# Patient Record
Sex: MALE | Race: WHITE | Employment: OTHER | URBAN - METROPOLITAN AREA
[De-identification: names, ages, dates, MRNs, and addresses within clinical notes are randomized per-mention and may not be internally consistent; named-entity substitution may affect disease eponyms.]

---

## 2017-09-24 ENCOUNTER — HOSPITAL ENCOUNTER (INPATIENT)
Facility: CLINIC | Age: 42
LOS: 15 days | Discharge: HOME | DRG: 885 | End: 2017-10-10
Attending: FAMILY MEDICINE | Admitting: PSYCHIATRY & NEUROLOGY
Payer: MEDICARE

## 2017-09-24 DIAGNOSIS — Z72.0 TOBACCO USE: Primary | ICD-10-CM

## 2017-09-24 DIAGNOSIS — F41.1 GAD (GENERALIZED ANXIETY DISORDER): ICD-10-CM

## 2017-09-24 DIAGNOSIS — F31.2 BIPOLAR AFFECTIVE DISORDER, MANIC, SEVERE, WITH PSYCHOTIC BEHAVIOR (H): ICD-10-CM

## 2017-09-24 DIAGNOSIS — F19.10 POLYSUBSTANCE ABUSE (H): ICD-10-CM

## 2017-09-24 LAB
ALBUMIN SERPL-MCNC: 3.8 G/DL (ref 3.4–5)
ALP SERPL-CCNC: 78 U/L (ref 40–150)
ALT SERPL W P-5'-P-CCNC: 23 U/L (ref 0–70)
AMPHETAMINES UR QL SCN: NEGATIVE
ANION GAP SERPL CALCULATED.3IONS-SCNC: 9 MMOL/L (ref 3–14)
AST SERPL W P-5'-P-CCNC: 17 U/L (ref 0–45)
BARBITURATES UR QL: NEGATIVE
BASOPHILS # BLD AUTO: 0 10E9/L (ref 0–0.2)
BASOPHILS NFR BLD AUTO: 0.4 %
BENZODIAZ UR QL: POSITIVE
BILIRUB SERPL-MCNC: 0.6 MG/DL (ref 0.2–1.3)
BUN SERPL-MCNC: 8 MG/DL (ref 7–30)
CALCIUM SERPL-MCNC: 8.7 MG/DL (ref 8.5–10.1)
CANNABINOIDS UR QL SCN: POSITIVE
CHLORIDE SERPL-SCNC: 110 MMOL/L (ref 94–109)
CO2 SERPL-SCNC: 25 MMOL/L (ref 20–32)
COCAINE UR QL: POSITIVE
CREAT SERPL-MCNC: 0.95 MG/DL (ref 0.66–1.25)
DIFFERENTIAL METHOD BLD: NORMAL
EOSINOPHIL # BLD AUTO: 0.2 10E9/L (ref 0–0.7)
EOSINOPHIL NFR BLD AUTO: 2 %
ERYTHROCYTE [DISTWIDTH] IN BLOOD BY AUTOMATED COUNT: 13.1 % (ref 10–15)
ETHANOL UR QL SCN: NEGATIVE
GFR SERPL CREATININE-BSD FRML MDRD: 88 ML/MIN/1.7M2
GLUCOSE SERPL-MCNC: 136 MG/DL (ref 70–99)
HCT VFR BLD AUTO: 42.3 % (ref 40–53)
HGB BLD-MCNC: 14.2 G/DL (ref 13.3–17.7)
IMM GRANULOCYTES # BLD: 0 10E9/L (ref 0–0.4)
IMM GRANULOCYTES NFR BLD: 0.2 %
LITHIUM SERPL-SCNC: 0.5 MMOL/L (ref 0.6–1.2)
LYMPHOCYTES # BLD AUTO: 2.5 10E9/L (ref 0.8–5.3)
LYMPHOCYTES NFR BLD AUTO: 25.6 %
MCH RBC QN AUTO: 29.3 PG (ref 26.5–33)
MCHC RBC AUTO-ENTMCNC: 33.6 G/DL (ref 31.5–36.5)
MCV RBC AUTO: 87 FL (ref 78–100)
MONOCYTES # BLD AUTO: 0.8 10E9/L (ref 0–1.3)
MONOCYTES NFR BLD AUTO: 8.2 %
NEUTROPHILS # BLD AUTO: 6.2 10E9/L (ref 1.6–8.3)
NEUTROPHILS NFR BLD AUTO: 63.6 %
NRBC # BLD AUTO: 0 10*3/UL
NRBC BLD AUTO-RTO: 0 /100
OPIATES UR QL SCN: NEGATIVE
PLATELET # BLD AUTO: 195 10E9/L (ref 150–450)
POTASSIUM SERPL-SCNC: 3.6 MMOL/L (ref 3.4–5.3)
PROT SERPL-MCNC: 7.2 G/DL (ref 6.8–8.8)
RBC # BLD AUTO: 4.85 10E12/L (ref 4.4–5.9)
SODIUM SERPL-SCNC: 144 MMOL/L (ref 133–144)
WBC # BLD AUTO: 9.8 10E9/L (ref 4–11)

## 2017-09-24 PROCEDURE — 99284 EMERGENCY DEPT VISIT MOD MDM: CPT | Mod: Z6 | Performed by: FAMILY MEDICINE

## 2017-09-24 PROCEDURE — A9270 NON-COVERED ITEM OR SERVICE: HCPCS | Mod: GY | Performed by: FAMILY MEDICINE

## 2017-09-24 PROCEDURE — 99285 EMERGENCY DEPT VISIT HI MDM: CPT | Mod: 25 | Performed by: FAMILY MEDICINE

## 2017-09-24 PROCEDURE — 25000132 ZZH RX MED GY IP 250 OP 250 PS 637: Mod: GY | Performed by: EMERGENCY MEDICINE

## 2017-09-24 PROCEDURE — 85025 COMPLETE CBC W/AUTO DIFF WBC: CPT | Performed by: FAMILY MEDICINE

## 2017-09-24 PROCEDURE — 90791 PSYCH DIAGNOSTIC EVALUATION: CPT

## 2017-09-24 PROCEDURE — 80307 DRUG TEST PRSMV CHEM ANLYZR: CPT | Performed by: NURSE PRACTITIONER

## 2017-09-24 PROCEDURE — 25000132 ZZH RX MED GY IP 250 OP 250 PS 637: Mod: GY | Performed by: FAMILY MEDICINE

## 2017-09-24 PROCEDURE — 80320 DRUG SCREEN QUANTALCOHOLS: CPT | Performed by: NURSE PRACTITIONER

## 2017-09-24 PROCEDURE — 80053 COMPREHEN METABOLIC PANEL: CPT | Performed by: FAMILY MEDICINE

## 2017-09-24 PROCEDURE — A9270 NON-COVERED ITEM OR SERVICE: HCPCS | Mod: GY | Performed by: EMERGENCY MEDICINE

## 2017-09-24 PROCEDURE — 80178 ASSAY OF LITHIUM: CPT | Performed by: FAMILY MEDICINE

## 2017-09-24 RX ORDER — LITHIUM CARBONATE 300 MG/1
1200 TABLET, FILM COATED, EXTENDED RELEASE ORAL AT BEDTIME
Status: DISCONTINUED | OUTPATIENT
Start: 2017-09-24 | End: 2017-09-25

## 2017-09-24 RX ORDER — LORAZEPAM 0.5 MG/1
0.5 TABLET ORAL EVERY 4 HOURS PRN
Status: ON HOLD | COMMUNITY
Start: 2017-06-15 | End: 2017-10-10

## 2017-09-24 RX ORDER — LITHIUM CARBONATE 300 MG/1
1200 TABLET, FILM COATED, EXTENDED RELEASE ORAL AT BEDTIME
Status: ON HOLD | COMMUNITY
Start: 2017-06-15 | End: 2017-10-10

## 2017-09-24 RX ORDER — DIPHENHYDRAMINE HCL 50 MG
50 CAPSULE ORAL ONCE
Status: COMPLETED | OUTPATIENT
Start: 2017-09-24 | End: 2017-09-24

## 2017-09-24 RX ORDER — OLANZAPINE 5 MG/1
10 TABLET, ORALLY DISINTEGRATING ORAL ONCE
Status: COMPLETED | OUTPATIENT
Start: 2017-09-24 | End: 2017-09-24

## 2017-09-24 RX ADMIN — OLANZAPINE 10 MG: 5 TABLET, ORALLY DISINTEGRATING ORAL at 16:05

## 2017-09-24 RX ADMIN — LITHIUM CARBONATE 1200 MG: 300 TABLET, EXTENDED RELEASE ORAL at 22:26

## 2017-09-24 RX ADMIN — DIPHENHYDRAMINE HYDROCHLORIDE 50 MG: 50 CAPSULE ORAL at 16:05

## 2017-09-24 ASSESSMENT — ENCOUNTER SYMPTOMS
ABDOMINAL PAIN: 0
FEVER: 0
DYSPHORIC MOOD: 1
AGITATION: 1
NERVOUS/ANXIOUS: 1
SHORTNESS OF BREATH: 0

## 2017-09-24 NOTE — ED NOTES
Son here to  keys, Keys not in pa=tient's belonging bag. Called [place] [facility] [phone] and was referred to Property room [phone]. Unable to reach any one, on hold for 15 minutes. Will pass on to call in am and follow up with patients keys.

## 2017-09-24 NOTE — ED PROVIDER NOTES
History     Chief Complaint   Patient presents with     Delusional     manic, off meds     Homicidal     threatened to harm hospital staff     HPI  Tyler Case is a 41 year old male who is brought to the emergency room after having made threats to family members and in general against many of his health care providers including the activity.  Patient has a history of bipolar disorder with psychotic symptoms he has also had intermittent drug abuse history and admits to having recently used some cocaine along with being noncompliant with his current medication regimen.  Currently patient is supposed to be taking lithium but has not done so and has had an escalation of his behaviors over the last 2-3 weeks.  Patient had previously been under commitment however currently is no longer under commitment.  Patient on arrival here to the emergency room was verbally aggressive towards staff and stating that his family and all health care providers for part of a group managed by [facility] out to get him.  Patient has a lot of Yazidism preoccupation is delusional paranoid and has made threats of harm against his family.    I have reviewed the Medications, Allergies, Past Medical and Surgical History, and Social History in the Epic system.    PERSONAL MEDICAL HISTORY  Past Medical History:   Diagnosis Date     Bipolar 1 disorder (H) 8/5/2013     Uses marijuana 10/17/2013     PAST SURGICAL HISTORY  Past Surgical History:   Procedure Laterality Date     leg fractures  2002     FAMILY HISTORY  Family History   Problem Relation Age of Onset     DIABETES Maternal Grandmother      Hypertension Paternal Grandfather      CEREBROVASCULAR DISEASE Paternal Grandmother      Alcohol/Drug Mother      Alcohol/Drug Father      Alcohol/Drug Maternal Grandmother      Alcohol/Drug Maternal Grandfather      Alcohol/Drug Paternal Grandmother      Alcohol/Drug Paternal Grandfather      Alcohol/Drug Sister      Alcohol/Drug Brother      Alzheimer  Disease Maternal Grandmother      Arthritis Maternal Grandmother      Arthritis Maternal Grandfather      Depression Mother      Depression Father      Depression Maternal Grandmother      Depression Maternal Grandfather      Eye Disorder Mother      Eye Disorder Father      Eye Disorder Maternal Grandmother      Eye Disorder Maternal Grandfather      Eye Disorder Paternal Grandmother      Eye Disorder Paternal Grandfather      Eye Disorder Sister      Eye Disorder Brother      GASTROINTESTINAL DISEASE Mother      GASTROINTESTINAL DISEASE Father      GASTROINTESTINAL DISEASE Maternal Grandmother      GASTROINTESTINAL DISEASE Maternal Grandfather      GASTROINTESTINAL DISEASE Paternal Grandmother      GASTROINTESTINAL DISEASE Paternal Grandfather      GASTROINTESTINAL DISEASE Sister      GASTROINTESTINAL DISEASE Brother      Neurologic Disorder Mother      Neurologic Disorder Father      Neurologic Disorder Maternal Grandmother      Neurologic Disorder Maternal Grandfather      Neurologic Disorder Paternal Grandmother      Neurologic Disorder Paternal Grandfather      Neurologic Disorder Sister      Neurologic Disorder Brother      Lipids Maternal Grandfather      Lipids Paternal Grandmother      OSTEOPOROSIS Maternal Grandmother      OSTEOPOROSIS Paternal Grandmother      Psychotic Disorder Mother      Psychotic Disorder Father      Psychotic Disorder Maternal Grandmother      Psychotic Disorder Maternal Grandfather      Psychotic Disorder Paternal Grandmother      Psychotic Disorder Paternal Grandfather      Psychotic Disorder Sister      Psychotic Disorder Brother      SOCIAL HISTORY  Social History   Substance Use Topics     Smoking status: Current Every Day Smoker     Types: Cigarettes, Pipe     Smokeless tobacco: Current User     Types: Snuff, Chew     Alcohol use No     MEDICATIONS  Current Facility-Administered Medications   Medication     OLANZapine zydis (zyPREXA) ODT tab 10 mg     diphenhydrAMINE  (BENADRYL) capsule 50 mg     Current Outpatient Prescriptions   Medication     OLANZapine (ZYPREXA) 5 MG tablet     risperiDONE microspheres (RISPERDAL CONSTA) 25 MG injection     risperiDONE (RISPERDAL) 2 MG tablet     ALLERGIES  Allergies   Allergen Reactions     Abilify Discmelt      Suicidal thoughts       Compazine Other (See Comments)     Seizure          Review of Systems   Constitutional: Negative for fever.   Respiratory: Negative for shortness of breath.    Cardiovascular: Negative for chest pain.   Gastrointestinal: Negative for abdominal pain.   Psychiatric/Behavioral: Positive for agitation, behavioral problems and dysphoric mood. The patient is nervous/anxious.    All other systems reviewed and are negative.      Physical Exam   BP: 137/82  Pulse: 86  Temp: 98.8  F (37.1  C)  Resp: 16  Weight: 78.5 kg (173 lb)  SpO2: 96 %  Physical Exam   Constitutional: No distress.   HENT:   Head: Atraumatic.   Mouth/Throat: Oropharynx is clear and moist. No oropharyngeal exudate.   Eyes: Pupils are equal, round, and reactive to light. No scleral icterus.   Cardiovascular: Normal heart sounds and intact distal pulses.    Pulmonary/Chest: Breath sounds normal. No respiratory distress.   Abdominal: Soft. Bowel sounds are normal. There is no tenderness.   Musculoskeletal: He exhibits no edema or tenderness.   Neurological: He is alert. No cranial nerve deficit. He exhibits normal muscle tone. Coordination normal.   Skin: Skin is warm. No rash noted. He is not diaphoretic.   Psychiatric: His mood appears anxious. His affect is angry and labile. His speech is rapid and/or pressured and tangential. He is agitated and aggressive. Thought content is paranoid and delusional. He expresses impulsivity. He expresses homicidal ideation. He expresses no suicidal ideation.       ED Course     ED Course     Procedures     Patient was seen by the  please refer to their report in the notes section of the Epic chart dated  9/24/17      Critical Care time:  none       Assessments & Plan (with Medical Decision Making)           I have reviewed the nursing notes.    I have reviewed the findings, diagnosis, plan and need for follow up with the patient.  Patient with bipolar affective disorder history question of possible PTSD as well as drug-induced psychosis in combination with possible polar affective disorder with psychotic features.  Patient presents today with Mormon preoccupation threatening aggressive behaviors it sounds as though the patient has been medication noncompliant and possibly using illicit drugs lab evaluation was pending at time of this dictation patient was discussed with both family members as well as the on-call psychiatrist for his act team at [phone] patient at this time will be placed on a 72 hour hold for admission to the hospital for further evaluation and treatment.  The patient has at times been verbally aggressive he has not acted out physically against staff we will be watching him very closely if in fact he is threatening towards staff he will require restraints occlusion but until that time will remain in the  psychiatric rooms in the emergency room until a bed becomes available upstairs.    New Prescriptions    No medications on file       Final diagnoses:   Bipolar affective disorder, manic, severe, with psychotic behavior (H)   Polysubstance abuse       9/24/2017   [facility], [place], EMERGENCY DEPARTMENT     Barry Oneill MD  09/24/17 8410

## 2017-09-24 NOTE — ED NOTES
Pt came in and was placed in hallway.  Psych Associate noticed that patient was recording.  Informed pt that recording was a hippa violation and that hospital staff was going to take his phone.  Pt became agitated.  Phone was taken.  Pt swore at staff.  Moved to room 17.

## 2017-09-24 NOTE — IP AVS SNAPSHOT
[address] [address]    Phone:  [phone]                                       After Visit Summary   9/24/2017    Tyler Case    MRN: [id number]           After Visit Summary Signature Page     I have received my discharge instructions, and my questions have been answered. I have discussed any challenges I see with this plan with the nurse or doctor.    ..........................................................................................................................................  Patient/Patient Representative Signature      ..........................................................................................................................................  Patient Representative Print Name and Relationship to Patient    ..................................................               ................................................  Date                                            Time    ..........................................................................................................................................  Reviewed by Signature/Title    ...................................................              ..............................................  Date                                                            Time

## 2017-09-24 NOTE — ED NOTES
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  You think I trust you  That I'm just gonna do what you tell me. You're not gonna drain me.  You're not gonna infect me with your satanic ways.  I'm not gonna let you hypnotize me.\"    \"How's it feel to be a double agent?  How's it feel to sell your berothers and sisters off to satan.  Your pedophilic ways.  Hey you know profiting off the death of your people is call slavery and sacrilidge.  And all you do is try to sit there and hypnotize us.\"    \"Every minute you keep me waiting is another minute of your millions of dollars that I'm going to sew you for profiting off my people.\"    \"Filthy, sick-a*s, pieces of s*it.\"    Pt is making no sense, being verbally aggressive.  "

## 2017-09-24 NOTE — PHARMACY-ADMISSION MEDICATION HISTORY
Admission medication history interview status for the September 24, 2017 admission is complete. See Epic admission navigator for allergy information, pharmacy, prior to admission medications and immunization status.     Medication history interview sources:  Patient, [facility] discharge pharmacy ([phone])    Medication compliance: poor - per pharmacy records patient has not refilled his medications since June 2017    Changes made to PTA medication list (reason)  Added: lithium, lorazepam, lidocaine  Deleted:   - olanzapine 5 mg at bedtime (discontinued)  - risperidone 2 mg twice daily (discontinued)  - Risperdal Consta 25 mg IM every 14 days (discontinued)  Changed: none    Additional medication history information (including reliability of information, actions taken by pharmacist):  - Attempted to interview patient, but he quickly became agitated after his son came in room the room and made a comment about the his keys. Unable to gather further information from the patient.   - [facility] was listed as the patient's pharmacy, however they have not filled anything since 2013.   - [facility] discharge pharmacy filled the medications listed below for 30 day supply on 6/15/17. They have not refilled anything since.     Prior to Admission medications    Medication Sig Last Dose Taking? Auth Provider   lidocaine (XYLOCAINE) 2 % topical gel Apply topically to left knee three times per day Unknown  Unknown, Entered By History   lithium (ESKALITH/LITHOBID) 300 MG CR tablet Take 1,200 mg by mouth At Bedtime Unknown  Unknown, Entered By History   LORazepam (ATIVAN) 0.5 MG tablet Take 0.5 mg by mouth every 4 hours as needed for anxiety Unknown  Unknown, Entered By History       Time spent: 15 minutes    Medication history completed by:   Cindy Sepulveda, AnselmoD

## 2017-09-24 NOTE — IP AVS SNAPSHOT
MRN:[id number]                      After Visit Summary   9/24/2017    Tyler Case    MRN: [id number]           Thank you!     Thank you for choosing [facility] for your care. Our goal is always to provide you with excellent care.        Patient Information     Date Of Birth          1975        Designated Caregiver       Most Recent Value    Caregiver    Will someone help with your care after discharge? no      About your hospital stay     You were admitted on:  September 25, 2017 You last received care in the:  [facility] 10[facility]    You were discharged on:  October 10, 2017       Who to Call     For medical emergencies, please call 911.  For non-urgent questions about your medical care, please call your primary care provider or clinic, [phone]          Attending Provider     Provider Specialty    Milan Toro MD Emergency Medicine    Cara, Ismael Gipson MD Emergency Medicine    Nino, Barry Bush MD Emergency Medicine    Joseph, Pablo Mayers MD Psychiatry    Bridget, Abdelrahman Hanson MD Psychiatry    Ashok, Lambert Ortega MD Psychiatry       Primary Care Provider Office Phone # Fax #    Radha MurraySCARLETT [phone]      Further instructions from your care team        Behavioral Discharge Planning and Instructions      Summary:  You were admitted on 9/24/2017  due to Disorganized Thinking/Behaviors, Delusional Thoughts and Psychotic Symptomology.  You were treated by Dr. Abdelrahman Gill MD and Dr. Lambert Pulido and discharged on 10/10/17 from Station 10 to Home  You were dually committed to the [facility] and the Commissioner of Human Services on 05/22/2017.  You are being discharged on a Provisional Discharge Agreement which shall remain in effect for the duration of the Commitment.  Your Commitment expires on 04/3/2018.    You were also court ordered to take the medications the doctor ordered for you.     Principal  Diagnosis:  Bipolar affective disorder, manic, severe, with psychotic behavior (H)  Polysubstance abuse      Health Care Follow-up Appointments:   - ACT Team - Psychiatrist comes to your home w/ the ACT team  Outpatient Psychiatrist:  Dr. Lydia Vasquez  Phone: [phone]  Fax: [phone] HUC TO FAX DISCHARGE INSTRUCTIONS      - [facility] :   Dayna Palafox   Phone: [phone]    Attend all scheduled appointments with your outpatient providers. Call at least 24 hours in advance if you need to reschedule an appointment to ensure continued access to your outpatient providers.   Major Treatments, Procedures and Findings:  You were provided with: a psychiatric assessment, assessed for medical stability, medication evaluation and/or management and milieu management    Symptoms to Report: feeling more aggressive, increased confusion, losing more sleep, mood getting worse or thoughts of suicide    Early warning signs can include: increased depression or anxiety sleep disturbances increased thoughts or behaviors of suicide or self-harm  increased unusual thinking, such as paranoia or hearing voices    Safety and Wellness:  Take all medicines as directed.  Make no changes unless your doctor suggests them.      Follow treatment recommendations.  Refrain from alcohol and non-prescribed drugs.  If there is a concern for safety, call 911.    Resources:   Crisis Intervention: 663.256.6530 or 254-341-3029 (TTY: 467.300.5461).  Call anytime for help.  [place] on Mental Illness (www.mn.luis.org): 747.738.1952 or 774-162-0131.  [place] Association for Children's Mental Health (www.macmh.org): 120.793.8585.  Alcoholics Anonymous (www.alcoholics-anonymous.org): Check your phone book for your local chapter.  Suicide Awareness Voices of Education (SAVE) (www.save.org): 739-132-AUXA (3526)  National Suicide Prevention Line (www.mentalhealthmn.org): 294-074-GPWQ (6101)  Mental Health Consumer/Survivor Network  "of [place] (www.mhcsn.net): 665-265-2438 or 748-586-0781  Mental Health Association of [place] (www.mentalhealth.org): 586.359.8967 or 494-889-1559  Self- Management and Recovery Training., SMART-- Toll free: 534.279.5703  www.JayCut.[employer] First  [facility] Crisis (COPE) Response - Adult [phone]  [facility] Crisis Response - Adult [phone]    The treatment team has appreciated the opportunity to work with you. If you have any questions or concerns our unit number is [phone].          Pending Results     Date and Time Order Name Status Description    10/10/2017 1141 Prolactin In process             Admission Information     Date & Time Provider Department Dept. Phone    2017 Lambert Pulido MD [facility] [phone]      Your Vitals Were     Blood Pressure Pulse Temperature Respirations Height Weight    131/91 87 98.6  F (37  C) (Oral) 16 1.676 m (5' 6\") 79.9 kg (176 lb 1.6 oz)    Pulse Oximetry BMI (Body Mass Index)                98% 28.42 kg/m2          MyChart Information     [facility] lets you send messages to your doctor, view your test results, renew your prescriptions, schedule appointments and more. To sign up, go to [email or web address] . Click on \"Log in\" on the left side of the screen, which will take you to the Welcome page. Then click on \"Sign up Now\" on the right side of the page.     You will be asked to enter the access code listed below, as well as some personal information. Please follow the directions to create your username and password.     Your access code is: [email or web address]  Expires: 2018 10:09 AM     Your access code will  in 90 days. If you need help or a new code, please call your [facility] clinic or [phone].        Care EveryWhere ID     This is your Care EveryWhere ID. This could be used by other organizations to access your [facility] medical records  [id number]        Equal Access to Services     CM GREEN AH: Hadfelicita Padilla " kelly eileen joelolivia vargas, tuan broderickaan ah. So [facility] [phone].    ATENCIÓN: Si avis hernandez, tiene a sarabia disposición servicios gratuitos de asistencia lingüística. Aime al [phone].    We comply with applicable federal civil rights laws and [place] laws. We do not discriminate on the basis of race, color, national origin, age, disability, sex, sexual orientation, or gender identity.               Review of your medicines      START taking        Dose / Directions    clonazePAM 1 MG tablet   Commonly known as:  klonoPIN        Dose:  1 mg   Take 1 tablet (1 mg) by mouth daily as needed for anxiety   Quantity:  3 tablet   Refills:  0       hydrOXYzine 25 MG tablet   Commonly known as:  ATARAX   Used for:  Bipolar affective disorder, manic, severe, with psychotic behavior (H)        Dose:  50 mg   Take 2 tablets (50 mg) by mouth 3 times daily as needed for anxiety   Quantity:  9 tablet   Refills:  0       nicotine 21 MG/24HR 24 hr patch   Commonly known as:  NICODERM CQ        Dose:  1 patch   Place 1 patch onto the skin daily   Quantity:  3 patch   Refills:  0       nicotine polacrilex 4 MG gum   Commonly known as:  NICORETTE        Dose:  8 mg   Place 2 each (8 mg) inside cheek every hour as needed for other (nicotine withdrawal symptoms)   Quantity:  6 tablet   Refills:  0       risperiDONE 1 MG tablet   Commonly known as:  risperDAL   Used for:  Bipolar affective disorder, manic, severe, with psychotic behavior (H)        Dose:  1 mg   Take 1 tablet (1 mg) by mouth daily   Quantity:  3 tablet   Refills:  0         CONTINUE these medicines which may have CHANGED, or have new prescriptions. If we are uncertain of the size of tablets/capsules you have at home, strength may be listed as something that might have changed.        Dose / Directions    lithium 300 MG CR tablet   Commonly known as:  ESKALITH/LITHOBID   This may have changed:  how much to take   Used for:   Bipolar affective disorder, manic, severe, with psychotic behavior (H)        Dose:  1500 mg   Take 5 tablets (1,500 mg) by mouth At Bedtime   Quantity:  15 tablet   Refills:  0         CONTINUE these medicines which have NOT CHANGED        Dose / Directions    lidocaine 2 % topical gel   Commonly known as:  XYLOCAINE        Apply topically to left knee three times per day   Refills:  0         STOP taking     LORazepam 0.5 MG tablet   Commonly known as:  ATIVAN                Where to get your medicines      These medications were sent to [facility]/pharmacy #[id number] - [place], [place] - [address], [place] [address]     Phone:  [phone]     hydrOXYzine 25 MG tablet    lithium 300 MG CR tablet    nicotine 21 MG/24HR 24 hr patch    nicotine polacrilex 4 MG gum    risperiDONE 1 MG tablet         Some of these will need a paper prescription and others can be bought over the counter. Ask your nurse if you have questions.     Bring a paper prescription for each of these medications     clonazePAM 1 MG tablet                Protect others around you: Learn how to safely use, store and throw away your medicines at www.disposemymeds.org.             Medication List: This is a list of all your medications and when to take them. Check marks below indicate your daily home schedule. Keep this list as a reference.      Medications           Morning Afternoon Evening Bedtime As Needed    clonazePAM 1 MG tablet   Commonly known as:  klonoPIN   Take 1 tablet (1 mg) by mouth daily as needed for anxiety                                hydrOXYzine 25 MG tablet   Commonly known as:  ATARAX   Take 2 tablets (50 mg) by mouth 3 times daily as needed for anxiety   Last time this was given:  50 mg on 10/10/2017  5:17 AM                                lidocaine 2 % topical gel   Commonly known as:  XYLOCAINE   Apply topically to left knee three times per day                                lithium 300 MG CR tablet    Commonly known as:  ESKALITH/LITHOBID   Take 5 tablets (1,500 mg) by mouth At Bedtime   Last time this was given:  1,500 mg on 10/9/2017  8:35 PM                                nicotine 21 MG/24HR 24 hr patch   Commonly known as:  NICODERM CQ   Place 1 patch onto the skin daily   Last time this was given:  1 patch on 10/10/2017  9:09 AM                                nicotine polacrilex 4 MG gum   Commonly known as:  NICORETTE   Place 2 each (8 mg) inside cheek every hour as needed for other (nicotine withdrawal symptoms)   Last time this was given:  8 mg on 10/4/2017  7:50 PM                                risperiDONE 1 MG tablet   Commonly known as:  risperDAL   Take 1 tablet (1 mg) by mouth daily   Last time this was given:  1 mg on 10/10/2017  9:09 AM

## 2017-09-24 NOTE — LETTER
Date: 10/9/2017    Agreement of Discharge Plan:    1.) ACT team is allowed to meet with patient daily, to be observed to take his medications (Lithium and Risperdal)    2.) ACT team is allowed to meet with him 1-2 times a month for any future long acting injectable medications administration.    3.) If patient refuses medications or is not able to been seen, then he has a period of 5 days to be seen. If he is not seen within those 5 days for medications, then he will be brought back to the hospital.           Doctor:  Lambert Pulido MD _______________________      Patient:  Tyler Case _________________________

## 2017-09-24 NOTE — ED NOTES
Pt was asleep in room 17 and sitting in chair.  Able to wake pt up after repeatedly tapping pt on the arm.

## 2017-09-24 NOTE — ED NOTES
Son, Khadar, came to  keys.    Son left key-less.  No keys were noted on security hold sheet.  Pt states that keys were on a red lanyard.  Psych Associate does not recall pt coming in with red lanyard.

## 2017-09-25 PROBLEM — F29 PSYCHOSIS (H): Status: ACTIVE | Noted: 2017-09-25

## 2017-09-25 PROCEDURE — 12400003 ZZH R&B MH CRITICAL UMMC

## 2017-09-25 RX ORDER — HYDROXYZINE HYDROCHLORIDE 25 MG/1
25-50 TABLET, FILM COATED ORAL EVERY 4 HOURS PRN
Status: DISCONTINUED | OUTPATIENT
Start: 2017-09-25 | End: 2017-10-10 | Stop reason: HOSPADM

## 2017-09-25 RX ORDER — ALUMINA, MAGNESIA, AND SIMETHICONE 2400; 2400; 240 MG/30ML; MG/30ML; MG/30ML
30 SUSPENSION ORAL EVERY 4 HOURS PRN
Status: DISCONTINUED | OUTPATIENT
Start: 2017-09-25 | End: 2017-10-10 | Stop reason: HOSPADM

## 2017-09-25 RX ORDER — OLANZAPINE 10 MG/1
10 TABLET, ORALLY DISINTEGRATING ORAL ONCE
Status: DISCONTINUED | OUTPATIENT
Start: 2017-09-25 | End: 2017-09-28 | Stop reason: CLARIF

## 2017-09-25 RX ORDER — OLANZAPINE 10 MG/2ML
10 INJECTION, POWDER, FOR SOLUTION INTRAMUSCULAR
Status: DISCONTINUED | OUTPATIENT
Start: 2017-09-25 | End: 2017-10-10 | Stop reason: HOSPADM

## 2017-09-25 RX ORDER — LORAZEPAM 0.5 MG/1
0.5 TABLET ORAL 3 TIMES DAILY PRN
Status: DISCONTINUED | OUTPATIENT
Start: 2017-09-25 | End: 2017-10-02

## 2017-09-25 RX ORDER — NICOTINE 21 MG/24HR
1 PATCH, TRANSDERMAL 24 HOURS TRANSDERMAL DAILY
Status: DISCONTINUED | OUTPATIENT
Start: 2017-09-25 | End: 2017-10-10 | Stop reason: HOSPADM

## 2017-09-25 RX ORDER — OLANZAPINE 10 MG/1
10 TABLET ORAL
Status: DISCONTINUED | OUTPATIENT
Start: 2017-09-25 | End: 2017-10-10 | Stop reason: HOSPADM

## 2017-09-25 RX ORDER — ACETAMINOPHEN 325 MG/1
650 TABLET ORAL EVERY 4 HOURS PRN
Status: DISCONTINUED | OUTPATIENT
Start: 2017-09-25 | End: 2017-10-10 | Stop reason: HOSPADM

## 2017-09-25 RX ORDER — TRAZODONE HYDROCHLORIDE 50 MG/1
50 TABLET, FILM COATED ORAL
Status: DISCONTINUED | OUTPATIENT
Start: 2017-09-25 | End: 2017-10-10 | Stop reason: HOSPADM

## 2017-09-25 RX ORDER — LITHIUM CARBONATE 300 MG/1
1200 TABLET, FILM COATED, EXTENDED RELEASE ORAL AT BEDTIME
Status: DISCONTINUED | OUTPATIENT
Start: 2017-09-25 | End: 2017-10-09 | Stop reason: DRUGHIGH

## 2017-09-25 ASSESSMENT — ACTIVITIES OF DAILY LIVING (ADL)
ORAL_HYGIENE: INDEPENDENT
GROOMING: INDEPENDENT
DRESS: INDEPENDENT

## 2017-09-25 NOTE — ED NOTES
41-year-old male with bipolar disorder being admitted to psychiatry after making threatening statements to others.    Tyler is on a 72 hour hold awaiting admission to psychiatry.     Osbaldo Sam MD  09/25/17 0753

## 2017-09-25 NOTE — ED NOTES
"Pt woke, asked for juice. He stated, \"I haven't slept since this whole thing started. This is what I do for you.\" as he was drinking his juice.  "

## 2017-09-25 NOTE — ED NOTES
"Pt woke, called out for nurse. He asked for fluids and they were provided to him. When asked what he wanted for breakfast, patient stated \"This place is fucked, this hospital is fucked, the whole system is fucked! They're dumping all their toxic pharmaceuticals into [place] killing off the animals.\" Pt confirmed that he does not want breakfast.  "

## 2017-09-25 NOTE — PROGRESS NOTES
"   09/25/17 1727   Patient Belongings   Patient Belongings cell phone/electronics;clothing;jewelry;necklace;money (see comment);ring;shoes   Disposition of Belongings Pt locker, security    Belongings Search Yes   Clothing Search Yes   Second Staff Jermaine    Locker Items:   Sandals  Belt  Marijuana shirt  [place]   Cell phone  Necklace x3  [place] DL  Ring  $1.00    Security Items envelope #[id number]  Paypal mastercard  [facility] visa  Passport    Fred Mccall  9/25/2017    See addendum of 9/30/17 that is entered below.  ADMISSION:  I am responsible for any personal items that are not sent to the safe or pharmacy. [facility] is not responsible for loss, theft or damage of any property in my possession.    Patient Signature _____________________ Date/Time _____________________    Staff Signature _______________________ Date/Time _____________________    2nd Staff person, if patient is unable/unwilling to sign  ___________________________________ Date/Time _____________________  DISCHARGE:  All personal items have been returned to me.    Patient Signature _____________________ Date/Time _____________________    Staff Signature _______________________ Date/Time _____________________    Patient requested that he be allowed to wear his \"pinky\" ring.  He was already wearing another ring so he was given the \"pinky\" ring that was in his locker.  "

## 2017-09-26 LAB
CHOLEST SERPL-MCNC: 164 MG/DL
HDLC SERPL-MCNC: 38 MG/DL
LDLC SERPL CALC-MCNC: 69 MG/DL
NONHDLC SERPL-MCNC: 126 MG/DL
T4 FREE SERPL-MCNC: 0.87 NG/DL (ref 0.76–1.46)
TRIGL SERPL-MCNC: 284 MG/DL
TSH SERPL DL<=0.005 MIU/L-ACNC: 4.62 MU/L (ref 0.4–4)

## 2017-09-26 PROCEDURE — 25000132 ZZH RX MED GY IP 250 OP 250 PS 637: Mod: GY | Performed by: PSYCHIATRY & NEUROLOGY

## 2017-09-26 PROCEDURE — 80061 LIPID PANEL: CPT | Performed by: PSYCHIATRY & NEUROLOGY

## 2017-09-26 PROCEDURE — 84443 ASSAY THYROID STIM HORMONE: CPT | Performed by: PSYCHIATRY & NEUROLOGY

## 2017-09-26 PROCEDURE — 84439 ASSAY OF FREE THYROXINE: CPT | Performed by: PSYCHIATRY & NEUROLOGY

## 2017-09-26 PROCEDURE — A9270 NON-COVERED ITEM OR SERVICE: HCPCS | Mod: GY | Performed by: PSYCHIATRY & NEUROLOGY

## 2017-09-26 PROCEDURE — 97150 GROUP THERAPEUTIC PROCEDURES: CPT | Mod: GO

## 2017-09-26 PROCEDURE — 12400003 ZZH R&B MH CRITICAL UMMC

## 2017-09-26 PROCEDURE — 99207 ZZC DOWN CODE DUE TO SUBSEQUENT EXAM: CPT | Performed by: PSYCHIATRY & NEUROLOGY

## 2017-09-26 PROCEDURE — 36415 COLL VENOUS BLD VENIPUNCTURE: CPT | Performed by: PSYCHIATRY & NEUROLOGY

## 2017-09-26 PROCEDURE — 99207 ZZC CDG-HISTORY COMP: MEETS EXP. PROBLEM FOCUSED-DOWN CODED LACK OF ROS: CPT | Performed by: PSYCHIATRY & NEUROLOGY

## 2017-09-26 PROCEDURE — 99221 1ST HOSP IP/OBS SF/LOW 40: CPT | Mod: AI | Performed by: PSYCHIATRY & NEUROLOGY

## 2017-09-26 RX ADMIN — ACETAMINOPHEN 650 MG: 325 TABLET, FILM COATED ORAL at 13:03

## 2017-09-26 RX ADMIN — HYDROXYZINE HYDROCHLORIDE 50 MG: 25 TABLET ORAL at 13:03

## 2017-09-26 RX ADMIN — NICOTINE 1 PATCH: 21 PATCH, EXTENDED RELEASE TRANSDERMAL at 09:18

## 2017-09-26 RX ADMIN — NICOTINE 1 PATCH: 21 PATCH, EXTENDED RELEASE TRANSDERMAL at 00:02

## 2017-09-26 RX ADMIN — LORAZEPAM 0.5 MG: 0.5 TABLET ORAL at 13:03

## 2017-09-26 ASSESSMENT — ACTIVITIES OF DAILY LIVING (ADL)
LAUNDRY: UNABLE TO COMPLETE
DRESS: SCRUBS (BEHAVIORAL HEALTH)
ORAL_HYGIENE: INDEPENDENT
LAUNDRY: UNABLE TO COMPLETE
GROOMING: INDEPENDENT
DRESS: SCRUBS (BEHAVIORAL HEALTH)
GROOMING: INDEPENDENT
ORAL_HYGIENE: INDEPENDENT

## 2017-09-26 NOTE — PROGRESS NOTES
Talked about questions being repeated and that he would be asked to take medications and this just had to be done, not to get upset because of it.  Did state he did not want mssa done but would allow labs to be drawn this am.  Thinks that his thyroid may be off due to taking the lithium

## 2017-09-26 NOTE — PROGRESS NOTES
[place] court order received and placed in pt's chart.    He is court ordered to take the following meds:  Risperdal  Zyprexa  Abilify  Seroquel

## 2017-09-26 NOTE — PROGRESS NOTES
SPIRITUAL HEALTH SERVICES    [facility] ([facility]) [address]      REFERRAL SOURCE: request at admission for chaplaincy    Triage with pt's nurse who agreed that pt is still quite delusional and a visit is not appropriate today. Pt also has Shinto delusions as a part of his symptomology.    PLAN: I will be out tomorrow, but will follow up on Thursday 9/28 with staff regarding pt readiness for a visit.                                                                                                                            Gina Garcias  Staff   Pager [phone]

## 2017-09-26 NOTE — PROGRESS NOTES
"Pt transferred to station 12 without incident.  Pt was elated to be on 12, giving staff high fives upon arrival.  Pt was searched and shown to his room.  When RN offered pt his schedule medications he became extremely tense and said \"It's in my constitutional rights to not be offered sickness and death.\"  Writer explained that he had schedule Lithium and that it was my role to offer those medications.  Pt became even more agitated saying  \"you know damn well that I won't take medications and you're assaulting me by offering me medications.\"  He made other Zoroastrian comments about his rights and how all the psychiatrists are evil.  Writer encouraged the pt to talk to his doctor in the morning about what he is willing to do, and pt responded, \"you will tell him.\"    "
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
## 2017-09-26 NOTE — PROGRESS NOTES
"Patient states he \"is here on behalf of the [employer]\". He goes on to further state he \"is the owner and  of [employer]\", and that he \"knows where the in2nite are\". Furthermore, he states he \"owns this hospital, he owns the [facility], as well as he owns the entire [place]\". Patient is offered PRN medication, which he agrees to take from this writer. He thanks this writer \"for making him not a slave, and not a patient anymore\".   "
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
## 2017-09-26 NOTE — PLAN OF CARE
"Problem: Psychotic Symptoms  Intervention: Social and Therapeutic Interv (Psychotic Symptoms)     Pt attended morning OT groups.  Very excited to work on a beaded necklace, took his time to carefully create a pattern.  Social, friendly, though grandiose and delusional throughout the session.     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  -Spoke of his connections in politics, \"Trumps my man, but it's Isis really running the show in this country!\"     Appreciative of group, compliments writer on craft samples, offers to buy them for several hundred dollars, believing he can market and sell them for thousands.        "

## 2017-09-26 NOTE — PROGRESS NOTES
This writer called and spoke to [facility] Atty Mara Booker @ [facility].  She sent over court paperwork showing that the patient is under commitment w/ [facility]arte order currently.  It as issued on 5/22/17 and expires on 11/22/17.  He is committed to [facility]. HOWEVER; the pt's CCM Dayna David Palafox @ [phone] told me they forgot to file the 60/90 day report and the committment was dropped.     Waiting to hear back from [facility] atty about how to proceed. If he isn't under commitment- we will likely pursue one in the morning.

## 2017-09-26 NOTE — PLAN OF CARE
"Problem: Psychotic Symptoms  Goal: Psychotic Symptoms  Signs and symptoms of listed problems will be absent or manageable.   Outcome: No Change  Pt was admitted at approx 1645. Pt presents as tense, staring intently and angry, Pt reports feeling frustrated. Pt reports that he feels he has been treated un compassionately at other hospitals and that he believes the \"pharmacutical industry\" is a scam to \"poision the earth.\" Pt is making many grandiose statements about how medical industry is destroying earth and killing people, Pt talks about an upcoming apocalypse and that he is a Messiah and no one is listening to him. Pt reports his [employer] is Orthodoxy and Roman Catholic. When asked if he has allergies Pt stated that he is allergic to all medications and forcing him to take medications is a violation of his personal rights and Muslim freedom. Pt stated \"if anyone forcible injects me with medication it will be the last thing they ever do.\" Pt agrees to be calm and seems relatively peaceful but becomes agitated when talking about medications. When asked if Pt uses drugs he states he uses drugs \"as I see fit,\" Pt stated using chewing tobacco and smoking tobacco \"in prayer\" he does deny drinking alcohol. Pt denies any suicidal thoughts. Pt reported stepping on glass about 1 week ago, writer observed a small sliver on bottom of R 1st toe, Pt reports this is painful to walk on, writer ordered medical consult.      "

## 2017-09-26 NOTE — PLAN OF CARE
Problem: Patient Care Overview  Goal: Team Discussion  Team Plan:   BEHAVIORAL TEAM DISCUSSION     Participants: Clarice Johnson MD (Resident), Alvin Eastman RN, Bettie DIAZ, [facility]  Progress: continue to assess, pt just admitted today   Continued Stay Criteria/Rationale: pt exhibiting delusional and threatening behavior  Medical/Physical: see medical chart  Precautions:   Behavioral Orders   Procedures     Assault precautions     Code 1 - Restrict to Unit     Routine Programming       As clinically indicated     Single Room     Status 15       Every 15 minutes.     Plan: conduct initial psychosocial assessment, meet with Psychiatrist and treatment team  Rationale for change in precautions or plan: no change        Problem: General Plan of Care (Inpatient Behavioral)  Goal: Team Discussion  Team Plan:   BEHAVIORAL TEAM DISCUSSION     Participants: Clarice Johnson MD (Resident), Alvin Eastman RN, Bettie DIAZ, [facility]  Progress: continue to assess, pt just admitted today   Continued Stay Criteria/Rationale: pt exhibiting delusional and threatening behavior  Medical/Physical: see medical chart  Precautions:   Behavioral Orders   Procedures     Assault precautions     Code 1 - Restrict to Unit     Routine Programming       As clinically indicated     Single Room     Status 15       Every 15 minutes.     Plan: conduct initial psychosocial assessment, meet with Psychiatrist and treatment team  Rationale for change in precautions or plan: no change

## 2017-09-26 NOTE — PROGRESS NOTES
Patient manic and intrusive, responds only to direction from selected staff members. Patient has had to be redirected to back off and allow space for peers. Patient became somewhat argumentative during a code 21 and needed redirection to go back to pod 2.     09/26/17 1323   Behavioral Health   Hallucinations denies / not responding to hallucinations   Thinking delusional;paranoid   Orientation person: oriented;place: oriented;date: oriented   Memory baseline memory   Insight poor   Judgement impaired   Eye Contact at examiner   Affect tense;angry;other (see comments)  (Heightened Intensity)   Mood grandiose;elated;labile;irritable   Hygiene well groomed   Suicidality other (see comments)  (Denies)   Self Injury other (see comment)  (Denies)   Elopement (No behaviors noted)   Activity restless   Speech clear;pressured   Medication Sensitivity no stated side effects;no observed side effects   Psychomotor / Gait balanced;steady   Overt Aggression Scale   Verbal Aggression 1   Aggression against Property 0   Auto-Aggression 0   Physical Aggression 0   Overt Aggression Total Score 1   Sleep/Rest/Relaxation   Day/Evening Time Hours up all shift   Psycho Education   Type of Intervention 1:1 intervention   Response unavailable   Hours 0.5   Treatment Detail (Disorganized)   Daily Care   Activity up ad iesha   Activities of Daily Living   Hygiene/Grooming independent   Oral Hygiene independent   Dress scrubs (behavioral health)   Laundry unable to complete   Room Organization independent   Activity   Activity Assistance Provided independent   Behavioral Health Interventions   Psychotic Symptoms maintain safety precautions;decrease environmental stimulation;simple, clear language   Social and Therapeutic Interventions (Psychotic Symptoms) encourage effective boundaries with peers

## 2017-09-26 NOTE — PLAN OF CARE
Problem: General Plan of Care (Inpatient Behavioral)  Goal: Individualization/Patient Specific Goal (IP Behavioral)  The patient and/or their representative will achieve their patient-specific goals related to the plan of care.    The patient-specific goals include:   Pt refusing to answer any questions at this time

## 2017-09-27 PROCEDURE — A9270 NON-COVERED ITEM OR SERVICE: HCPCS | Mod: GY | Performed by: PSYCHIATRY & NEUROLOGY

## 2017-09-27 PROCEDURE — 90853 GROUP PSYCHOTHERAPY: CPT

## 2017-09-27 PROCEDURE — 25000128 H RX IP 250 OP 636

## 2017-09-27 PROCEDURE — 99233 SBSQ HOSP IP/OBS HIGH 50: CPT | Mod: GC | Performed by: PSYCHIATRY & NEUROLOGY

## 2017-09-27 PROCEDURE — 25000132 ZZH RX MED GY IP 250 OP 250 PS 637: Mod: GY | Performed by: PSYCHIATRY & NEUROLOGY

## 2017-09-27 PROCEDURE — 12400003 ZZH R&B MH CRITICAL UMMC

## 2017-09-27 PROCEDURE — S0166 INJ OLANZAPINE 2.5MG: HCPCS | Performed by: PSYCHIATRY & NEUROLOGY

## 2017-09-27 PROCEDURE — 25000125 ZZHC RX 250: Performed by: PSYCHIATRY & NEUROLOGY

## 2017-09-27 RX ORDER — HALOPERIDOL 5 MG/ML
5-10 INJECTION INTRAMUSCULAR ONCE
Status: COMPLETED | OUTPATIENT
Start: 2017-09-27 | End: 2017-09-27

## 2017-09-27 RX ORDER — DIPHENHYDRAMINE HYDROCHLORIDE 50 MG/ML
INJECTION INTRAMUSCULAR; INTRAVENOUS
Status: COMPLETED
Start: 2017-09-27 | End: 2017-09-27

## 2017-09-27 RX ORDER — LORAZEPAM 2 MG/ML
1-2 INJECTION INTRAMUSCULAR ONCE
Status: COMPLETED | OUTPATIENT
Start: 2017-09-27 | End: 2017-09-27

## 2017-09-27 RX ORDER — LORAZEPAM 2 MG/ML
INJECTION INTRAMUSCULAR
Status: COMPLETED
Start: 2017-09-27 | End: 2017-09-27

## 2017-09-27 RX ORDER — DIPHENHYDRAMINE HYDROCHLORIDE 50 MG/ML
50 INJECTION INTRAMUSCULAR; INTRAVENOUS ONCE
Status: COMPLETED | OUTPATIENT
Start: 2017-09-27 | End: 2017-09-27

## 2017-09-27 RX ORDER — HALOPERIDOL 5 MG/ML
INJECTION INTRAMUSCULAR
Status: COMPLETED
Start: 2017-09-27 | End: 2017-09-27

## 2017-09-27 RX ADMIN — DIPHENHYDRAMINE HYDROCHLORIDE 50 MG: 50 INJECTION, SOLUTION INTRAMUSCULAR; INTRAVENOUS at 23:24

## 2017-09-27 RX ADMIN — HYDROXYZINE HYDROCHLORIDE 50 MG: 25 TABLET ORAL at 08:46

## 2017-09-27 RX ADMIN — ACETAMINOPHEN 650 MG: 325 TABLET, FILM COATED ORAL at 08:46

## 2017-09-27 RX ADMIN — LORAZEPAM 0.5 MG: 0.5 TABLET ORAL at 08:46

## 2017-09-27 RX ADMIN — OLANZAPINE 10 MG: 10 INJECTION, POWDER, LYOPHILIZED, FOR SOLUTION INTRAMUSCULAR at 17:03

## 2017-09-27 RX ADMIN — LORAZEPAM 2 MG: 2 INJECTION INTRAMUSCULAR at 23:23

## 2017-09-27 RX ADMIN — HALOPERIDOL LACTATE 5 MG: 5 INJECTION, SOLUTION INTRAMUSCULAR at 23:24

## 2017-09-27 RX ADMIN — DIPHENHYDRAMINE HYDROCHLORIDE 50 MG: 50 INJECTION INTRAMUSCULAR; INTRAVENOUS at 23:24

## 2017-09-27 RX ADMIN — OLANZAPINE 10 MG: 10 INJECTION, POWDER, LYOPHILIZED, FOR SOLUTION INTRAMUSCULAR at 19:30

## 2017-09-27 RX ADMIN — NICOTINE 1 PATCH: 21 PATCH, EXTENDED RELEASE TRANSDERMAL at 01:21

## 2017-09-27 RX ADMIN — LORAZEPAM 2 MG: 2 INJECTION INTRAMUSCULAR; INTRAVENOUS at 23:23

## 2017-09-27 RX ADMIN — LITHIUM CARBONATE 1200 MG: 300 TABLET, EXTENDED RELEASE ORAL at 22:36

## 2017-09-27 RX ADMIN — HALOPERIDOL 5 MG: 5 INJECTION INTRAMUSCULAR at 23:24

## 2017-09-27 RX ADMIN — LORAZEPAM 0.5 MG: 0.5 TABLET ORAL at 15:57

## 2017-09-27 ASSESSMENT — ACTIVITIES OF DAILY LIVING (ADL)
GROOMING: HANDWASHING;INDEPENDENT
DRESS: SCRUBS (BEHAVIORAL HEALTH)
GROOMING: INDEPENDENT;HANDWASHING
LAUNDRY: WITH SUPERVISION
DRESS: SCRUBS (BEHAVIORAL HEALTH)
ORAL_HYGIENE: INDEPENDENT
ORAL_HYGIENE: INDEPENDENT

## 2017-09-27 NOTE — PROGRESS NOTES
"[facility], [place]   Psychiatric Progress Note  Hospital Day: 2        Interim History:   The patient's care was discussed with the treatment team during the daily team meeting and/or staff's chart notes were reviewed.  Staff report patient continues to display grandiose delusions, refused Lithium.    Upon interview, the patient reports that he is doing well. Asked if he would be able to wear his Gnosticist head covering. Explained that this is a bandana with a rock and a quarter in it. He likes to tight it tightly around his head and explained this is part of  heritage to help calm him down. 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 Reports that family will be bringing his [place].     Psychiatric Symptoms: Denies    Medication side effects reported: None    Other issues reported by patient: None         Medications:       OLANZapine zydis  10 mg Oral Once     lithium  1,200 mg Oral At Bedtime     nicotine   Transdermal Q8H     nicotine   Transdermal Daily     nicotine  1 patch Transdermal Daily          Allergies:     Allergies   Allergen Reactions     Abilify Discmelt      Suicidal thoughts       Compazine Other (See Comments)     Seizure           Labs:   No results found for this or any previous visit (from the past 24 hour(s)).       Psychiatric Examination:     BP (!) 149/96  Pulse 91  Temp 98.3  F (36.8  C) (Tympanic)  Resp 16  Ht 1.676 m (5' 6\")  Wt 78.6 kg (173 lb 4.8 oz)  SpO2 98%  BMI 27.97 kg/m2  Weight is 173 lbs 4.8 oz  Body mass index is 27.97 kg/(m^2).                Sitting Orthostatic BP: 151/85      Sitting Orthostatic Pulse: 103 bpm      Standing Orthostatic BP:  (delcined)      Standing Orthostatic Pulse: 100 bpm       Appearance: awake, alert, adequately groomed and dressed in hospital scrubs, open sore in the middle of his forehead  Attitude:  cooperative  Eye Contact:  intense  Mood:  \"I'm good\"  Affect:  intensity " is heightened  Speech:  clear, coherent  Language: fluent and intact in English  Psychomotor, Gait, Musculoskeletal:  no evidence of tardive dyskinesia, dystonia, or tics and intact station, gait and muscle tone  Throught Process:  goal oriented  Associations:  no loose associations  Thought Content:  no evidence of suicidal ideation or homicidal ideation and grandiose  Insight:  limited  Judgement:  limited  Oriented to:  time, person, and place  Attention Span and Concentration:  fair  Recent and Remote Memory:  fair  Fund of Knowledge:  appropriate         Precautions:     Behavioral Orders   Procedures     Assault precautions     Code 1 - Restrict to Unit     Routine Programming     As clinically indicated     Single Room     Status 15     Every 15 minutes.          Diagnoses:      Bipolar affective disorder, manic, severe, with psychotic behavior (H)  Polysubstance abuse         Assessment & Plan:     Assessment and hospital summary: 41 year old man with a h/o Bipolar 1 Disorder and multiple substance use disorders presented with gwen with psychosis and aggression in the context of medication non adherence. Currently refusing Lithium as pt prefers more natural form that he has apparently asked family to bring in.     Target psychiatric symptoms and interventions:  Gwen:  - Lithium 1200 mg QHS  - PRN ativan, olanzapine     Medical Problems and Treatments: None    Behavioral/Psychological/Social: Encourage group attendance    Legal: Came in on a 72 hour hold, recent commitment had lapsed so re-petitioned for commitment with [facility].     Disposition: Pending improvement in symptoms and court process

## 2017-09-27 NOTE — PROGRESS NOTES
This writer spoke to Dr. Wilde the pt's ACT Team Psychiatrist - She wanted to make sure that our team was pursuing commitment.  I explained that the screener from [employer] was here today and screened him.  We will learn today/tomorrow if they are support the petition.     Dr. Wilde provided her cell number to me if myself or our dr has any questions.

## 2017-09-27 NOTE — PLAN OF CARE
Problem: Psychotic Symptoms  Intervention: Social and Therapeutic Interv (Psychotic Symptoms)     Pt attended 2 hours of morning groups where he has worked on a beading project.  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  Has remained calm in group, though had several complains and allegations about psychiatric medications and psychiatrists.

## 2017-09-27 NOTE — PROGRESS NOTES
"Pt has spent most of the evening pacing the halls. Pt made delusional statements through out the evening, \"I am the  of a very large essential oils company, I should do business with this hospital.\" Pt also stated, \"I have been medicating all of [place] for the last 10 years.\" Pt is pleasant to talk to but affect is tense, speech is pressured and tangential. Pt did not endorse SI, SIB, or HI and has been cooperative with staff when pt needed redirection for poor boundaries with a peer. No behavioral concerns to report this evening.      09/26/17 2154   Behavioral Health   Hallucinations denies / not responding to hallucinations   Thinking delusional;paranoid   Orientation person: oriented;place: oriented;date: oriented   Memory baseline memory   Insight poor   Judgement impaired   Eye Contact at examiner   Affect tense;angry   Mood grandiose;elated;labile;irritable   Physical Appearance/Attire attire appropriate to age and situation;appears stated age   Hygiene well groomed   Suicidality other (see comments)  (none noted)   Self Injury other (see comment)  (none noted)   Elopement Loitering near exit doors   Activity restless;hyperactive (agitated, impulsive)   Speech pressured;tangential   Medication Sensitivity no observed side effects;no stated side effects   Psychomotor / Gait balanced;steady   Activities of Daily Living   Hygiene/Grooming independent   Oral Hygiene independent   Dress scrubs (behavioral health)   Laundry unable to complete   Room Organization independent   Behavioral Health Interventions   Psychotic Symptoms maintain safety precautions;monitor need to revise level of observation;maintain safe secure environment;reality orientation;simple, clear language;decrease environmental stimulation;redirection of intrusive behaviors;provide emotional support;establish therapeutic relationship;build upon strengths;monitor need for prn medication;assist with developing & utilizing healthy coping " strategies   Social and Therapeutic Interventions (Psychotic Symptoms) encourage socialization with peers;encourage participation in therapeutic groups and milieu activities;encourage effective boundaries with peers

## 2017-09-27 NOTE — PROGRESS NOTES
"Writer assessed pt's foot.  Pt stated that he stepped on a splinter.   Writer observed a small brown dot.  Writer applied pressure to the dot, and pt denied pain.  CRT < 3 seconds.  Pedal pulse +2.  CMS intact.  Pt stated he did not want a physician at [facility] to assess his foot.  \"I am going to get a full scan at [facility] next month\"    Pt refused scheduled medications \"I buy my own lithium, that other stuff can give you kidney disease.\"  Pt refused education about the benefits of lithium.  Pt stated \"I heal by calling my [place] together, making a fire, and eating the sacred food of my people (pt described it as a cactus).  My creator will heal me from the inside out\".  "
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
## 2017-09-27 NOTE — PROGRESS NOTES
At 1745 writer tried to process pt out of seclusion.  Pt was sleeping on the map, snoring loudly.  Pt appeared in NAD.   Respirations even and regular.   Per report pt had poor sleep during the night shift.  Pt did not wake up when staff tapped on seclusion room window.  Pt behavior on the unit had been threatening towards others.  Because pt was sleeping, exhibiting symptoms of denisha, and has had poor sleep on the unit, pt was allowed to continue sleeping.  Pt unable to process out of seclusion.  Psych associate notified to call writer when pt wakes up.    At 1820, writer assessed pt.  Pt sleeping in seclusion room.  Pt snoring loudly.  Pt appeared to be in NAD.  Pt did not respond to writer tapping on seclusion room window.  Unable to complete debriefing with pt.   Seclusion continued.    Will continue to monitor and assess.

## 2017-09-27 NOTE — PLAN OF CARE
"Problem: Psychotic Symptoms  Goal: Psychotic Symptoms  Signs and symptoms of listed problems will be absent or manageable.   Outcome: No Change  RN ASSESSMENT   Patient presents visible in the milieu, social with select peers. Mood grandiose and mostly irritable. Complained of anxiety and requested PRN, however insisted for a while that current dose of ativan is not therapeutic and he needs four milligrams instead. Requested his bandana out of the locker stating that it is his Mandaeism head covering and helps him feel grounded. He stated that he had a small rock attached to it.  Upon examination bandana had a large sharp rock and what and appeared to be covered in fragments of marijuana leaf.  Patient was informed that he will not be able to have bandana with a rock in it. Before this writer was ana to finish sentence patient instantly got agitated, started yelling at this writer, accusing of violating his human rights. He yelled, \" You are fired, you are fired. You hear me?\"  I removed myself from patients room as he was staring and was posturing towards me yelling. After this writer left the room patient came out of his room and continued to yell. He then returned to his room and made shooting gun gestures out of his hand pointing at this writer. Later requested PRN, however stated that he will not be taking his currently ordered dose and needs at least 2 mg to calm down. Provider was contacted.  Patient signed an RAPHAEL for his parents as JFK, MLK, Christy, and Allrenae. He stated that he was sent here by the royal family.  Denied suicidal and homicidal thoughts.  Insight into situation noted. Sleep poor. Per chart review patient slept 2.5 hours last night. Patient was seen by pre petition today.   Current legal status 72 hour hold. Refer to case manger notes for discharge planing and recommendations. Continue with current treatment plan and recommendations. Continue to monitor and reassess symptoms. Monitor response " to medications. Monitor progress towards treatment goals. Encourage groups and participation.

## 2017-09-27 NOTE — PROGRESS NOTES
Has been going to the br a lot and often after each visit will drink 2 glasses of water.  When asked about doing mssa he stated no and that he was feeling ok so he did not need it.

## 2017-09-27 NOTE — PROGRESS NOTES
"   09/27/17 1715   Seclusion or Restraint Order   Length of Order 4   Order Obtained Yes   Leadership   Seclus/Restraint >12H or 2x/24H (NA)   Assessment   Less Restrictive Alternative Decreased stimulation;Verbal de-escalation;Medication administration;Reality orientation;Reassurance / Support   Risk Factors N   Justification   Clinical Justification Others   Education   Discontinuation Criteria Cessation of behavior that precipitated seclusion or restraint   Criteria Explained Yes   Patient's Response VU   Family Notification D     At approx 1700, pt was extremely agitated about not having his \"Anabaptist headpiece\".  Writer explained to pt that it was not an approved item for the unit.  Pt was tense, had his hands in a fist, and was yelling at writer.  Pt stated he was going to slit the throat of his doctor (Dr. Gill notified of threat).  PT stated that he was going to have \"either peace, or war\".  From [id number], staff had multiple deescalation conversations with pt, offered him PRN ativan (accepted), offered pt distraction with hard candies/ activities, modified programming by having the pt relax in his room.  All nursing interventions failed.  Writer assessed pt as a threat to others.      Pt was stating that he would refuse all medications.  Pt threatening behavior necessitated seclusion and medication.  Code 21 was paged.  Pt walked to seclusion with staff holding onto patient's arms' with BCS technique.  Writer administered 10 mg PRN zyprexa (SEE MAR).  Discontinuation criteria was explained to pt, but pt just yelled at second nurse - no evidence of learning.     Will continue to monitor and assess.  "

## 2017-09-27 NOTE — PROGRESS NOTES
"   09/27/17 1720   Seclusion or Restraint Order   In Person Face to Face Assessment Conducted Yes-Eval of pt's immediate situation, reaction to intervention, complete review of systems assessment, behavioral assessment & review/assessment of hx, drugs & meds, recent labs, etc, behavioral condition, need to continue/terminate restraint/seclusion   Patient Experienced No adverse physical outcome from seclusion/restraint initiation   Continuation of Seclus/Restraint indicated at this time Yes   Patient states that he has no injuries. He then yells, \"This hospital is killing me\". Dr Christensen was paged.   "

## 2017-09-27 NOTE — PROGRESS NOTES
This writer called [facility] Pre-petition screening to do an MI/CD w/ [facility].  Had to leave a vm.

## 2017-09-28 PROCEDURE — A9270 NON-COVERED ITEM OR SERVICE: HCPCS | Mod: GY | Performed by: PSYCHIATRY & NEUROLOGY

## 2017-09-28 PROCEDURE — 12400003 ZZH R&B MH CRITICAL UMMC

## 2017-09-28 PROCEDURE — 25000132 ZZH RX MED GY IP 250 OP 250 PS 637: Mod: GY | Performed by: PSYCHIATRY & NEUROLOGY

## 2017-09-28 PROCEDURE — 90853 GROUP PSYCHOTHERAPY: CPT

## 2017-09-28 RX ADMIN — LITHIUM CARBONATE 1200 MG: 300 TABLET, EXTENDED RELEASE ORAL at 19:23

## 2017-09-28 RX ADMIN — OLANZAPINE 10 MG: 10 TABLET, FILM COATED ORAL at 06:05

## 2017-09-28 RX ADMIN — NICOTINE POLACRILEX 8 MG: 4 GUM, CHEWING ORAL at 16:49

## 2017-09-28 RX ADMIN — LORAZEPAM 0.5 MG: 0.5 TABLET ORAL at 19:23

## 2017-09-28 RX ADMIN — OLANZAPINE 10 MG: 10 TABLET, FILM COATED ORAL at 03:03

## 2017-09-28 RX ADMIN — HYDROXYZINE HYDROCHLORIDE 50 MG: 25 TABLET ORAL at 19:23

## 2017-09-28 RX ADMIN — NICOTINE 1 PATCH: 21 PATCH, EXTENDED RELEASE TRANSDERMAL at 12:26

## 2017-09-28 RX ADMIN — NICOTINE POLACRILEX 8 MG: 4 GUM, CHEWING ORAL at 22:15

## 2017-09-28 RX ADMIN — LORAZEPAM 0.5 MG: 0.5 TABLET ORAL at 12:26

## 2017-09-28 RX ADMIN — LORAZEPAM 0.5 MG: 0.5 TABLET ORAL at 06:07

## 2017-09-28 RX ADMIN — HYDROXYZINE HYDROCHLORIDE 50 MG: 25 TABLET ORAL at 12:26

## 2017-09-28 RX ADMIN — ACETAMINOPHEN 650 MG: 325 TABLET, FILM COATED ORAL at 12:26

## 2017-09-28 ASSESSMENT — ACTIVITIES OF DAILY LIVING (ADL)
HYGIENE/GROOMING: INDEPENDENT
DRESS: SCRUBS (BEHAVIORAL HEALTH);INDEPENDENT
LAUNDRY: UNABLE TO COMPLETE
ORAL_HYGIENE: INDEPENDENT

## 2017-09-28 NOTE — PROGRESS NOTES
"   09/27/17 1930   Seclusion or Restraint Order   Length of Order 4   Order Obtained Yes   Attending Physician Notified Yes   Leadership   Seclus/Restraint >12H or 2x/24H Notified  (nursing supervisor)   Assessment   Less Restrictive Alternative Decreased stimulation;Verbal de-escalation;Reality orientation;Modified programming;Reassurance / Support   Risk Factors N   Justification   Clinical Justification Others   Education   Discontinuation Criteria Cessation of behavior that precipitated seclusion or restraint   Criteria Explained Yes   Patient's Response NL  (Giving middle finger to nurse, not answering)   Family Notification D   Restraint Monitoring Q15 Minutes   Psychological Status O  (laying on mat, staring at ceiling)   Physical Comfort D   Circulation NS   Continuous Observation Yes   Restraint Type   Seclusion (BH) Start       Staff notified writer that pt was threatening staff with vague threats \"there will be no peace here tonight\" \"and \"I can start trouble here tonight\".  Writer assessed pt.  Writer offered pt PRN medication, candy, to talk in the lounge, and headphones.  Pt refused all interventions.   Pt told writer that he would hurt people if he did not get his \"Adventist head gear.\"  Writer asked pt if he could be safe on the unit.  Pt stated no.  Pt's hands were clenched in a fist.  Pt had an intense stare at the writer.  Writer assessed pt as a threat to others.  Code 21 staff were present on the unit from a previous code - no additional code was paged.  Pt walked to seclusion with staff holding his arms.  Pt received 10 mg zyprexa IM (SEE MAR) at approx 1930.  Pt showed no evidence of learning concerning discharge criteria, and only gave staff the middle finger.  Pt appeared in NAD.  Physician notified.  No apparent injury to staff or patient.    Will continue to monitor and assess.  "

## 2017-09-28 NOTE — PROGRESS NOTES
Late entry note:  Spoke to [employer] yesterday late morning to screen patient and paperwork sent over.  They will send screener to see pt.

## 2017-09-28 NOTE — PROGRESS NOTES
09/27/17 7997   Seclusion or Restraint Order   Length of Order 4   Order Obtained Yes   Attending Physician Notified Yes   Attending Physician's Name Fermin    Leadership   Seclus/Restraint >12H or 2x/24H Notified   Assessment   Less Restrictive Alternative Decreased stimulation;Verbal de-escalation;Reassurance / Support;Modified programming;Immediate action required, no least restrictive measures could be attempted   Risk Factors N   Justification   Clinical Justification Others   Education   Discontinuation Criteria Cessation of behavior that precipitated seclusion or restraint   Criteria Explained Yes   Patient's Response NL   Restraint Type   Seclusion () Start     Patient has been continuing to escalate since coming out of seclusions. He is continuing to make verbal threats and posture at staff. He was placed on a SIO when last seclusion was discontinued. He was redirected to his room several times. He was given headphones and journaling items. All interventions have failed and patient was again taken to seclusion. Once in seclusion he banged his head. He was directed to stop or he would have to go into 5 point restraints for safety. He stopped. He was given a IM and haldol, benadryl, and ativan.

## 2017-09-28 NOTE — PROGRESS NOTES
"   09/27/17 2235   Debriefing   Debriefing DO   Does patient understand why the event happened? Yes   Does patient agree to safe behaviors? Yes   What can we do differently so this doesn't happen again? Other (comment)  (\"talk to me, give me ativan\")   Plan of care reviewed and modified Yes       Pt woke up in seclusion room approx 2215.  Pt stated he could be safe on the unit.  Pt stated that he was brought to seclusion because he was \"swearing\".  Writer told pt he was threatening staff.  Pt was receptive to information.  Writer assessed pt as safe to be in the milieu.  Pt agreed to take scheduled lithium.  Pt took scheduled lithium approx 2230.  Pt ambulated to his room.  Pt was receptive to a snack.      Will continue to monitor and assess.  "

## 2017-09-28 NOTE — PROGRESS NOTES
"Attempted to process pt out of seclusion at approx 2000.  Pt was sleeping in seclusion room, snoring loudly.  Pt has had poor sleep on the unit and symptoms of denisha.  Writer assessed that sleep was beneficial for the pt.  Writer notified psych associate to call writer if pt wakes up.    At 2030, pt woke up to use a urinal.  Writer assessed pt.  Pt was unable to state why he was in seclusion.  When pt was told he made threatening statements to staff, the pt became argumentative.  Pt stated he was no longer a threat to others; however, thirty seconds later, pt was threatening to call the police for \"killing me with medications\", to \"mess the unit up\", and other threatening statements.  Pt still appeared disorganized.  Writer assessed pt as still a threat to others. As writer turned to leave, pt shouted obscenities at writer.  Another nurse attempted to assess pt and pt shouted obscenities at that nurse.    At 2100, writer went to assess pt.  Pt was sleeping.  Staff told writer, that 15 minutes earlier pt had been yelling and threatening at staff through the seclusion room window.  Pt is still displaying threatening behaviors.  Writer assessed pt as still a danger to others.  Staff notified to call writer if pt wakes up again.    At 2200 writer attempted to process pt out of seclusion.  Pt was sleeping, snoring loudly on the seclusion floor mat.  Pt did not wake when writer tapped on seclusion room window.  Pt has had poor sleep and can benefit from continued sleeping.  Pt has consistently demonstrated that when he is awake he is threatening, impulsive, not receptive to nursing education, and easily agitated.  Unable to process pt out of seclusion at this time.    Will continue to monitor and assess.  "

## 2017-09-28 NOTE — PROGRESS NOTES
This writer spoke to Jacquie from [employer] office.  They are supporting the MI portion of the petition but not the CD.  We decided not to appeal this.  They will send over court hold paperwork when it is complete.

## 2017-09-28 NOTE — PLAN OF CARE
Problem: Psychotic Symptoms  Intervention: Social and Therapeutic Interv (Psychotic Symptoms)     Pt attended the afternoon OT clinic group.  Friendly, though conversation remains entirely grandiose and delusional. 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     Yesterday was angry with writer about the safety policies- not being able to keep a bracelet with 3 wire strands, started to yell that he built this hospital... Though today made no complaints.

## 2017-09-28 NOTE — PROGRESS NOTES
"[facility], [place]   Psychiatric Progress Note  Hospital Day: 3        Interim History:   The patient's care was discussed with the treatment team during the daily team meeting and/or staff's chart notes were reviewed.  Staff report Tyler became agitated and made threats towards doctors. Ended up in seclusion three times last evening. Did eventually take scheduled Lithium last evening.     Upon interview, Tyler expresses frustration about getting medications because he now reports this has impacted his thinking. Cannot believe he is treated this way since he is a \"Sovereign Diplomat\" and works for the \"[employer].\" Is annoyed that he can't have his Anabaptism head wrap and explained that staff examined it but deemed it unsafe since it has a rock in it and pictures of marijuana on it. He talked about how Psychiatric drugs kill people.     Psychiatric Symptoms: Denies    Medication side effects reported: as above    Other issues reported by patient: None         Medications:       lithium  1,200 mg Oral At Bedtime     nicotine   Transdermal Q8H     nicotine   Transdermal Daily     nicotine  1 patch Transdermal Daily          Allergies:     Allergies   Allergen Reactions     Abilify Discmelt      Suicidal thoughts       Compazine Other (See Comments)     Seizure           Labs:   No results found for this or any previous visit (from the past 24 hour(s)).       Psychiatric Examination:     BP (!) 131/91  Pulse 87  Temp 98  F (36.7  C) (Tympanic)  Resp 18  Ht 1.676 m (5' 6\")  Wt 78.6 kg (173 lb 4.8 oz)  SpO2 98%  BMI 27.97 kg/m2  Weight is 173 lbs 4.8 oz  Body mass index is 27.97 kg/(m^2).    Appearance: awake, alert, adequately groomed and dressed in hospital scrubs, open sore in the middle of his forehead is scabbing over   Attitude:  cooperative  Eye Contact:  intense  Mood:  \"Not great\"  Affect:  intensity is heightened  Speech:  clear, coherent  Language: fluent and intact in " English  Psychomotor, Gait, Musculoskeletal:  no evidence of tardive dyskinesia, dystonia, or tics and intact station, gait and muscle tone  Throught Process:  goal oriented  Associations:  no loose associations  Thought Content:  no evidence of suicidal ideation or homicidal ideation and grandiose  Insight:  limited  Judgement:  limited  Oriented to:  time, person, and place  Attention Span and Concentration:  fair  Recent and Remote Memory:  fair  Fund of Knowledge:  appropriate         Precautions:     Behavioral Orders   Procedures     Assault precautions     Code 1 - Restrict to Unit     Routine Programming     As clinically indicated     Single Room     Status 15     Every 15 minutes.     Status Individual Observation     Order Specific Question:   CONTINUOUS 24 hours / day     Answer:   Distance and Exceptions     Order Specific Question:   Distance     Answer:   5 feet     Order Specific Question:   Exceptions     Answer:   Other     Order Specific Question:   Other exception     Answer:   outside of room          Diagnoses:      Bipolar affective disorder, manic, severe, with psychotic behavior (H)  Polysubstance abuse         Assessment & Plan:     Assessment and hospital summary: 41 year old man with a h/o Bipolar 1 Disorder and multiple substance use disorders presented with gwen with psychosis and aggression in the context of medication non adherence. Initially refused Lithium as pt prefers more natural form that he has apparently asked family to bring in. Took on 9/27.    Target psychiatric symptoms and interventions:  Gwen:  - Lithium 1200 mg QHS  - PRN ativan, olanzapine     Medical Problems and Treatments: None    Behavioral/Psychological/Social: Encourage group attendance    Legal: Came in on a 72 hour hold, recent commitment had lapsed so re-petitioned for commitment with Frausto.     Disposition: Pending improvement in symptoms and court process

## 2017-09-28 NOTE — PROGRESS NOTES
When giving medications to pt he talked about being violent and in the room he has been yelling that his rights have been violated and that he needs to be given his bandana that has the rock in it due to it being a Gnosticist item.  He would not contract for his/others safety on the unit at this time.  Md notified and continuation of the seclusion has been approved.

## 2017-09-28 NOTE — PROGRESS NOTES
"   09/27/17 1904   Restraint Type   Seclusion (BH) Discontinued   Debriefing   Debriefing DO   Does patient understand why the event happened? Yes   Does patient agree to safe behaviors? Yes   What can we do differently so this doesn't happen again? Other (comment)  (\"I will ask for medications.  I will come talk to staff\")   Plan of care reviewed and modified Yes       At approx 1900, psych associate notified writer that pt woke up in seclusion.  Writer assessed pt.  Pt stated that he was in seclusion because he was threatening.  Pt stated that he would have safe behaviors on the unit.  Pt stated that if he became agitated he would talk to staff, or ask for PRN medications.  Pt was calm and cooperative when talking with writer.  Pt stated he felt \"great\".  Seclusion was discontinued.  Pt ambulated to his room.    Will continue to monitor and assess.  "

## 2017-09-28 NOTE — PROVIDER NOTIFICATION
"   09/28/17 0640   Debriefing   Debriefing DO   Does patient understand why the event happened? Other (comment)  (Did not want to talk.  Fixated on his rights)   Does patient agree to safe behaviors? Yes   What can we do differently so this doesn't happen again? Other (comment)  (Give him what he wants)   Plan of care reviewed and modified No  (He refused and only wanted to go back to his room)   He was taken out of seclusion when he stated that he could stay calm now.  Did talk about the zyprexa \"fucking him up \" and that is why he does not want it.  It will also kill him he stated.Walked back to his room and given some snacks to eat and he is relaxing and not calling out at this time.  "

## 2017-09-28 NOTE — PROGRESS NOTES
09/27/17 1935   Seclusion or Restraint Order   Attending Physician's Name Femrin   In Person Face to Face Assessment Conducted Yes-Eval of pt's immediate situation, reaction to intervention, complete review of systems assessment, behavioral assessment & review/assessment of hx, drugs & meds, recent labs, etc, behavioral condition, need to continue/terminate restraint/seclusion   Patient Experienced No adverse physical outcome from seclusion/restraint initiation   Continuation of Seclus/Restraint indicated at this time Yes     Patient was walked with no issues to seclusion. When asked if he was injured he responded with two middle fingers. MD was notified.

## 2017-09-28 NOTE — PROGRESS NOTES
"Behavioral Health  Note    Behavioral Health  Spirituality Group Note    [address]    Name: Tyler Case YOB: 1975   MRN: [id number] Age: 41 year old      Patient attended -led group, which included discussion of spirituality, coping with illness and building resilience.    Patient attended group for 1.0 hrs.    The patient actively participated in group discussion and patient demonstrated an appreciation of topic's application for their personal circumstances. Tyler was very interested in spiritual topics and was very verbal during this group. He made quite a few grandiose comments such as \"I am the [place] leader of the [facility]. 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     His main concern is \"I am being denied the right to wear my Mandaen head-covering.\" I agreed with him that I would need to talk to his staff to get their reasoning, but would follow up with him about this concern -- I did not say anything more would be possible. He also expressed interest in both some readings from the native tradition (unclear about his actual connection, but addressed his concern about \"being denied his Mandaen rights\"), and Qi Gong as a soothing/connection mechanism.     Tyler's sense of connection is to his values is strongly felt at this time (\"showing love and peace to others, and discovering what that is for myself.\"). He completed an activity on " connection and reflected on his values in conversation with the group.    After group, I followed up with unit staff (OT and nurse) regarding his requested head-covering.    Gina Garcias MDiv, [facility]  Staff   Pager [phone]

## 2017-09-28 NOTE — PROGRESS NOTES
09/27/17 0942   Seclusion or Restraint Order   In Person Face to Face Assessment Conducted Yes-Eval of pt's immediate situation, reaction to intervention, complete review of systems assessment, behavioral assessment & review/assessment of hx, drugs & meds, recent labs, etc, behavioral condition, need to continue/terminate restraint/seclusion   Patient Experienced No adverse physical outcome from seclusion/restraint initiation   Continuation of Seclus/Restraint indicated at this time Yes   Describe actions taken medication, de-escalation, redirection, decrease of stimuli   Pt experienced no adverse physical outcome as a result of the seclusion.  Pt walked to seclusion without incident.  He refuses to process what happened and shows no insight into why he needed to go to seclusion and what behaviors are necessary to get out of seclusion.

## 2017-09-29 PROCEDURE — A9270 NON-COVERED ITEM OR SERVICE: HCPCS | Mod: GY | Performed by: PSYCHIATRY & NEUROLOGY

## 2017-09-29 PROCEDURE — 25000132 ZZH RX MED GY IP 250 OP 250 PS 637: Mod: GY | Performed by: PSYCHIATRY & NEUROLOGY

## 2017-09-29 PROCEDURE — 12400003 ZZH R&B MH CRITICAL UMMC

## 2017-09-29 PROCEDURE — 97150 GROUP THERAPEUTIC PROCEDURES: CPT | Mod: GO

## 2017-09-29 PROCEDURE — 99233 SBSQ HOSP IP/OBS HIGH 50: CPT | Mod: GC | Performed by: PSYCHIATRY & NEUROLOGY

## 2017-09-29 RX ADMIN — HYDROXYZINE HYDROCHLORIDE 50 MG: 25 TABLET ORAL at 10:40

## 2017-09-29 RX ADMIN — LITHIUM CARBONATE 1200 MG: 300 TABLET, EXTENDED RELEASE ORAL at 20:03

## 2017-09-29 RX ADMIN — HYDROXYZINE HYDROCHLORIDE 50 MG: 25 TABLET ORAL at 06:00

## 2017-09-29 RX ADMIN — ACETAMINOPHEN 650 MG: 325 TABLET, FILM COATED ORAL at 20:03

## 2017-09-29 RX ADMIN — LORAZEPAM 0.5 MG: 0.5 TABLET ORAL at 20:03

## 2017-09-29 RX ADMIN — LORAZEPAM 0.5 MG: 0.5 TABLET ORAL at 05:58

## 2017-09-29 RX ADMIN — LORAZEPAM 0.5 MG: 0.5 TABLET ORAL at 10:40

## 2017-09-29 RX ADMIN — HYDROXYZINE HYDROCHLORIDE 50 MG: 25 TABLET ORAL at 20:03

## 2017-09-29 RX ADMIN — NICOTINE 1 PATCH: 21 PATCH, EXTENDED RELEASE TRANSDERMAL at 08:02

## 2017-09-29 ASSESSMENT — ACTIVITIES OF DAILY LIVING (ADL)
ORAL_HYGIENE: INDEPENDENT
GROOMING: INDEPENDENT
LAUNDRY: UNABLE TO COMPLETE
ORAL_HYGIENE: INDEPENDENT
DRESS: SCRUBS (BEHAVIORAL HEALTH)
DRESS: SCRUBS (BEHAVIORAL HEALTH)
GROOMING: INDEPENDENT

## 2017-09-29 NOTE — PROGRESS NOTES
"SPIRITUAL HEALTH SERVICES    [facility] ([facility]) [address]      REFERRAL SOURCE: pt verbal request and follow up from admission request    Staff consultation regarding Tyler's objection to not being allowed to wear his \"Nondenominational head covering\" which, as reflected in the notes, is a piece of cloth/bandana with marijuana images on it and a rock tied into it. Tyler was very upset when discussing his anger about this with me, but was amenable to me following up with staff. I did not promise anything further. Conferred with day shift and evening shift nursing staff, as well as OT.    Analysis of request for spiritual item: There are head coverings in several sacred traditions, but it is not clear that Tyler is aligned with any of them when outside the hospital and behaviorally stable. Per SW assessments from 2015 and 2017 (this admit) Tyler is Confucianist. In his patient demographics he has listed . Per a  visit less than 2 weeks ago, he stated (while still not behaviorally stable) that he was born Confucianist but is now . He also has told staff on this admit that he is a leader in the Islamic ebony. His race/ethinicity is listed as /White in all instances I was able to see in the chart. From these statements, it remains unclear that Tyler's claims to be spirituality  are reflected by meaningful practice when he is behaviorally stable. Further, his referenced \"head covering\" does not comply with any known native head covering of which I am aware. My assessment is that this request for a spiritual head-covering is more likely a symptom of Tyler's current MH issues.    At this time, staff prefer to attempt to stabilize patient without further addressing of Tyler's request, and I agree that since we are not able to give him his bandana, offering other options or further conversation on the topic are not likely to guarantee positive results, and Tyler has caused " codes about his anger related to this issue on this admit.     I also agreed with nurse that offering any further readings or other support at this time might serve to agitate Tyler's hyper-spiritual preoccupation, so I will defer offering further resources or spiritual support until following up with staff when I return on Monday.    PLAN: Follow up with staff on Monday.                                                                                                                            Gina Garcias  Staff   Pager [phone]

## 2017-09-29 NOTE — PROGRESS NOTES
"Pt's mood was irritable and grandiose. Pt was agitated throughout the shift. Pt has poor boundaries with staff and other pt's. Pt was swearing at his parents who came to visit. Pt threw the drinks they brought him. Pt was overheard telling his parents that he is causing all of the hurricanes in the world. This writer redirected the pt's intrusive behavior and the pt told this writer \"You are psychological warfare.\" Pt ruminated on needing his bandana to wear on his head. Pt appears paranoid as observed by the pt asking his parents over the phone \"who put out the hit on me?\"      09/28/17 2100   Behavioral Health   Hallucinations denies / not responding to hallucinations   Thinking delusional;paranoid;poor concentration   Orientation person: oriented;place: oriented;date: oriented;time: oriented   Memory confabulation   Insight poor   Judgement impaired   Eye Contact at examiner   Affect irritable;full range affect   Mood labile;grandiose;irritable   Physical Appearance/Attire attire appropriate to age and situation   Hygiene neglected grooming - unclean body, hair, teeth   Suicidality other (see comments)  (none reported)   Self Injury other (see comment)  (none reported/none observed)   Elopement (no observed behaviors)   Activity hyperactive (agitated, impulsive)   Speech rambling;flight of ideas;clear   Medication Sensitivity no observed side effects;no stated side effects   Psychomotor / Gait balanced;steady   Activities of Daily Living   Hygiene/Grooming independent   Oral Hygiene independent   Dress scrubs (behavioral health);independent   Laundry unable to complete   Room Organization independent   Activity   Activity Assistance Provided independent   Behavioral Health Interventions   Psychotic Symptoms maintain safety precautions;monitor need to revise level of observation;maintain safe secure environment;reality orientation;simple, clear language;redirection of intrusive behaviors;decrease environmental " stimulation;assist patient in following safety plan;provide emotional support;build upon strengths;assist with developing & utilizing healthy coping strategies;establish therapeutic relationship;encourage participation / independence with adls;encourage nutrition and hydration;assist patient in developing safety plan;redirection of aggressive behaviors;monitor confusion, memory loss, decision making ability and reorient / intervent as needed;monitor need for prn medication   Social and Therapeutic Interventions (Psychotic Symptoms) encourage participation in therapeutic groups and milieu activities;encourage effective boundaries with peers;encourage socialization with peers

## 2017-09-29 NOTE — PLAN OF CARE
"Problem: Psychotic Symptoms  Goal: Psychotic Symptoms  Signs and symptoms of listed problems will be absent or manageable.   Outcome: No Change  48 hour nursing assessment completed. Patient awake and visible throughout the shift. Patient continues to lack insight towards his situation, denying any mental illness or symptoms. Patient presents with continued pressured speech, tangential, grandiose, and delusional thought process. He perseverates on legal issues and \"suing the entire hospital\" if he does not get his \"religous headband. Patient is served with court paperwork, handles it well, and asks for PRN ativan and vistaril appropriately. Denies SI/SIB.       "

## 2017-09-29 NOTE — PLAN OF CARE
"Problem: Psychotic Symptoms  Goal: Social and Therapeutic (Psychotic Symptoms)  Signs and symptoms of listed problems will be absent or manageable.         Pt. Actively participated in goal directed task session. 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  He then argued with the limits on certain special beads cathy after reading the guidelines on the box of beads.  Several times mentioned that \"rm wouldn't make me follow these rules\".  This writer explained that I always follow these rules when working in this clinic.  Pt organized in his approach to the stringing of the beads but unsure of how to attach the fasteners for the clasp.       "

## 2017-09-29 NOTE — PROGRESS NOTES
"[facility], [place]   Psychiatric Progress Note  Hospital Day: 4        Interim History:   The patient's care was discussed with the treatment team during the daily team meeting and/or staff's chart notes were reviewed.  Staff report Tyler attended some groups yesterday, met with a . Continue to be agitated and was apparently yelling at his family who visited him. Took [place] last evening.     Upon interview, Tyler reports that he's feeling a little better now that he's back on his Lithium. Brings up frustration about not being able to have \"Orthodox headcovering\" and talks about how this is a right of his. It was explained to him again that staff have evaluated it and do not feel it is safe. Encouraged him to contact Patient Relations. Talked about how he is a . Aware of upcoming court dates.     Psychiatric Symptoms: Denies    Medication side effects reported: as above    Other issues reported by patient: None         Medications:       lithium  1,200 mg Oral At Bedtime     nicotine   Transdermal Q8H     nicotine   Transdermal Daily     nicotine  1 patch Transdermal Daily          Allergies:     Allergies   Allergen Reactions     Abilify Discmelt      Suicidal thoughts       Compazine Other (See Comments)     Seizure           Labs:   No results found for this or any previous visit (from the past 24 hour(s)).       Psychiatric Examination:     BP (!) 131/91  Pulse 87  Temp 96.4  F (35.8  C) (Tympanic)  Resp 16  Ht 1.676 m (5' 6\")  Wt 78.9 kg (174 lb)  SpO2 98%  BMI 28.08 kg/m2  Weight is 174 lbs 0 oz  Body mass index is 28.08 kg/(m^2).    Appearance: awake, alert, adequately groomed and dressed in hospital scrubs, open sore in the middle of his forehead is scabbing over   Attitude:  cooperative  Eye Contact:  intense  Mood:  \"okay\"  Affect:  intensity is heightened  Speech:  clear, coherent  Language: fluent and intact in English  Psychomotor, Gait, " Musculoskeletal:  no evidence of tardive dyskinesia, dystonia, or tics and intact station, gait and muscle tone  Throught Process:  goal oriented  Associations:  no loose associations  Thought Content:  no evidence of suicidal ideation or homicidal ideation and grandiose  Insight:  limited  Judgement:  limited  Oriented to:  time, person, and place  Attention Span and Concentration:  fair  Recent and Remote Memory:  fair  Fund of Knowledge:  appropriate         Precautions:     Behavioral Orders   Procedures     Assault precautions     Code 1 - Restrict to Unit     Routine Programming     As clinically indicated     Single Room     Status 15     Every 15 minutes.     Status Individual Observation     Order Specific Question:   CONTINUOUS 24 hours / day     Answer:   Distance and Exceptions     Order Specific Question:   Distance     Answer:   5 feet     Order Specific Question:   Exceptions     Answer:   Other     Order Specific Question:   Other exception     Answer:   outside of room          Diagnoses:      Bipolar affective disorder, manic, severe, with psychotic behavior (H)  Polysubstance abuse         Assessment & Plan:     Assessment and hospital summary: 41 year old man with a h/o Bipolar 1 Disorder and multiple substance use disorders presented with gwen with psychosis and aggression in the context of medication non adherence. Initially refused Lithium as pt prefers more natural form that he has apparently asked family to bring in. Took on 9/27.    Target psychiatric symptoms and interventions:  Gwen:  - Lithium 1200 mg QHS  - PRN ativan, olanzapine     Medical Problems and Treatments: None    Behavioral/Psychological/Social: Encourage group attendance    Legal: Came in on a 72 hour hold, recent commitment had lapsed so re-petitioned for MICD commitment with CLARA Frausto portion was supported. On a court hold. Exam 10/3, Final 10/6.    Disposition: Pending improvement in symptoms and court process

## 2017-09-29 NOTE — PROGRESS NOTES
This writer received court paperwork for this patient.  He remains on court hold     Preliminary Exam: 10/3 @ 2:15  Final: 10/6 @ TBD    This writer also faxed the Neuroleptic Note to Jackie Jarquin atty's office.

## 2017-09-30 PROCEDURE — 12400003 ZZH R&B MH CRITICAL UMMC

## 2017-09-30 PROCEDURE — A9270 NON-COVERED ITEM OR SERVICE: HCPCS | Mod: GY | Performed by: PSYCHIATRY & NEUROLOGY

## 2017-09-30 PROCEDURE — 25000132 ZZH RX MED GY IP 250 OP 250 PS 637: Mod: GY | Performed by: PSYCHIATRY & NEUROLOGY

## 2017-09-30 RX ADMIN — HYDROXYZINE HYDROCHLORIDE 50 MG: 25 TABLET ORAL at 18:40

## 2017-09-30 RX ADMIN — LITHIUM CARBONATE 1200 MG: 300 TABLET, EXTENDED RELEASE ORAL at 20:55

## 2017-09-30 RX ADMIN — HYDROXYZINE HYDROCHLORIDE 50 MG: 25 TABLET ORAL at 05:21

## 2017-09-30 RX ADMIN — NICOTINE 1 PATCH: 21 PATCH, EXTENDED RELEASE TRANSDERMAL at 10:38

## 2017-09-30 RX ADMIN — ACETAMINOPHEN 650 MG: 325 TABLET, FILM COATED ORAL at 04:54

## 2017-09-30 RX ADMIN — LORAZEPAM 0.5 MG: 0.5 TABLET ORAL at 05:21

## 2017-09-30 RX ADMIN — ALUMINUM HYDROXIDE, MAGNESIUM HYDROXIDE, AND DIMETHICONE 30 ML: 400; 400; 40 SUSPENSION ORAL at 15:46

## 2017-09-30 RX ADMIN — LORAZEPAM 0.5 MG: 0.5 TABLET ORAL at 18:40

## 2017-09-30 ASSESSMENT — ACTIVITIES OF DAILY LIVING (ADL)
GROOMING: INDEPENDENT
LAUNDRY: UNABLE TO COMPLETE
DRESS: SCRUBS (BEHAVIORAL HEALTH)
ORAL_HYGIENE: INDEPENDENT
ORAL_HYGIENE: INDEPENDENT
DRESS: SCRUBS (BEHAVIORAL HEALTH)
GROOMING: INDEPENDENT

## 2017-09-30 NOTE — PROGRESS NOTES
"Pt continues to be suspicious and paranoid of staff and psychiatry in general. Frequently speaks about psychiatrists murdering and experimenting on patients. Pt was overheard talking on the phone with unknown person; talking about setting up a website in which people \"pay by the minute\" to watch him use a \"go-pro\" and explore \"UFO portals.\" Spoke at length about O portals being located in [place], [place], [place], [place], etc.   Pt was irritable and tense when expressing frustration about not having access to his personal essential oils and \"Islam items\" (bandana with marijuana leaf debris on it, and a rock inside).   Compliant with scheduled Lithium, and requested prns Ativan and Vistaril for anxiety. No physical aggression or threats observed. Will continue on SIO until reassessed by team in the morning.  "

## 2017-09-30 NOTE — PROGRESS NOTES
09/30/17 1400   Behavioral Health   Hallucinations denies / not responding to hallucinations   Thinking distractable;delusional;paranoid   Orientation person: oriented;place: oriented;date: oriented;time: oriented   Memory confabulation   Insight denial of illness;poor   Judgement impaired   Eye Contact at examiner   Affect tense;irritable   Mood grandiose;labile;irritable   Physical Appearance/Attire neat   Hygiene well groomed   Suicidality other (see comments)  (None stated.)   Self Injury other (see comment)  (None observed.)   Activity withdrawn   Speech flight of ideas;rambling   Medication Sensitivity no stated side effects;no observed side effects   Psychomotor / Gait wrings hands;balanced   Psycho Education   Type of Intervention 1:1 intervention   Response participates with cues/redirection   Hours 0.5   Activities of Daily Living   Hygiene/Grooming independent   Oral Hygiene independent   Dress scrubs (behavioral health)   Room Organization independent   Activity   Activity Assistance Provided independent   Behavioral Health Interventions   Psychotic Symptoms maintain safety precautions   Social and Therapeutic Interventions (Psychotic Symptoms) encourage effective boundaries with peers     Pt spent most of this shift in his room resting and listening to music. Withdrawn and irritable, Needed some redirection to keep conversation appropriate when in milieu. No SI/SIB thoughts stated or observed.

## 2017-10-01 PROCEDURE — 25000132 ZZH RX MED GY IP 250 OP 250 PS 637: Mod: GY | Performed by: PSYCHIATRY & NEUROLOGY

## 2017-10-01 PROCEDURE — 90853 GROUP PSYCHOTHERAPY: CPT

## 2017-10-01 PROCEDURE — 12400003 ZZH R&B MH CRITICAL UMMC

## 2017-10-01 PROCEDURE — A9270 NON-COVERED ITEM OR SERVICE: HCPCS | Mod: GY | Performed by: PSYCHIATRY & NEUROLOGY

## 2017-10-01 RX ADMIN — NICOTINE POLACRILEX 8 MG: 4 GUM, CHEWING ORAL at 10:29

## 2017-10-01 RX ADMIN — HYDROXYZINE HYDROCHLORIDE 50 MG: 25 TABLET ORAL at 13:17

## 2017-10-01 RX ADMIN — LITHIUM CARBONATE 1200 MG: 300 TABLET, EXTENDED RELEASE ORAL at 20:01

## 2017-10-01 RX ADMIN — LORAZEPAM 0.5 MG: 0.5 TABLET ORAL at 02:55

## 2017-10-01 RX ADMIN — LORAZEPAM 0.5 MG: 0.5 TABLET ORAL at 13:17

## 2017-10-01 RX ADMIN — HYDROXYZINE HYDROCHLORIDE 50 MG: 25 TABLET ORAL at 19:00

## 2017-10-01 RX ADMIN — ACETAMINOPHEN 650 MG: 325 TABLET, FILM COATED ORAL at 02:55

## 2017-10-01 RX ADMIN — NICOTINE 1 PATCH: 21 PATCH, EXTENDED RELEASE TRANSDERMAL at 09:54

## 2017-10-01 RX ADMIN — HYDROXYZINE HYDROCHLORIDE 50 MG: 25 TABLET ORAL at 02:55

## 2017-10-01 RX ADMIN — LORAZEPAM 0.5 MG: 0.5 TABLET ORAL at 19:00

## 2017-10-01 RX ADMIN — OLANZAPINE 10 MG: 10 TABLET, FILM COATED ORAL at 20:01

## 2017-10-01 ASSESSMENT — ACTIVITIES OF DAILY LIVING (ADL)
DRESS: SCRUBS (BEHAVIORAL HEALTH)
LAUNDRY: UNABLE TO COMPLETE
GROOMING: INDEPENDENT
ORAL_HYGIENE: INDEPENDENT

## 2017-10-01 NOTE — PLAN OF CARE
"Problem: Psychotic Symptoms  Goal: Psychotic Symptoms  Signs and symptoms of listed problems will be absent or manageable.   Outcome: No Change  RN Assessment     Patent has been awake all shift. Has been hyperactive throughout the shift. Pacing halls and hyperverbal. He introduces himself to new staff as the \"star child.\" She does attend groups. Overall pleasant and cooperative with staff and peers. No aggression noted. Accepts staff redirection. Medication compliant.       "

## 2017-10-01 NOTE — PROGRESS NOTES
"Pt was present and active in milieu most of shift this evening. Pt was pleasant with peers and staff. Pt did require frequent redirection for drug talk with peers and was not receptive most of the time and asked to stop conversations. Pt made delusional statements this evening about formation of a new business and [place], pt made frequent remarks about \"healing rituals\" and \"energy work\" being all the medication he needed to discharge from the hospital. Pt made paranoid statements about the hospital and state keeping him here against his will. Pt is less hostile and agitated with hospital staff this evening. No SI, SIB, or HI to report, pt is eating in good amounts, and affect is less tense.      09/30/17 2147   Behavioral Health   Hallucinations denies / not responding to hallucinations   Thinking delusional;distractable;poor concentration;paranoid   Orientation person: oriented;place: oriented;date: oriented;time: oriented   Memory confabulation   Insight denial of illness;poor   Judgement impaired   Eye Contact at examiner   Affect tense   Mood grandiose;elated;labile   Physical Appearance/Attire attire appropriate to age and situation   Hygiene well groomed   Suicidality other (see comments)  (none noted)   Self Injury other (see comment)  (none noted)   Elopement (none noted)   Activity restless   Speech tangential;pressured   Medication Sensitivity no observed side effects;no stated side effects   Psychomotor / Gait balanced;steady   Activities of Daily Living   Hygiene/Grooming independent   Oral Hygiene independent   Dress scrubs (behavioral health)   Laundry unable to complete   Room Organization independent   Behavioral Health Interventions   Psychotic Symptoms maintain safety precautions;monitor need to revise level of observation;maintain safe secure environment;reality orientation;simple, clear language;decrease environmental stimulation;provide emotional support;establish therapeutic relationship;assist " with developing & utilizing healthy coping strategies;build upon strengths;monitor need for prn medication   Social and Therapeutic Interventions (Psychotic Symptoms) encourage socialization with peers;encourage participation in therapeutic groups and milieu activities;encourage effective boundaries with peers

## 2017-10-02 PROCEDURE — 25000132 ZZH RX MED GY IP 250 OP 250 PS 637: Mod: GY | Performed by: PSYCHIATRY & NEUROLOGY

## 2017-10-02 PROCEDURE — A9270 NON-COVERED ITEM OR SERVICE: HCPCS | Mod: GY | Performed by: PSYCHIATRY & NEUROLOGY

## 2017-10-02 PROCEDURE — 97150 GROUP THERAPEUTIC PROCEDURES: CPT | Mod: GO

## 2017-10-02 PROCEDURE — 99233 SBSQ HOSP IP/OBS HIGH 50: CPT | Mod: GC | Performed by: PSYCHIATRY & NEUROLOGY

## 2017-10-02 PROCEDURE — 12400003 ZZH R&B MH CRITICAL UMMC

## 2017-10-02 PROCEDURE — 90853 GROUP PSYCHOTHERAPY: CPT

## 2017-10-02 PROCEDURE — H2032 ACTIVITY THERAPY, PER 15 MIN: HCPCS

## 2017-10-02 RX ORDER — LORAZEPAM 1 MG/1
1 TABLET ORAL 3 TIMES DAILY PRN
Status: DISCONTINUED | OUTPATIENT
Start: 2017-10-02 | End: 2017-10-10 | Stop reason: HOSPADM

## 2017-10-02 RX ADMIN — NICOTINE POLACRILEX 8 MG: 4 GUM, CHEWING ORAL at 14:02

## 2017-10-02 RX ADMIN — NICOTINE POLACRILEX 8 MG: 4 GUM, CHEWING ORAL at 10:17

## 2017-10-02 RX ADMIN — HYDROXYZINE HYDROCHLORIDE 50 MG: 25 TABLET ORAL at 16:18

## 2017-10-02 RX ADMIN — NICOTINE 1 PATCH: 21 PATCH, EXTENDED RELEASE TRANSDERMAL at 10:17

## 2017-10-02 RX ADMIN — HYDROXYZINE HYDROCHLORIDE 50 MG: 25 TABLET ORAL at 05:34

## 2017-10-02 RX ADMIN — LITHIUM CARBONATE 1200 MG: 300 TABLET, EXTENDED RELEASE ORAL at 20:25

## 2017-10-02 RX ADMIN — LORAZEPAM 0.5 MG: 0.5 TABLET ORAL at 05:34

## 2017-10-02 RX ADMIN — NICOTINE POLACRILEX 8 MG: 4 GUM, CHEWING ORAL at 16:18

## 2017-10-02 RX ADMIN — LORAZEPAM 1 MG: 1 TABLET ORAL at 16:18

## 2017-10-02 ASSESSMENT — ACTIVITIES OF DAILY LIVING (ADL)
DRESS: SCRUBS (BEHAVIORAL HEALTH);INDEPENDENT
ORAL_HYGIENE: INDEPENDENT
GROOMING: INDEPENDENT
LAUNDRY: UNABLE TO COMPLETE

## 2017-10-02 NOTE — PLAN OF CARE
"Problem: Psychotic Symptoms  Intervention: Social and Therapeutic Interv (Psychotic Symptoms)     Pt attended 2 of 2 OT groups.  Initially less grandiose as he's been in previous days. Once questions are asked of him about wellness, he did start talking about his '[facility] natural wellness team'.  From that, pt started to discuss frustration with not having access to his 'natural medicine, nutrients, minerals, vitamins' and the studies he's done on how harmful antipsychotics are.     Pt is concerned about commitment, states he's spent time at [facility], and \"it's not good there\".   Feels his doctor doesn't know him, and was quick to  him on his symptoms when he was admitted, \"I was having a panic attack with severe anxiety, after 3 days, I realized Lithium works well for me, and I'm doing better now\".     Works well during OT clinic.  Continues to focus on beadwork.  Spoke today about having a finace.            "

## 2017-10-02 NOTE — PROGRESS NOTES
"[facility], [place]   Psychiatric Progress Note  Hospital Day: 7        Interim History:   The patient's care was discussed with the treatment team during the daily team meeting and/or staff's chart notes were reviewed.  Staff report Tyler is doing well, less agitated, compliant with Lithium.  Did make some grandiose/bizzare statements over the weekend but good behavioral control.  Did need several doses of prn lorazepam over the weekend.    Upon interview, Tyler reports that he's continuing to feel better, feels Lithium is helpful.  Is pleasant on interview, discusses the events leading to admission, including the multiple healing/spirtual services and obligations he was involved in.  States \"I am a big spirit and a big energy in a small body\".  Wants to remain compliant on his medications, feel this will help him stay out of the hospital.  Feels comfortable on the unit.    Psychiatric Symptoms: Denies    Medication side effects reported: as above    Other issues reported by patient: None         Medications:       lithium  1,200 mg Oral At Bedtime     nicotine   Transdermal Q8H     nicotine   Transdermal Daily     nicotine  1 patch Transdermal Daily          Allergies:     Allergies   Allergen Reactions     Abilify Discmelt      Suicidal thoughts       Compazine Other (See Comments)     Seizure           Labs:   No results found for this or any previous visit (from the past 24 hour(s)).       Psychiatric Examination:     BP (!) 131/91  Pulse 87  Temp 98  F (36.7  C) (Tympanic)  Resp 16  Ht 1.676 m (5' 6\")  Wt 80.3 kg (177 lb)  SpO2 98%  BMI 28.57 kg/m2  Weight is 177 lbs 0 oz  Body mass index is 28.57 kg/(m^2).    Appearance: awake, alert, adequately groomed and dressed in hospital scrubs, open sore in the middle of his forehead is scabbing over   Attitude:  cooperative  Eye Contact:  intense  Mood:  \"good\"  Affect:  intensity is heightened slightly  Speech:  clear, " coherent  Language: fluent and intact in English  Psychomotor, Gait, Musculoskeletal:  no evidence of tardive dyskinesia, dystonia, or tics and intact station, gait and muscle tone  Throught Process:  goal oriented  Associations:  no loose associations  Thought Content:  no evidence of suicidal ideation or homicidal ideation and somewhat grandiose  Insight:  limited  Judgement:  limited  Oriented to:  time, person, and place  Attention Span and Concentration:  fair  Recent and Remote Memory:  fair  Fund of Knowledge:  appropriate         Precautions:     Behavioral Orders   Procedures     Assault precautions     Code 1 - Restrict to Unit     Routine Programming     As clinically indicated     Single Room     Status 15     Every 15 minutes.          Diagnoses:      Bipolar affective disorder, manic, severe, with psychotic behavior (H)  Polysubstance abuse         Assessment & Plan:     Assessment and hospital summary: 41 year old man with a h/o Bipolar 1 Disorder and multiple substance use disorders presented with gwen with psychosis and aggression in the context of medication non adherence. Initially refused Lithium as pt prefers more natural form that he has apparently asked family to bring in. Took on 9/27.    Target psychiatric symptoms and interventions:  Gwen:  - Lithium 1200 mg QHS, Li level tomorrow  - Increase lorazepam to 1 mg TID prn  - olanzapine 10 mg    Medical Problems and Treatments: None    Behavioral/Psychological/Social: Encourage group attendance    Legal: Came in on a 72 hour hold, recent commitment had lapsed so re-petitioned for MICD commitment with CLARA Frausto portion was supported. On a court hold. Exam 10/3, Final 10/6.    Disposition: Pending improvement in symptoms and court process

## 2017-10-02 NOTE — PROGRESS NOTES
10/01/17 2100 Behavioral Health   Hallucinations denies / not responding to hallucinations   Thinking confused;delusional   Insight poor   Judgement impaired   Mood mood is calm   Physical Appearance/Attire attire appropriate to age and situation   Suicidality other (see comments)  (none reported )   Self Injury other (see comment)  (none reported )   Elopement (no attempt to elope )   Activity isolative;withdrawn   Speech flight of ideas;rambling   Psychomotor / Gait fast;balanced;steady   Activities of Daily Living   Hygiene/Grooming independent   Oral Hygiene independent   Dress scrubs (behavioral health)   Laundry unable to complete   Room Organization independent   Behavioral Health Interventions   Psychotic Symptoms maintain safety precautions;monitor need to revise level of observation;maintain safe secure environment;reality orientation;simple, clear language;decrease environmental stimulation;redirection of intrusive behaviors;redirection of aggressive behaviors;assist patient in developing safety plan;assist patient in following safety plan;encourage nutrition and hydration;encourage participation / independence with adls;provide emotional support;establish therapeutic relationship;assist with developing & utilizing healthy coping strategies;build upon strengths;assess patient response to medication;assess medication adherance;monitor need for prn medication;monitor confusion, memory loss, decision making ability and reorient / intervent as needed   Social and Therapeutic Interventions (Psychotic Symptoms) encourage socialization with peers;encourage effective boundaries with peers;encourage participation in therapeutic groups and milieu activities

## 2017-10-03 LAB — LITHIUM SERPL-SCNC: 0.6 MMOL/L (ref 0.6–1.2)

## 2017-10-03 PROCEDURE — 25000132 ZZH RX MED GY IP 250 OP 250 PS 637: Mod: GY | Performed by: PSYCHIATRY & NEUROLOGY

## 2017-10-03 PROCEDURE — 12400003 ZZH R&B MH CRITICAL UMMC

## 2017-10-03 PROCEDURE — 80178 ASSAY OF LITHIUM: CPT | Performed by: PSYCHIATRY & NEUROLOGY

## 2017-10-03 PROCEDURE — A9270 NON-COVERED ITEM OR SERVICE: HCPCS | Mod: GY | Performed by: PSYCHIATRY & NEUROLOGY

## 2017-10-03 PROCEDURE — 36415 COLL VENOUS BLD VENIPUNCTURE: CPT | Performed by: PSYCHIATRY & NEUROLOGY

## 2017-10-03 PROCEDURE — 99233 SBSQ HOSP IP/OBS HIGH 50: CPT | Mod: GC | Performed by: PSYCHIATRY & NEUROLOGY

## 2017-10-03 RX ADMIN — LORAZEPAM 1 MG: 1 TABLET ORAL at 21:24

## 2017-10-03 RX ADMIN — NICOTINE POLACRILEX 8 MG: 4 GUM, CHEWING ORAL at 16:24

## 2017-10-03 RX ADMIN — NICOTINE POLACRILEX 8 MG: 4 GUM, CHEWING ORAL at 10:19

## 2017-10-03 RX ADMIN — OLANZAPINE 10 MG: 10 TABLET, FILM COATED ORAL at 01:02

## 2017-10-03 RX ADMIN — LORAZEPAM 1 MG: 1 TABLET ORAL at 10:19

## 2017-10-03 RX ADMIN — HYDROXYZINE HYDROCHLORIDE 50 MG: 25 TABLET ORAL at 10:19

## 2017-10-03 RX ADMIN — NICOTINE 1 PATCH: 21 PATCH, EXTENDED RELEASE TRANSDERMAL at 08:11

## 2017-10-03 RX ADMIN — LITHIUM CARBONATE 1200 MG: 300 TABLET, EXTENDED RELEASE ORAL at 20:28

## 2017-10-03 RX ADMIN — HYDROXYZINE HYDROCHLORIDE 50 MG: 25 TABLET ORAL at 21:25

## 2017-10-03 NOTE — PROGRESS NOTES
This writer left  for the [facility] Atty on this case (Jeremie Cruz).  Explained that our Dr would be agreeable to a stay if the pt agreed to remain here until the dr feels he is ready for d/c.  Especially since Lithium is not able to be on a rodarte we feel a stay would be ok.  Pt has his court exam today at 2:15pm

## 2017-10-03 NOTE — PROGRESS NOTES
"[facility], [place]   Psychiatric Progress Note  Hospital Day: 8        Interim History:   The patient's care was discussed with the treatment team during the daily team meeting and/or staff's chart notes were reviewed.  Staff report Tyler has been anxious about court today but with good behavioral control.  Less grandiose at times making paranoid statements. Yesterday took prn lorazepam twice and hydroxyzine once.     Upon interview, pt states he is doing well and is eager for court.  Has a list of questions for court today, understands that we are supportive of a stay of commitment.  Found lorazepam increase helpful, thinks this is a useful med for him while in crisis.  Did not like having a blood drawn states \"you are violating the hippocratic oat of do no harm\", but is amenable to explanation of why it is important.    Psychiatric Symptoms: Denies    Medication side effects reported: as above    Other issues reported by patient: None         Medications:       lithium  1,200 mg Oral At Bedtime     nicotine   Transdermal Q8H     nicotine   Transdermal Daily     nicotine  1 patch Transdermal Daily          Allergies:     Allergies   Allergen Reactions     Abilify Discmelt      Suicidal thoughts       Compazine Other (See Comments)     Seizure           Labs:     Recent Results (from the past 24 hour(s))   Lithium level    Collection Time: 10/03/17  8:50 AM   Result Value Ref Range    Lithium Level 0.6 0.6 - 1.2 mmol/L          Psychiatric Examination:     BP (!) 131/91  Pulse 87  Temp 97.2  F (36.2  C) (Tympanic)  Resp 16  Ht 1.676 m (5' 6\")  Wt 79.8 kg (176 lb)  SpO2 98%  BMI 28.41 kg/m2  Weight is 176 lbs 0 oz  Body mass index is 28.41 kg/(m^2).    Appearance: awake, alert, adequately groomed and dressed in hospital scrubs, open sore in the middle of his forehead is scabbing over   Attitude:  cooperative  Eye Contact:  intense  Mood:  \"good\"  Affect:  intensity is heightened " slightly  Speech:  clear, coherent  Language: fluent and intact in English  Psychomotor, Gait, Musculoskeletal:  no evidence of tardive dyskinesia, dystonia, or tics and intact station, gait and muscle tone  Throught Process:  goal oriented  Associations:  no loose associations  Thought Content:  no evidence of suicidal ideation or homicidal ideation and somewhat grandiose  Insight:  limited  Judgement:  limited  Oriented to:  time, person, and place  Attention Span and Concentration:  fair  Recent and Remote Memory:  fair  Fund of Knowledge:  appropriate         Precautions:     Behavioral Orders   Procedures     Assault precautions     Code 1 - Restrict to Unit     Routine Programming     As clinically indicated     Single Room     Status 15     Every 15 minutes.          Diagnoses:      Bipolar affective disorder, manic, severe, with psychotic behavior (H)  Polysubstance abuse         Assessment & Plan:     Assessment and hospital summary: 41 year old man with a h/o Bipolar 1 Disorder and multiple substance use disorders presented with gwen with psychosis and aggression in the context of medication non adherence. Initially refused Lithium as pt prefers more natural form, but did start taking it and has improved.  Lithium level on current dose is 0.6.     Target psychiatric symptoms and interventions:  Gwen:  - Lithium 1200 mg QHS  - Lorazepam to 1 mg TID prn  - olanzapine 10 mg prn    Medical Problems and Treatments: None    Behavioral/Psychological/Social: Encourage group attendance    Legal: Came in on a 72 hour hold, recent commitment had lapsed so re-petitioned for MICD commitment with CLARA Frausto portion was supported. On a court hold. Exam 10/3, Final 10/6.    Disposition: Pending improvement in symptoms and court process    Nichelle Delarosa MD  Psychiatry Resident

## 2017-10-03 NOTE — PROGRESS NOTES
Jeremie Cruz called twice today to discuss this pt prior to the examination today.  He is concerned about offering a stay for this pt.  He plans to speak to his OP ACT team CM and also the pt and then call me again to inform me of their decision.  They are leaning towards a full commit due to him threatening his family and non compliance with meds (even though it's lithium).

## 2017-10-03 NOTE — PLAN OF CARE
Problem: Psychotic Symptoms  Goal: Psychotic Symptoms  Signs and symptoms of listed problems will be absent or manageable.   Outcome: No Change  RN Assessment     Patient has been in the lounge most of the day. Focused on his writing and journaling. He spends time on the phone engaged in intense conversations about sales and pharmaceutical companies. He speech is pressured. Stated today that he was part  and that the hospital was in violation of his rights not allowing him to smoke. He denies AH/VH. Denies depression SI/SIB. He is eating and drinking in good amounts. Left for court today at 1230. Medication compliant.

## 2017-10-03 NOTE — PLAN OF CARE
Problem: Patient Care Overview  Goal: Team Discussion  Team Plan:   BEHAVIORAL TEAM DISCUSSION     Participants: Nichelle Johnson (Resident), Kaela Moreau RN, FLORIAN DIAZ, [facility]   Progress: continues to refuse medications, pt has his court examination today.    Continued Stay Criteria/Rationale: pt is in midst of court process and has his exam today, hearing 10/6 @ TBD  Medical/Physical: see medical chart  Precautions:   Behavioral Orders   Procedures     Assault precautions     Code 1 - Restrict to Unit     Routine Programming       As clinically indicated     Single Room     Status 15       Every 15 minutes.     Plan: will await court decision   Rationale for change in precautions or plan: no change        Problem: General Plan of Care (Inpatient Behavioral)  Goal: Team Discussion  Team Plan:   BEHAVIORAL TEAM DISCUSSION     Participants: Dr. Abdelrahman Gill, Nichelle Delarosa (Resident), Kaela Moreau RN, FLORIAN Benson LMFT, [facility]   Progress: continues to refuse medications, pt has his court examination today.    Continued Stay Criteria/Rationale: pt is in midst of court process and has his exam today, hearing 10/6 @ TBD  Medical/Physical: see medical chart  Precautions:   Behavioral Orders   Procedures     Assault precautions     Code 1 - Restrict to Unit     Routine Programming       As clinically indicated     Single Room     Status 15       Every 15 minutes.     Plan: will await court decision   Rationale for change in precautions or plan: no change

## 2017-10-03 NOTE — PROGRESS NOTES
"   10/02/17 2200   Behavioral Health   Hallucinations denies / not responding to hallucinations   Thinking distractable;delusional   Orientation person: oriented;place: oriented   Memory confabulation   Insight poor   Judgement impaired   Eye Contact at examiner   Affect full range affect   Mood mood is calm   Physical Appearance/Attire attire appropriate to age and situation   Hygiene well groomed   Suicidality other (see comments)  (denies)   Self Injury other (see comment)  (denies)   Elopement (none)   Activity other (see comment)  (present in milieu)   Speech clear;coherent   Medication Sensitivity no stated side effects;no observed side effects   Psychomotor / Gait balanced;steady   Behavioral Health Interventions   Psychotic Symptoms maintain safety precautions;maintain safe secure environment;reality orientation;assist patient in following safety plan;encourage nutrition and hydration;encourage participation / independence with adls;provide emotional support;assist with developing & utilizing healthy coping strategies;build upon strengths   Social and Therapeutic Interventions (Psychotic Symptoms) encourage socialization with peers;encourage effective boundaries with peers;encourage participation in therapeutic groups and milieu activities   Pt was visible in milieu for most of evening. Pt was social with staff and peers. Pt had some delusions tonight such as \"your uncle is the one who sent me here\" to other pt. Pt was calm throughout shift, had bright/full range affect. Pt got upset with another pt once this shift and went to room to calm down. Pt is feeling anxious about court.  "

## 2017-10-04 PROCEDURE — A9270 NON-COVERED ITEM OR SERVICE: HCPCS | Mod: GY | Performed by: PSYCHIATRY & NEUROLOGY

## 2017-10-04 PROCEDURE — 12400007 ZZH R&B MH INTERMEDIATE UMMC

## 2017-10-04 PROCEDURE — 25000132 ZZH RX MED GY IP 250 OP 250 PS 637: Mod: GY | Performed by: PSYCHIATRY & NEUROLOGY

## 2017-10-04 PROCEDURE — 99233 SBSQ HOSP IP/OBS HIGH 50: CPT | Mod: GC | Performed by: PSYCHIATRY & NEUROLOGY

## 2017-10-04 PROCEDURE — 90853 GROUP PSYCHOTHERAPY: CPT

## 2017-10-04 PROCEDURE — 97150 GROUP THERAPEUTIC PROCEDURES: CPT | Mod: GO

## 2017-10-04 RX ADMIN — LITHIUM CARBONATE 1200 MG: 300 TABLET, EXTENDED RELEASE ORAL at 19:50

## 2017-10-04 RX ADMIN — LORAZEPAM 1 MG: 1 TABLET ORAL at 17:01

## 2017-10-04 RX ADMIN — HYDROXYZINE HYDROCHLORIDE 50 MG: 25 TABLET ORAL at 17:01

## 2017-10-04 RX ADMIN — LORAZEPAM 1 MG: 1 TABLET ORAL at 09:42

## 2017-10-04 RX ADMIN — NICOTINE 1 PATCH: 21 PATCH, EXTENDED RELEASE TRANSDERMAL at 08:27

## 2017-10-04 RX ADMIN — NICOTINE POLACRILEX 8 MG: 4 GUM, CHEWING ORAL at 19:50

## 2017-10-04 RX ADMIN — HYDROXYZINE HYDROCHLORIDE 50 MG: 25 TABLET ORAL at 09:42

## 2017-10-04 ASSESSMENT — ACTIVITIES OF DAILY LIVING (ADL)
LAUNDRY: WITH SUPERVISION
ORAL_HYGIENE: INDEPENDENT
HYGIENE/GROOMING: INDEPENDENT
DRESS: SCRUBS (BEHAVIORAL HEALTH)
DRESS: SCRUBS (BEHAVIORAL HEALTH)
GROOMING: INDEPENDENT
ORAL_HYGIENE: INDEPENDENT

## 2017-10-04 NOTE — PROGRESS NOTES
Pt went to court yesterday and he agreed to a full commitment w/ rodarte.  Jeremie Jarquin atty called and confirmed this and will be sending over court paperwork.  Pt will need a provisional discharge.
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
## 2017-10-04 NOTE — PROGRESS NOTES
10/04/17 1400 Behavioral Health   Hallucinations denies / not responding to hallucinations   Insight denial of illness;poor   Judgement impaired   Affect full range affect   Mood grandiose   Activity restless   Speech tangential;pressured;flight of ideas   Activities of Daily Living   Hygiene/Grooming independent   Oral Hygiene independent   Dress scrubs (behavioral health)   Room Organization independent   Pt was restless throughout the shift. He continues to be somewhat hyper-verbal and grandiose, but he denies current symptoms. He stated that he's been feeling much better the last few days. Pt did have a minor verbal altercation with another patient; however, the other patient was antagonizing him; and he was able to quickly calm independently. Pt expects to discharge tomorrow and stated that he feels ready to go.

## 2017-10-04 NOTE — PROGRESS NOTES
Patient transferred to Station 10 at 1400. Patient oriented to unit. Patient is upset because his doctor told him he could stay on station 12 until his provisional discharge was finalized but is cooperative with staff at this time.

## 2017-10-05 PROCEDURE — 25000132 ZZH RX MED GY IP 250 OP 250 PS 637: Mod: GY | Performed by: PSYCHIATRY & NEUROLOGY

## 2017-10-05 PROCEDURE — A9270 NON-COVERED ITEM OR SERVICE: HCPCS | Mod: GY | Performed by: PSYCHIATRY & NEUROLOGY

## 2017-10-05 PROCEDURE — 12400007 ZZH R&B MH INTERMEDIATE UMMC

## 2017-10-05 PROCEDURE — 99232 SBSQ HOSP IP/OBS MODERATE 35: CPT | Performed by: PSYCHIATRY & NEUROLOGY

## 2017-10-05 PROCEDURE — 90853 GROUP PSYCHOTHERAPY: CPT

## 2017-10-05 RX ADMIN — LORAZEPAM 1 MG: 1 TABLET ORAL at 10:36

## 2017-10-05 RX ADMIN — HYDROXYZINE HYDROCHLORIDE 50 MG: 25 TABLET ORAL at 10:35

## 2017-10-05 RX ADMIN — HYDROXYZINE HYDROCHLORIDE 50 MG: 25 TABLET ORAL at 15:47

## 2017-10-05 RX ADMIN — LITHIUM CARBONATE 1200 MG: 300 TABLET, EXTENDED RELEASE ORAL at 20:27

## 2017-10-05 RX ADMIN — OLANZAPINE 10 MG: 10 TABLET, FILM COATED ORAL at 03:41

## 2017-10-05 RX ADMIN — OLANZAPINE 10 MG: 10 TABLET, FILM COATED ORAL at 14:39

## 2017-10-05 RX ADMIN — NICOTINE 1 PATCH: 21 PATCH, EXTENDED RELEASE TRANSDERMAL at 08:45

## 2017-10-05 RX ADMIN — LORAZEPAM 1 MG: 1 TABLET ORAL at 15:47

## 2017-10-05 RX ADMIN — HYDROXYZINE HYDROCHLORIDE 50 MG: 25 TABLET ORAL at 22:24

## 2017-10-05 RX ADMIN — LORAZEPAM 1 MG: 1 TABLET ORAL at 22:24

## 2017-10-05 ASSESSMENT — ACTIVITIES OF DAILY LIVING (ADL)
ORAL_HYGIENE: INDEPENDENT
ORAL_HYGIENE: INDEPENDENT
LAUNDRY: WITH SUPERVISION
DRESS: INDEPENDENT
LAUNDRY: WITH SUPERVISION
HYGIENE/GROOMING: INDEPENDENT
GROOMING: INDEPENDENT
DRESS: INDEPENDENT

## 2017-10-05 NOTE — PLAN OF CARE
"Problem: Psychotic Symptoms  Goal: Psychotic Symptoms  Signs and symptoms of listed problems will be absent or manageable.   Outcome: No Change  Patient has been irritable this shift, he states he is disappointed because his Dr. Gill told him yesterday that he would be discharged on Friday and \"he gave me his word.\" Patient was told by Dr. Pulido that he will have to be hospitalized for at least a week for stabilization. Medical director contacted, he suggests Dr. Pulido and Dr. Gill meet tomorrow to discuss a plan. Patient is hyperverbal, grandiose. Stating his resume would take 3 weeks to tell, he is a master healer, independently wealthy from selling essential oils, he has healed stage 4 cancer in 6 months, he came into the hospital 7 years ago as an  for his [place] to investigate the death trap that is the mental health care system. Patient states he knows that a lot of what he says is exaggerated and that he likes to tell \"tall tales.\" Patient has been compliant with medications and has requested and received PRNs for anxiety several times this shift. He received PRN vistaril and ativan at 10:30 and 1545 and received PRN zyprexa at 12:40. Patient denies all mental health symptoms except anxiety, denies SI/SIB/HI/psychotic symptoms.       "

## 2017-10-05 NOTE — PROGRESS NOTES
Writer faxed the following letter to [facility] Attorney's office asking for an amendment to Pt's Frausto order. Original and signed letter is located in Pt's paper chart under the legal section. Faxed to: [phone]    October 6, 2017    [facility] [address]  Civil Commitment Division  [address] [place] [address]    Re:   Respondent:  _________________________  File Number:  _________________________      To Whom it May Concern:    The attending psychiatrist Dr. Lambert Pulido and clinical treatment coordinator   Aruna Calderón [facility] on [address], request an amendment to the Frausto order.     We are requesting to replace Seroquel with Invega Sustenna due to his delusional   and/or grandiose statements and we believe Invega Sustenna (an injectable   medication) would also assist with Pt s inconsistency of taking his medications.     Sincerely,

## 2017-10-05 NOTE — PROGRESS NOTES
Writer spoke with Dr. Lydia Covington, Pt's ACT team psychiatrist who reported the team has serious concerns about Pt discharging today or tomorrow from the hospital, they do not believe he has reached mental health stabilization. Per Dr. Covington Pt met with ACT team CM yesterday and stated he would not be taking his medications once discharged, also made several delusional statements. They are recommending Pt be put on a long acting injectable, and reported they have seen success with the risperdal consta injection. Dr. Covington reported they (the ACT team) believes Pt is not stable, and when he is not stable has a history of being a danger to himself, but especially others. Pt's CD use has been expanding to more substances than marijuana, and this is also concerning to the ACT team.

## 2017-10-05 NOTE — PROGRESS NOTES
"Attended 2 of 3 OT groups offered. Hyper-verbal and grandiose in conversation. 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 Also, said \"I was sent here from our mother ship to help heal our most treasured friend [place]. I will return [place] upon being discharged. [place] I need to be cleansed.\" Went on to say he is an expert in several areas of practice and thinks he should go to school to back up his knowledge. Needed consistent redirections, cuing and limits regarding monopolizing conversations. Denies need for medications and said \"I am only taking it here to get out or here. [place] not this pharmaceutical produced poison.\"  "

## 2017-10-06 PROCEDURE — 99207 ZZC CDG-MDM COMPONENT: MEETS MODERATE - UP CODED: CPT | Performed by: PSYCHIATRY & NEUROLOGY

## 2017-10-06 PROCEDURE — 25000132 ZZH RX MED GY IP 250 OP 250 PS 637: Mod: GY | Performed by: PSYCHIATRY & NEUROLOGY

## 2017-10-06 PROCEDURE — A9270 NON-COVERED ITEM OR SERVICE: HCPCS | Mod: GY | Performed by: PSYCHIATRY & NEUROLOGY

## 2017-10-06 PROCEDURE — 99232 SBSQ HOSP IP/OBS MODERATE 35: CPT | Performed by: PSYCHIATRY & NEUROLOGY

## 2017-10-06 PROCEDURE — 97150 GROUP THERAPEUTIC PROCEDURES: CPT | Mod: GO

## 2017-10-06 PROCEDURE — 90853 GROUP PSYCHOTHERAPY: CPT

## 2017-10-06 PROCEDURE — 12400007 ZZH R&B MH INTERMEDIATE UMMC

## 2017-10-06 RX ORDER — PALIPERIDONE 3 MG/1
3 TABLET, EXTENDED RELEASE ORAL DAILY
Status: DISCONTINUED | OUTPATIENT
Start: 2017-10-06 | End: 2017-10-06

## 2017-10-06 RX ORDER — LITHIUM CARBONATE 300 MG/1
300 TABLET, FILM COATED, EXTENDED RELEASE ORAL AT BEDTIME
Status: DISCONTINUED | OUTPATIENT
Start: 2017-10-07 | End: 2017-10-09 | Stop reason: DRUGHIGH

## 2017-10-06 RX ORDER — RISPERIDONE 1 MG/1
1 TABLET ORAL DAILY
Status: DISCONTINUED | OUTPATIENT
Start: 2017-10-06 | End: 2017-10-10 | Stop reason: HOSPADM

## 2017-10-06 RX ADMIN — LITHIUM CARBONATE 1200 MG: 300 TABLET, EXTENDED RELEASE ORAL at 20:09

## 2017-10-06 RX ADMIN — LORAZEPAM 1 MG: 1 TABLET ORAL at 12:38

## 2017-10-06 RX ADMIN — NICOTINE 1 PATCH: 21 PATCH, EXTENDED RELEASE TRANSDERMAL at 09:44

## 2017-10-06 RX ADMIN — HYDROXYZINE HYDROCHLORIDE 50 MG: 25 TABLET ORAL at 12:38

## 2017-10-06 RX ADMIN — LORAZEPAM 1 MG: 1 TABLET ORAL at 22:47

## 2017-10-06 RX ADMIN — LORAZEPAM 1 MG: 1 TABLET ORAL at 08:38

## 2017-10-06 RX ADMIN — HYDROXYZINE HYDROCHLORIDE 50 MG: 25 TABLET ORAL at 08:38

## 2017-10-06 RX ADMIN — HYDROXYZINE HYDROCHLORIDE 50 MG: 25 TABLET ORAL at 22:47

## 2017-10-06 ASSESSMENT — ACTIVITIES OF DAILY LIVING (ADL)
ORAL_HYGIENE: INDEPENDENT
DRESS: INDEPENDENT
GROOMING: INDEPENDENT

## 2017-10-06 NOTE — PROGRESS NOTES
10/06/17 1400   Behavioral Health   Hallucinations denies / not responding to hallucinations   Thinking distractable   Orientation person: oriented;place: oriented;date: oriented   Memory baseline memory   Insight denial of illness   Judgement impaired   Eye Contact at examiner   Affect irritable   Mood anxious;irritable   Physical Appearance/Attire neat   Suicidality other (see comments)  (denies)   Self Injury other (see comment)  (denies)   Elopement (no value)   Activity withdrawn;restless   Speech clear;coherent   Medication Sensitivity no stated side effects;no observed side effects   Psychomotor / Gait balanced;steady   Psycho Education   Type of Intervention 1:1 intervention   Response participates with cues/redirection   Hours 0.5   Treatment Detail check in   Behavioral Health Interventions   Psychotic Symptoms maintain safety precautions;monitor need to revise level of observation;maintain safe secure environment;reality orientation;simple, clear language;decrease environmental stimulation;redirection of intrusive behaviors;redirection of aggressive behaviors;assist patient in developing safety plan;assist patient in following safety plan;encourage nutrition and hydration;encourage participation / independence with adls;provide emotional support;build upon strengths;assist with developing & utilizing healthy coping strategies;establish therapeutic relationship   Social and Therapeutic Interventions (Psychotic Symptoms) encourage effective boundaries with peers;encourage socialization with peers;encourage participation in therapeutic groups and milieu activities     Patient is really irritable about moving to this station from another station. Pt stated that he was told he was being discharged. Pt is irritated because he is not discharged. Pt is denying his illness. Pt has no SI or SIB.

## 2017-10-06 NOTE — PROGRESS NOTES
"[facility], [place]   Psychiatric Progress Note        Interim History:       Subjective: \"I need to go home tomorrow, the other doctor gave me his word!\"     As per today's interview: Tyler was seen today in his room, and was initially conversationally appropriate however when the subject of his continued denisha and his probable need for continued hospitalization came up , his demeanor and tone became much more hostile and intrusive. He mentioned various things, such as being a healer, being part of a cleansing ritual, going to a Cynny ritual. He makes claims that his behavior is like this at baseline. He mentioned psychiatric medications are poisoning his body and poisoning the drinking water. He denies any SI/HI/AH/VH. When I mention that I will speak to his other doctor and decided on a plan tomorrow, he replies \"If I'm not out here tomorrow, then I'll have a legal team waiting for you!\"          Medications:       lithium  1,200 mg Oral At Bedtime     nicotine   Transdermal Q8H     nicotine   Transdermal Daily     nicotine  1 patch Transdermal Daily          Allergies:     Allergies   Allergen Reactions     Abilify Discmelt      Suicidal thoughts       Compazine Other (See Comments)     Seizure           Labs:   No results found for this or any previous visit (from the past 48 hour(s)).       Psychiatric Examination:   BP (!) 131/91  Pulse 87  Temp 99.4  F (37.4  C) (Oral)  Resp 16  Ht 1.676 m (5' 6\")  Wt 78.9 kg (174 lb)  SpO2 98%  BMI 28.08 kg/m2  Weight is 174 lbs 0 oz  Body mass index is 28.08 kg/(m^2).    Appearance: Short white male, balled heard, moderately groomed beard. Long earring on both ears.   Attitude:  Initially appropriate, but soon became irritable, instrusive, dismissive and demanding.   Eye Contact:  Intense, continuous   Mood:  \"I'm frustrated\"   Affect:  Irritable, anxious   Speech:  Pressured/rapid in rate, loud in tone  Language: " Intact   Psychomotor Behavior:  Activated   Thought Process:  Pressured, tangential.   Associations:  Some loosening present  Thought Content:  Delusional, paranoid, grandiose. No SI/HI/AH/VH.   Insight:  Poor   Judgement:  Poor   Oriented to:  Person, place  Attention Span and Concentration:  Seems distracted   Recent and Remote Memory:  Intact   Fund of knowledge: Below average   Gait and Station: Normal     Assessment & Plan       Principal Diagnosis:   Bipolar Disorder, currently manic, with psychosis   Cannabis Use Disorder     Assessment:   Tyler was recently committed with rodarte (rodarte medications including Zyprexa, Haldol, Risperdal, Seroquel), and was recently transferred units along with transferring providers. The patient remains quite manic in his behavior, exhibiting delusions, disorganized thinking, irritability, intrusiveness, demanding, hyperverbal. He seems very much fixated on telling me his prior provider had promised him discharge today or tomorrow. I spend quite a bit of time speaking to his outpatient provider Dr. Goetz (who is closely connected to patient's ACT team). I learned that the patient's CM met with him yesterday, who believed that he was nowhere close to baseline, additionally he was quite antagonistic to having the ACT team visit him or give him medications . Considering they know the patient for a long time, and have worked with him, we discussed what criteria should Tyler exhibit in order to be determined suitable for discharge and continued care by ACT team. They recommended that patient should agree with and IGNACIO, having the ACT team do weekly visits (along with monthly visits for IGNACIO injection), and also if he misses his medications (with a 5 day buffer), then they will bring him back to the hospital. Dr. Goetz mentioned that her and the ACT team would highly recommend starting the patient on an IGNACIO, suggesting Invega Sustenna, or Consta. Judging by his recent records,  it appears that the patient was tried on oral Invega at [facility] during his 6/2017 admission, but appears to have suffered from worsened akathisia from this medication. Another option would be Risperdal Consta, however the patient had already expressed distain for this medication, stating that it damaged him. Considering he is on commitment, and jarvised, and still continued manic we will likely make a decision on adding on a neuroleptic. He is compliant on Lithium, and appears to have benefited from it, however it is not a neuroleptic, thus it cannot be forced as per rules of his commitment/rodarte. He will likely need additional hospital stay, much to his dismay, in order to arrange these medication additions, along with a care meeting with ACT team  and Dr. Goetz to determine if we have approached a period of stability in which this patient can be saftely discharged. Decompensation is to be expected, however, the goal should be to increase the time between his hospitalizations (taking into account he's been rehospitalized multiple times his year alone due to acutely decompensated denisha in the community).     Plan:  - Continue Lithium 1200 mg HS  - Atarax 50 mg Q4H PRN  - Ativan 1 mg TID PRN  - Zyprexa 10 mg Q2H PRN     Legal Status: Committed with Rodarte          Anticipated Disposition/Discharge Date: Possibly next week     Attestation:  Patient has been seen and evaluated by me,  Lambert Pulido MD     Spent 30   minutes on encounter, >50% of which was spent in counseling and/or coordination of care, consisting of taking history, reviewing symptoms, discussing with team, and speaking with outpatient provider

## 2017-10-06 NOTE — PROGRESS NOTES
"Tyler spent a majority of the evening shift on the telephone, making numerous calls to several different people.  At one point, writer over heard pt trying to sell a $160 \"Tao[employer]\" starter pack to a person on the other line.  During check in, pt was hyper focused on discharging as soon as possible, and how he \"shouldn't even be here in the first place\".  Pt denied all mental health symptoms, aside from low to moderate levels of anxiety.  At the end of the evening, pt vomitted several times in a trash can in the [facility] area, pt stated he was OK, fluids and rest were encouraged.     10/05/17 2214   Behavioral Health   Hallucinations denies / not responding to hallucinations   Thinking distractable;poor concentration   Orientation person: oriented;place: oriented;date: oriented;time: oriented   Memory baseline memory   Insight denial of illness;poor   Judgement impaired   Eye Contact at examiner   Affect tense   Mood grandiose;anxious   Physical Appearance/Attire untidy   Suicidality other (see comments)  (pt denies)   Self Injury other (see comment)  (pt denies)   Activity withdrawn;restless   Speech flight of ideas;pressured;rambling   Coping/Psychosocial   Verbalized Emotional State frustration   Psycho Education   Type of Intervention 1:1 intervention   Response participates with cues/redirection   Hours (15)   Treatment Detail mh check in   Activities of Daily Living   Hygiene/Grooming independent   Oral Hygiene independent   Dress independent   Laundry with supervision   Room Organization independent     "

## 2017-10-06 NOTE — PLAN OF CARE
Problem: Psychotic Symptoms  Goal: Social and Therapeutic (Psychotic Symptoms)  Signs and symptoms of listed problems will be absent or manageable.            Pt. Attended 2 of 3 scheduled Occupational Therapy sessions today. Pt. Participated in a Life Skills session which focussed on a review of the week and planning for the weekend. Pt was expansive and at times needed cues to let the group move on. Pt. Actively participated in goal directed task session. Pt spent his time using oil pastels.  Creative in his efforts.  Left to put the finished picture and never returned to clean his work space.

## 2017-10-06 NOTE — PROGRESS NOTES
"[facility], [place]   Psychiatric Progress Note        Interim History:       Subjective: \"I cant' stay here , this is medical malpractice!\"     As per today's interview: Tyler still continues too exhibition manic symptoms. He is hyperverbal, intrusive, demanding, and dismissive. He was told today that he will have to stay in the hospital longer, at which point he became angry and agitated. He was later seen and heard speaking about his thoughts about the mental health system, and how he was against it. Often seen conversing with another patient, who also is also quite delusional about psychiatry. Denies SI/HI/AH/VH.          Medications:       paliperidone  3 mg Oral Daily     lithium  1,200 mg Oral At Bedtime     nicotine   Transdermal Q8H     nicotine   Transdermal Daily     nicotine  1 patch Transdermal Daily          Allergies:     Allergies   Allergen Reactions     Abilify Discmelt      Suicidal thoughts       Compazine Other (See Comments)     Seizure           Labs:   No results found for this or any previous visit (from the past 48 hour(s)).       Psychiatric Examination:   BP (!) 131/91  Pulse 87  Temp 97.4  F (36.3  C) (Oral)  Resp 19  Ht 1.676 m (5' 6\")  Wt 78.9 kg (174 lb)  SpO2 98%  BMI 28.08 kg/m2  Weight is 174 lbs 0 oz  Body mass index is 28.08 kg/(m^2).    Appearance: Short white male, balled heard, moderately groomed beard. Long earring on both ears.   Attitude:  Initially appropriate, but soon became irritable, instrusive, dismissive and demanding.   Eye Contact:  Intense, continuous   Mood:  \"I'm frustrated\"   Affect:  Irritable, anxious   Speech:  Pressured/rapid in rate, loud in tone  Language: Intact   Psychomotor Behavior:  Activated   Thought Process:  Pressured, tangential.   Associations:  Some loosening present  Thought Content:  Delusional, paranoid, grandiose. No SI/HI/AH/VH.   Insight:  Poor   Judgement:  Poor   Oriented to:  Person, " place  Attention Span and Concentration:  Seems distracted   Recent and Remote Memory:  Intact   Fund of knowledge: Below average   Gait and Station: Normal     Assessment & Plan       Principal Diagnosis:   Bipolar Disorder, currently manic, with psychosis   Cannabis Use Disorder     Assessment:   (10/5): Tyler was recently committed with rodarte (rodarte medications including Zyprexa, Haldol, Risperdal, Seroquel), and was recently transferred units along with transferring providers. The patient remains quite manic in his behavior, exhibiting delusions, disorganized thinking, irritability, intrusiveness, demanding, hyperverbal. He seems very much fixated on telling me his prior provider had promised him discharge today or tomorrow. I spend quite a bit of time speaking to his outpatient provider Dr. Goetz (who is closely connected to patient's ACT team). I learned that the patient's CM met with him yesterday, who believed that he was nowhere close to baseline, additionally he was quite antagonistic to having the ACT team visit him or give him medications . Considering they know the patient for a long time, and have worked with him, we discussed what criteria should Tyler exhibit in order to be determined suitable for discharge and continued care by ACT team. They recommended that patient should agree with and IGNACIO, having the ACT team do weekly visits (along with monthly visits for IGNACIO injection), and also if he misses his medications (with a 5 day buffer), then they will bring him back to the hospital. Dr. Goetz mentioned that her and the ACT team would highly recommend starting the patient on an IGNACIO, suggesting Invega Sustenna, or Consta. Judging by his recent records, it appears that the patient was tried on oral Invega at [facility] during his 6/2017 admission, but appears to have suffered from worsened akathisia from this medication. Another option would be Risperdal Consta, however the patient had already  expressed distain for this medication, stating that it damaged him. Considering he is on commitment, and jarvised, and still continued manic we will likely make a decision on adding on a neuroleptic. He is compliant on Lithium, and appears to have benefited from it, however it is not a neuroleptic, thus it cannot be forced as per rules of his commitment/rodarte. He will likely need additional hospital stay, much to his dismay, in order to arrange these medication additions, along with a care meeting with ACT team  and Dr. Goetz to determine if we have approached a period of stability in which this patient can be saftely discharged. Decompensation is to be expected, however, the goal should be to increase the time between his hospitalizations (taking into account he's been rehospitalized multiple times his year alone due to acutely decompensated denisha in the community).     (10/6): Medication plan is to increase Lithium to 1500 mg (add 300 mg HS dose to 1200 mg dose). Lithium level on 10/3 was only 0.6 which is low normal. Risperdal PO will be added, with hopes to change to Invega (once it is added to the amended Rodarte), and then to Invega Susstenna. ACT team spoken to today. We will plan a meeting with our team and the ACT team on Tuesday in the middle of the day. The plan is to initiate some IGNACIO in the hospital, meet on Tuesday (with patient, ACT team, our team, and patient's family if possible), agree on a discharge plan which will consist of ACT team meeting with patient everyday for eyes on medications for Lithium, every month for injection (with a 5 day buffer), and if he fails to do so, will be brought back to the hospital. Of note, patient is close to another patient who also harbors delusional beliefs about mental illness, which in effect could reinforce each other's delusional beliefs.     Plan:  - Start Invega 3 mg   - Increase Lithium 1500 mg HS  - Atarax 50 mg Q4H PRN  - Ativan 1 mg TID  PRN  - Zyprexa 10 mg Q2H PRN     Legal Status: Committed with Frausto          Anticipated Disposition/Discharge Date: Possibly next week     Attestation:  Patient has been seen and evaluated by me,  Lambert Pulido MD     Spent 20 minutes on encounter, >50% of which was spent in counseling and/or coordination of care, consisting of speaking with ACT team, formulating plan, discussing plan with patient.

## 2017-10-06 NOTE — PROGRESS NOTES
Earlier today, Writer spoke with Stormy ABARCA, Pt's mother, who feels he is not ready for discharge at this time and needs further stabilization before entering the community. Discussed discharge plans and how/when we would know Pt would be ready for discharge. Pt's mother reported, he typically does not make delusional statements and is generally not quick to anger. Pt's mother discussed some frustration with the overall mental health system, and inpatient system specifically. Stormy is requesting a call back from Dr. Pulido today. Writer reported she would share contact information with Dr. Pulido and ask for him to call her.     Writer called Dayna from Pt's ACT team ([phone]) discussed Pt transfer to station 10 and we discussed discharge plans. Dayna reported she would stop by some time on Monday to meet with Pt, and would try and make a team meeting if we do have one on Tuesday.

## 2017-10-07 PROCEDURE — 12400007 ZZH R&B MH INTERMEDIATE UMMC

## 2017-10-07 PROCEDURE — A9270 NON-COVERED ITEM OR SERVICE: HCPCS | Mod: GY | Performed by: PSYCHIATRY & NEUROLOGY

## 2017-10-07 PROCEDURE — 25000132 ZZH RX MED GY IP 250 OP 250 PS 637: Mod: GY | Performed by: PSYCHIATRY & NEUROLOGY

## 2017-10-07 RX ADMIN — LITHIUM CARBONATE 300 MG: 300 TABLET, EXTENDED RELEASE ORAL at 20:51

## 2017-10-07 RX ADMIN — LORAZEPAM 1 MG: 1 TABLET ORAL at 15:06

## 2017-10-07 RX ADMIN — RISPERIDONE 1 MG: 1 TABLET ORAL at 09:33

## 2017-10-07 RX ADMIN — NICOTINE 1 PATCH: 21 PATCH, EXTENDED RELEASE TRANSDERMAL at 09:05

## 2017-10-07 RX ADMIN — LITHIUM CARBONATE 1200 MG: 300 TABLET, EXTENDED RELEASE ORAL at 20:48

## 2017-10-07 ASSESSMENT — ACTIVITIES OF DAILY LIVING (ADL)
ORAL_HYGIENE: INDEPENDENT
HYGIENE/GROOMING: INDEPENDENT
HYGIENE/GROOMING: INDEPENDENT
ORAL_HYGIENE: INDEPENDENT
LAUNDRY: UNABLE TO COMPLETE
DRESS: INDEPENDENT
DRESS: INDEPENDENT

## 2017-10-07 NOTE — PROGRESS NOTES
"Patient requested that this author note in chart that after taking Risperidone 1 mg this morning now feels \"like I am 50% mentally retarded\".  Initially refused, then agreed to take it after reading Frausto statement in chart.  "

## 2017-10-07 NOTE — PLAN OF CARE
"Problem: General Plan of Care (Inpatient Behavioral)  Goal: Individualization/Patient Specific Goal (IP Behavioral)  The patient and/or their representative will achieve their patient-specific goals related to the plan of care.    The patient-specific goals include:   Outcome: No Change  Tlyer was out in the lounge most the early part of the shift. He was watching TV. He was very social with a select peer. They were collectively discussing at a loud volume their impressions of the mental health system, the mental health process, drugs recreational and otherwise. They were both given many warnings to not discuss certain topics, but they made claims of \"free speech\" and \"first amendment\". No amount of redirection was working, and asking them to change subjects only made them more mad. At times Tyler was talkking to his peer in wispers, at other times their discussions were very loud. Some of the things overheard from Tyler were that he \"can't wait until he can get an eight ball\", that he is \"going [place] leave this country as soon as he can\", [place], and that [place] even believe in mental illness.      Once Keron peer went to bed around 730, Tyler was much more quiet and less outpspoken the rest of the evening.       "

## 2017-10-07 NOTE — PROGRESS NOTES
"Pt was awake early and active in the milieu.  Pt was overheard regularly complaining about being hospitalized and claimed that staff were attempting to make him take \"poison pills.\"  At one point pt was of the opinion that staff would force him to take pills in order for him to be discharged.      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  Pt complained of friends who had abandoned him.  Pt received a phone call and talked at length.    Pt was redirectable and responded positively to staff interaction.  Pt occasionally complained of his treatment at the hospital.        10/07/17 1200   Behavioral Health   Hallucinations (none observed)   Thinking distractable;paranoid   Orientation person: oriented;place: oriented   Memory baseline memory   Insight denial of illness   Judgement impaired   Eye Contact at examiner   Affect irritable   Mood labile   Physical Appearance/Attire attire appropriate to age and situation   Suicidality (none stated nor observed)   Self Injury (none stated nor observed)   Activity (active in milieu, needing redirection at times)   Speech flight of ideas   Medication Sensitivity no stated side effects;no observed side effects   Psychomotor / Gait balanced;steady   Psycho Education   Type of Intervention 1:1 intervention   Response participates with cues/redirection   Hours 0.5   Activities of Daily Living   Hygiene/Grooming independent   Oral Hygiene independent   Dress independent   Room Organization independent     "

## 2017-10-08 PROCEDURE — 25000132 ZZH RX MED GY IP 250 OP 250 PS 637: Mod: GY | Performed by: PSYCHIATRY & NEUROLOGY

## 2017-10-08 PROCEDURE — A9270 NON-COVERED ITEM OR SERVICE: HCPCS | Mod: GY | Performed by: PSYCHIATRY & NEUROLOGY

## 2017-10-08 PROCEDURE — 12400007 ZZH R&B MH INTERMEDIATE UMMC

## 2017-10-08 RX ADMIN — RISPERIDONE 1 MG: 1 TABLET ORAL at 07:50

## 2017-10-08 RX ADMIN — LITHIUM CARBONATE 300 MG: 300 TABLET, EXTENDED RELEASE ORAL at 22:05

## 2017-10-08 RX ADMIN — LITHIUM CARBONATE 1200 MG: 300 TABLET, EXTENDED RELEASE ORAL at 22:05

## 2017-10-08 RX ADMIN — HYDROXYZINE HYDROCHLORIDE 50 MG: 25 TABLET ORAL at 14:55

## 2017-10-08 RX ADMIN — HYDROXYZINE HYDROCHLORIDE 50 MG: 25 TABLET ORAL at 00:56

## 2017-10-08 RX ADMIN — NICOTINE 1 PATCH: 21 PATCH, EXTENDED RELEASE TRANSDERMAL at 10:58

## 2017-10-08 RX ADMIN — LORAZEPAM 1 MG: 1 TABLET ORAL at 14:55

## 2017-10-08 RX ADMIN — LORAZEPAM 1 MG: 1 TABLET ORAL at 00:56

## 2017-10-08 ASSESSMENT — ACTIVITIES OF DAILY LIVING (ADL)
GROOMING: INDEPENDENT
DRESS: INDEPENDENT
ORAL_HYGIENE: INDEPENDENT
LAUNDRY: WITH SUPERVISION

## 2017-10-08 NOTE — PROGRESS NOTES
Pt was visible in the milieu this shift, making frequent phone calls and is often heard shouting and swearing on the phone. Pt is somewhat able to redirected by staff but often demands and questions staff about expectations of the unit and limitations that need to be set times with him. Pt is grandiose and paranoid, overheard telling a family member on the phone that hospital staff had 'given me a lobotomy and chemically castrated me'. Pt also told staff he is a 'healer' and had 'cured stage 4 cancer in a day'.       10/07/17 3679   Behavioral Health   Hallucinations denies / not responding to hallucinations   Thinking paranoid;delusional;poor concentration   Orientation person: oriented;place: oriented   Memory baseline memory   Insight poor;denial of illness   Judgement impaired   Eye Contact at examiner   Affect irritable   Mood labile;irritable;grandiose   ADL Assessment (WDL) WDL   Suicidality (WDL) WDL   Self Injury (WDL) WDL   Elopement (WDL) WDL   Activity restless   Speech flight of ideas   Psychomotor Gait (WDL) WDL   Psycho Education   Type of Intervention 1:1 intervention   Response participates, initiates socially appropriate   Hours 0.5   Treatment Detail check in   Activities of Daily Living   Hygiene/Grooming independent   Oral Hygiene independent   Dress independent   Laundry unable to complete   Room Organization independent

## 2017-10-08 NOTE — PROGRESS NOTES
"\"I need the Ativan and Vistaril as I'm having a major panic attack right now.  The Risperdal is giving me more violent thoughts.\"  Was very sarcastic when discussion this with nurse.  When encouraged to speak to psychiatrist indicated, \"OK sure, I'm going to talk to him when he gives me poison that causes erectile dysfunction.\"  "

## 2017-10-08 NOTE — PLAN OF CARE
"Problem: Psychotic Symptoms  Goal: Psychotic Symptoms  Signs and symptoms of listed problems will be absent or manageable.   Outcome: Improving  \"I can't believe doctors prescribe medication for people that causes erectile dysfunction in 80% of the people who take it.  I'll go ahead and take that poison because I want to get out of here.\"  Comment directed to this staff when offered Risperidone this morning, did take it.. Had been interacting with staff pleasantly until medication was offered then became angry, was redirectable.  Continues to maintain \"there is nothing wrong with me, my behavior is stable, I can't get any more stable\".  Denies mental health issues, is continuing to perseverate about being told would discharge and then was unable to do so.  Appears less angry regarding this today.  Was awake prior to breakfast then returned to bed and did not attend OT group.  Upon completion of group was in lounge, selectively social with peers, watching TV.      "

## 2017-10-09 PROCEDURE — 25000132 ZZH RX MED GY IP 250 OP 250 PS 637: Mod: GY | Performed by: PSYCHIATRY & NEUROLOGY

## 2017-10-09 PROCEDURE — 12400007 ZZH R&B MH INTERMEDIATE UMMC

## 2017-10-09 PROCEDURE — A9270 NON-COVERED ITEM OR SERVICE: HCPCS | Mod: GY | Performed by: PSYCHIATRY & NEUROLOGY

## 2017-10-09 PROCEDURE — 99233 SBSQ HOSP IP/OBS HIGH 50: CPT | Performed by: PSYCHIATRY & NEUROLOGY

## 2017-10-09 PROCEDURE — 99207 ZZC CDG-MDM COMPONENT: MEETS MODERATE - UP CODED: CPT | Performed by: PSYCHIATRY & NEUROLOGY

## 2017-10-09 RX ADMIN — LORAZEPAM 1 MG: 1 TABLET ORAL at 02:54

## 2017-10-09 RX ADMIN — LORAZEPAM 1 MG: 1 TABLET ORAL at 10:33

## 2017-10-09 RX ADMIN — NICOTINE 1 PATCH: 21 PATCH, EXTENDED RELEASE TRANSDERMAL at 08:29

## 2017-10-09 RX ADMIN — RISPERIDONE 1 MG: 1 TABLET ORAL at 08:29

## 2017-10-09 RX ADMIN — HYDROXYZINE HYDROCHLORIDE 50 MG: 25 TABLET ORAL at 10:33

## 2017-10-09 RX ADMIN — HYDROXYZINE HYDROCHLORIDE 50 MG: 25 TABLET ORAL at 02:54

## 2017-10-09 RX ADMIN — LITHIUM CARBONATE 1500 MG: 300 TABLET, EXTENDED RELEASE ORAL at 20:35

## 2017-10-09 ASSESSMENT — ACTIVITIES OF DAILY LIVING (ADL)
ORAL_HYGIENE: INDEPENDENT
GROOMING: INDEPENDENT
DRESS: INDEPENDENT
DRESS: INDEPENDENT
ORAL_HYGIENE: INDEPENDENT
GROOMING: INDEPENDENT
LAUNDRY: WITH SUPERVISION

## 2017-10-09 NOTE — PROGRESS NOTES
"Patient requested and was given prn 1 mg lorazepam PO and prn 50 mg hydroxyzine for \"extreme anxiety, it's full-on panic\" at 1033. He made reference that his anxiety was caused by \"being forced on Risperdal that caused me all these problems in the past, and having people charting against me when Dr. Gill had said I was good ot go last week\".   "

## 2017-10-09 NOTE — PROGRESS NOTES
"[facility], [place]   Psychiatric Progress Note        Interim History:       Subjective: \"These medications are poison!\"     As per today's interview: Tyler seen in his room, although seen intermittently in the milieu. Antagonistic to medications and psychiatric treatment, although realized that \"taking medications is my ticket out of here.\" Discussed upcoming meeting tomorrow. Denies SI/HI/AH/VH.          Medications:       lithium  1,500 mg Oral At Bedtime     risperiDONE  1 mg Oral Daily     nicotine   Transdermal Q8H     nicotine   Transdermal Daily     nicotine  1 patch Transdermal Daily          Allergies:     Allergies   Allergen Reactions     Abilify Discmelt      Suicidal thoughts       Compazine Other (See Comments)     Seizure           Labs:   No results found for this or any previous visit (from the past 48 hour(s)).       Psychiatric Examination:   BP (!) 131/91  Pulse 87  Temp 97.1  F (36.2  C) (Oral)  Resp 16  Ht 1.676 m (5' 6\")  Wt 78.9 kg (174 lb)  SpO2 98%  BMI 28.08 kg/m2  Weight is 174 lbs 0 oz  Body mass index is 28.08 kg/(m^2).    Appearance: Short white male, balled heard, moderately groomed beard. Long dangling earring on both ears.   Attitude:  Initially appropriate, but soon became irritable, instrusive, dismissive and demanding.   Eye Contact:  Intense, continuous   Mood:  \"I'm frustrated\"   Affect:  Irritable, anxious   Speech:  Pressured/rapid in rate, loud in tone  Language: Intact   Psychomotor Behavior:  Activated   Thought Process:  Pressured, tangential.   Associations:  Some loosening present  Thought Content:  Delusional, paranoid, grandiose. No SI/HI/AH/VH.   Insight:  Poor   Judgement:  Poor   Oriented to:  Person, place  Attention Span and Concentration:  Seems distracted   Recent and Remote Memory:  Intact   Fund of knowledge: Below average   Gait and Station: Normal     Assessment & Plan       Principal Diagnosis:   Bipolar Disorder, " currently manic, with psychosis   Cannabis Use Disorder     Assessment:   (10/5): Tyler was recently committed with rodarte (rodarte medications including Zyprexa, Haldol, Risperdal, Seroquel), and was recently transferred units along with transferring providers. The patient remains quite manic in his behavior, exhibiting delusions, disorganized thinking, irritability, intrusiveness, demanding, hyperverbal. He seems very much fixated on telling me his prior provider had promised him discharge today or tomorrow. I spend quite a bit of time speaking to his outpatient provider Dr. Goetz (who is closely connected to patient's ACT team). I learned that the patient's CM met with him yesterday, who believed that he was nowhere close to baseline, additionally he was quite antagonistic to having the ACT team visit him or give him medications . Considering they know the patient for a long time, and have worked with him, we discussed what criteria should Tyler exhibit in order to be determined suitable for discharge and continued care by ACT team. They recommended that patient should agree with and IGNACIO, having the ACT team do weekly visits (along with monthly visits for IGNACIO injection), and also if he misses his medications (with a 5 day buffer), then they will bring him back to the hospital. Dr. Goetz mentioned that her and the ACT team would highly recommend starting the patient on an IGNACIO, suggesting Invega Sustenna, or Consta. Judging by his recent records, it appears that the patient was tried on oral Invega at [facility] during his 6/2017 admission, but appears to have suffered from worsened akathisia from this medication. Another option would be Risperdal Consta, however the patient had already expressed distain for this medication, stating that it damaged him. Considering he is on commitment, and jarvised, and still continued manic we will likely make a decision on adding on a neuroleptic. He is compliant on Lithium, and  appears to have benefited from it, however it is not a neuroleptic, thus it cannot be forced as per rules of his commitment/rodarte. He will likely need additional hospital stay, much to his dismay, in order to arrange these medication additions, along with a care meeting with ACT team  and Dr. Goetz to determine if we have approached a period of stability in which this patient can be saftely discharged. Decompensation is to be expected, however, the goal should be to increase the time between his hospitalizations (taking into account he's been rehospitalized multiple times his year alone due to acutely decompensated denisha in the community).     (10/6): Medication plan is to increase Lithium to 1500 mg (add 300 mg HS dose to 1200 mg dose). Lithium level on 10/3 was only 0.6 which is low normal. Risperdal PO will be added, with hopes to change to Invega (once it is added to the amended Rodarte), and then to Invega Susstenna. ACT team spoken to today. We will plan a meeting with our team and the ACT team on Tuesday in the middle of the day. The plan is to initiate some IGNACIO in the hospital, meet on Tuesday (with patient, ACT team, our team, and patient's family if possible), agree on a discharge plan which will consist of ACT team meeting with patient everyday for eyes on medications for Lithium, every month for injection (with a 5 day buffer), and if he fails to do so, will be brought back to the hospital. Of note, patient is close to another patient who also harbors delusional beliefs about mental illness, which in effect could reinforce each other's delusional beliefs.     (10/9): Discussed our plan, which we formulated with ACT team, which includes having ACT team visit him every day for eyes on observation of medication, ACT team visiting him 1-2x a month for any future IGNACIO's, and if he is not seen or refuses medications he has a 5 day buffer, and if he continues to refuse medications or refuse to be  seen by ACT by the end of those 5 days, then he will be involuntarily brought back to the hospital. I will continue increased dose of Lithium due to his low theraputic level last week. Regarding his Risperdal, he has been reluctantly compliant with this medication, he harbors many delusions regarding this medication , particularly incurring erectile dysfunction from it. We discussed that the ACT team will help him follow up with a primary physician to help with his erectile dysfunction. Additionally, it will be important to consider that his somatic complaints regarding medications could be due to an outside issue (not related to Risperdal), and/or part of a delusional process. We will plan to meet with ACT team tomorrow at 12:30 PM.     Plan:  - Risperdal 1 mg   - Lithium 1500 mg HS  - Atarax 50 mg Q4H PRN  - Ativan 1 mg TID PRN  - Zyprexa 10 mg Q2H PRN     Legal Status: Committed with [facility]          Anticipated Disposition/Discharge Date: Possibly next week     Attestation:  Patient has been seen and evaluated by me,  Lambert Pulido MD     Spent 30 minutes on encounter, >50% of which was spent in counseling and/or coordination of care, consisting of speaking with ACT team, formulating plan, discussing plan with patient. Also spent time obtaining collateral information with patient's mother along with speaking with ACT team provider.

## 2017-10-09 NOTE — PROGRESS NOTES
"   10/08/17 1316   Behavioral Health   Hallucinations denies / not responding to hallucinations   Thinking delusional   Orientation place: oriented;person: oriented   Memory baseline memory   Insight poor;denial of illness   Judgement impaired   Eye Contact at examiner   Affect tense;full range affect   Mood irritable   Physical Appearance/Attire attire appropriate to age and situation;neat   Hygiene other (see comment)  (fair)   Suicidality other (see comments)  (denies currently)   Self Injury other (see comment)  (denies currently)   Elopement (no value)   Activity isolative;withdrawn;other (see comment)  (sleeping all shift)   Speech clear;coherent     Pt appeared irritable with full range affect. Pt continues to perseverate on taking medications at the hospital and wanting to be discharged. Pt made comments about his medications referring to them as \"poison.\" Pt stated he is upset because he feels there is no way to advocate for himself with his treatment plan. Pt was visible in the milieu early in the shift, ate dinner, and then remained in his room sleeping for the rest of the evening. No SI/SIB indicated. No behavioral issues this shift.   "

## 2017-10-09 NOTE — PROGRESS NOTES
"   10/09/17 4508   Significant Event   Significant Event Other (see comments)  (shift summary)   Pt continues to be angry and in denial. He expressed frustration that he was not d/c'ed on Friday \"like Dr. Gill promiseemmy,\" and also angry that he was required to take Risperdal as that has \"given me permanent erectile dysfunction.\" Pt continues to state that he is going to gissel several people (doctor, hospital, a different doctor) when he leaves and gets \"one hell of a .\" Despite frustrations, pt is pleasant and polite in interaction.   "

## 2017-10-10 VITALS
DIASTOLIC BLOOD PRESSURE: 91 MMHG | BODY MASS INDEX: 28.3 KG/M2 | HEIGHT: 66 IN | SYSTOLIC BLOOD PRESSURE: 131 MMHG | RESPIRATION RATE: 16 BRPM | HEART RATE: 87 BPM | TEMPERATURE: 98.6 F | OXYGEN SATURATION: 98 % | WEIGHT: 176.1 LBS

## 2017-10-10 PROBLEM — Z72.0 TOBACCO USE: Status: ACTIVE | Noted: 2017-10-10

## 2017-10-10 PROBLEM — F41.1 GAD (GENERALIZED ANXIETY DISORDER): Status: ACTIVE | Noted: 2017-10-10

## 2017-10-10 LAB — PROLACTIN SERPL-MCNC: 14 UG/L (ref 2–18)

## 2017-10-10 PROCEDURE — 36415 COLL VENOUS BLD VENIPUNCTURE: CPT | Performed by: PSYCHIATRY & NEUROLOGY

## 2017-10-10 PROCEDURE — 25000132 ZZH RX MED GY IP 250 OP 250 PS 637: Mod: GY | Performed by: PSYCHIATRY & NEUROLOGY

## 2017-10-10 PROCEDURE — A9270 NON-COVERED ITEM OR SERVICE: HCPCS | Mod: GY | Performed by: PSYCHIATRY & NEUROLOGY

## 2017-10-10 PROCEDURE — 99239 HOSP IP/OBS DSCHRG MGMT >30: CPT | Performed by: PSYCHIATRY & NEUROLOGY

## 2017-10-10 PROCEDURE — 84146 ASSAY OF PROLACTIN: CPT | Performed by: PSYCHIATRY & NEUROLOGY

## 2017-10-10 RX ORDER — HYDROXYZINE HYDROCHLORIDE 25 MG/1
50 TABLET, FILM COATED ORAL 3 TIMES DAILY PRN
Qty: 9 TABLET | Refills: 0 | Status: ON HOLD | OUTPATIENT
Start: 2017-10-10 | End: 2017-12-08

## 2017-10-10 RX ORDER — LITHIUM CARBONATE 300 MG/1
1500 TABLET, FILM COATED, EXTENDED RELEASE ORAL AT BEDTIME
Qty: 15 TABLET | Refills: 0 | Status: ON HOLD | OUTPATIENT
Start: 2017-10-10 | End: 2017-12-08

## 2017-10-10 RX ORDER — RISPERIDONE 1 MG/1
1 TABLET ORAL DAILY
Qty: 3 TABLET | Refills: 0 | Status: ON HOLD | OUTPATIENT
Start: 2017-10-10 | End: 2017-12-08

## 2017-10-10 RX ORDER — CLONAZEPAM 1 MG/1
1 TABLET ORAL DAILY PRN
Qty: 3 TABLET | Refills: 0 | Status: ON HOLD | OUTPATIENT
Start: 2017-10-10 | End: 2017-12-08

## 2017-10-10 RX ORDER — NICOTINE 21 MG/24HR
1 PATCH, TRANSDERMAL 24 HOURS TRANSDERMAL DAILY
Qty: 3 PATCH | Refills: 0 | Status: ON HOLD | OUTPATIENT
Start: 2017-10-10 | End: 2017-12-08

## 2017-10-10 RX ADMIN — RISPERIDONE 1 MG: 1 TABLET ORAL at 09:09

## 2017-10-10 RX ADMIN — ALUMINUM HYDROXIDE, MAGNESIUM HYDROXIDE, AND DIMETHICONE 30 ML: 400; 400; 40 SUSPENSION ORAL at 05:16

## 2017-10-10 RX ADMIN — NICOTINE 1 PATCH: 21 PATCH, EXTENDED RELEASE TRANSDERMAL at 09:09

## 2017-10-10 RX ADMIN — LORAZEPAM 1 MG: 1 TABLET ORAL at 05:16

## 2017-10-10 RX ADMIN — HYDROXYZINE HYDROCHLORIDE 50 MG: 25 TABLET ORAL at 05:17

## 2017-10-10 ASSESSMENT — ACTIVITIES OF DAILY LIVING (ADL)
ORAL_HYGIENE: INDEPENDENT
LAUNDRY: WITH SUPERVISION
GROOMING: INDEPENDENT
DRESS: INDEPENDENT

## 2017-10-10 NOTE — PROGRESS NOTES
Is discharged at this time accompanied by self via cab with instructions and belongings.  Will  medications at home pharmacy.

## 2017-10-10 NOTE — DISCHARGE SUMMARY
[facility], [place]   Psychiatric Discharge Summary      Tyler Case MRN# [id number]   Age: 41 year old YOB: 1975     Date of Admission:  9/24/2017  Date of Discharge:  10/10/17  Admitting Physician:  Abdelrahman Gill MD  Discharge Physician:  Lambert Pulido MD         Summary/Hospital Course/Disposition:     Tyler Case is a 41 year old male with a past psychiatric history of Bipolar Disorder with Psychosis and cocaine use who presented to ED via police on 9/24/2017 after threatening his family. This is in the context of nonadherence to Lithium and a worsening of symptoms over the past 2-3 weeks prior to hospitaliation. Per Pharmacy med rec, has not filled medications since June 2017 (lithium, lorazepam). In the ED, was displaying verbal aggression towards staff and making delusional statements regarding health care providers being a part of a group managed by [facility]. He was initially admitted to [facility] and quickly afterwards, transferred to [facility] due to threats made against staff and for safety. After having some stability, and having titration of his Lithium, he was later transferred back to [facility] to continue in a less acute unit.     When he was transferred to [facility], patient remained quite manic in his behavior, exhibiting delusions, disorganized thinking, irritability, intrusiveness, demanding, hyperverbal. He seems very much fixated on telling me his prior provider had promised him discharge as soon as he was transfered. I spend quite a bit of time speaking to his outpatient provider Dr. Goetz (who is closely connected to patient's ACT team). I learned that the patient's CM met with him recently, who believed that he was nowhere close to baseline, additionally he was quite antagonistic to having the ACT team visit him or give him medications . Considering they know the patient for a long time, and have worked with him, we discussed what  criteria should Tyler exhibit in order to be determined suitable for discharge and continued care by ACT team. They recommended that patient should agree with and IGNACIO, having the ACT team do weekly visits (along with monthly visits for IGNACIO injection), and also if he misses his medications (with a 5 day buffer), then they will bring him back to the hospital. Dr. Goetz mentioned that her and the ACT team would highly recommend starting the patient on an IGNACIO, suggesting Invega Sustenna, or Consta. Judging by his recent records, it appears that the patient was tried on oral Invega at [facility] during his 6/2017 admission, but appears to have suffered from worsened akathisia from this medication. Another option would be Risperdal Consta, however the patient had already expressed distain for this medication, stating that it damaged him, and gave him erectile dysfunction. Considering he is on commitment, and jarvised, and still continued manic we will likely make a decision on adding on a neuroleptic.     He is compliant on Lithium, and appears to have benefited from it, however it is not a neuroleptic, thus it cannot be forced as per rules of his commitment/rodarte. Nevertheless his Lithium level was increased to 1500 (his level was only 0.6 on 10/3) for better stability. We added on Risperdal 1 mg , which he was reluctantly compliant with in the hospital. He continued to harbor delusions, was grandiose, and paranoid, although his level of denisha/psychosis was at a level where we felt comfortable to discharge and could be continued to be managed by his ACT team. On day of discharge, we had a meeting consisting of ACT team /nurse, psychiatrist (me), inpatient CTC, and patient's mother and father. Patient agreed to have ACT team visit daily for medications, and bring him back if he is not seen within a certain amount of days.     Future recommendations: Due to his low theraputic Lithium levels, and recently  increased dose, I would recommend checking a Lithium level on Thursday in the AM. Additionally, his erectile dysfunction complaints are related to his prolactin levels (from Risperdal specifically), therefor it would be important to check prolactin levels at some pont.                DIagnoses:     Bipolar Disorder, currently manic, with psychosis   Cannabis Use Disorder          Labs:   No results found for this or any previous visit (from the past 24 hour(s)).         Consults:   None            Discharge Medications:        Review of your medicines      START taking       Dose / Directions    clonazePAM 1 MG tablet   Commonly known as:  klonoPIN        Dose:  1 mg   Take 1 tablet (1 mg) by mouth daily as needed for anxiety   Quantity:  3 tablet   Refills:  0       hydrOXYzine 25 MG tablet   Commonly known as:  ATARAX   Used for:  Bipolar affective disorder, manic, severe, with psychotic behavior (H)        Dose:  50 mg   Take 2 tablets (50 mg) by mouth 3 times daily as needed for anxiety   Quantity:  9 tablet   Refills:  0       nicotine 21 MG/24HR 24 hr patch   Commonly known as:  NICODERM CQ        Dose:  1 patch   Place 1 patch onto the skin daily   Quantity:  3 patch   Refills:  0       nicotine polacrilex 4 MG gum   Commonly known as:  NICORETTE        Dose:  8 mg   Place 2 each (8 mg) inside cheek every hour as needed for other (nicotine withdrawal symptoms)   Quantity:  6 tablet   Refills:  0       risperiDONE 1 MG tablet   Commonly known as:  risperDAL   Used for:  Bipolar affective disorder, manic, severe, with psychotic behavior (H)        Dose:  1 mg   Take 1 tablet (1 mg) by mouth daily   Quantity:  3 tablet   Refills:  0         CONTINUE these medicines which may have CHANGED, or have new prescriptions. If we are uncertain of the size of tablets/capsules you have at home, strength may be listed as something that might have changed.       Dose / Directions    lithium 300 MG CR tablet   Commonly known  "as:  ESKALITH/LITHOBID   This may have changed:  how much to take   Used for:  Bipolar affective disorder, manic, severe, with psychotic behavior (H)        Dose:  1500 mg   Take 5 tablets (1,500 mg) by mouth At Bedtime   Quantity:  15 tablet   Refills:  0         CONTINUE these medicines which have NOT CHANGED       Dose / Directions    lidocaine 2 % topical gel   Commonly known as:  XYLOCAINE        Apply topically to left knee three times per day   Refills:  0         STOP taking          LORazepam 0.5 MG tablet   Commonly known as:  ATIVAN                Where to get your medicines      These medications were sent to [id number] - [place], [place] - [address], [place] [address]     Phone:  [phone]      hydrOXYzine 25 MG tablet     lithium 300 MG CR tablet     nicotine 21 MG/24HR 24 hr patch     nicotine polacrilex 4 MG gum     risperiDONE 1 MG tablet         Some of these will need a paper prescription and others can be bought over the counter. Ask your nurse if you have questions.     Bring a paper prescription for each of these medications      clonazePAM 1 MG tablet                  Mental Status Examination:   Appearance: Short white male, balled heard, moderately groomed beard. Long dangling earring on both ears.   Attitude:  Initially appropriate, but soon became irritable, instrusive, dismissive and demanding.   Eye Contact:  Intense, continuous   Mood:  \"I'm ok\"   Affect:  Irritable, anxious   Speech:  Pressured/rapid in rate, loud in tone  Language: Intact   Psychomotor Behavior:  Activated   Thought Process:  Pressured, tangential.   Associations:  Some loosening present  Thought Content:  Delusional, paranoid, grandiose. No SI/HI/AH/VH.   Insight:  Poor   Judgement:  Poor   Oriented to:  Person, place  Attention Span and Concentration:  Seems distracted   Recent and Remote Memory:  Intact   Fund of knowledge: Below average   Gait and Station: Normal       "    Discharge Plan:   No discharge procedures on file.    - Discharged to home  - Meds sent to [facility] [address] (3 day supply, his ACT team doctor will take over medications after that)  - ACT team will follow up with patient      Lambert Pulido MD  [facility]    Spent  60  minutes on encounter, >50% of which was spent in counseling and/or coordination of care, consisting of multidiciplinary team meeting, consisting of ACT team staff, nursing staff, case management staff, and patient's family. Discussed medications, side effects, and discharge planning.

## 2017-10-10 NOTE — DISCHARGE INSTRUCTIONS
Behavioral Discharge Planning and Instructions      Summary:  You were admitted on 9/24/2017  due to Disorganized Thinking/Behaviors, Delusional Thoughts and Psychotic Symptomology.  You were treated by Dr. Abdelrahman Gill MD and Dr. Lambert Pulido and discharged on 10/10/17 from [facility] to Home  You were dually committed to the [facility] and the [facility] on 05/22/2017.  You are being discharged on a Provisional Discharge Agreement which shall remain in effect for the duration of the Commitment.  Your Commitment expires on 04/3/2018.    You were also court ordered to take the medications the doctor ordered for you.     Principal Diagnosis:  Bipolar affective disorder, manic, severe, with psychotic behavior (H)  Polysubstance abuse      Health Care Follow-up Appointments:   - ACT Team - Psychiatrist comes to your home w/ the ACT team  Outpatient Psychiatrist:  Dr. Lydia Vasquez  Phone: [phone]  Fax: [phone] HUC TO FAX DISCHARGE INSTRUCTIONS      - [facility] :   Dayna Palafox   Phone: [phone]    Attend all scheduled appointments with your outpatient providers. Call at least 24 hours in advance if you need to reschedule an appointment to ensure continued access to your outpatient providers.   Major Treatments, Procedures and Findings:  You were provided with: a psychiatric assessment, assessed for medical stability, medication evaluation and/or management and milieu management    Symptoms to Report: feeling more aggressive, increased confusion, losing more sleep, mood getting worse or thoughts of suicide    Early warning signs can include: increased depression or anxiety sleep disturbances increased thoughts or behaviors of suicide or self-harm  increased unusual thinking, such as paranoia or hearing voices    Safety and Wellness:  Take all medicines as directed.  Make no changes unless your doctor suggests them.      Follow  treatment recommendations.  Refrain from alcohol and non-prescribed drugs.  If there is a concern for safety, call 911.    Resources:   Crisis Intervention: 728.834.4700 or 349-308-6478 (TTY: 940.868.6053).  Call anytime for help.  National [place] on Mental Illness (www.mn.luis.org): 637.657.5533 or 499-048-6438.  [place] Association for Children's Mental Health (www.mac.org): 948.219.3211.  Alcoholics Anonymous (www.alcoholics-anonymous.org): Check your phone book for your local chapter.  Suicide Awareness Voices of Education (SAVE) (www.save.org): 030-809-MNZZ (2075)  National Suicide Prevention Line (www.mentalhealthmn.org): 968-940-BMKX (6738)  Mental Health Consumer/Survivor Network of [place] (www.mhcsn.net): 917.445.8990 or 707-113-8358  Mental Health Association of [place] (www.mentalhealth.org): 321.139.1079 or 781-070-2415  Self- Management and Recovery Training., SMART-- Toll free: 613.870.9150  www.Youboox.org  [facility] Crisis (COPE) Response - Adult [phone]  [facility] Crisis Response - Adult [phone]    The treatment team has appreciated the opportunity to work with you. If you have any questions or concerns our unit number is [phone].

## 2017-10-10 NOTE — PROGRESS NOTES
Team meeting today with ACT team, Pt, Pt's family and treatment team from hospital to discuss discharge planning. Reviewed agreement for Provisional Discharge with Pt and Dr. Pulido along with ACT psychiatrist answered numerous questions about neuroleptics and mood stabilizers. Redirection was needed several times to stay on the topic of discharge. Pt will discharge today. ACT team will agree to explore alternatives to Risperdal post discharge, however, Pt is agreeable to taking Risperdal as prescribed. Eyes on medications will be given daily by ACT team. See signed provisional discharge located in Pt's chart.     Writer faxed MAR and PD to ACT team fax at [phone].    Writer faxed PD agreement to [facility] at [phone]

## 2017-10-12 NOTE — PROGRESS NOTES
"Update on Provisional Discharge 10/12/17:   Writer has attempted to fax Pt's provisional discharge to [facility] over 7 times now, and cannot be verified by Right Fax states \"transmission error\" or \"fax number busy\". Writer has called [facility] several times, and has been advised to keep faxing to: [phone]. Writer will continue to attempt faxing document to [facility], and PD was also sent by mail to [facility] with letter explaining situation.   "

## 2017-11-09 ENCOUNTER — TELEPHONE (OUTPATIENT)
Dept: BEHAVIORAL HEALTH | Facility: CLINIC | Age: 42
End: 2017-11-09

## 2017-11-09 ENCOUNTER — HOSPITAL ENCOUNTER (INPATIENT)
Facility: CLINIC | Age: 42
LOS: 28 days | Discharge: HOME | DRG: 885 | End: 2017-12-08
Attending: FAMILY MEDICINE | Admitting: PSYCHIATRY & NEUROLOGY
Payer: MEDICARE

## 2017-11-09 DIAGNOSIS — E55.9 VITAMIN D DEFICIENCY: ICD-10-CM

## 2017-11-09 DIAGNOSIS — F13.20 MODERATE BENZODIAZEPINE USE DISORDER (H): ICD-10-CM

## 2017-11-09 DIAGNOSIS — F31.2 BIPOLAR AFFECTIVE DISORDER, MANIC, SEVERE, WITH PSYCHOTIC BEHAVIOR (H): ICD-10-CM

## 2017-11-09 DIAGNOSIS — F31.2 BIPOLAR I, RECURRENT MANIC EPISODE, SEVERE WITH PSYCHOTIC BEHAVIOR (H): Primary | ICD-10-CM

## 2017-11-09 DIAGNOSIS — F12.20 CANNABIS USE DISORDER, SEVERE, DEPENDENCE (H): ICD-10-CM

## 2017-11-09 DIAGNOSIS — F14.20 COCAINE USE DISORDER, MODERATE, DEPENDENCE (H): ICD-10-CM

## 2017-11-09 DIAGNOSIS — F17.213 CIGARETTE NICOTINE DEPENDENCE WITH WITHDRAWAL: ICD-10-CM

## 2017-11-09 DIAGNOSIS — F41.9 ACUTE ANXIETY: ICD-10-CM

## 2017-11-09 LAB — LITHIUM SERPL-SCNC: <0.2 MMOL/L (ref 0.6–1.2)

## 2017-11-09 PROCEDURE — S0166 INJ OLANZAPINE 2.5MG: HCPCS | Performed by: FAMILY MEDICINE

## 2017-11-09 PROCEDURE — 80178 ASSAY OF LITHIUM: CPT | Performed by: FAMILY MEDICINE

## 2017-11-09 PROCEDURE — 25000125 ZZHC RX 250: Performed by: FAMILY MEDICINE

## 2017-11-09 PROCEDURE — 96372 THER/PROPH/DIAG INJ SC/IM: CPT | Performed by: FAMILY MEDICINE

## 2017-11-09 PROCEDURE — 99285 EMERGENCY DEPT VISIT HI MDM: CPT | Mod: 25 | Performed by: FAMILY MEDICINE

## 2017-11-09 PROCEDURE — 99285 EMERGENCY DEPT VISIT HI MDM: CPT | Mod: Z6 | Performed by: FAMILY MEDICINE

## 2017-11-09 RX ORDER — OLANZAPINE 10 MG/2ML
10 INJECTION, POWDER, FOR SOLUTION INTRAMUSCULAR ONCE
Status: COMPLETED | OUTPATIENT
Start: 2017-11-09 | End: 2017-11-09

## 2017-11-09 RX ORDER — OLANZAPINE 5 MG/1
10 TABLET, ORALLY DISINTEGRATING ORAL ONCE
Status: DISCONTINUED | OUTPATIENT
Start: 2017-11-09 | End: 2017-11-09

## 2017-11-09 RX ADMIN — OLANZAPINE 10 MG: 10 INJECTION, POWDER, LYOPHILIZED, FOR SOLUTION INTRAMUSCULAR at 18:02

## 2017-11-09 NOTE — IP AVS SNAPSHOT
MRN:[id number]                      After Visit Summary   11/9/2017    Tyler Case    MRN: [id number]           Thank you!     Thank you for choosing [facility] for your care. Our goal is always to provide you with excellent care.        Patient Information     Date Of Birth          1975        About your hospital stay     You were admitted on:  November 10, 2017 You last received care in the:  [facility] 12[facility]    You were discharged on:  December 8, 2017       Who to Call     For medical emergencies, please call 911.  For non-urgent questions about your medical care, please call your primary care provider or clinic, [phone]          Attending Provider     Provider Specialty    Perez Lema MD Emergency Medicine    Bridget, Abdelrahman Hanson MD Psychiatry    Jesse, Jennifer White MD Psychiatry       Primary Care Provider Office Phone # Fax #    SCARLETT Tidwell [phone]      Further instructions from your care team        Behavioral Discharge Planning and Instructions      Summary:  You were admitted on 11/9/2017  due to Disorganized Thinking/Behaviors, Delusional Thoughts, Psychotic Symptomology, Homicidal Ideations/Threatening Behaviors and Agressive Behaviors.  You were treated by Dr. Abdelrahman Gill MD and by Dr Molina.     While on [facility], your Provisional Discharge was revoked on 11-14-17.     Your original commitment consisted of:  - Committed on 10-4-17 as MI to [facility] and the [employer] of [employer].   You are also under a [facility] order for Zyprexa, Haldol, Risperdal, Seroquel.    You are being Provisionally Discharged today back to your apartment.      Principal Diagnoses:   Bipolar I, recurrent manic episode, severe with psychosis  Cannabis use disorder, severe, dependence  Cocaine use disorder, moderate, dependence  Moderate benzodiazepine use disorder      Health Care Follow-up Appointments:   - ACT Team - Psychiatrist comes to your home w/  "the ACT team  Dr. Lydia Vasquez  Phone: [phone]  Fax: [phone]        - [facility] ACT Team :   Jett Hernandez  Phone: [phone]  Fax: [phone]    - You will also have an RN that will come to your home to administer your Injection     through [facility]:   [phone]  [phone]    Attend all scheduled appointments with your outpatient providers. Call at least 24 hours in advance if you need to reschedule an appointment to ensure continued access to your outpatient providers.   Major Treatments, Procedures and Findings:  You were provided with: a psychiatric assessment, assessed for medical stability, group therapy and CD evaluation/assessment    Symptoms to Report: feeling more aggressive, increased confusion, losing more sleep, mood getting worse or thoughts of suicide    Early warning signs can include: increased depression or anxiety sleep disturbances increased thoughts or behaviors of suicide or self-harm  increased unusual thinking, such as paranoia or hearing voices    Safety and Wellness:  Take all medicines as directed.  Make no changes unless your doctor suggests them.      Follow treatment recommendations.  Refrain from alcohol and non-prescribed drugs.  If there is a concern for safety, call 911.    Resources:   Crisis Intervention: 589.819.2449 or 650-940-1994 (TTY: 971.720.7246).  Call anytime for help.  National [place] on Mental Illness (www.mn.luis.org): 430.419.4547 or 187-952-1725.  Alcoholics Anonymous (www.alcoholics-anonymous.org): Check your phone book for your local chapter.  National Suicide Prevention Line (www.mentalhealthmn.org): 826-840-YPNJ (9546)  Mental Health Consumer/Survivor Network of [place] (www.mhcsn.net): 266.573.2641 or 567-358-5681  Mental Health Association of [place] (www.mentalhealth.org): 273.477.7024 or 593-701-4393  [facility] Crisis (COPE) Response - Adult [phone]  Text 4 Life: txt \"LIFE\" to 10180 for immediate " "support and crisis intervention  Crisis text line: Text \"START\" to 669-889. Free, confidential, .      The treatment team has appreciated the opportunity to work with you and thank you for choosing the [facility]. If you have any questions or concerns our unit number is [phone].        Pending Results     No orders found from 2017 to 11/10/2017.            Admission Information     Date & Time Provider Department Dept. Phone    2017 Jennifer Molina MD  12NB [phone]      Your Vitals Were     Blood Pressure Pulse Temperature Respirations Height Weight    105/72 73 96.8  F (36  C) (Tympanic) 16 1.676 m (5' 5.98\") 81.2 kg (179 lb)    Pulse Oximetry BMI (Body Mass Index)                93% 28.91 kg/m2          [employer] Information     [employer] lets you send messages to your doctor, view your test results, renew your prescriptions, schedule appointments and more. To sign up, go to [email or web address] . Click on \"Log in\" on the left side of the screen, which will take you to the Welcome page. Then click on \"Sign up Now\" on the right side of the page.     You will be asked to enter the access code listed below, as well as some personal information. Please follow the directions to create your username and password.     Your access code is: [email or web address]  Expires: 2018  9:09 AM     Your access code will  in 90 days. If you need help or a new code, please call your [facility] clinic or [phone].        Care EveryWhere ID     This is your Care EveryWhere ID. This could be used by other organizations to access your [facility] medical records  [id number]        Equal Access to Services     CM GREEN AH: Josemanuel Sen, felicita mendoza, tuan billy [facility] [phone].    ATENCIÓN: Si habla español, tiene a sarabia disposición servicios gratuitos de asistencia lingüística. Llame al " [phone].    We comply with applicable federal civil rights laws and [place] laws. We do not discriminate on the basis of race, color, national origin, age, disability, sex, sexual orientation, or gender identity.               Review of your medicines      START taking        Dose / Directions    cholecalciferol 2000 UNITS tablet   Used for:  Vitamin D deficiency        Dose:  2000 Units   Take 2,000 Units by mouth daily   Quantity:  30 tablet   Refills:  0       gabapentin 300 MG capsule   Commonly known as:  NEURONTIN   Used for:  Acute anxiety        Dose:  300 mg   Take 1 capsule (300 mg) by mouth 3 times daily   Quantity:  90 capsule   Refills:  1         CONTINUE these medicines which may have CHANGED, or have new prescriptions. If we are uncertain of the size of tablets/capsules you have at home, strength may be listed as something that might have changed.        Dose / Directions    hydrOXYzine 50 MG tablet   Commonly known as:  ATARAX   This may have changed:  medication strength   Used for:  Bipolar affective disorder, manic, severe, with psychotic behavior (H)        Dose:  50 mg   Take 1 tablet (50 mg) by mouth 3 times daily as needed for anxiety   Quantity:  90 tablet   Refills:  1       lithium 300 MG CR tablet   Commonly known as:  ESKALITH/LITHOBID   This may have changed:  how much to take   Used for:  Bipolar affective disorder, manic, severe, with psychotic behavior (H)        Dose:  900 mg   Take 3 tablets (900 mg) by mouth At Bedtime   Quantity:  90 tablet   Refills:  1       * risperiDONE 1 MG tablet   Commonly known as:  risperDAL   This may have changed:    - how much to take  - how to take this  - when to take this  - additional instructions   Used for:  Bipolar affective disorder, manic, severe, with psychotic behavior (H)        Take one 2 mg tablet in the morning and one 2 mg tablet at bedtime Take one 1 mg tablet at bedtime  For a total daily dose of 5 mg   Quantity:  30 tablet    Refills:  1       * risperiDONE 2 MG tablet   Commonly known as:  risperDAL   This may have changed:  You were already taking a medication with the same name, and this prescription was added. Make sure you understand how and when to take each.   Used for:  Bipolar I, recurrent manic episode, severe with psychotic behavior (H)        Take one 2 mg tablet in the morning and one 2 mg tablet at bedtime Take one 1 mg tablet at bedtime  For a total daily dose of 5 mg   Quantity:  60 tablet   Refills:  1       * Notice:  This list has 2 medication(s) that are the same as other medications prescribed for you. Read the directions carefully, and ask your doctor or other care provider to review them with you.      CONTINUE these medicines which have NOT CHANGED        Dose / Directions    lidocaine 2 % topical gel   Commonly known as:  XYLOCAINE        Apply topically to left knee three times per day   Refills:  0         STOP taking     clonazePAM 1 MG tablet   Commonly known as:  klonoPIN           nicotine 21 MG/24HR 24 hr patch   Commonly known as:  NICODERM CQ           nicotine polacrilex 4 MG gum   Commonly known as:  NICORETTE                Where to get your medicines      These medications were sent to [facility] [address]  [address] [address]     Phone:  [phone]     gabapentin 300 MG capsule    hydrOXYzine 50 MG tablet    lithium 300 MG CR tablet    risperiDONE 1 MG tablet    risperiDONE 2 MG tablet         Some of these will need a paper prescription and others can be bought over the counter. Ask your nurse if you have questions.     You don't need a prescription for these medications     cholecalciferol 2000 UNITS tablet                Protect others around you: Learn how to safely use, store and throw away your medicines at www.disposemymeds.org.             Medication List: This is a list of all your medications and when to take them.  Check marks below indicate your daily home schedule. Keep this list as a reference.      Medications           Morning Afternoon Evening Bedtime As Needed    cholecalciferol 2000 UNITS tablet   Take 2,000 Units by mouth daily   Last time this was given:  2,000 Units on 12/8/2017  8:51 AM                                   gabapentin 300 MG capsule   Commonly known as:  NEURONTIN   Take 1 capsule (300 mg) by mouth 3 times daily   Last time this was given:  300 mg on 12/8/2017  8:50 AM                                         hydrOXYzine 50 MG tablet   Commonly known as:  ATARAX   Take 1 tablet (50 mg) by mouth 3 times daily as needed for anxiety   Last time this was given:  100 mg on 12/7/2017  6:12 PM                         1  3x daily as needed for anxiety       lidocaine 2 % topical gel   Commonly known as:  XYLOCAINE   Apply topically to left knee three times per day                         Apply to left knee up to 3x daily         lithium 300 MG CR tablet   Commonly known as:  ESKALITH/LITHOBID   Take 3 tablets (900 mg) by mouth At Bedtime   Last time this was given:  900 mg on 12/7/2017  9:29 PM                                   * risperiDONE 1 MG tablet   Commonly known as:  risperDAL   Take one 2 mg tablet in the morning and one 2 mg tablet at bedtime Take one 1 mg tablet at bedtime  For a total daily dose of 5 mg   Last time this was given:  2 mg on 12/8/2017  8:51 AM                                   * risperiDONE 2 MG tablet   Commonly known as:  risperDAL   Take one 2 mg tablet in the morning and one 2 mg tablet at bedtime Take one 1 mg tablet at bedtime  For a total daily dose of 5 mg   Last time this was given:  2 mg on 12/8/2017  8:51 AM                                      * Notice:  This list has 2 medication(s) that are the same as other medications prescribed for you. Read the directions carefully, and ask your doctor or other care provider to review them with you.
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
## 2017-11-09 NOTE — IP AVS SNAPSHOT
[address] [address]    Phone:  [phone]                                       After Visit Summary   11/9/2017    Tyler Case    MRN: [id number]           After Visit Summary Signature Page     I have received my discharge instructions, and my questions have been answered. I have discussed any challenges I see with this plan with the nurse or doctor.    ..........................................................................................................................................  Patient/Patient Representative Signature      ..........................................................................................................................................  Patient Representative Print Name and Relationship to Patient    ..................................................               ................................................  Date                                            Time    ..........................................................................................................................................  Reviewed by Signature/Title    ...................................................              ..............................................  Date                                                            Time

## 2017-11-10 PROBLEM — F31.9 BIPOLAR DISORDER (H): Status: ACTIVE | Noted: 2017-11-10

## 2017-11-10 LAB
AMPHETAMINES UR QL SCN: NEGATIVE
BARBITURATES UR QL: NEGATIVE
BENZODIAZ UR QL: NEGATIVE
CANNABINOIDS UR QL SCN: POSITIVE
COCAINE UR QL: POSITIVE
ETHANOL UR QL SCN: NEGATIVE
OPIATES UR QL SCN: NEGATIVE

## 2017-11-10 PROCEDURE — 25000125 ZZHC RX 250: Performed by: EMERGENCY MEDICINE

## 2017-11-10 PROCEDURE — A9270 NON-COVERED ITEM OR SERVICE: HCPCS | Mod: GY | Performed by: PSYCHIATRY & NEUROLOGY

## 2017-11-10 PROCEDURE — 96372 THER/PROPH/DIAG INJ SC/IM: CPT | Performed by: FAMILY MEDICINE

## 2017-11-10 PROCEDURE — A9270 NON-COVERED ITEM OR SERVICE: HCPCS | Mod: GY | Performed by: EMERGENCY MEDICINE

## 2017-11-10 PROCEDURE — 25000132 ZZH RX MED GY IP 250 OP 250 PS 637: Mod: GY | Performed by: EMERGENCY MEDICINE

## 2017-11-10 PROCEDURE — 80307 DRUG TEST PRSMV CHEM ANLYZR: CPT | Performed by: FAMILY MEDICINE

## 2017-11-10 PROCEDURE — 12400003 ZZH R&B MH CRITICAL UMMC

## 2017-11-10 PROCEDURE — 25000132 ZZH RX MED GY IP 250 OP 250 PS 637: Mod: GY | Performed by: PSYCHIATRY & NEUROLOGY

## 2017-11-10 PROCEDURE — 80320 DRUG SCREEN QUANTALCOHOLS: CPT | Performed by: FAMILY MEDICINE

## 2017-11-10 PROCEDURE — S0166 INJ OLANZAPINE 2.5MG: HCPCS | Performed by: EMERGENCY MEDICINE

## 2017-11-10 PROCEDURE — 99223 1ST HOSP IP/OBS HIGH 75: CPT | Mod: AI | Performed by: PSYCHIATRY & NEUROLOGY

## 2017-11-10 RX ORDER — LITHIUM CARBONATE 300 MG/1
1200 TABLET, FILM COATED, EXTENDED RELEASE ORAL AT BEDTIME
Status: DISCONTINUED | OUTPATIENT
Start: 2017-11-10 | End: 2017-11-10

## 2017-11-10 RX ORDER — HYDROXYZINE HYDROCHLORIDE 50 MG/1
50 TABLET, FILM COATED ORAL 3 TIMES DAILY PRN
Status: DISCONTINUED | OUTPATIENT
Start: 2017-11-10 | End: 2017-11-20

## 2017-11-10 RX ORDER — OLANZAPINE 10 MG/1
10 TABLET, ORALLY DISINTEGRATING ORAL ONCE
Status: DISCONTINUED | OUTPATIENT
Start: 2017-11-10 | End: 2017-11-10 | Stop reason: CLARIF

## 2017-11-10 RX ORDER — RISPERIDONE 1 MG/1
1 TABLET ORAL DAILY
Status: DISCONTINUED | OUTPATIENT
Start: 2017-11-10 | End: 2017-11-11 | Stop reason: DRUGHIGH

## 2017-11-10 RX ORDER — OLANZAPINE 10 MG/1
10 TABLET ORAL
Status: DISCONTINUED | OUTPATIENT
Start: 2017-11-10 | End: 2017-12-08 | Stop reason: HOSPADM

## 2017-11-10 RX ORDER — BISACODYL 10 MG
10 SUPPOSITORY, RECTAL RECTAL DAILY PRN
Status: DISCONTINUED | OUTPATIENT
Start: 2017-11-10 | End: 2017-12-08 | Stop reason: HOSPADM

## 2017-11-10 RX ORDER — ALUMINA, MAGNESIA, AND SIMETHICONE 2400; 2400; 240 MG/30ML; MG/30ML; MG/30ML
30 SUSPENSION ORAL EVERY 4 HOURS PRN
Status: DISCONTINUED | OUTPATIENT
Start: 2017-11-10 | End: 2017-12-08 | Stop reason: HOSPADM

## 2017-11-10 RX ORDER — OLANZAPINE 10 MG/2ML
INJECTION, POWDER, FOR SOLUTION INTRAMUSCULAR
Status: DISPENSED
Start: 2017-11-10 | End: 2017-11-11

## 2017-11-10 RX ORDER — ACETAMINOPHEN 325 MG/1
650 TABLET ORAL EVERY 4 HOURS PRN
Status: DISCONTINUED | OUTPATIENT
Start: 2017-11-10 | End: 2017-12-08 | Stop reason: HOSPADM

## 2017-11-10 RX ORDER — PHENOBARBITAL 32.4 MG/1
32.4 TABLET ORAL 3 TIMES DAILY
Status: DISCONTINUED | OUTPATIENT
Start: 2017-11-10 | End: 2017-11-13

## 2017-11-10 RX ORDER — NAPROXEN 500 MG/1
500 TABLET ORAL ONCE
Status: COMPLETED | OUTPATIENT
Start: 2017-11-10 | End: 2017-11-10

## 2017-11-10 RX ORDER — ACETAMINOPHEN 500 MG
1000 TABLET ORAL ONCE
Status: COMPLETED | OUTPATIENT
Start: 2017-11-10 | End: 2017-11-10

## 2017-11-10 RX ORDER — OLANZAPINE 10 MG/2ML
10 INJECTION, POWDER, FOR SOLUTION INTRAMUSCULAR ONCE
Status: COMPLETED | OUTPATIENT
Start: 2017-11-10 | End: 2017-11-10

## 2017-11-10 RX ORDER — OLANZAPINE 10 MG/2ML
10 INJECTION, POWDER, FOR SOLUTION INTRAMUSCULAR
Status: DISCONTINUED | OUTPATIENT
Start: 2017-11-10 | End: 2017-12-08 | Stop reason: HOSPADM

## 2017-11-10 RX ADMIN — ACETAMINOPHEN 1000 MG: 500 TABLET ORAL at 04:37

## 2017-11-10 RX ADMIN — NAPROXEN 500 MG: 500 TABLET ORAL at 09:28

## 2017-11-10 RX ADMIN — PHENOBARBITAL 32.4 MG: 32.4 TABLET ORAL at 22:53

## 2017-11-10 RX ADMIN — OLANZAPINE 10 MG: 5 TABLET, ORALLY DISINTEGRATING ORAL at 14:38

## 2017-11-10 RX ADMIN — OLANZAPINE 10 MG: 10 INJECTION, POWDER, LYOPHILIZED, FOR SOLUTION INTRAMUSCULAR at 06:41

## 2017-11-10 RX ADMIN — ACETAMINOPHEN 650 MG: 325 TABLET, FILM COATED ORAL at 22:53

## 2017-11-10 ASSESSMENT — ACTIVITIES OF DAILY LIVING (ADL)
ORAL_HYGIENE: INDEPENDENT
RETIRED_EATING: 0-->INDEPENDENT
ORAL_HYGIENE: INDEPENDENT;PROMPTS
LAUNDRY: UNABLE TO COMPLETE
TRANSFERRING: 0-->INDEPENDENT
GROOMING: INDEPENDENT
DRESS: 0-->INDEPENDENT
AMBULATION: 0-->INDEPENDENT
TOILETING: 0-->INDEPENDENT
DRESS: SCRUBS (BEHAVIORAL HEALTH)
BATHING: 0-->INDEPENDENT
COGNITION: 0 - NO COGNITION ISSUES REPORTED
RETIRED_COMMUNICATION: 0-->UNDERSTANDS/COMMUNICATES WITHOUT DIFFICULTY
GROOMING: INDEPENDENT
SWALLOWING: 0-->SWALLOWS FOODS/LIQUIDS WITHOUT DIFFICULTY
DRESS: SCRUBS (BEHAVIORAL HEALTH)
FALL_HISTORY_WITHIN_LAST_SIX_MONTHS: NO

## 2017-11-10 NOTE — ED NOTES
"Patient awake, requesting a blanket and to use the restroom.  Patient asking about 72 hour hold paperwork--this RN began discussing the paperwork with the patient who began rapidly speaking about how he \"came here to be poisoned\" and expressing distaste for medical personnel.  Patient able to calm down after a few minutes.  Will continue to monitor.  "

## 2017-11-10 NOTE — ED NOTES
"Pt called and requested help to sit up. Staff came in to assist pt. Pt started yelling and swearing at staff and declined assist to get up. Pt requesting more fluids. Pt c/o not getting any fluids he has asked for. Pt stated, \"I have been waiting in silence.\" Pt had been previously given fluid multiple times. Pt waiting for transport to [facility].  "
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
## 2017-11-10 NOTE — PROGRESS NOTES
P/C from Na on ACT Team [phone]., the Team did not meet today, they will meet on Monday.  They will then discuss the Provisional Discharge.  Writer told them Pt is not stable and Dr. Gill will support revoking the PD but defers to them as of today.
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
## 2017-11-10 NOTE — PROGRESS NOTES
NON ATTENDANCE:      11/10/17 1400   General Information   Has Not Attended OT as of: 11/10/17   Special Considerations Encourage attendance and participation.   Pt. will be given a Self Assessment form.  O.T. Staff will explain the value of including them in their treatment plan and offer options to meet their needs and identify goals.

## 2017-11-10 NOTE — PROGRESS NOTES
P/C to [facility]- [phone]- ACT Team .  L/M regarding Pt's admission and requesting call back regarding status of Provisional Discharge.

## 2017-11-10 NOTE — PROGRESS NOTES
"Patient has been coming out of his room multiple times,yelling, screaming, and swearing at staff. He goes on about frustrations with his belongings and many other complaints of issues. Patient is yelling so loudly that he is frightening the other patients. Attempts to redirect patient are unsuccessful. Patient is offered PRN PO medications, which he refuses. Code 21 called. With the code team on the unit, patient does agree to take PO PRN medications, which he referred to as \"poison\". He agreed to calm in his room. MD notified.   "
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
## 2017-11-10 NOTE — ED NOTES
In person face to face assessment completed, including an evaluation of the patient's immediate reaction to the intervention, complete review of systems assessment, behavioral assessment and review/assessment of history, drugs and medications, recent labs, etc., and behavioral condition.  The patient experienced: No adverse physical outcome from seclusion/restraint initiation.  The intervention of restraint or seclusion needs to continue.     Neena Su MD  11/10/17 0649

## 2017-11-10 NOTE — ED NOTES
"Patient sitting up in room, reports he is allergic to \"all psychiatric medications\".  Patient reports he feels suicidal when he is on medication that makes him feel suicidal, then reports considering suicide a few days ago when he wasn't able to get help from his family.  Patient asked about suicide plan and then denies SI.   Patient denies HI also.  "
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
## 2017-11-10 NOTE — PROGRESS NOTES
Pt refused to take Zyprexa.Code 21 was called. Pt was asked to move to seclusion room due to pt's resistance to Zyprexa and behavior escalating .Code team went hands on to get patient down because pt refused to get down to take Zyprexa and IM Zyprexa as given. Pt placed in seclusion's room and MD was notified.

## 2017-11-10 NOTE — ED PROVIDER NOTES
The patient became more agitated, required Zyprexa.  He is currently in seclusion.  He will be signed out to the oncoming provider pending bed placement.     Neena Su MD  11/10/17 0703
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
## 2017-11-10 NOTE — ED NOTES
Vital signs and triage questions not performed d/t patient condition on arrival. Patient remains in seclusion.

## 2017-11-10 NOTE — ED NOTES
In person face to face assessment completed, including an evaluation of the patient's immediate reaction to the intervention, complete review of systems assessment, behavioral assessment and review/assessment of history, drugs and medications, recent labs, etc., and behavioral condition.  The patient experienced: No adverse physical outcome from seclusion/restraint initiation.  The intervention of restraint or seclusion needs to terminate    The zyprexa worked well and he is calm. Awaiting admission.     Perez Lema MD  11/09/17 4083

## 2017-11-10 NOTE — ED NOTES
Patient yelling at staff, fists clenched.  Patient backing away from staff, continues to refuse to walk with staff to room 14.  Patient refuses to answer staff's questions/cooperate with staff.

## 2017-11-10 NOTE — CONSULTS
Brief evaluation    Attempted to see him however he was in transit to the upstairs unit. According to what I was able to gather he has a history of bipolar 1 disorder, cannabis use, alcohol use, herpes. His most recent lithium level was 0.2 and he has likely been using substances. He has been off medications and threatening staff members. He was agitated when I arrived to the emergency department. He did receive forced olanzapine while in the emergency department. According to his psychiatric history he is committed with an ACT team. He has a Frausto with olanzapine, haloperidol, risperidone, quetiapine, and possibly paliperidone. Previous medication trials include risperidone, aripiprazole, lithium, olanzapine, paliperidone, lorazepam, melatonin, trazodone.    He is currently likely manic and possibly intoxicated with substances. He has not been taking his lithium previously discharged on 1500 mg h.s. He also received 1 mg of risperidone daily in addition to that at his last discharge. He would likely benefit from a long-acting injectable medication would suggest starting haloperidol a long-acting injectable form and re-titrating his lithium.    Pablo Carpenter  [facility]      The following document has been created with voice recognition software and may contain unintentional word substitutions.
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
## 2017-11-10 NOTE — ED NOTES
Patient yelling at staff, continues to refuse to move out of hallway/undergo search. Patient unable to lower his voice despite multiple instructions from staff and the police. Code 21 called.
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
## 2017-11-10 NOTE — TELEPHONE ENCOUNTER
R: Helena12/trisha accepted for himself                          #12 RN informed by author of admit at 8:31 am, er informed at 8:32 am                daniel
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
## 2017-11-10 NOTE — ED NOTES
"Patient sleeping, more cooperative with staff--staff able to perform blood draw, BP, SpO2, and heart rate reading.  When asked for an oral temperature, patient states \"I can't open my mouth for you\", then mumbles about toothpaste.  Will continue to monitor.  "

## 2017-11-11 PROCEDURE — 12400003 ZZH R&B MH CRITICAL UMMC

## 2017-11-11 PROCEDURE — A9270 NON-COVERED ITEM OR SERVICE: HCPCS | Mod: GY | Performed by: PSYCHIATRY & NEUROLOGY

## 2017-11-11 PROCEDURE — 25000132 ZZH RX MED GY IP 250 OP 250 PS 637: Mod: GY | Performed by: PSYCHIATRY & NEUROLOGY

## 2017-11-11 RX ORDER — OLANZAPINE 10 MG/2ML
10 INJECTION, POWDER, FOR SOLUTION INTRAMUSCULAR DAILY
Status: DISCONTINUED | OUTPATIENT
Start: 2017-11-12 | End: 2017-11-13

## 2017-11-11 RX ORDER — RISPERIDONE 1 MG/1
1 TABLET ORAL DAILY
Status: DISCONTINUED | OUTPATIENT
Start: 2017-11-12 | End: 2017-11-13

## 2017-11-11 RX ADMIN — ACETAMINOPHEN 650 MG: 325 TABLET, FILM COATED ORAL at 20:53

## 2017-11-11 RX ADMIN — PHENOBARBITAL 32.4 MG: 32.4 TABLET ORAL at 20:54

## 2017-11-11 RX ADMIN — PHENOBARBITAL 32.4 MG: 32.4 TABLET ORAL at 17:21

## 2017-11-11 RX ADMIN — OLANZAPINE 10 MG: 10 TABLET, FILM COATED ORAL at 07:17

## 2017-11-11 RX ADMIN — NICOTINE POLACRILEX 4 MG: 2 GUM, CHEWING ORAL at 18:23

## 2017-11-11 ASSESSMENT — ACTIVITIES OF DAILY LIVING (ADL)
GROOMING: INDEPENDENT
ORAL_HYGIENE: INDEPENDENT
DRESS: SCRUBS (BEHAVIORAL HEALTH)
GROOMING: INDEPENDENT
DRESS: SCRUBS (BEHAVIORAL HEALTH)
ORAL_HYGIENE: INDEPENDENT

## 2017-11-11 NOTE — PLAN OF CARE
"Problem: Cognitive Impairment (Psychotic Signs/Symptoms) (Adult)  Goal: Improved Thought Clarity/Organization (Psychotic Signs/Symptoms)  Outcome: No Change  Pt has been in the milieu for most of the evening. Mood labile. At times, he is calm or napping while in the lounge. Pt becomes agitated and angry at times, perseverating on being \"forced to take poison\" and that his rights are being violated. Pt was angry that staff would not allow him access to his phone. Due to pt's behavior of pushing through staff to get into the exam room on day shift, decision was made that if pt wanted phone numbers off his phone, staff would get those for him. Pt angrily yelling that staff are violating the law, and now allowing him access to \"my personal spiritual items.\" Pt making delusional statements regarding being in the hospital \"because of the ROSSANA.\" Refusing to discuss medication use prior to admission. Refusing vitals assessment and refusing scheduled Phenobarbital. Refusing all other scheduled medications.   Pt came out of his room at 2245 to request a new scrub shirt. Pt's shirt was soaked with sweat. Continues to refuse vitals assessment. Pt did eventually agree to take scheduled Phenobarbital and prn Tylenol. Will continue to monitor closely.    "

## 2017-11-11 NOTE — H&P
DATE OF ADMISSION:  11/09/2017      DATE OF SERVICE:  11/10/2017      CHIEF COMPLAINT:  Tyler Case is a 42-year-old man admitted with increased manic symptoms.      HISTORY OF PRESENT ILLNESS:  Patient is a 42-year-old man who was discharged from [facility] on 10/10/2017.  He presented to our emergency room with manic behavior.  He was agitated.  He is noncompliant with his prescribed lithium.  He was quite agitated in the ER, required restraints, code 21 to be called as well as IM medications.  His outpatient provider had indicated that she is unable to manage him due to his repeated noncompliance with medications.      When I attempted to meet with the patient, he simply started screaming at me and told me to go away.  He claimed that I am part of the problem, that I am poisoning him, that he refuses to take any medications and denies that he is mentally ill.  This is very consistent with his past presentation where he was admitted on 09/24/2017.  At that point in time, he claimed that Psychiatry was a cult and that the FDA approved medications they knew were poisonous in order to wipe out the human species.  He continues to make similar-sounding paranoid statements and is completely uncooperative.  After I had met with him, patient continued to be agitated; he is swearing at staff.  He declined medications.  A code was called.  When other staff arrived and responded to the code, he finally agreed to take medications and he returned to his room.      PAST PSYCHIATRIC HISTORY:  Patient has had numerous inpatient hospitalizations at [facility].  Last admission here was in 09/2017.  Prior to that, he was in [facility] in 06/2017.  He has had no known suicide attempts.  No known ECT treatment.  Past medication trials include risperidone, Abilify, lithium, Zyprexa, Invega and Ativan.  He was currently under a provisional discharge with a Frausto order.      SUBSTANCE HISTORY:  The  patient has a history of cannabis, methamphetamines, ecstasy, bath salts, MDMA, LSD.  No history of CD treatment.      PAST MEDICAL HISTORY:  No chronic or acute medical issues known.      PHYSICAL REVIEW OF SYSTEMS:  The patient is completely uncooperative and declines to answer any questions, even though I attempted to complete a 10-point review of systems.      FAMILY HISTORY:  Possible bipolar in family members.      SOCIAL HISTORY:  The patient was raised by both parents.  He is 1 of 7 siblings, lives alone, has 1 child.  He is currently on SSDI.      ALLERGIES:  Abilify Discmelt, Compazine.      PRIOR TO ADMISSION MEDICATIONS:  The patient stopped taking his medications; however, he was prescribed lithium 1500 mg at night and risperidone 1 mg at night in addition to clonazepam as needed and hydroxyzine as needed.      LABORATORY DATA:  Urine toxicology is positive for cocaine and cannabinoids.  Lithium level was less than 0.2.      VITAL SIGNS:  Pulse 79, respiratory rate of 16, blood pressure 108/52, oxygen saturation 97% on room air.      PHYSICAL EXAMINATION:  I reviewed physical exam as documented by emergency room physician Perez Lema.  I have no additional findings at this time.      MENTAL STATUS EXAMINATION:  Patient is alert.  He is agitated.  He is wearing hospital scrubs.  He is oriented to person and place, does not answer about date.  He has intense eye contact.  He has psychomotor agitation.  No abnormalities with muscle strength and tone or gait.  Mood is labile.  Affect is labile.  Speech is pressured.  He uses frequent profanity.  He is interrupting of any attempts to communicate with him.  Thought process is illogical.  He has loose associations.  Thought content is notable for paranoid thoughts.  Recent and remote memory are impaired due to his current state.  Attention and concentration are impaired.  Fund of knowledge is adequate.  Insight is poor.  Judgment is poor.      DIAGNOSES:   1.   Bipolar disorder, currently manic, severe, with psychotic features.   2.  Cannabis use disorder, moderate.   3.  Cigarette and nicotine dependence, currently in withdrawal.      PLAN:   1.  Will restart patient's prior to admission dose of lithium and risperidone to treat his bipolar disorder.  Given his frequent noncompliance, will attempt to titrate risperidone up to adequate treatment dose and start him on Consta.  He seems to do best with lithium; however, that can be enforced by Frausto.  He may see some long-lasting benefit from a Consta injection.   2.  Given patient's repeated noncompliance and repeated hospitalizations, some longer term placement, such as IRTS or similar, seems to be the next least restrictive step in treatment.   3.  Patient had symptoms of benzo withdrawal last time during his stay.  Given that, I have started scheduled phenobarbital.  I am unable to determine how much benzodiazepine he was using because he is unwilling to cooperate.   4.  Legal:  The patient was admitted on a 72-hour hold, as likely the outpatient team is moving to revoke a provisional discharge.  Will contact them to see if that is the case.   5.  Disposition:  Patient is acutely manic, quite agitated.  He would be unsafe and unable to function in the community.         RAISSA ANTUNEZ MD             D: 11/10/2017 20:19   T: 11/10/2017 21:25   MT: EARNEST      Name:     KAYLA REYES   MRN:      [id number]        Account:      [id number]   :      1975           Admitted:     [id number]      Document: [id number]       cc: Radha PANTOJA CNP
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
## 2017-11-12 PROCEDURE — A9270 NON-COVERED ITEM OR SERVICE: HCPCS | Mod: GY | Performed by: PSYCHIATRY & NEUROLOGY

## 2017-11-12 PROCEDURE — 25000132 ZZH RX MED GY IP 250 OP 250 PS 637: Mod: GY | Performed by: PSYCHIATRY & NEUROLOGY

## 2017-11-12 PROCEDURE — 12400003 ZZH R&B MH CRITICAL UMMC

## 2017-11-12 RX ADMIN — PHENOBARBITAL 32.4 MG: 32.4 TABLET ORAL at 07:37

## 2017-11-12 RX ADMIN — PHENOBARBITAL 32.4 MG: 32.4 TABLET ORAL at 14:30

## 2017-11-12 RX ADMIN — RISPERIDONE 1 MG: 1 TABLET ORAL at 07:36

## 2017-11-12 ASSESSMENT — ACTIVITIES OF DAILY LIVING (ADL)
GROOMING: HANDWASHING;INDEPENDENT
ORAL_HYGIENE: INDEPENDENT
ORAL_HYGIENE: INDEPENDENT
LAUNDRY: UNABLE TO COMPLETE
GROOMING: INDEPENDENT
DRESS: SCRUBS (BEHAVIORAL HEALTH)
DRESS: SCRUBS (BEHAVIORAL HEALTH)
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
## 2017-11-12 NOTE — PROGRESS NOTES
SPIRITUAL HEALTH SERVICES  SPIRITUAL ASSESSMENT Progress Note   Hospital Location 12[facility]  ON-CALL    Referral Source: Consult order for support around his wishes for smudging.    Spoke with staff who indicated Tyler can get agitated at times if he disagrees and/or is told no, staff also shared Tyler degree of hyper religous beliefs..  Tyler expressed hat he has a constitutional right to practice his Buddhist and the hospital is infringing on this right. It is his wish to practice smudging and smoke tobacco in order to practice his ebony. We explored his options around this wish. As an alternative to smuding that was acceptable to him was using the herbs in the form of a tea. Working toward a goal of being allowed to practice smudging once the procedures for doing so could be met. I expressly explained I could not promise anything but would pass on to the unit  his desires and leave it up to the unit  to facilitate use of herbs in tea form or smudging at later date.   He seemed to understand that while he does have [employer] rights they can not infringe on the rights of others and the need for safety in the hospital for everyone.          PLAN: Unit  shall follow this pt in future..                                                                                                                                               Greg Nuñez MA.  Associate Staff   Pager [phone]
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
## 2017-11-13 PROBLEM — R45.1 AGITATION: Status: ACTIVE | Noted: 2017-11-13

## 2017-11-13 PROBLEM — F13.10 MILD BENZODIAZEPINE USE DISORDER (H): Status: ACTIVE | Noted: 2017-11-13

## 2017-11-13 PROBLEM — F41.1 GAD (GENERALIZED ANXIETY DISORDER): Status: RESOLVED | Noted: 2017-10-10 | Resolved: 2017-11-13

## 2017-11-13 PROBLEM — F17.213 CIGARETTE NICOTINE DEPENDENCE WITH WITHDRAWAL: Status: ACTIVE | Noted: 2017-10-10

## 2017-11-13 PROCEDURE — 12400003 ZZH R&B MH CRITICAL UMMC

## 2017-11-13 PROCEDURE — 25000132 ZZH RX MED GY IP 250 OP 250 PS 637: Mod: GY | Performed by: PSYCHIATRY & NEUROLOGY

## 2017-11-13 PROCEDURE — A9270 NON-COVERED ITEM OR SERVICE: HCPCS | Mod: GY | Performed by: PSYCHIATRY & NEUROLOGY

## 2017-11-13 PROCEDURE — 99233 SBSQ HOSP IP/OBS HIGH 50: CPT | Performed by: PSYCHIATRY & NEUROLOGY

## 2017-11-13 RX ORDER — PHENOBARBITAL 32.4 MG/1
32.4 TABLET ORAL 2 TIMES DAILY
Status: DISCONTINUED | OUTPATIENT
Start: 2017-11-13 | End: 2017-11-14

## 2017-11-13 RX ORDER — RISPERIDONE 1 MG/1
1 TABLET ORAL 2 TIMES DAILY
Status: DISCONTINUED | OUTPATIENT
Start: 2017-11-13 | End: 2017-11-14

## 2017-11-13 RX ORDER — OLANZAPINE 10 MG/2ML
10 INJECTION, POWDER, FOR SOLUTION INTRAMUSCULAR 2 TIMES DAILY
Status: DISCONTINUED | OUTPATIENT
Start: 2017-11-13 | End: 2017-11-14

## 2017-11-13 RX ORDER — NICOTINE 21 MG/24HR
1 PATCH, TRANSDERMAL 24 HOURS TRANSDERMAL DAILY
Status: DISCONTINUED | OUTPATIENT
Start: 2017-11-13 | End: 2017-12-08 | Stop reason: HOSPADM

## 2017-11-13 RX ADMIN — PHENOBARBITAL 32.4 MG: 32.4 TABLET ORAL at 00:48

## 2017-11-13 RX ADMIN — RISPERIDONE 1 MG: 1 TABLET ORAL at 21:45

## 2017-11-13 RX ADMIN — RISPERIDONE 1 MG: 1 TABLET ORAL at 09:03

## 2017-11-13 RX ADMIN — NICOTINE POLACRILEX 4 MG: 2 GUM, CHEWING ORAL at 21:45

## 2017-11-13 RX ADMIN — PHENOBARBITAL 32.4 MG: 32.4 TABLET ORAL at 21:45

## 2017-11-13 RX ADMIN — NICOTINE 1 PATCH: 21 PATCH, EXTENDED RELEASE TRANSDERMAL at 13:31

## 2017-11-13 RX ADMIN — PHENOBARBITAL 32.4 MG: 32.4 TABLET ORAL at 09:03

## 2017-11-13 ASSESSMENT — ACTIVITIES OF DAILY LIVING (ADL)
ORAL_HYGIENE: INDEPENDENT
ORAL_HYGIENE: INDEPENDENT
LAUNDRY: UNABLE TO COMPLETE
DRESS: SCRUBS (BEHAVIORAL HEALTH)
GROOMING: HANDWASHING;INDEPENDENT
DRESS: SCRUBS (BEHAVIORAL HEALTH);INDEPENDENT
LAUNDRY: WITH SUPERVISION
GROOMING: HANDWASHING;INDEPENDENT

## 2017-11-13 NOTE — PROGRESS NOTES
This writer spoke to Tyler's ACT team CCM Dayna Palafox.  She requested updated notes as they are discussing revocation during their team meeting today.  I explained we believe his PD needs to be revoked.  His 72 hr hold is up tomorrow at 7pm.

## 2017-11-13 NOTE — PROGRESS NOTES
Patient was visible in the milieu majority of the evening. His mood fluctuated throughout the evening from calm and social with staff and quickly agitating himself by turning every conversation into his anger against the hospital, police, and medications. He stated that he thinks he is was wrongly brought into the hospital. On numerous occasions he would become upset and yell that he was being mistreated and poisoned by the medications he is receiving at the hospital. He was heard telling other patients that he needs natural medication and referred to marijuana as helping him. He also had many phone calls throughout the evening also sounding tense angry. He was independent with his meals and ADL's.     11/12/17 2158   Behavioral Health   Hallucinations denies / not responding to hallucinations   Thinking distractable;delusional;paranoid   Orientation situation, disoriented;person: oriented;place: oriented;date: oriented;time: oriented   Memory baseline memory   Insight poor   Judgement impaired   Eye Contact at examiner   Affect angry;tense;irritable   Mood irritable   Physical Appearance/Attire attire appropriate to age and situation;disheveled   Hygiene well groomed   Suicidality other (see comments)  (none stated or observed)   Self Injury other (see comment)  (none stated or observed)   Elopement (none noted)   Activity hyperactive (agitated, impulsive);restless   Speech clear;coherent;pressured   Medication Sensitivity no observed side effects  (patient stated mental block from Zyprexa)   Psychomotor / Gait balanced;steady;hyperactive;agitated  (tense)   Overt Aggression Scale   Verbal Aggression 2   Aggression against Property 0   Auto-Aggression 0   Physical Aggression 0   Overt Aggression Total Score 2   Sleep/Rest/Relaxation   Sleep/Rest/Relaxation appears asleep   Day/Evening Time Hours napping;resting in bed   Number of hours napping 0.75 hours   Number of hours resting in bed 0.5 hours    Coping/Psychosocial   Verbalized Emotional State anger;frustration;powerlessness   Supportive Measures active listening utilized;relaxation techniques promoted;positive reinforcement provided;verbalization of feelings encouraged   Trust Relationship/Rapport questions answered;questions encouraged;reassurance provided;thoughts/feelings acknowledged;empathic listening provided;choices provided   Psycho Education   Type of Intervention 1:1 intervention   Response refuses   Activities of Daily Living   Hygiene/Grooming handwashing;independent   Oral Hygiene independent   Dress scrubs (behavioral health)   Laundry unable to complete   Room Organization independent   Activity   Activity Assistance Provided independent

## 2017-11-13 NOTE — PLAN OF CARE
"Problem: Manic Symptoms  Goal: Manic Symptoms  Signs and symptoms of listed problems will be absent or manageable.   Outcome: No Change  RN ASSESSMENT   Patient isolative to self and his room most of the shift. When out, he is usually on the phone, loud and accusatory. My attempt to administer a.m. Medications were met with extreme hostility and agitation. Patient called this writer \"fucking bitch\" and \"the terrorist\". Accused provider of causing his erectile dysfunction and poisoning him on daily basis. He initially refused his scheduled medications, but then reluctantly agreed to take it, assuring that this writer is aware that he is being forced to take the midicaiton. Body languge during most of the intractions tense and aggressive. Posturing and cliniching his fists, loud, with tense and angry affect. When asking for things initially polite, but then suddenly demanding and rude about it. For example when asked for Nicotine patch and was told that the doctor has to order it became instantly angry and threatening stating, \" That fucker better give me what I want. I come here often enough for you know that I take both patch and the gum, so tell this fucker that he should know this!\"   Denies auditory and visual hallucinations. Denied suicidal and homicidal thoughts. Mood grandiose and labile.  Insight into situation limited.    Currently on 72 hold. Refer to case manger notes for PD revocation and discharge planing and recommendations. Continue with current treatment plan and recommendations. Continue to monitor and reassess symptoms. Monitor response to medications. Monitor progress towards treatment goals. Encourage groups and participation.      "

## 2017-11-13 NOTE — PROGRESS NOTES
Pt was awake several times through the night presenting with request for fresh scrubs.  Noted pt continues to be very diaphoretic at night.  He did request evening dose of Phenobarb at 12MN.   He has changed clothes X4.  Mood is generally calm though will quickly escalate with any trigger topic eg discussion of medication, hospitalization or his abuse of drugs.

## 2017-11-13 NOTE — PROGRESS NOTES
[facility], [place]   Psychiatric Progress Note  Hospital Day: 3        Interim History:   The patient's care was discussed with the treatment team during the daily team meeting and/or staff's chart notes were reviewed.  Staff report patient has remained very agitated. He is taking the risperidone only because it is backed up with Zyprexa, which he dislikes more than the risperidone. He is refusing the lithium.    Prior to my attempt at meeting with the patient he was on the phone for a significant amount of time. He was making several delusional and grandiose statements about our mistreatment of him here. He had been telling staff that he is  of several [employer] and that he is here to bring down psychiatry. I overheard some of his phone conversation as his volume was such that he could be heard at the end of the nicholas. He was angry and derogatory to both his mother and patient relations.    Upon interview, the patient held up a sheet of paper saying he is putting me on some sort of notice that I am breaking the law and that he is going to taking me down in some way. When I asked if he could do anything, he stuck up his middle finger. He did not answer me verbally.    Psychiatric Symptoms: Grandiose, Agitation, Paranoid, Delusional    Medication side effects reported: None           Medications:       PHENobarbital  32.4 mg Oral BID     risperiDONE  1 mg Oral BID    Or     OLANZapine  10 mg Intramuscular BID     lithium  1,500 mg Oral At Bedtime          Allergies:     Allergies   Allergen Reactions     Abilify Discmelt      Suicidal thoughts       Compazine Other (See Comments)     Seizure           Labs:   No results found for this or any previous visit (from the past 24 hour(s)).       Psychiatric Examination:     /72  Pulse 76  Temp 96.7  F (35.9  C)  Resp 16  SpO2 97%  Weight is 0 lbs 0 oz  There is no height or weight on file to calculate BMI.    Appearance: awake,  alert, adequately groomed and dressed in hospital scrubs  Attitude:  uncooperative  Eye Contact:  intense  Mood:  angry  Affect:  intensity is heightened, labile and reactive  Speech:  pressured speech  Language: fluent and intact in English  Psychomotor, Gait, Musculoskeletal:  no evidence of tardive dyskinesia, dystonia, or tics and intact station, gait and muscle tone  Throught Process:  disorganized and illogical  Associations:  loosening of associations present  Thought Content:  significant paranoia with grandiose thinking  Insight:  absent  Judgement:  poor  Oriented to:  time, person, and place  Attention Span and Concentration:  intact  Recent and Remote Memory:  intact  Fund of Knowledge:  appropriate         Precautions:     Behavioral Orders   Procedures     Assault precautions     Code 1 - Restrict to Unit     Elopement precautions     Routine Programming     As clinically indicated     Status 15     Every 15 minutes.     Withdrawal precautions          Diagnoses:      Bipolar I, recurrent manic episode, severe with psychotic behavior  Cannabis use disorder, moderate, dependence  Cigarette nicotine dependence with withdrawal  Mild benzodiazepine use disorder           Assessment & Plan:   Assessment and hospital summary:  The patient is a 42-year-old man that was admitted due to increased denisha with aggressive behaviors in the setting of non-compliance with treatment. He has not been agreeable to lithium and has only been taking the risperidone to avoid the Zyprexa injection.    Target psychiatric symptoms and interventions:  Significant psychosis with denisha  --Increase risperidone to 1 mg BID with IM backup of Zyprexa  --Continue with lithium. Patient declining.    Continue with nicotine replacement    Benzodiazapine dependence  --Decrease phenobarbital to 32.4 mg BID    Medical Problems and Treatments:  None acute    Behavioral/Psychological/Social:  Patient remains extremely agitated with posturing  behaviors. Not appropriate for groups or much unit programming at this time.    Legal:  72-hour hold. Outpatient team having a meeting to consider revoking PD.    Disposition:  Ongoing denisha with psychosis requiring stabilization in the hospital.

## 2017-11-14 PROBLEM — F14.20 COCAINE USE DISORDER, MODERATE, DEPENDENCE (H): Status: ACTIVE | Noted: 2017-11-14

## 2017-11-14 PROBLEM — F13.20 MODERATE BENZODIAZEPINE USE DISORDER (H): Status: ACTIVE | Noted: 2017-11-13

## 2017-11-14 PROCEDURE — 12400003 ZZH R&B MH CRITICAL UMMC

## 2017-11-14 PROCEDURE — 99233 SBSQ HOSP IP/OBS HIGH 50: CPT | Performed by: PSYCHIATRY & NEUROLOGY

## 2017-11-14 PROCEDURE — 25000132 ZZH RX MED GY IP 250 OP 250 PS 637: Mod: GY | Performed by: PSYCHIATRY & NEUROLOGY

## 2017-11-14 PROCEDURE — A9270 NON-COVERED ITEM OR SERVICE: HCPCS | Mod: GY | Performed by: PSYCHIATRY & NEUROLOGY

## 2017-11-14 RX ORDER — CHLORAL HYDRATE 500 MG
1 CAPSULE ORAL DAILY
Status: DISCONTINUED | OUTPATIENT
Start: 2017-11-14 | End: 2017-12-08 | Stop reason: HOSPADM

## 2017-11-14 RX ORDER — MULTIPLE VITAMINS W/ MINERALS TAB 9MG-400MCG
1 TAB ORAL DAILY
Status: DISCONTINUED | OUTPATIENT
Start: 2017-11-14 | End: 2017-12-08 | Stop reason: HOSPADM

## 2017-11-14 RX ORDER — RISPERIDONE 2 MG/1
2 TABLET ORAL 2 TIMES DAILY
Status: DISCONTINUED | OUTPATIENT
Start: 2017-11-14 | End: 2017-11-24

## 2017-11-14 RX ORDER — OLANZAPINE 10 MG/2ML
10 INJECTION, POWDER, FOR SOLUTION INTRAMUSCULAR 2 TIMES DAILY
Status: DISCONTINUED | OUTPATIENT
Start: 2017-11-14 | End: 2017-11-24

## 2017-11-14 RX ORDER — PHENOBARBITAL 32.4 MG/1
32.4 TABLET ORAL AT BEDTIME
Status: COMPLETED | OUTPATIENT
Start: 2017-11-14 | End: 2017-11-15

## 2017-11-14 RX ADMIN — RISPERIDONE 2 MG: 2 TABLET ORAL at 22:31

## 2017-11-14 RX ADMIN — RISPERIDONE 1 MG: 1 TABLET ORAL at 08:27

## 2017-11-14 RX ADMIN — MULTIPLE VITAMINS W/ MINERALS TAB 1 TABLET: TAB at 13:45

## 2017-11-14 RX ADMIN — HYDROXYZINE HYDROCHLORIDE 50 MG: 50 TABLET, FILM COATED ORAL at 18:44

## 2017-11-14 RX ADMIN — PHENOBARBITAL 32.4 MG: 32.4 TABLET ORAL at 22:31

## 2017-11-14 RX ADMIN — NICOTINE POLACRILEX 4 MG: 2 GUM, CHEWING ORAL at 21:18

## 2017-11-14 RX ADMIN — NICOTINE 1 PATCH: 21 PATCH, EXTENDED RELEASE TRANSDERMAL at 08:27

## 2017-11-14 RX ADMIN — Medication 1 G: at 13:45

## 2017-11-14 RX ADMIN — PHENOBARBITAL 32.4 MG: 32.4 TABLET ORAL at 08:27

## 2017-11-14 RX ADMIN — NICOTINE POLACRILEX 4 MG: 2 GUM, CHEWING ORAL at 18:44

## 2017-11-14 ASSESSMENT — ACTIVITIES OF DAILY LIVING (ADL)
DRESS: SCRUBS (BEHAVIORAL HEALTH)
GROOMING: SHOWER;INDEPENDENT
ORAL_HYGIENE: INDEPENDENT
LAUNDRY: WITH SUPERVISION
ORAL_HYGIENE: INDEPENDENT
DRESS: INDEPENDENT;SCRUBS (BEHAVIORAL HEALTH)
GROOMING: HANDWASHING;INDEPENDENT
LAUNDRY: UNABLE TO COMPLETE

## 2017-11-14 NOTE — PROGRESS NOTES
Dayna Palafox Pt's CCM called and told me that after their team meeting they have decided to revoke the patients provisional discharge.  She plans to send over the intent to revoke asap. They are faxing over the intent to revoke to give to the pt.    However the pt's 72 hr hold does not  until 7/15/17 @ 7pm.  Originally, we thought it  tonight, however, Friday the  was observed holiday veterans day and a court holiday and therefore the hold isn't up until tomorrow.      We will get the official revocation tomorrow after the  signs it.

## 2017-11-14 NOTE — PROGRESS NOTES
[facility], [place]   Psychiatric Progress Note  Hospital Day: 4        Interim History:   The patient's care was discussed with the treatment team during the daily team meeting and/or staff's chart notes were reviewed.  Staff report patient has continued to indicate that he won't speak with MD due to anger about hospitalization and medications.    Upon interview, the patient indicated that he had no questions or concerns for me. He was not agreeable to any lengthy discussion.    Patient approached RN later and indicated that he wanted to be able to get a snack and requested nutrition consult. He asked RN for MVI, fish oil, B12, MVI, Mg, Vit C, and Co-Q10.    I returned a call to the patient's outpatient provider. She indicated that the patient has been using drugs extensively. He smokes marijuana all day, most days. He is abusing any prescribed benzodiazapine medications. He also has been heavily using cocaine. She was informed of a live stream that he made of himself literally foaming at the mouth after using drugs. She believes that he needs CD treatment. She also indicated that in the past has has been reasonably stable on Consta when not using drugs. He doesn't take the lithium and will sometimes take several pill right before a blood draw to get his level up. He won't agree to witnessed administration of medications.    Psychiatric Symptoms: Grandiose, Agitation, Paranoid, Delusional    Medication side effects reported: None           Medications:       PHENobarbital  32.4 mg Oral BID     risperiDONE  1 mg Oral BID    Or     OLANZapine  10 mg Intramuscular BID     nicotine   Transdermal Q8H     nicotine   Transdermal Daily     nicotine  1 patch Transdermal Daily     lithium  1,500 mg Oral At Bedtime          Allergies:     Allergies   Allergen Reactions     Abilify Discmelt      Suicidal thoughts       Compazine Other (See Comments)     Seizure           Labs:   No results found  for this or any previous visit (from the past 24 hour(s)).       Psychiatric Examination:     /67  Pulse 121  Temp 99  F (37.2  C) (Oral)  Resp 18  SpO2 97%  Weight is 0 lbs 0 oz  There is no height or weight on file to calculate BMI.    Appearance: awake, alert, adequately groomed and dressed in hospital scrubs  Attitude:  uncooperative  Eye Contact:  intense  Mood:  angry  Affect:  intensity is heightened, labile and reactive  Speech:  slightly less pressured  Language: fluent and intact in English  Psychomotor, Gait, Musculoskeletal:  no evidence of tardive dyskinesia, dystonia, or tics and intact station, gait and muscle tone  Throught Process:  disorganized and illogical  Associations:  loosening of associations present  Thought Content:  significant paranoia with grandiose thinking  Insight:  absent  Judgement:  poor  Oriented to:  time, person, and place  Attention Span and Concentration:  intact  Recent and Remote Memory:  intact  Fund of Knowledge:  appropriate         Precautions:     Behavioral Orders   Procedures     Assault precautions     Code 1 - Restrict to Unit     Elopement precautions     Routine Programming     As clinically indicated     Status 15     Every 15 minutes.     Withdrawal precautions          Diagnoses:         Bipolar I, recurrent manic episode, severe with psychotic behavior  Cannabis use disorder, severe, dependence  Cocaine use disorder, moderate, dependence  Moderate benzodiazepine use disorder  Cigarette nicotine dependence with withdrawal           Assessment & Plan:   Assessment and hospital summary:  The patient is a 42-year-old man that was admitted due to increased denisha with aggressive behaviors in the setting of non-compliance with treatment. He initially was not agreeable to any medications but took the risperidone to avoid getting the olanzapine injection. Given my conversation with his outpatient provider on 11/14, I have updated his diagnoses. He has  significantly more substance use issues than previously documented here. She was concerned about his unsafe and inconsistent use of lithium outside of the hospital. Because of this, the decision was made to transition to risperidone only for the long acting injection.    Target psychiatric symptoms and interventions:  Significant psychosis with deinsha  --Increase risperidone to 2 mg BID with IM backup of Zyprexa  --Discontinue lithium    Continue with nicotine replacement    Benzodiazapine dependence  --Decrease phenobarbital to 32.4 mg qHS (last dose tomorrow evening)    Medical Problems and Treatments:  Nutrition consult for snack request.  Will order MVI and Fish oil. Will consider his other requests of B12, Mg, Vit C, and Co-Q10 after some blood work and discussion with the patient (he currently is not willing to speak with me at any length)    Behavioral/Psychological/Social:  Patient remains extremely agitated with posturing behaviors. Not appropriate for groups or much unit programming at this time.    Legal:  72-hour hold. Outpatient team will be revoking PD based on conversation with outpatient provider on 11/14    Disposition:  Ongoing denisha with psychosis requiring stabilization in the hospital.

## 2017-11-14 NOTE — PROGRESS NOTES
"Patient visible in the milieu majority of the evening. He was calm and approachable when staff initiated  conversation but he quickly controls the conversation always revolving back to his thoughts that hospital staff are persecuting him and forcing \"poisonous\" medication onto him. Though he became tense and agitated during this conversation staff was able to calm him and redirect his behavior by offering help with basic needs like food. He stated that his appetite has been increased since being admitted and requested larger meal portions or snacks in between meals. He spent a considerable amount of time using the phone. All of his phone calls sounded very tense and he appeared agitated seeming to control the conversation often yelling during the calls.      11/13/17 2200   Behavioral Health   Hallucinations denies / not responding to hallucinations   Thinking distractable;delusional;paranoid   Orientation person: oriented;place: oriented;date: oriented;time: oriented;situation, disoriented   Memory baseline memory;confabulation   Insight poor   Judgement impaired   Eye Contact at examiner   Affect tense;blunted, flat   Mood anxious;irritable;labile   Physical Appearance/Attire attire appropriate to age and situation   Hygiene well groomed   Suicidality other (see comments)  (none stated or observed)   Self Injury other (see comment)  (none stated or observed)   Elopement (none noted)   Activity hyperactive (agitated, impulsive);restless   Speech clear;coherent;pressured   Medication Sensitivity no observed side effects;other (see comment)  (Patient stated mental block from zyprexa)   Psychomotor / Gait agitated;balanced;steady   Overt Aggression Scale   Verbal Aggression 1   Aggression against Property 0   Auto-Aggression 0   Physical Aggression 0   Overt Aggression Total Score 1   Sleep/Rest/Relaxation   Sleep/Rest/Relaxation appears asleep   Day/Evening Time Hours napping   Number of hours napping 0.75 hours "   Coping/Psychosocial   Verbalized Emotional State anger;frustration   Supportive Measures active listening utilized;decision-making supported;positive reinforcement provided;verbalization of feelings encouraged;relaxation techniques promoted   Trust Relationship/Rapport choices provided;emotional support provided;empathic listening provided;questions answered;questions encouraged;reassurance provided;thoughts/feelings acknowledged   Psycho Education   Type of Intervention 1:1 intervention   Response refuses   Activities of Daily Living   Hygiene/Grooming handwashing;independent   Oral Hygiene independent   Dress scrubs (behavioral health)   Laundry unable to complete   Room Organization independent   Activity   Activity Assistance Provided independent

## 2017-11-14 NOTE — PLAN OF CARE
"Problem: Manic Symptoms  Goal: Manic Symptoms  Signs and symptoms of listed problems will be absent or manageable.   Outcome: Therapy, progress toward functional goals as expected  RN ASSESSMENT   Patient presents visible in the milieu, mostly on the phone and social with select peers. He is notably calmer and not as angry this shift. Compliant with scheduled medications. Continues to verbalize hatered and anger toward psychiatric provider and states that he is not interested in seeing him at all diring this hospitalization. Stated, \" As soon as I see him I just get so angry, I want to scream and punch the walls. I just don't know if I can control myself\" Provider warned. Made multiple requests to provider, however not directly but through this writer. Hypervebal and grandiose on approach. Denies auditory and visual hallucinations. Denied suicidal and homicidal thoughts. Insight into situation limited.   Refer to case manger notes for discharge planing and recommendations. Continue with current treatment plan and recommendations. Continue to monitor and reassess symptoms. Monitor response to medications. Monitor progress towards treatment goals. Encourage groups and participation.          "

## 2017-11-14 NOTE — PROGRESS NOTES
"CLINICAL NUTRITION SERVICES - BRIEF NOTE    Received pt/family request - \"Would like to receive turkey sandwiches between breakfast and lunch, lunch and dinner, and HS snack.     Discussed consult with RN. Per RN pt wanting scheduled snacks ordered per consult.     Nutrition Intervention  -Ordered scheduled snacks per pt request.     No nutrition follow-up warranted at this time.  Please consult if further needs arise.    Mary Rizo RD, LD  Unit Pager: [phone]      "

## 2017-11-15 LAB
ALBUMIN SERPL-MCNC: 3.6 G/DL (ref 3.4–5)
ALP SERPL-CCNC: 77 U/L (ref 40–150)
ALT SERPL W P-5'-P-CCNC: 64 U/L (ref 0–70)
ANION GAP SERPL CALCULATED.3IONS-SCNC: 12 MMOL/L (ref 3–14)
AST SERPL W P-5'-P-CCNC: 35 U/L (ref 0–45)
BASOPHILS # BLD AUTO: 0.1 10E9/L (ref 0–0.2)
BASOPHILS NFR BLD AUTO: 0.6 %
BILIRUB SERPL-MCNC: 0.2 MG/DL (ref 0.2–1.3)
BUN SERPL-MCNC: 15 MG/DL (ref 7–30)
CALCIUM SERPL-MCNC: 8.8 MG/DL (ref 8.5–10.1)
CHLORIDE SERPL-SCNC: 101 MMOL/L (ref 94–109)
CO2 SERPL-SCNC: 21 MMOL/L (ref 20–32)
CREAT SERPL-MCNC: 0.75 MG/DL (ref 0.66–1.25)
DEPRECATED CALCIDIOL+CALCIFEROL SERPL-MC: 12 UG/L (ref 20–75)
DIFFERENTIAL METHOD BLD: NORMAL
EOSINOPHIL # BLD AUTO: 0.3 10E9/L (ref 0–0.7)
EOSINOPHIL NFR BLD AUTO: 2.9 %
ERYTHROCYTE [DISTWIDTH] IN BLOOD BY AUTOMATED COUNT: 13.2 % (ref 10–15)
GFR SERPL CREATININE-BSD FRML MDRD: >90 ML/MIN/1.7M2
GLUCOSE SERPL-MCNC: 116 MG/DL (ref 70–99)
HCT VFR BLD AUTO: 44 % (ref 40–53)
HGB BLD-MCNC: 14.5 G/DL (ref 13.3–17.7)
IMM GRANULOCYTES # BLD: 0.1 10E9/L (ref 0–0.4)
IMM GRANULOCYTES NFR BLD: 0.6 %
LYMPHOCYTES # BLD AUTO: 3.8 10E9/L (ref 0.8–5.3)
LYMPHOCYTES NFR BLD AUTO: 42.9 %
MAGNESIUM SERPL-MCNC: 2.2 MG/DL (ref 1.6–2.3)
MCH RBC QN AUTO: 28.8 PG (ref 26.5–33)
MCHC RBC AUTO-ENTMCNC: 33 G/DL (ref 31.5–36.5)
MCV RBC AUTO: 88 FL (ref 78–100)
MONOCYTES # BLD AUTO: 0.8 10E9/L (ref 0–1.3)
MONOCYTES NFR BLD AUTO: 9.1 %
NEUTROPHILS # BLD AUTO: 3.9 10E9/L (ref 1.6–8.3)
NEUTROPHILS NFR BLD AUTO: 43.9 %
NRBC # BLD AUTO: 0 10*3/UL
NRBC BLD AUTO-RTO: 0 /100
PLATELET # BLD AUTO: 226 10E9/L (ref 150–450)
POTASSIUM SERPL-SCNC: 4.9 MMOL/L (ref 3.4–5.3)
PROT SERPL-MCNC: 7.1 G/DL (ref 6.8–8.8)
RBC # BLD AUTO: 5.03 10E12/L (ref 4.4–5.9)
SODIUM SERPL-SCNC: 134 MMOL/L (ref 133–144)
VIT B12 SERPL-MCNC: 478 PG/ML (ref 193–986)
WBC # BLD AUTO: 8.8 10E9/L (ref 4–11)

## 2017-11-15 PROCEDURE — 25000132 ZZH RX MED GY IP 250 OP 250 PS 637: Mod: GY | Performed by: PSYCHIATRY & NEUROLOGY

## 2017-11-15 PROCEDURE — 99233 SBSQ HOSP IP/OBS HIGH 50: CPT | Mod: GC | Performed by: PSYCHIATRY & NEUROLOGY

## 2017-11-15 PROCEDURE — 12400003 ZZH R&B MH CRITICAL UMMC

## 2017-11-15 PROCEDURE — 82607 VITAMIN B-12: CPT | Performed by: PSYCHIATRY & NEUROLOGY

## 2017-11-15 PROCEDURE — 82306 VITAMIN D 25 HYDROXY: CPT | Performed by: PSYCHIATRY & NEUROLOGY

## 2017-11-15 PROCEDURE — A9270 NON-COVERED ITEM OR SERVICE: HCPCS | Mod: GY | Performed by: PSYCHIATRY & NEUROLOGY

## 2017-11-15 PROCEDURE — 85025 COMPLETE CBC W/AUTO DIFF WBC: CPT | Performed by: PSYCHIATRY & NEUROLOGY

## 2017-11-15 PROCEDURE — 36415 COLL VENOUS BLD VENIPUNCTURE: CPT | Performed by: PSYCHIATRY & NEUROLOGY

## 2017-11-15 PROCEDURE — 80053 COMPREHEN METABOLIC PANEL: CPT | Performed by: PSYCHIATRY & NEUROLOGY

## 2017-11-15 PROCEDURE — 83735 ASSAY OF MAGNESIUM: CPT | Performed by: PSYCHIATRY & NEUROLOGY

## 2017-11-15 RX ADMIN — NICOTINE 1 PATCH: 21 PATCH, EXTENDED RELEASE TRANSDERMAL at 08:16

## 2017-11-15 RX ADMIN — RISPERIDONE 2 MG: 2 TABLET ORAL at 08:16

## 2017-11-15 RX ADMIN — HYDROXYZINE HYDROCHLORIDE 50 MG: 50 TABLET, FILM COATED ORAL at 02:05

## 2017-11-15 RX ADMIN — PHENOBARBITAL 32.4 MG: 32.4 TABLET ORAL at 22:18

## 2017-11-15 RX ADMIN — HYDROXYZINE HYDROCHLORIDE 50 MG: 50 TABLET, FILM COATED ORAL at 11:03

## 2017-11-15 RX ADMIN — Medication 1 G: at 08:16

## 2017-11-15 RX ADMIN — RISPERIDONE 2 MG: 2 TABLET ORAL at 22:18

## 2017-11-15 RX ADMIN — MULTIPLE VITAMINS W/ MINERALS TAB 1 TABLET: TAB at 08:16

## 2017-11-15 ASSESSMENT — ACTIVITIES OF DAILY LIVING (ADL)
ORAL_HYGIENE: INDEPENDENT
GROOMING: INDEPENDENT
LAUNDRY: UNABLE TO COMPLETE
DRESS: SCRUBS (BEHAVIORAL HEALTH)
DRESS: SCRUBS (BEHAVIORAL HEALTH)
LAUNDRY: UNABLE TO COMPLETE
HYGIENE/GROOMING: INDEPENDENT
ORAL_HYGIENE: INDEPENDENT

## 2017-11-15 NOTE — PROGRESS NOTES
"Pt was visible in the lounge appearing pleasant and cooperative. Pt was social and interactive with peers and staff. Pt was hyper-verbal and needed redirection to maintain appropriate conversation with a peer. Pt rated anxiety 11/10, denied SIB and stated that \"taking Risperdal makes me to have suicidal ideation\". Pt took a shower, ate dinner and snack. Nothing further to report.      11/14/17 5409   Behavioral Health   Hallucinations appears responding   Thinking paranoid;delusional;distractable;poor concentration   Orientation place: oriented;date: oriented;person: oriented;situation, disoriented   Memory baseline memory   Insight poor   Judgement impaired   Eye Contact at examiner   Affect full range affect   Mood mood is calm;labile;anxious   Physical Appearance/Attire attire appropriate to age and situation   Hygiene well groomed   Suicidality other (see comments)  (None observed, but see my note.)   Self Injury (None)   Activity hyperactive (agitated, impulsive)   Speech pressured;flight of ideas   Psychomotor / Gait steady;balanced   Activities of Daily Living   Hygiene/Grooming shower;independent   Oral Hygiene independent   Dress scrubs (behavioral health)   Laundry unable to complete   Room Organization unable     "

## 2017-11-15 NOTE — PROGRESS NOTES
"[facility], [place]   Psychiatric Progress Note  Hospital Day: 5        Interim History:   The patient's care was discussed with the treatment team during the daily team meeting and/or staff's chart notes were reviewed.  Staff report patient continues to be extremely labile and agitated at times, but does respond to redirection. He is compliant with oral neuroleptic.    Upon interview, the patient verbally re-iterated his written request for the treatment team. These were addressed sequentially. Laboratory results were communicated, including normal Mg level and pending B12 level. Patient expressed his strong dislike for attending physician Dr. Gill as well as risperidone, stating that the medication is a \"Nazi poison narcoleptic\" and that if the treatment \"knew anything about medications, you wouldn't poison me with it.\" He ends the interview by yelling \"fuck you\" at the treatment team.    Psychiatric Symptoms: Grandiose, Agitation, Paranoid, Delusional    Medication side effects reported: Patient reports that risperdal causes him erectile dysfunction, SI, and anxiety. No reported muscle stiffness, restlessness, or sedation.            Medications:       risperiDONE  2 mg Oral BID    Or     OLANZapine  10 mg Intramuscular BID     PHENobarbital  32.4 mg Oral At Bedtime     multivitamin, therapeutic with minerals  1 tablet Oral Daily     fish oil-omega-3 fatty acids  1 g Oral Daily     nicotine   Transdermal Q8H     nicotine   Transdermal Daily     nicotine  1 patch Transdermal Daily          Allergies:     Allergies   Allergen Reactions     Abilify Discmelt      Suicidal thoughts       Compazine Other (See Comments)     Seizure           Labs:     Recent Results (from the past 24 hour(s))   CBC with platelets differential    Collection Time: 11/15/17  7:36 AM   Result Value Ref Range    WBC 8.8 4.0 - 11.0 10e9/L    RBC Count 5.03 4.4 - 5.9 10e12/L    Hemoglobin 14.5 13.3 - 17.7 g/dL    " Hematocrit 44.0 40.0 - 53.0 %    MCV 88 78 - 100 fl    MCH 28.8 26.5 - 33.0 pg    MCHC 33.0 31.5 - 36.5 g/dL    RDW 13.2 10.0 - 15.0 %    Platelet Count 226 150 - 450 10e9/L    Diff Method Automated Method     % Neutrophils 43.9 %    % Lymphocytes 42.9 %    % Monocytes 9.1 %    % Eosinophils 2.9 %    % Basophils 0.6 %    % Immature Granulocytes 0.6 %    Nucleated RBCs 0 0 /100    Absolute Neutrophil 3.9 1.6 - 8.3 10e9/L    Absolute Lymphocytes 3.8 0.8 - 5.3 10e9/L    Absolute Monocytes 0.8 0.0 - 1.3 10e9/L    Absolute Eosinophils 0.3 0.0 - 0.7 10e9/L    Absolute Basophils 0.1 0.0 - 0.2 10e9/L    Abs Immature Granulocytes 0.1 0 - 0.4 10e9/L    Absolute Nucleated RBC 0.0    Comprehensive metabolic panel    Collection Time: 11/15/17  7:36 AM   Result Value Ref Range    Sodium 134 133 - 144 mmol/L    Potassium 4.9 3.4 - 5.3 mmol/L    Chloride 101 94 - 109 mmol/L    Carbon Dioxide 21 20 - 32 mmol/L    Anion Gap 12 3 - 14 mmol/L    Glucose 116 (H) 70 - 99 mg/dL    Urea Nitrogen 15 7 - 30 mg/dL    Creatinine 0.75 0.66 - 1.25 mg/dL    GFR Estimate >90 >60 mL/min/1.7m2    GFR Estimate If Black >90 >60 mL/min/1.7m2    Calcium 8.8 8.5 - 10.1 mg/dL    Bilirubin Total 0.2 0.2 - 1.3 mg/dL    Albumin 3.6 3.4 - 5.0 g/dL    Protein Total 7.1 6.8 - 8.8 g/dL    Alkaline Phosphatase 77 40 - 150 U/L    ALT 64 0 - 70 U/L    AST 35 0 - 45 U/L   Magnesium    Collection Time: 11/15/17  7:36 AM   Result Value Ref Range    Magnesium 2.2 1.6 - 2.3 mg/dL   Vitamin B12    Collection Time: 11/15/17  7:36 AM   Result Value Ref Range    Vitamin B12 478 193 - 986 pg/mL          Psychiatric Examination:     /80  Pulse 118  Temp 98.8  F (37.1  C) (Oral)  Resp 18  SpO2 97%  Weight is 0 lbs 0 oz  There is no height or weight on file to calculate BMI.    Appearance: awake, alert, adequately groomed and dressed in hospital scrubs, wearing earrings  Attitude:  uncooperative  Eye Contact:  intense  Mood:  angry  Affect:  intensity is heightened,  labile and reactive  Speech:  moderately pressured  Language: fluent and intact in English  Psychomotor, Gait, Musculoskeletal:  no evidence of tardive dyskinesia, dystonia, or tics and intact station, gait and muscle tone  Throught Process:  disorganized and illogical  Associations:  loosening of associations present  Thought Content:  significant paranoia with grandiose thinking  Insight:  absent  Judgement:  poor  Oriented to:  time, person, and place  Attention Span and Concentration:  intact  Recent and Remote Memory:  intact  Fund of Knowledge:  appropriate         Precautions:     Behavioral Orders   Procedures     Assault precautions     Code 1 - Restrict to Unit     Elopement precautions     Routine Programming     As clinically indicated     Status 15     Every 15 minutes.     Withdrawal precautions          Diagnoses:         Bipolar I, recurrent manic episode, severe with psychotic behavior  Cannabis use disorder, severe, dependence  Cocaine use disorder, moderate, dependence  Moderate benzodiazepine use disorder  Cigarette nicotine dependence with withdrawal           Assessment & Plan:   Assessment and hospital summary:  The patient is a 42-year-old man that was admitted due to increased denisha with aggressive behaviors in the setting of non-compliance with treatment. He initially was not agreeable to any medications but took the risperidone to avoid getting the olanzapine injection. Given my conversation with his outpatient provider on 11/14, I have updated his diagnoses. He has significantly more substance use issues than previously documented here. She was concerned about his unsafe and inconsistent use of lithium outside of the hospital. Because of this, the decision was made to transition to risperidone only with tentative plan to transition to long acting injection.    Target psychiatric symptoms and interventions:  Significant psychosis with denisha  --risperidone 2 mg BID with IM backup of  Zyprexa    Continue with nicotine replacement    Benzodiazapine dependence  --phenobarbital 32.4 mg qHS, last dose tonight    Medical Problems and Treatments:  Nutrition has seen patient for snack requests.  Continue MVI and Fish oil. Mg and B12 levels normal, no need for supplementation. Will consider Vit D supplementation pending lab results.    Behavioral/Psychological/Social:  Patient remains extremely agitated with posturing behaviors. Not appropriate for groups or much unit programming at this time.    Legal:  72-hour hold. Outpatient team will be revoking PD based on conversation with outpatient provider on 11/14.    Disposition:  Ongoing denisha with psychosis requiring stabilization in the hospital.     Patient seen and discussed with attending psychiatrist Abdelrahman Gill MD, who agrees with my assessment and plan.    Abdelrahman Quevedo PGY-2  pg [phone]   12:03 PM 11/15/2017

## 2017-11-15 NOTE — PROGRESS NOTES
"This writer spoke to the patient.  He was extremely close as he wanted to whisper in my ear what he was talking about.  I asked him to respect space and he came in really close again ignoring my request.  He just stated, \"I have to be this close so no on can hear us and know what my plan is.\"       He continued to talk about smoking marijuana, the price he pays for it and then made some additional delusional statements.  I explained that I was going to have someone do a R25 in the next few days and he was happy to hear that.  "

## 2017-11-16 PROCEDURE — A9270 NON-COVERED ITEM OR SERVICE: HCPCS | Mod: GY | Performed by: PSYCHIATRY & NEUROLOGY

## 2017-11-16 PROCEDURE — 12400003 ZZH R&B MH CRITICAL UMMC

## 2017-11-16 PROCEDURE — H2032 ACTIVITY THERAPY, PER 15 MIN: HCPCS

## 2017-11-16 PROCEDURE — 25000132 ZZH RX MED GY IP 250 OP 250 PS 637: Mod: GY | Performed by: PSYCHIATRY & NEUROLOGY

## 2017-11-16 RX ADMIN — NICOTINE 1 PATCH: 21 PATCH, EXTENDED RELEASE TRANSDERMAL at 08:10

## 2017-11-16 RX ADMIN — RISPERIDONE 2 MG: 2 TABLET ORAL at 08:09

## 2017-11-16 RX ADMIN — Medication 1 G: at 08:10

## 2017-11-16 RX ADMIN — ACETAMINOPHEN 650 MG: 325 TABLET, FILM COATED ORAL at 18:09

## 2017-11-16 RX ADMIN — HYDROXYZINE HYDROCHLORIDE 50 MG: 50 TABLET, FILM COATED ORAL at 18:07

## 2017-11-16 RX ADMIN — MULTIPLE VITAMINS W/ MINERALS TAB 1 TABLET: TAB at 08:10

## 2017-11-16 RX ADMIN — NICOTINE POLACRILEX 4 MG: 2 GUM, CHEWING ORAL at 19:42

## 2017-11-16 RX ADMIN — RISPERIDONE 2 MG: 2 TABLET ORAL at 19:43

## 2017-11-16 ASSESSMENT — ACTIVITIES OF DAILY LIVING (ADL)
LAUNDRY: UNABLE TO COMPLETE
ORAL_HYGIENE: INDEPENDENT
DRESS: SCRUBS (BEHAVIORAL HEALTH)
HYGIENE/GROOMING: INDEPENDENT

## 2017-11-16 NOTE — PLAN OF CARE
Problem: Manic Symptoms  Goal: Manic Symptoms  Signs and symptoms of listed problems will be absent or manageable.   Outcome: No Change  No change in Tyler presentation. He remains manic, grandiose, paranoid, and delusional. He feels entitled to everything. Insight into his mental status remains poor. He remains preoccupied with Judaism issues. No SI/SIB behaviors observed.
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
## 2017-11-17 PROCEDURE — 25000132 ZZH RX MED GY IP 250 OP 250 PS 637: Mod: GY | Performed by: PSYCHIATRY & NEUROLOGY

## 2017-11-17 PROCEDURE — 12400003 ZZH R&B MH CRITICAL UMMC

## 2017-11-17 PROCEDURE — 99233 SBSQ HOSP IP/OBS HIGH 50: CPT | Performed by: PSYCHIATRY & NEUROLOGY

## 2017-11-17 PROCEDURE — H2032 ACTIVITY THERAPY, PER 15 MIN: HCPCS

## 2017-11-17 PROCEDURE — A9270 NON-COVERED ITEM OR SERVICE: HCPCS | Mod: GY | Performed by: PSYCHIATRY & NEUROLOGY

## 2017-11-17 RX ADMIN — ACETAMINOPHEN 650 MG: 325 TABLET, FILM COATED ORAL at 14:37

## 2017-11-17 RX ADMIN — HYDROXYZINE HYDROCHLORIDE 50 MG: 50 TABLET, FILM COATED ORAL at 14:37

## 2017-11-17 RX ADMIN — ALUMINUM HYDROXIDE, MAGNESIUM HYDROXIDE, AND DIMETHICONE 30 ML: 400; 400; 40 SUSPENSION ORAL at 15:36

## 2017-11-17 RX ADMIN — NICOTINE 1 PATCH: 21 PATCH, EXTENDED RELEASE TRANSDERMAL at 08:22

## 2017-11-17 RX ADMIN — RISPERIDONE 2 MG: 2 TABLET ORAL at 19:42

## 2017-11-17 RX ADMIN — RISPERIDONE 2 MG: 2 TABLET ORAL at 08:22

## 2017-11-17 RX ADMIN — HYDROXYZINE HYDROCHLORIDE 50 MG: 50 TABLET, FILM COATED ORAL at 19:43

## 2017-11-17 RX ADMIN — Medication 1 G: at 08:22

## 2017-11-17 RX ADMIN — ACETAMINOPHEN 650 MG: 325 TABLET, FILM COATED ORAL at 19:45

## 2017-11-17 RX ADMIN — NICOTINE POLACRILEX 4 MG: 2 GUM, CHEWING ORAL at 14:37

## 2017-11-17 RX ADMIN — NICOTINE POLACRILEX 4 MG: 2 GUM, CHEWING ORAL at 19:46

## 2017-11-17 RX ADMIN — MULTIPLE VITAMINS W/ MINERALS TAB 1 TABLET: TAB at 08:22

## 2017-11-17 ASSESSMENT — ACTIVITIES OF DAILY LIVING (ADL)
DRESS: SCRUBS (BEHAVIORAL HEALTH)
GROOMING: INDEPENDENT
LAUNDRY: UNABLE TO COMPLETE
GROOMING: INDEPENDENT
ORAL_HYGIENE: INDEPENDENT
ORAL_HYGIENE: INDEPENDENT
DRESS: SCRUBS (BEHAVIORAL HEALTH)

## 2017-11-17 NOTE — PROGRESS NOTES
This writer spoke to Cleo Persaud's from [facility]'s office.  We explained that we would agree to a Stay if he is willing to sign in, stay until residential treatment is available.  They will let us know following the hearing.      Update:  [facility] called and they have a bed and can take the patient today.  We set up d/c for 5pm, we will order 30 day supply of meds and cab him to [address] [place] treatment.

## 2017-11-17 NOTE — PROGRESS NOTES
"Pt showed author a series of papers on which are written grandiose and delusional claims of the type he has been making throughout his stay here. At the end of one of the sheets, the pt had been writing about his attending physician, and wrote \"he will,\" followed by a picture of a stick figure hanging from a noose.  "
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
## 2017-11-17 NOTE — PLAN OF CARE
Problem: Patient Care Overview  Goal: Team Discussion  Team Plan:   BEHAVIORAL TEAM DISCUSSION    Participants: Dr. Abdelrahman Gill, Dr. Cristobal Quevedo (Resident), Darby Cooper RN, ARYA Benson LMSIERRA, [facility]   Progress: Pt continues to stabilize and once he is stable we will work to get him a Rule 25/CD assessment.    Continued Stay Criteria/Rationale: Pt remains intrusive, delusional and paranoid.  Medical/Physical: see medical chart  Precautions:   Behavioral Orders   Procedures     Assault precautions     Code 1 - Restrict to Unit     Elopement precautions     Routine Programming     As clinically indicated     Status 15     Every 15 minutes.     Withdrawal precautions     Plan: see above  Rationale for change in precautions or plan: no change      Problem: General Plan of Care (Inpatient Behavioral)  Goal: Team Discussion  Team Plan:   BEHAVIORAL TEAM DISCUSSION    Participants: Dr. Abdelrahman Gill, Dr. Cristobal Quevedo (Resident), Darby Cooper RN, ARYA Benson LMFT, [facility]   Progress: Pt continues to stabilize and once he is stable we will work to get him a Rule 25/CD assessment.    Continued Stay Criteria/Rationale: Pt remains intrusive, delusional and paranoid.  Medical/Physical: see medical chart  Precautions:   Behavioral Orders   Procedures     Assault precautions     Code 1 - Restrict to Unit     Elopement precautions     Routine Programming     As clinically indicated     Status 15     Every 15 minutes.     Withdrawal precautions     Plan: see above  Rationale for change in precautions or plan: no change

## 2017-11-17 NOTE — PROGRESS NOTES
"[facility], [place]   Psychiatric Progress Note  Hospital Day: 7        Interim History:   The patient's care was discussed with the treatment team during the daily team meeting and/or staff's chart notes were reviewed.  Staff report patient continues to be extremely labile and agitated at times. He slept 6 hours last night.    Upon interview, the patient presented with numerous concerns for the team, the most important to him being a request to re-initiate Klonopin. Patient notes that it is very easy for his anxiety to skyrocket to \"a 10, 11, 12, or 13 out of 10.\" He also gets mad very easily. Team communicated that his mood lability would best be treated with Lithium; patient is not open to starting this medication. Team also offered gabapentin as a preventative medication for anxiety; patient was on this medication about 5 years ago and did not find it helpful. He re-iterates his request to switch doctors.    Psychiatric Symptoms: Grandiose, Agitation, Paranoid, Delusional, labile    Medication side effects reported: No reported muscle stiffness, restlessness, or sedation.            Medications:       [START ON 11/18/2017] cholecalciferol  2,000 Units Oral Daily     risperiDONE  2 mg Oral BID    Or     OLANZapine  10 mg Intramuscular BID     multivitamin, therapeutic with minerals  1 tablet Oral Daily     fish oil-omega-3 fatty acids  1 g Oral Daily     nicotine   Transdermal Q8H     nicotine   Transdermal Daily     nicotine  1 patch Transdermal Daily          Allergies:     Allergies   Allergen Reactions     Abilify Discmelt      Suicidal thoughts       Compazine Other (See Comments)     Seizure           Labs:     No results found for this or any previous visit (from the past 24 hour(s)).       Psychiatric Examination:     BP (!) 136/96  Pulse 97  Temp 98.5  F (36.9  C) (Oral)  Resp 16  Wt 77.1 kg (170 lb)  SpO2 97%  BMI 27.44 kg/m2  Weight is 170 lbs 0 oz  Body mass index is " 27.44 kg/(m^2).    Appearance: awake, alert, adequately groomed and dressed in hospital scrubs, wearing earrings  Attitude:  uncooperative  Eye Contact:  intense  Mood:  angry  Affect:  intensity is heightened, labile and reactive  Speech:  pressured speech and rambling  Language: fluent and intact in English  Psychomotor, Gait, Musculoskeletal: agitation, no evidence of tardive dyskinesia, dystonia, or tics and intact station, gait and muscle tone  Throught Process:  disorganized, illogical and perseverative  Associations:  loosening of associations present  Thought Content:  significant paranoia with grandiose thinking  Insight:  absent  Judgement:  poor  Oriented to:  time, person, and place  Attention Span and Concentration:  intact  Recent and Remote Memory:  intact  Fund of Knowledge:  appropriate         Precautions:     Behavioral Orders   Procedures     Assault precautions     Code 1 - Restrict to Unit     Elopement precautions     Routine Programming     As clinically indicated     Status 15     Every 15 minutes.     Withdrawal precautions          Diagnoses:      Bipolar I, recurrent manic episode, severe with psychosis  Cannabis use disorder, severe, dependence  Cocaine use disorder, moderate, dependence  Moderate benzodiazepine use disorder  Cigarette nicotine dependence with withdrawal         Assessment & Plan:   Assessment and hospital summary:  The patient is a 42-year-old man that was admitted due to increased denisha with aggressive behaviors in the setting of non-compliance with treatment. He initially was not agreeable to any medications but took the risperidone to avoid getting the olanzapine injection. Given my conversation with his outpatient provider on 11/14, I have updated his diagnoses. He has significantly more substance use issues than previously documented here. She was concerned about his unsafe and inconsistent use of lithium outside of the hospital. Because of this, the decision was  made to transition to risperidone only with tentative plan to transition to long acting injection. Patient has consistently requested to switch doctors 2/2 delusional reasoning; patient will be evaluated by Dr. Molina today for possible transfer to her team.    Target psychiatric symptoms and interventions:  Significant psychosis with denisha  --risperidone 2 mg BID with IM backup of Zyprexa    Continue with nicotine replacement    Benzodiazapine dependence  --phenobarbital 32.4 mg qHS, last dose tonight    Medical Problems and Treatments:  # Nutrition: [facility] has seen patient for snack requests. Continue MVI and Fish oil. Mg and B12 levels normal, no need for supplementation.  # Vitamin D deficiency: start Vit D 2000 IU daliy    Behavioral/Psychological/Social:  Patient remains extremely agitated with posturing behaviors. Not appropriate for groups or much unit programming at this time.    Legal: PD revoked, now on full commitment with [employer]    Disposition:  Ongoing denisha with psychosis requiring stabilization in the hospital.     Patient seen and discussed with attending psychiatrist Abdelrahman Gill MD, who agrees with my assessment and plan.    Abdelrahman Quevedo PGY-2  pg [phone]   11:17 AM 11/17/2017
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
## 2017-11-18 PROCEDURE — 25000132 ZZH RX MED GY IP 250 OP 250 PS 637: Mod: GY | Performed by: STUDENT IN AN ORGANIZED HEALTH CARE EDUCATION/TRAINING PROGRAM

## 2017-11-18 PROCEDURE — A9270 NON-COVERED ITEM OR SERVICE: HCPCS | Mod: GY | Performed by: PSYCHIATRY & NEUROLOGY

## 2017-11-18 PROCEDURE — 12400003 ZZH R&B MH CRITICAL UMMC

## 2017-11-18 PROCEDURE — 25000132 ZZH RX MED GY IP 250 OP 250 PS 637: Mod: GY | Performed by: PSYCHIATRY & NEUROLOGY

## 2017-11-18 PROCEDURE — A9270 NON-COVERED ITEM OR SERVICE: HCPCS | Mod: GY | Performed by: STUDENT IN AN ORGANIZED HEALTH CARE EDUCATION/TRAINING PROGRAM

## 2017-11-18 RX ADMIN — MULTIPLE VITAMINS W/ MINERALS TAB 1 TABLET: TAB at 08:19

## 2017-11-18 RX ADMIN — VITAMIN D, TAB 1000IU (100/BT) 2000 UNITS: 25 TAB at 08:18

## 2017-11-18 RX ADMIN — NICOTINE 1 PATCH: 21 PATCH, EXTENDED RELEASE TRANSDERMAL at 08:18

## 2017-11-18 RX ADMIN — RISPERIDONE 2 MG: 2 TABLET ORAL at 08:19

## 2017-11-18 RX ADMIN — RISPERIDONE 2 MG: 2 TABLET ORAL at 21:57

## 2017-11-18 RX ADMIN — Medication 1 G: at 08:19

## 2017-11-18 RX ADMIN — HYDROXYZINE HYDROCHLORIDE 50 MG: 50 TABLET, FILM COATED ORAL at 16:41

## 2017-11-18 ASSESSMENT — ACTIVITIES OF DAILY LIVING (ADL)
GROOMING: INDEPENDENT
DRESS: SCRUBS (BEHAVIORAL HEALTH);INDEPENDENT
ORAL_HYGIENE: INDEPENDENT
LAUNDRY: UNABLE TO COMPLETE
DRESS: SCRUBS (BEHAVIORAL HEALTH);INDEPENDENT
GROOMING: INDEPENDENT
LAUNDRY: UNABLE TO COMPLETE
ORAL_HYGIENE: INDEPENDENT

## 2017-11-18 NOTE — PLAN OF CARE
"Problem: Manic Symptoms  Goal: Manic Symptoms  Signs and symptoms of listed problems will be absent or manageable.   Outcome: Improving  Pt was hyperactive in the milieu. He continues to be manic, paranoid, and delusional. When redirected by staff, pt would become irritable and would yelling at staff. Pt stated \"You're are violating my consitutional rights\" and \"you are suppressing my culture\". Pt mood was labile. He was med compliant, although pt stated \"you can give me that poison, that you make me pay for\". Pt is eating and drinking well. Will continue to monitor the pt.      "

## 2017-11-18 NOTE — PROGRESS NOTES
"Pt was in the milieu.  His brother visited.  Pt denies SI, SIB.  Confirms high anx/dep due to \"being in the hospital, being held against his will...\"  Pt was pleasant with staff after expressing his frustration..  "
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
## 2017-11-18 NOTE — PROGRESS NOTES
"Second Opinion per request of Dr. Gill as patient has been requesting a new provider.     Met with patient today. He made several statements about how psychiatrists \"are nazis and you all created the holocaust.\" He exhibits symptoms of denisha, including disorganized thought process, rapid speech, and nonsensical statements at times. Also exhibits paranoia regarding Dr. Gill's intentions despite several attempts to reassure patient.     Patient states that he is experiencing several side effects of risperidone, including \"social anxiety and severe erectile dysfunction. He requests Klonopin for his \"severe anxiety,\" and pain medication for his \"chronic pain.\" He does not feel like he is being heard by his primary provider. I explained that I am in agreement with his current treatment plan, and that I would likely continue this plan without significant changes. I informed him that I will not be prescribing the patient benzodiazepines or narcotics. Despite this, he continued to request a switch in providers, and would like to be transferred to my care. Will discuss this further with Dr. Gill who will ultimately determine whether this transfer of care is in the best interest of the patient. If so, I would be happy to take over his care.     Maggi Molina MD  [facility]       "

## 2017-11-18 NOTE — PROGRESS NOTES
"At 6am pt started yelling for security and saying \"Fuck you security and fuck you staff, you want to poison me, I am not taking that Zyprexa\".   "
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
## 2017-11-19 PROCEDURE — 25000132 ZZH RX MED GY IP 250 OP 250 PS 637: Mod: GY | Performed by: STUDENT IN AN ORGANIZED HEALTH CARE EDUCATION/TRAINING PROGRAM

## 2017-11-19 PROCEDURE — 12400003 ZZH R&B MH CRITICAL UMMC

## 2017-11-19 PROCEDURE — 25000132 ZZH RX MED GY IP 250 OP 250 PS 637: Mod: GY | Performed by: PSYCHIATRY & NEUROLOGY

## 2017-11-19 PROCEDURE — A9270 NON-COVERED ITEM OR SERVICE: HCPCS | Mod: GY | Performed by: STUDENT IN AN ORGANIZED HEALTH CARE EDUCATION/TRAINING PROGRAM

## 2017-11-19 PROCEDURE — A9270 NON-COVERED ITEM OR SERVICE: HCPCS | Mod: GY | Performed by: PSYCHIATRY & NEUROLOGY

## 2017-11-19 RX ADMIN — VITAMIN D, TAB 1000IU (100/BT) 2000 UNITS: 25 TAB at 09:10

## 2017-11-19 RX ADMIN — MULTIPLE VITAMINS W/ MINERALS TAB 1 TABLET: TAB at 09:10

## 2017-11-19 RX ADMIN — RISPERIDONE 2 MG: 2 TABLET ORAL at 21:14

## 2017-11-19 RX ADMIN — RISPERIDONE 2 MG: 2 TABLET ORAL at 09:10

## 2017-11-19 RX ADMIN — ACETAMINOPHEN 650 MG: 325 TABLET, FILM COATED ORAL at 16:27

## 2017-11-19 RX ADMIN — Medication 1 G: at 09:10

## 2017-11-19 RX ADMIN — HYDROXYZINE HYDROCHLORIDE 50 MG: 50 TABLET, FILM COATED ORAL at 16:12

## 2017-11-19 RX ADMIN — NICOTINE 1 PATCH: 21 PATCH, EXTENDED RELEASE TRANSDERMAL at 09:10

## 2017-11-19 ASSESSMENT — ACTIVITIES OF DAILY LIVING (ADL)
LAUNDRY: WITH SUPERVISION
ORAL_HYGIENE: INDEPENDENT
DRESS: SCRUBS (BEHAVIORAL HEALTH);INDEPENDENT
ORAL_HYGIENE: INDEPENDENT
DRESS: SCRUBS (BEHAVIORAL HEALTH)
GROOMING: HANDWASHING
GROOMING: INDEPENDENT

## 2017-11-19 NOTE — PLAN OF CARE
Problem: Manic Symptoms  Goal: Manic Symptoms  Signs and symptoms of listed problems will be absent or manageable.   Outcome: No Change  RN ASSESSMENT   Patient presents visible in the milieu, social with peers. He noted to be agitated and angry on approach, stating that Risperdal is causing him to be angry, citing studies that had proven that antipsychotic medications are not effective and main cause of erectile disfunction in men. Difficult to divert from this topic. Needed redirection for poor social boundaries with peers.  Denies auditory and visual hallucinations. Denied suicidal and homicidal thoughts. Mood irritable, insight poor.   Current legal status commited and rodarte.  Refer to case manger notes for discharge planing and recommendations. Continue with current treatment plan and recommendations. Continue to monitor and reassess symptoms. Monitor response to medications. Monitor progress towards treatment goals. Encourage groups and participation.

## 2017-11-19 NOTE — PROGRESS NOTES
Pt was irritable, short tempered most of the evening.   He blamed the hospital + staff for all of his problems.  Pt was loud, disrespectful + responded harshly to redirection from staff.  Pt denies SI, SIB, high 10 anx but denies having any dep.

## 2017-11-19 NOTE — PROGRESS NOTES
"Pt is irritable this shift, expressing delusions of persecution, feeling he is being \"poisoned and oppressed!\" Speech is pressured, rambling, and pt displays a flight of ideas. Denies AH, and does not appear to be responding. Of note however, pt has made several vague statements of intent to harm staff, saying \"one of these days, I gonna just stop taking the Risperdal, and really make you pay when you try and hold me down. Then we will fight.\" Pt also referenced \"taking em (referring to the hospital as a whole) from behind.. No lube. Like you all do to me! Being stuck here. I hate you all!\" Pt remains hostile with several staff, especially those redirecting him for inappropriate language and content of speech in the therapeutic milieu (e.g. Praying loudly for \"the islamic terrorists and extremists\", swearing, and discussing the use of illicit street drugs).   " Problem: Patient Care Overview (Adult)  Goal: Plan of Care Review  Outcome: Ongoing (interventions implemented as appropriate)    03/09/17 1250   Coping/Psychosocial Response Interventions   Plan Of Care Reviewed With patient   Patient Care Overview   Progress improving       Goal: Adult Individualization and Mutuality  Outcome: Ongoing (interventions implemented as appropriate)  Goal: Discharge Needs Assessment  Outcome: Ongoing (interventions implemented as appropriate)    03/09/17 1250   Discharge Needs Assessment   Concerns To Be Addressed no discharge needs identified   Discharge Disposition home or self-care   Living Environment   Transportation Available car;family or friend will provide         Problem: GI Endoscopy (Adult)  Intervention: Monitor/Manage Procedure Recovery    03/09/17 1250   Respiratory Interventions   Airway/Ventilation Management airway patency maintained   Coping/Psychosocial Interventions   Environmental Support calm environment promoted   Activity   Activity Type activity adjusted per tolerance   Cardiac Interventions   Warming Thermoregulation Maintenance warm blankets applied       Intervention: Prevent Mayda-procedural Injury    03/09/17 1250   Positioning   Positioning side lying, left   Head Of Bed (HOB) Position HOB elevated         Goal: Signs and Symptoms of Listed Potential Problems Will be Absent or Manageable (GI Endoscopy)  Outcome: Ongoing (interventions implemented as appropriate)    03/09/17 1250   GI Endoscopy   Problems Assessed (GI Endoscopy) all   Problems Present (GI Endoscopy) none

## 2017-11-20 PROCEDURE — 99233 SBSQ HOSP IP/OBS HIGH 50: CPT | Performed by: PSYCHIATRY & NEUROLOGY

## 2017-11-20 PROCEDURE — A9270 NON-COVERED ITEM OR SERVICE: HCPCS | Mod: GY | Performed by: PSYCHIATRY & NEUROLOGY

## 2017-11-20 PROCEDURE — 25000132 ZZH RX MED GY IP 250 OP 250 PS 637: Mod: GY | Performed by: PSYCHIATRY & NEUROLOGY

## 2017-11-20 PROCEDURE — 12400003 ZZH R&B MH CRITICAL UMMC

## 2017-11-20 PROCEDURE — 90853 GROUP PSYCHOTHERAPY: CPT

## 2017-11-20 PROCEDURE — 25000132 ZZH RX MED GY IP 250 OP 250 PS 637: Mod: GY | Performed by: STUDENT IN AN ORGANIZED HEALTH CARE EDUCATION/TRAINING PROGRAM

## 2017-11-20 PROCEDURE — A9270 NON-COVERED ITEM OR SERVICE: HCPCS | Mod: GY | Performed by: STUDENT IN AN ORGANIZED HEALTH CARE EDUCATION/TRAINING PROGRAM

## 2017-11-20 RX ORDER — HYDROXYZINE HYDROCHLORIDE 50 MG/1
50-100 TABLET, FILM COATED ORAL 3 TIMES DAILY PRN
Status: DISCONTINUED | OUTPATIENT
Start: 2017-11-20 | End: 2017-12-08 | Stop reason: HOSPADM

## 2017-11-20 RX ORDER — LIDOCAINE 50 MG/G
1-3 PATCH TOPICAL
Status: DISCONTINUED | OUTPATIENT
Start: 2017-11-20 | End: 2017-12-08 | Stop reason: HOSPADM

## 2017-11-20 RX ORDER — LIDOCAINE 50 MG/G
1-3 PATCH TOPICAL
Status: DISCONTINUED | OUTPATIENT
Start: 2017-11-20 | End: 2017-11-20

## 2017-11-20 RX ADMIN — RISPERIDONE 2 MG: 2 TABLET ORAL at 09:41

## 2017-11-20 RX ADMIN — Medication 1 G: at 09:45

## 2017-11-20 RX ADMIN — MULTIPLE VITAMINS W/ MINERALS TAB 1 TABLET: TAB at 09:47

## 2017-11-20 RX ADMIN — RISPERIDONE 2 MG: 2 TABLET ORAL at 21:12

## 2017-11-20 RX ADMIN — ALUMINUM HYDROXIDE, MAGNESIUM HYDROXIDE, AND DIMETHICONE 30 ML: 400; 400; 40 SUSPENSION ORAL at 22:04

## 2017-11-20 RX ADMIN — VITAMIN D, TAB 1000IU (100/BT) 2000 UNITS: 25 TAB at 09:47

## 2017-11-20 RX ADMIN — NICOTINE 1 PATCH: 21 PATCH, EXTENDED RELEASE TRANSDERMAL at 09:49

## 2017-11-20 RX ADMIN — ACETAMINOPHEN 650 MG: 325 TABLET, FILM COATED ORAL at 10:16

## 2017-11-20 ASSESSMENT — ACTIVITIES OF DAILY LIVING (ADL)
LAUNDRY: UNABLE TO COMPLETE
DRESS: SCRUBS (BEHAVIORAL HEALTH);INDEPENDENT
ORAL_HYGIENE: INDEPENDENT
GROOMING: INDEPENDENT

## 2017-11-20 NOTE — PROGRESS NOTES
[facility] to a peer to stay at his home upon peer's discharge from unit. The peer apparently has the contact information for this patient's son, who is currently occupying patient's apartment.  Patient was overheard directing peer to call his son from unit phone and arrange time of arrival in advance. Writer intervened, explaining inappropriateness of arrangement and reinforcing expectation that personal/contact information is not to be shared. Patient was dismissive of this feedback.
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
## 2017-11-20 NOTE — PLAN OF CARE
Problem: Manic Symptoms  Intervention: Social and Therapeutic Interv (Manic Symptoms)    Pt attended the morning OT clinic group- spent time beading, which is relaxing for him.  Often refers to himself as an expert on many topic of conversation, though was able to engage in a 2 way conversation without dominating over others.
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
## 2017-11-20 NOTE — PROGRESS NOTES
"Pt had a calm shift. Pt said that the \"hosptial was a unjust place\". Pt is not happy that he is here and said his \"rights were violated\". Pt was active in the milieu and had a good shift. Pt respected the staff and was pleasant but was agitated with the \"higher up people\".   "

## 2017-11-21 PROCEDURE — A9270 NON-COVERED ITEM OR SERVICE: HCPCS | Mod: GY | Performed by: PSYCHIATRY & NEUROLOGY

## 2017-11-21 PROCEDURE — 12400003 ZZH R&B MH CRITICAL UMMC

## 2017-11-21 PROCEDURE — 25000132 ZZH RX MED GY IP 250 OP 250 PS 637: Mod: GY | Performed by: PSYCHIATRY & NEUROLOGY

## 2017-11-21 PROCEDURE — 99233 SBSQ HOSP IP/OBS HIGH 50: CPT | Performed by: PSYCHIATRY & NEUROLOGY

## 2017-11-21 PROCEDURE — A9270 NON-COVERED ITEM OR SERVICE: HCPCS | Mod: GY | Performed by: STUDENT IN AN ORGANIZED HEALTH CARE EDUCATION/TRAINING PROGRAM

## 2017-11-21 PROCEDURE — 25000132 ZZH RX MED GY IP 250 OP 250 PS 637: Mod: GY | Performed by: STUDENT IN AN ORGANIZED HEALTH CARE EDUCATION/TRAINING PROGRAM

## 2017-11-21 RX ADMIN — RISPERIDONE 2 MG: 2 TABLET ORAL at 09:12

## 2017-11-21 RX ADMIN — MULTIPLE VITAMINS W/ MINERALS TAB 1 TABLET: TAB at 09:12

## 2017-11-21 RX ADMIN — NICOTINE 1 PATCH: 21 PATCH, EXTENDED RELEASE TRANSDERMAL at 09:11

## 2017-11-21 RX ADMIN — HYDROXYZINE HYDROCHLORIDE 100 MG: 50 TABLET, FILM COATED ORAL at 21:21

## 2017-11-21 RX ADMIN — LIDOCAINE 1 PATCH: 50 PATCH CUTANEOUS at 16:37

## 2017-11-21 RX ADMIN — VITAMIN D, TAB 1000IU (100/BT) 2000 UNITS: 25 TAB at 09:12

## 2017-11-21 RX ADMIN — RISPERIDONE 2 MG: 2 TABLET ORAL at 21:21

## 2017-11-21 RX ADMIN — NICOTINE POLACRILEX 4 MG: 2 GUM, CHEWING ORAL at 09:12

## 2017-11-21 RX ADMIN — Medication 1 G: at 09:12

## 2017-11-21 RX ADMIN — ACETAMINOPHEN 650 MG: 325 TABLET, FILM COATED ORAL at 15:21

## 2017-11-21 ASSESSMENT — ACTIVITIES OF DAILY LIVING (ADL)
LAUNDRY: UNABLE TO COMPLETE
ORAL_HYGIENE: INDEPENDENT
DRESS: SCRUBS (BEHAVIORAL HEALTH)
GROOMING: INDEPENDENT
DRESS: SCRUBS (BEHAVIORAL HEALTH)
GROOMING: INDEPENDENT
ORAL_HYGIENE: INDEPENDENT
LAUNDRY: UNABLE TO COMPLETE

## 2017-11-21 NOTE — PROGRESS NOTES
"   11/20/17 2155   Behavioral Health   Hallucinations appears responding   Thinking distractable;poor concentration   Orientation place: oriented;person: oriented   Memory baseline memory   Insight denial of illness   Judgement impaired   Eye Contact at examiner   Affect full range affect;tense   Mood grandiose;mood is calm;anxious   Physical Appearance/Attire disheveled   Hygiene neglected grooming - unclean body, hair, teeth   Suicidality other (see comments)  (demies currently)   Self Injury other (see comment)  (denies currently)   Elopement Statements about wanting to leave;Distress about legal situation (holds for mental health or criminal)   Activity isolative   Speech clear;coherent   Medication Sensitivity no observed side effects   Psychomotor / Gait balanced;steady     Pt had an unremarkable shift. Pt's mood was calm with full range affect. No SI/SIB indicated. Pt continues to express opposition towards pharmaceuticals and claims that neuroleptics are \"poison.\" No signs of aggression or agitation. No behavioral issues this shift.   "

## 2017-11-21 NOTE — PROGRESS NOTES
"[facility], [place]   Psychiatric Progress Note  Hospital Day: 11        Interim History:   The patient's care was discussed with the treatment team during the daily team meeting and/or staff's chart notes were reviewed.  Staff report patient has been attending groups. He continues to state that his \"rights were violated\" as he is hospitalized \"against my will.\"  Patient was overheard in the lounge giving permission to a peer to stay in his home upon peer's discharge. Was attempting to have peer communicate with patient's son to coordinate this. Patient continues to state that neuroleptics are \"poison.\" No signs of aggression or agitation. No other behavioral issues reported.    Upon interview, patient is irritable and frustrated. He said that \"a human being can only handle so much. I am developing suicidal ideations from risperidone. My anger is increasing, and I am not feeling healthy in this health system.\" He denies SI plan or intent. When asked if he will remain safe on the unit, he said \"I can ensure my own safety, yes.\" He said that \"everyone in psychiatry is bullshit.\" He states that he has experienced \"erectile dysfunction, severe social anxiety, and suicidal ideation for years on risperidone.\" When asked if I could speak with his outpatient provider, he said \"She is a piece of sh*t that thought I was addicted to Klonopin.\" When writer ended interview due to increasing agitation,patient said \"And these are not the psychotic ramblings of a delusional person!\"     Psychiatric Symptoms: Grandiose, Agitation, Paranoid, Delusional, labile    Medication side effects reported: Reports above side effects           Medications:       lidocaine   Transdermal Q24h     lidocaine   Transdermal Q8H     cholecalciferol  2,000 Units Oral Daily     risperiDONE  2 mg Oral BID    Or     OLANZapine  10 mg Intramuscular BID     multivitamin, therapeutic with minerals  1 tablet Oral Daily     fish " oil-omega-3 fatty acids  1 g Oral Daily     nicotine   Transdermal Q8H     nicotine   Transdermal Daily     nicotine  1 patch Transdermal Daily          Allergies:     Allergies   Allergen Reactions     Abilify Discmelt      Suicidal thoughts       Compazine Other (See Comments)     Seizure           Labs:     No results found for this or any previous visit (from the past 24 hour(s)).          Psychiatric Examination:     /73  Pulse 93  Temp 97.2  F (36.2  C) (Tympanic)  Resp 16  Wt 77.1 kg (170 lb)  SpO2 97%  BMI 27.44 kg/m2  Weight is 170 lbs 0 oz  Body mass index is 27.44 kg/(m^2).    Appearance: awake, alert, adequately groomed and dressed in hospital scrubs, wearing earrings  Attitude:  uncooperative  Eye Contact:  intense  Mood:  angry  Affect:  intensity is heightened, labile and reactive   Speech:  pressured speech and rambling  Language: fluent and intact in English  Psychomotor, Gait, Musculoskeletal: agitation, no evidence of tardive dyskinesia, dystonia, or tics and intact station, gait and muscle tone  Throught Process:  disorganized, illogical and perseverative  Associations:  loosening of associations present  Thought Content:  significant paranoia with grandiose thinking  Insight:  absent  Judgement:  poor  Oriented to:  time, person, and place  Attention Span and Concentration:  intact  Recent and Remote Memory:  intact  Fund of Knowledge:  appropriate         Precautions:     Behavioral Orders   Procedures     Assault precautions     Code 1 - Restrict to Unit     Elopement precautions     Routine Programming     As clinically indicated     Status 15     Every 15 minutes.     Withdrawal precautions          Diagnoses:      Bipolar I, recurrent manic episode, severe with psychosis  Cannabis use disorder, severe, dependence  Cocaine use disorder, moderate, dependence  Moderate benzodiazepine use disorder  Cigarette nicotine dependence with withdrawal         Assessment & Plan:      Assessment and hospital summary:  The patient is a 42-year-old man that was admitted due to increased denisha with aggressive behaviors in the setting of non-compliance with treatment. He initially was not agreeable to any medications but took the risperidone to avoid getting the olanzapine injection. Given Dr. Gill's conversation with his outpatient provider on 11/14, he has updated patient's diagnoses. He has significantly more substance use issues than previously documented here. She was concerned about his unsafe and inconsistent use of lithium outside of the hospital. Because of this, the decision was made to transition to risperidone only with tentative plan to transition to long acting injection.     Target psychiatric symptoms and interventions:  Significant psychosis with denisha  --risperidone 2 mg BID with IM backup of Zyprexa. Will benefit from dose increase and transition to IGNACIO, though plan is to hold off for now in order to establish some rapport with patient    Continue with nicotine replacement    Medical Problems and Treatments:  # Nutrition: nutrition has seen patient for snack requests. Continue MVI and Fish oil. Mg and B12 levels normal, no need for supplementation. Will add snacks per pt request  # Vitamin D deficiency: Continue Vit D 2000 IU daliy  # Continue Lidocaine patches for pain management    Behavioral/Psychological/Social:  - Monitor closely for emergence of active SI or plan. Patient currently denies and states he will remain safe on the unit.  Patient remains extremely agitated with posturing behaviors. Not appropriate for groups or much unit programming at this time.    Legal: PD revoked, now on full commitment with [facility]    Disposition:  Ongoing denisha with psychosis requiring stabilization in the hospital.     Maggi Molina MD  [facility] Psychiatry

## 2017-11-21 NOTE — PROGRESS NOTES
Pt continues to be irritable, liable and agitated.  Have been waiting for him to stabilize a little bit further in order to have a chemical dependency assessment completed.

## 2017-11-21 NOTE — PLAN OF CARE
"Problem: Manic Symptoms  Goal: Manic Symptoms  Signs and symptoms of listed problems will be absent or manageable.   Outcome: No Change  48 hour nursing assessment completed. Patient agitated this am, initially refusing his am medications. Patient attempted a long, pressured conversation complaining of his medication, psychiatry, and the court process. Patient accused writer of \"being a robot\" to the psychiatry department, and that we are all \"idiots\". Patient eventually did agree to take his medications. Patient met with one of his ACT team members, which went well. Patient presents as manic with pressured speech. Delusional and grandiose much of the time. Denies SI/SIB. Continue to monitor and assess.       "

## 2017-11-22 PROCEDURE — 25000132 ZZH RX MED GY IP 250 OP 250 PS 637: Mod: GY | Performed by: STUDENT IN AN ORGANIZED HEALTH CARE EDUCATION/TRAINING PROGRAM

## 2017-11-22 PROCEDURE — A9270 NON-COVERED ITEM OR SERVICE: HCPCS | Mod: GY | Performed by: STUDENT IN AN ORGANIZED HEALTH CARE EDUCATION/TRAINING PROGRAM

## 2017-11-22 PROCEDURE — 12400003 ZZH R&B MH CRITICAL UMMC

## 2017-11-22 PROCEDURE — 25000132 ZZH RX MED GY IP 250 OP 250 PS 637: Mod: GY | Performed by: PSYCHIATRY & NEUROLOGY

## 2017-11-22 PROCEDURE — A9270 NON-COVERED ITEM OR SERVICE: HCPCS | Mod: GY | Performed by: PSYCHIATRY & NEUROLOGY

## 2017-11-22 RX ADMIN — SALINE NASAL SPRAY 1 SPRAY: 1.5 SOLUTION NASAL at 21:23

## 2017-11-22 RX ADMIN — RISPERIDONE 2 MG: 2 TABLET ORAL at 11:53

## 2017-11-22 RX ADMIN — NICOTINE POLACRILEX 4 MG: 2 GUM, CHEWING ORAL at 18:42

## 2017-11-22 RX ADMIN — RISPERIDONE 2 MG: 2 TABLET ORAL at 21:00

## 2017-11-22 RX ADMIN — MULTIPLE VITAMINS W/ MINERALS TAB 1 TABLET: TAB at 11:53

## 2017-11-22 RX ADMIN — Medication 1 G: at 11:53

## 2017-11-22 RX ADMIN — VITAMIN D, TAB 1000IU (100/BT) 2000 UNITS: 25 TAB at 11:53

## 2017-11-22 RX ADMIN — SALINE NASAL SPRAY 1 SPRAY: 1.5 SOLUTION NASAL at 16:51

## 2017-11-22 RX ADMIN — NICOTINE 1 PATCH: 21 PATCH, EXTENDED RELEASE TRANSDERMAL at 11:52

## 2017-11-22 ASSESSMENT — ACTIVITIES OF DAILY LIVING (ADL)
GROOMING: INDEPENDENT
DRESS: SCRUBS (BEHAVIORAL HEALTH)
ORAL_HYGIENE: INDEPENDENT
DRESS: SCRUBS (BEHAVIORAL HEALTH);INDEPENDENT
ORAL_HYGIENE: INDEPENDENT
LAUNDRY: UNABLE TO COMPLETE
GROOMING: INDEPENDENT

## 2017-11-22 NOTE — PROGRESS NOTES
"Patient continues with his complaints about the \"mental health system\". He is frequently on the phone discussing his situation.      11/22/17 1430   Behavioral Health   Hallucinations denies / not responding to hallucinations   Thinking delusional;paranoid   Orientation person: oriented;place: oriented;date: oriented   Memory baseline memory   Insight poor;denial of illness   Judgement impaired   Eye Contact at examiner  (less intensity)   Affect tense;irritable   Mood labile;irritable;grandiose;depressed   Physical Appearance/Attire flamboyant;disheveled;multiple layers   Hygiene other (see comment)  (patient able to provide self care)   Suicidality other (see comments)  (No SI shared with staff)   Self Injury other (see comment)  (No SIB observed)   Elopement Distress about legal situation (holds for mental health or criminal)   Activity other (see comment)  (In milieu, on phone often)   Speech pressured;rambling;clear   Medication Sensitivity other (see comment)  (Complaining about meds)   Psychomotor / Gait balanced;steady   Overt Aggression Scale   Verbal Aggression 1   Aggression against Property 0   Auto-Aggression 0   Physical Aggression 0   Overt Aggression Total Score 1   Sleep/Rest/Relaxation   Day/Evening Time Hours up all shift   Psycho Education   Type of Intervention 1:1 intervention   Response unavailable   Hours 0.5   Treatment Detail Warning Signs   Daily Care   Activity up ad iesha   Activities of Daily Living   Hygiene/Grooming independent   Oral Hygiene independent   Dress scrubs (behavioral health)   Laundry unable to complete   Room Organization prompts   Activity   Activity Assistance Provided independent     "

## 2017-11-22 NOTE — PROGRESS NOTES
Pt continues to be irritable, but less agitated this evening.  Reports that he still does not want to be taking Risperdal and wants his provider to know that it's making him feel suicidal.  He contracts for safety on the unit, saying he wouldn't act on his SI, but that being forced to take this medication is making him feel much worse.

## 2017-11-23 PROCEDURE — 12400003 ZZH R&B MH CRITICAL UMMC

## 2017-11-23 PROCEDURE — 25000132 ZZH RX MED GY IP 250 OP 250 PS 637: Mod: GY | Performed by: STUDENT IN AN ORGANIZED HEALTH CARE EDUCATION/TRAINING PROGRAM

## 2017-11-23 PROCEDURE — A9270 NON-COVERED ITEM OR SERVICE: HCPCS | Mod: GY | Performed by: STUDENT IN AN ORGANIZED HEALTH CARE EDUCATION/TRAINING PROGRAM

## 2017-11-23 PROCEDURE — H2032 ACTIVITY THERAPY, PER 15 MIN: HCPCS

## 2017-11-23 PROCEDURE — A9270 NON-COVERED ITEM OR SERVICE: HCPCS | Mod: GY | Performed by: PSYCHIATRY & NEUROLOGY

## 2017-11-23 PROCEDURE — 25000132 ZZH RX MED GY IP 250 OP 250 PS 637: Mod: GY | Performed by: PSYCHIATRY & NEUROLOGY

## 2017-11-23 RX ADMIN — LIDOCAINE 1 PATCH: 50 PATCH CUTANEOUS at 11:07

## 2017-11-23 RX ADMIN — ACETAMINOPHEN 650 MG: 325 TABLET, FILM COATED ORAL at 12:46

## 2017-11-23 RX ADMIN — Medication 1 G: at 09:01

## 2017-11-23 RX ADMIN — RISPERIDONE 2 MG: 2 TABLET ORAL at 20:35

## 2017-11-23 RX ADMIN — VITAMIN D, TAB 1000IU (100/BT) 2000 UNITS: 25 TAB at 09:00

## 2017-11-23 RX ADMIN — RISPERIDONE 2 MG: 2 TABLET ORAL at 09:01

## 2017-11-23 RX ADMIN — MULTIPLE VITAMINS W/ MINERALS TAB 1 TABLET: TAB at 09:01

## 2017-11-23 RX ADMIN — ACETAMINOPHEN 650 MG: 325 TABLET, FILM COATED ORAL at 22:03

## 2017-11-23 RX ADMIN — NICOTINE 1 PATCH: 21 PATCH, EXTENDED RELEASE TRANSDERMAL at 09:01

## 2017-11-23 ASSESSMENT — ACTIVITIES OF DAILY LIVING (ADL)
ORAL_HYGIENE: INDEPENDENT
GROOMING: INDEPENDENT
DRESS: SCRUBS (BEHAVIORAL HEALTH)
LAUNDRY: UNABLE TO COMPLETE

## 2017-11-23 NOTE — PROGRESS NOTES
11/22/17 8998   Behavioral Health   Hallucinations denies / not responding to hallucinations   Thinking delusional;paranoid   Orientation person: oriented;place: oriented;date: oriented;time: oriented   Memory baseline memory   Insight poor;denial of illness   Judgement impaired   Eye Contact at examiner   Affect full range affect   Mood irritable   Physical Appearance/Attire flamboyant;disheveled   Hygiene neglected grooming - unclean body, hair, teeth   Suicidality other (see comments)  (states that medicine makes him want to hurt self )   1. Wish to be Dead No   2. Non-Specific Active Suicidal Thoughts  No   3. Active Sucidal Ideation with any Methods (Not Plan) Without Intent to Act  No   4. Active Suicidal Ideation with Some Intent to Act, Without Specific Plan  No   5. Active Suicidal Ideation with Specific Plan and Intent  No   Self Injury other (see comment)  (denies)   Elopement Distress about legal situation (holds for mental health or criminal)   Activity other (see comment)  (social and active in milieu)   Speech rambling;clear;coherent   Medication Sensitivity no observed side effects;other (see comment)  (does state that he doesnt like medications)   Psychomotor / Gait balanced;steady   Psycho Education   Type of Intervention 1:1 intervention   Response participates, initiates socially appropriate   Hours 0.5   Treatment Detail (check in)   Activities of Daily Living   Hygiene/Grooming independent   Oral Hygiene independent   Dress scrubs (behavioral health);independent   Room Organization independent   Activity   Activity Assistance Provided independent     Pt had okay shift, complains that medications make him want to hurt himself.  Pt was in lounge for most of shift with peers socializing.  Pt ate dinner and snack and has no further questions or concerns at this time.

## 2017-11-23 NOTE — SIGNIFICANT EVENT
"Tyler was overheard on the phone making explicit suicidal statements: \"I am so suicidal, these meds are making me want to kill my self, I want to blow my brains out all over the room.\"  "

## 2017-11-24 PROCEDURE — 25000132 ZZH RX MED GY IP 250 OP 250 PS 637: Mod: GY | Performed by: PSYCHIATRY & NEUROLOGY

## 2017-11-24 PROCEDURE — A9270 NON-COVERED ITEM OR SERVICE: HCPCS | Mod: GY | Performed by: STUDENT IN AN ORGANIZED HEALTH CARE EDUCATION/TRAINING PROGRAM

## 2017-11-24 PROCEDURE — A9270 NON-COVERED ITEM OR SERVICE: HCPCS | Mod: GY | Performed by: PSYCHIATRY & NEUROLOGY

## 2017-11-24 PROCEDURE — H2032 ACTIVITY THERAPY, PER 15 MIN: HCPCS

## 2017-11-24 PROCEDURE — 25000132 ZZH RX MED GY IP 250 OP 250 PS 637: Mod: GY | Performed by: STUDENT IN AN ORGANIZED HEALTH CARE EDUCATION/TRAINING PROGRAM

## 2017-11-24 PROCEDURE — 99233 SBSQ HOSP IP/OBS HIGH 50: CPT | Performed by: PSYCHIATRY & NEUROLOGY

## 2017-11-24 PROCEDURE — 12400003 ZZH R&B MH CRITICAL UMMC

## 2017-11-24 RX ORDER — OLANZAPINE 10 MG/2ML
10 INJECTION, POWDER, FOR SOLUTION INTRAMUSCULAR AT BEDTIME
Status: DISCONTINUED | OUTPATIENT
Start: 2017-11-24 | End: 2017-12-08 | Stop reason: HOSPADM

## 2017-11-24 RX ORDER — OLANZAPINE 10 MG/2ML
10 INJECTION, POWDER, FOR SOLUTION INTRAMUSCULAR DAILY
Status: DISCONTINUED | OUTPATIENT
Start: 2017-11-25 | End: 2017-12-08 | Stop reason: HOSPADM

## 2017-11-24 RX ORDER — GABAPENTIN 300 MG/1
300 CAPSULE ORAL AT BEDTIME
Status: DISCONTINUED | OUTPATIENT
Start: 2017-11-24 | End: 2017-12-05

## 2017-11-24 RX ORDER — RISPERIDONE 2 MG/1
2 TABLET ORAL DAILY
Status: DISCONTINUED | OUTPATIENT
Start: 2017-11-25 | End: 2017-12-08 | Stop reason: HOSPADM

## 2017-11-24 RX ADMIN — HYDROXYZINE HYDROCHLORIDE 100 MG: 50 TABLET, FILM COATED ORAL at 22:01

## 2017-11-24 RX ADMIN — ACETAMINOPHEN 650 MG: 325 TABLET, FILM COATED ORAL at 03:47

## 2017-11-24 RX ADMIN — Medication 1 G: at 08:32

## 2017-11-24 RX ADMIN — ACETAMINOPHEN 650 MG: 325 TABLET, FILM COATED ORAL at 15:03

## 2017-11-24 RX ADMIN — ACETAMINOPHEN 650 MG: 325 TABLET, FILM COATED ORAL at 22:01

## 2017-11-24 RX ADMIN — HYDROXYZINE HYDROCHLORIDE 100 MG: 50 TABLET, FILM COATED ORAL at 03:47

## 2017-11-24 RX ADMIN — GABAPENTIN 300 MG: 300 CAPSULE ORAL at 20:46

## 2017-11-24 RX ADMIN — LIDOCAINE 1 PATCH: 50 PATCH CUTANEOUS at 15:03

## 2017-11-24 RX ADMIN — RISPERIDONE 3 MG: 2 TABLET ORAL at 20:47

## 2017-11-24 RX ADMIN — MULTIPLE VITAMINS W/ MINERALS TAB 1 TABLET: TAB at 08:32

## 2017-11-24 RX ADMIN — RISPERIDONE 2 MG: 2 TABLET ORAL at 08:32

## 2017-11-24 RX ADMIN — VITAMIN D, TAB 1000IU (100/BT) 2000 UNITS: 25 TAB at 08:32

## 2017-11-24 RX ADMIN — NICOTINE 1 PATCH: 21 PATCH, EXTENDED RELEASE TRANSDERMAL at 13:56

## 2017-11-24 ASSESSMENT — ACTIVITIES OF DAILY LIVING (ADL)
DRESS: SCRUBS (BEHAVIORAL HEALTH)
DRESS: SCRUBS (BEHAVIORAL HEALTH);INDEPENDENT
LAUNDRY: WITH SUPERVISION
ORAL_HYGIENE: INDEPENDENT
LAUNDRY: UNABLE TO COMPLETE
ORAL_HYGIENE: INDEPENDENT
HYGIENE/GROOMING: INDEPENDENT
DRESS: SCRUBS (BEHAVIORAL HEALTH)
HYGIENE/GROOMING: INDEPENDENT
ORAL_HYGIENE: INDEPENDENT
GROOMING: INDEPENDENT

## 2017-11-24 NOTE — PLAN OF CARE
"Problem: Manic Symptoms  Goal: Manic Symptoms  Signs and symptoms of listed problems will be absent or manageable.     48 Hour Nurse Note:  Pt was visible in the milieu, social with select peer.  Pt is initially calm on approach from staff, but pt is easily irritated.  Pt states \"I hate it here\" and \"This place is shitty\".  Pt appears disheveled.  Pt speech slightly pressured.      Pt overheard in the milieu having delusional conversations with peers.  For instance, pt was describing a \"cloning potion\" to a peer and his visitor.    Pt reports his risperdal is causing suicidal ideation.  Pt contracts for safety.  Pt states \"I have no intention to hurt myself, but if you study Risperdal as long as I have, you would know it causes suicidal ideation\".  Pt identified watching TV and talking to peers as distractions/coping skills for suicidal ideation.    Pt denies all other mental health concerns.  He feels ready to discharge.  Pt has poor insight and demonstrated impaired judgement when talking to writer.    Pt focused on list of complaints during interview; Pt wants cannabis as a sleep aid, pt stated \"I never see my doctor\", pt stated \"This whole thing is messed up, I am not a danger to myself\", and pt does not feel like he has enough snacks.  Pt has additional snacks ordered and eats food from the snack cart in addition.  When pt was offered his HS snack, he declined it.    Pt endorsed back pain 7/10.  Receptive to PRN tylenol at 2200 and a heat pack.  Denies further medical concerns.    Pt states he is \"tossing and turning\" while sleeping and having \"nightmares all night\".  Per chart pt slept 6.75 hours the previous night.   Pt declines all available PRN sleep aids.      Will continue to monitor and assess.      "

## 2017-11-24 NOTE — PLAN OF CARE
Problem: Patient Care Overview  Goal: Team Discussion  Team Plan:    BEHAVIORAL TEAM DISCUSSION    Participants:everett navarro rn, cain concepcion    Progress: Poor progress.  He remains very delusional and very agitated.   He constantly talks about being victimized by the system (providers) and victimized by his medicaitons - e.g., he says the medication is poison and is making him suicidal and he says his rights are being violated.  He does not trust any treatment providers.   He is hostile toward providers and treatment itself.    Continued Stay Criteria/Rationale: due to the above symptoms  Medical/Physical: per internal medicine  Precautions:   Behavioral Orders   Procedures     Assault precautions     Code 1 - Restrict to Unit     Elopement precautions     Routine Programming     As clinically indicated     Status 15     Every 15 minutes.     Withdrawal precautions     Plan: Med adjustments per Dr Molina.   His Provisional discharge was revoked on 11-14-17.  He is under full commitment and [facility].    The plan is for him to get a Rule 25 eval as soon as he is more psychiatrically stable.   He apparently had a new ACT team worker assigned:  Jett Hernandez at [phone].      Rationale for change in precautions or plan: no change at this moment      Problem: General Plan of Care (Inpatient Behavioral)  Goal: Team Discussion  Team Plan:    BEHAVIORAL TEAM DISCUSSION    Participants:everett navarro rn, cain concepcion    Progress: Poor progress.  He remains very delusional and very agitated.   He constantly talks about being victimized by the system (providers) and victimized by his medicaitons - e.g., he says the medication is poison and is making him suicidal and he says his rights are being violated.  He does not trust any treatment providers.   He is hostile toward providers and treatment itself.    Continued Stay Criteria/Rationale: due to the above symptoms  Medical/Physical:  per internal medicine  Precautions:   Behavioral Orders   Procedures     Assault precautions     Code 1 - Restrict to Unit     Elopement precautions     Routine Programming     As clinically indicated     Status 15     Every 15 minutes.     Withdrawal precautions     Plan: Med adjustments per Dr Molina.   His Provisional discharge was revoked on 11-14-17.  He is under full commitment and [facility].    The plan is for him to get a Rule 25 eval as soon as he is more psychiatrically stable.   He apparently had a new ACT team worker assigned:  Jett Hernandez at [phone].      Rationale for change in precautions or plan: no change at this moment

## 2017-11-24 NOTE — PROGRESS NOTES
"Pt participated in dance/movement therapy (DMT) with prompting.  He was positively influenced by peers in the group who suggested \"relaxing music and movement.\"  He said: \"it makes me want to kill myself less violently\" and he laughed at his \"progress.\"  He believes the meds are making him \"more suicidal\" and he wants to work more \"in harmony with natural medicine and the Native ways.\" He demonstrated empathy with another patient, then stated he would like to \"send a pile of shit to his parents.\"  He did speak positively about his relationship with his son.      Pt movements were internally preoccupied and difficult to join.  He appeared to float inside his body and mind without connecting to anyone else in the group.  He didn't join the movements of others- even though he stated he has a lot of respect for them.   "

## 2017-11-24 NOTE — PROGRESS NOTES
"[facility], [place]   Psychiatric Progress Note  Hospital Day: 14        Interim History:   The patient's care was discussed with the treatment team during the daily team meeting and/or staff's chart notes were reviewed.  Staff report patient has been making explicit suicidal statements, such as \"these meds are making me want to kill myself, I want to blow my brains out all over the room.\" However, patient stated that he has \"no intention to hurt myself.\" Continues to exhibit delusional thought content as he was discussing a \"cloning potion\" to a peer and his visitor. Also was discussing how he was raised in a \"satanic cult.\" Continues to have very poor insight and impaired judgment. Patient stated that he wants cannabis as a sleep aid.     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 He endorses initial insomnia and ongoing chronic pain. We discussed option of Gabapentin. R/B/A and he agreed with plan. He again denies that he has ever been \"chemically dependent\" to anything. Continues to state that risperidone gives him \"extreme anxiety and erectile dysfunction. Did not report SI today. When writer informed him that I have reviewed his chart and it appears that he has had significant positive response to risperidone in the past, and that is why I have planned to continue his titration in addition to evidence of delusional content, he said \"Shut the F up!\" Writer then terminated interview.     Psychiatric Symptoms: Grandiose, Agitation, Paranoid, Delusional, labile    Medication side effects reported: Reports above side effects           Medications:       lidocaine   Transdermal Q24h     lidocaine   Transdermal Q8H     cholecalciferol  2,000 Units Oral Daily     risperiDONE  2 mg Oral BID    Or     OLANZapine  10 mg Intramuscular BID     multivitamin, therapeutic with " minerals  1 tablet Oral Daily     fish oil-omega-3 fatty acids  1 g Oral Daily     nicotine   Transdermal Q8H     nicotine   Transdermal Daily     nicotine  1 patch Transdermal Daily          Allergies:     Allergies   Allergen Reactions     Abilify Discmelt      Suicidal thoughts       Compazine Other (See Comments)     Seizure           Labs:     No results found for this or any previous visit (from the past 24 hour(s)).          Psychiatric Examination:     /76  Pulse 99  Temp 98.5  F (36.9  C) (Tympanic)  Resp 16  Wt 79.8 kg (176 lb)  SpO2 97%  BMI 28.41 kg/m2  Weight is 176 lbs 0 oz  Body mass index is 28.41 kg/(m^2).    Appearance: awake, alert, adequately groomed and dressed in hospital scrubs, wearing earrings  Attitude:  uncooperative  Eye Contact:  intense  Mood:  angry  Affect:  intensity is heightened, labile and reactive   Speech:  pressured speech and rambling  Language: fluent and intact in English  Psychomotor, Gait, Musculoskeletal: agitation, no evidence of tardive dyskinesia, dystonia, or tics and intact station, gait and muscle tone  Throught Process:  disorganized, illogical and perseverative  Associations:  loosening of associations present  Thought Content:  significant paranoia with grandiose thinking  Insight:  absent  Judgement:  poor  Oriented to:  time, person, and place  Attention Span and Concentration:  intact  Recent and Remote Memory:  intact  Fund of Knowledge:  appropriate         Precautions:     Behavioral Orders   Procedures     Assault precautions     Code 1 - Restrict to Unit     Elopement precautions     Routine Programming     As clinically indicated     Status 15     Every 15 minutes.     Withdrawal precautions          Diagnoses:      Bipolar I, recurrent manic episode, severe with psychosis  Cannabis use disorder, severe, dependence  Cocaine use disorder, moderate, dependence  Moderate benzodiazepine use disorder  Cigarette nicotine dependence with  withdrawal         Assessment & Plan:     Assessment and hospital summary:  The patient is a 42-year-old man that was admitted due to increased denisha with aggressive behaviors in the setting of non-compliance with treatment. He initially was not agreeable to any medications but took the risperidone to avoid getting the olanzapine injection. Given Dr. Gill's conversation with his outpatient provider on 11/14, he has updated patient's diagnoses. He has significantly more substance use issues than previously documented here. She was concerned about his unsafe and inconsistent use of lithium outside of the hospital. Because of this, the decision was made to transition to risperidone only with tentative plan to transition to long acting injection.     Target psychiatric symptoms and interventions:  Significant psychosis with denisha  --Increase risperidone to 2 mg daily and 3 mg QHS with IM backup of Zyprexa.     Continue with nicotine replacement    Medical Problems and Treatments:  # Nutrition: nutrition has seen patient for snack requests. Continue MVI and Fish oil. Mg and B12 levels normal, no need for supplementation. Will add snacks per pt request  # Vitamin D deficiency: Continue Vit D 2000 IU daliy  # Continue Lidocaine patches for pain management    Behavioral/Psychological/Social:  - Monitor closely for emergence of active SI or plan. Patient not reporting SI today. He has informed staff and myself that he will remain safe on the unit and has no intention of harming himself.    Legal: PD revoked, now on full commitment with [facility]    Disposition:  Ongoing denisha with psychosis requiring stabilization in the hospital.     Maggi Molina MD  [facility]

## 2017-11-24 NOTE — PROGRESS NOTES
"Patient has only slept a total of 2 hours total tonight, in large part primarily due to the fact that he would not take any prn medications until around 03:45AM. At that time patient took Tylenol po plus Hydroxyzine 100 mg po, after he gave a long diatribe about how he grew up in a Satanic cult that was headed by his parents.  He then stated that his parents lived in [place] and were trying to keep him quiet about their being in a Satanic cult by saying that he was mentally ill.  Patient earlier had been excessively angry about one of the male psych associates, saying that he was \"nasty\" (one of the better names that he called this staff to the RN), and that that staff enjoyed being cruel to him.  Patient appeared sadder than usual, affect blunted and tended to look and act more morosely.  After taking the po medication around 3:45AM, the patient also given a hot pack (he'd heard another patient requesting one earlier for his back) for his back and settled in for sleep.  Continue to monitor closely, reassurance, medication prn as indicated and as able.  "

## 2017-11-24 NOTE — PROGRESS NOTES
called pt's ACT team worker, Dayna.   She answered and said she no longer works on pt's ACT team. She gave me a name of number of the person who she thinks is pt's new worker:  Jett Hernandez at [phone].    I called him and left a message asking for a return call.

## 2017-11-25 PROCEDURE — A9270 NON-COVERED ITEM OR SERVICE: HCPCS | Mod: GY | Performed by: STUDENT IN AN ORGANIZED HEALTH CARE EDUCATION/TRAINING PROGRAM

## 2017-11-25 PROCEDURE — 25000132 ZZH RX MED GY IP 250 OP 250 PS 637: Mod: GY | Performed by: PSYCHIATRY & NEUROLOGY

## 2017-11-25 PROCEDURE — A9270 NON-COVERED ITEM OR SERVICE: HCPCS | Mod: GY | Performed by: PSYCHIATRY & NEUROLOGY

## 2017-11-25 PROCEDURE — 25000132 ZZH RX MED GY IP 250 OP 250 PS 637: Mod: GY | Performed by: STUDENT IN AN ORGANIZED HEALTH CARE EDUCATION/TRAINING PROGRAM

## 2017-11-25 PROCEDURE — 12400003 ZZH R&B MH CRITICAL UMMC

## 2017-11-25 RX ADMIN — Medication 1 G: at 08:54

## 2017-11-25 RX ADMIN — ACETAMINOPHEN 650 MG: 325 TABLET, FILM COATED ORAL at 12:09

## 2017-11-25 RX ADMIN — LIDOCAINE 2 PATCH: 50 PATCH CUTANEOUS at 12:09

## 2017-11-25 RX ADMIN — HYDROXYZINE HYDROCHLORIDE 50 MG: 50 TABLET, FILM COATED ORAL at 03:18

## 2017-11-25 RX ADMIN — NICOTINE 1 PATCH: 21 PATCH, EXTENDED RELEASE TRANSDERMAL at 08:55

## 2017-11-25 RX ADMIN — GABAPENTIN 300 MG: 300 CAPSULE ORAL at 22:06

## 2017-11-25 RX ADMIN — ACETAMINOPHEN 650 MG: 325 TABLET, FILM COATED ORAL at 19:04

## 2017-11-25 RX ADMIN — VITAMIN D, TAB 1000IU (100/BT) 2000 UNITS: 25 TAB at 08:54

## 2017-11-25 RX ADMIN — RISPERIDONE 3 MG: 2 TABLET ORAL at 22:06

## 2017-11-25 RX ADMIN — RISPERIDONE 2 MG: 2 TABLET ORAL at 08:54

## 2017-11-25 RX ADMIN — MULTIPLE VITAMINS W/ MINERALS TAB 1 TABLET: TAB at 08:54

## 2017-11-25 ASSESSMENT — ACTIVITIES OF DAILY LIVING (ADL)
GROOMING: INDEPENDENT
ORAL_HYGIENE: INDEPENDENT
DRESS: SCRUBS (BEHAVIORAL HEALTH);INDEPENDENT
GROOMING: INDEPENDENT
ORAL_HYGIENE: INDEPENDENT
DRESS: SCRUBS (BEHAVIORAL HEALTH)

## 2017-11-25 NOTE — PROGRESS NOTES
"Patient was social and visible in the milieu majority of the evening. His appearance was calm and approachable he was observed attempting to help calm other patients that were visibly agitated which in past days he would have reacted by getting agitated with them. He stated he is feeling better today due to getting to visit with his son on Thanksgiving and also attributed it to herbal tea and improved pain control with the aid of hot packs. He did state chronic thoughts of suicide that he attributed to Risperadol stating \"I would rather be put out of my misery than take that\". He was able to contract for safety and stated though he has chronic thoughts he has never had any active plans or attempts and has the want to live. He stated he is upset with the lengthened stay and believes he didn't need to come into the hospital and could have self treated himself with herbal supplements at home.      11/24/17 2200   Behavioral Health   Hallucinations denies / not responding to hallucinations   Thinking distractable;delusional;paranoid   Orientation person: oriented;place: oriented;date: oriented;time: oriented   Memory confabulation   Insight poor;denial of illness   Judgement impaired   Eye Contact at examiner   Affect full range affect   Mood mood is calm;irritable;grandiose   Physical Appearance/Attire attire appropriate to age and situation   Hygiene well groomed   Suicidality (WDL) suicidality   Suicidality thoughts only   1. Wish to be Dead No   Wish to be Dead Description States would rather be put out of misery rather than take risperadol. Aside from the medication denies wish to be dead.   2. Non-Specific Active Suicidal Thoughts  No   3. Active Sucidal Ideation with any Methods (Not Plan) Without Intent to Act  No   4. Active Suicidal Ideation with Some Intent to Act, Without Specific Plan  No   5. Active Suicidal Ideation with Specific Plan and Intent  No   Duration (Lifetime) 5   Self Injury other (see " comment)  (none stated or observed)   Elopement (none stated or observed)   Activity other (see comment)  (visible and social in the mileiu)   Speech pressured;tangential   Medication Sensitivity no observed side effects;other (see comment)  (patient stated constant suicidal thoughts from risperadol)   Psychomotor / Gait balanced;steady   Overt Aggression Scale   Verbal Aggression 1   Aggression against Property 0   Auto-Aggression 0   Physical Aggression 0   Overt Aggression Total Score 1   Sleep/Rest/Relaxation   Day/Evening Time Hours up all shift;resting in bed   Number of hours resting in bed 3 hours   Coping/Psychosocial   Verbalized Emotional State anger;frustration;powerlessness   Plan Of Care Reviewed With patient   Supportive Measures active listening utilized;positive reinforcement provided;decision-making supported;relaxation techniques promoted;self-care encouraged;verbalization of feelings encouraged   Trust Relationship/Rapport care explained;choices provided;emotional support provided;empathic listening provided;questions answered;questions encouraged;reassurance provided;thoughts/feelings acknowledged   Psycho Education   Type of Intervention 1:1 intervention   Response participates with encouragement   Hours 0.5   Treatment Detail calming techniques   Activities of Daily Living   Hygiene/Grooming independent   Oral Hygiene independent   Dress scrubs (behavioral health)   Laundry unable to complete   Room Organization independent   Activity   Activity Assistance Provided independent   Hygiene Care Assistance   Hygiene Assistance per patient

## 2017-11-25 NOTE — PROGRESS NOTES
"Patient has had two brief episodes of of inappropriate ranting when staff set some limits with him.  He was out of room approximately 2/3 of shift.  He was interacting with peers with some of his usual pronouncements about the unfairness of being here and being \"forced\" to take meds.  He did take his scheduled Risperdal without demanding that the RN chart his views about his medication causing him to be suicidal.  When asked if he had an active suicide plan he denied it.   "

## 2017-11-25 NOTE — PLAN OF CARE
Problem: Manic Symptoms  Goal: Manic Symptoms  Signs and symptoms of listed problems will be absent or manageable.   Outcome: No Change  Patient continues to have episodes of yelling, swearing and threatening.  These episodes have decreased since his admission. He will be in the lounge during those episodes and consequently be sent to his room.  This behavior is very disruptive and has also been anxiety producing for other patients.  He will state that he has a right to express his feelings. He is then reminded that when his behavior is frightening to other patients it is our job to manage his behavior.  Patient also continues to state that his scheduled Risperidone is poisonous and is making him suicidal.  He then demands that staff chart what he is saying.  Patient is taking his scheduled medication despite is above comments.  Patient is hyperverbal on an ongoing basis.  He attempts to proselytize his views with other patients. Pt is eating with good appetite.  His sleep at night has been inconsistent. He is sleeping some during the day.

## 2017-11-26 PROCEDURE — 25000132 ZZH RX MED GY IP 250 OP 250 PS 637: Mod: GY | Performed by: PSYCHIATRY & NEUROLOGY

## 2017-11-26 PROCEDURE — 25000132 ZZH RX MED GY IP 250 OP 250 PS 637: Mod: GY | Performed by: STUDENT IN AN ORGANIZED HEALTH CARE EDUCATION/TRAINING PROGRAM

## 2017-11-26 PROCEDURE — 12400003 ZZH R&B MH CRITICAL UMMC

## 2017-11-26 PROCEDURE — A9270 NON-COVERED ITEM OR SERVICE: HCPCS | Mod: GY | Performed by: PSYCHIATRY & NEUROLOGY

## 2017-11-26 PROCEDURE — A9270 NON-COVERED ITEM OR SERVICE: HCPCS | Mod: GY | Performed by: STUDENT IN AN ORGANIZED HEALTH CARE EDUCATION/TRAINING PROGRAM

## 2017-11-26 RX ADMIN — NICOTINE POLACRILEX 2 MG: 2 GUM, CHEWING ORAL at 07:39

## 2017-11-26 RX ADMIN — NICOTINE 1 PATCH: 21 PATCH, EXTENDED RELEASE TRANSDERMAL at 08:55

## 2017-11-26 RX ADMIN — RISPERIDONE 3 MG: 2 TABLET ORAL at 21:51

## 2017-11-26 RX ADMIN — ACETAMINOPHEN 650 MG: 325 TABLET, FILM COATED ORAL at 11:03

## 2017-11-26 RX ADMIN — LIDOCAINE 1 PATCH: 50 PATCH CUTANEOUS at 11:03

## 2017-11-26 RX ADMIN — VITAMIN D, TAB 1000IU (100/BT) 2000 UNITS: 25 TAB at 08:55

## 2017-11-26 RX ADMIN — RISPERIDONE 2 MG: 2 TABLET ORAL at 08:55

## 2017-11-26 RX ADMIN — Medication 1 G: at 08:55

## 2017-11-26 RX ADMIN — GABAPENTIN 300 MG: 300 CAPSULE ORAL at 21:51

## 2017-11-26 RX ADMIN — ACETAMINOPHEN 650 MG: 325 TABLET, FILM COATED ORAL at 21:54

## 2017-11-26 RX ADMIN — MULTIPLE VITAMINS W/ MINERALS TAB 1 TABLET: TAB at 08:55

## 2017-11-26 ASSESSMENT — ACTIVITIES OF DAILY LIVING (ADL)
DRESS: SCRUBS (BEHAVIORAL HEALTH)
ORAL_HYGIENE: INDEPENDENT
GROOMING: INDEPENDENT
LAUNDRY: UNABLE TO COMPLETE
GROOMING: INDEPENDENT
ORAL_HYGIENE: INDEPENDENT
DRESS: INDEPENDENT

## 2017-11-26 NOTE — PROGRESS NOTES
"Tyler was isolative and withdrawn to his room for the majority of the shift; he was not very talkative with peers nor staff. He was irritable and quiet. When asked why so withdrawn, he stated that risperdal \"makes me depressed and have social anxiety disorder\" and that he doesn't understand why the dose was increased, and why the \"psychotic antisocial fucking doctors give this to me\".    Tyler denied SI, but stated \"not really today\". He denied SIB and HI, and denied any AVH.     11/25/17 5117   Behavioral Health   Hallucinations denies / not responding to hallucinations   Thinking distractable;paranoid   Orientation person: oriented;place: oriented   Memory baseline memory   Insight poor;denial of illness   Judgement impaired   Eye Contact at examiner   Affect blunted, flat;sad;irritable   Mood depressed;irritable   Physical Appearance/Attire disheveled   Hygiene neglected grooming - unclean body, hair, teeth;body odor   Suicidality other (see comments)  (denies, but states that they \"not really today\")   1. Wish to be Dead No   2. Non-Specific Active Suicidal Thoughts  No   3. Active Sucidal Ideation with any Methods (Not Plan) Without Intent to Act  No   4. Active Suicidal Ideation with Some Intent to Act, Without Specific Plan  No   5. Active Suicidal Ideation with Specific Plan and Intent  No   Self Injury other (see comment)  (denies and none observed)   Elopement Distress about legal situation (holds for mental health or criminal)   Activity isolative;withdrawn   Speech clear;coherent   Medication Sensitivity sedation;other (see comment)  (risperdal \"makes me depressed and have social anx. disorder\")   Psychomotor / Gait balanced;steady   Activities of Daily Living   Hygiene/Grooming independent   Oral Hygiene independent   Dress scrubs (behavioral health);independent   Room Organization prompts   Activity   Activity Assistance Provided independent     "

## 2017-11-26 NOTE — PROGRESS NOTES
Patient has been out in the milieu approximately 25% of shift.  He has not had any episodes of swearing, yelling or name calling.  He does deny suicidal ideation.  Pt has eaten with good appetite.  There has been no dialogue about his medications causing problems for him.

## 2017-11-27 PROCEDURE — A9270 NON-COVERED ITEM OR SERVICE: HCPCS | Mod: GY | Performed by: STUDENT IN AN ORGANIZED HEALTH CARE EDUCATION/TRAINING PROGRAM

## 2017-11-27 PROCEDURE — 25000132 ZZH RX MED GY IP 250 OP 250 PS 637: Mod: GY | Performed by: PSYCHIATRY & NEUROLOGY

## 2017-11-27 PROCEDURE — 12400003 ZZH R&B MH CRITICAL UMMC

## 2017-11-27 PROCEDURE — A9270 NON-COVERED ITEM OR SERVICE: HCPCS | Mod: GY | Performed by: PSYCHIATRY & NEUROLOGY

## 2017-11-27 PROCEDURE — 99233 SBSQ HOSP IP/OBS HIGH 50: CPT | Performed by: PSYCHIATRY & NEUROLOGY

## 2017-11-27 PROCEDURE — 25000132 ZZH RX MED GY IP 250 OP 250 PS 637: Mod: GY | Performed by: STUDENT IN AN ORGANIZED HEALTH CARE EDUCATION/TRAINING PROGRAM

## 2017-11-27 RX ORDER — LITHIUM CARBONATE 450 MG
900 TABLET, EXTENDED RELEASE ORAL AT BEDTIME
Status: DISCONTINUED | OUTPATIENT
Start: 2017-11-27 | End: 2017-12-08 | Stop reason: HOSPADM

## 2017-11-27 RX ADMIN — Medication 1 G: at 09:12

## 2017-11-27 RX ADMIN — MULTIPLE VITAMINS W/ MINERALS TAB 1 TABLET: TAB at 09:12

## 2017-11-27 RX ADMIN — RISPERIDONE 3 MG: 2 TABLET ORAL at 22:16

## 2017-11-27 RX ADMIN — VITAMIN D, TAB 1000IU (100/BT) 2000 UNITS: 25 TAB at 09:11

## 2017-11-27 RX ADMIN — LIDOCAINE 1 PATCH: 50 PATCH CUTANEOUS at 12:50

## 2017-11-27 RX ADMIN — GABAPENTIN 300 MG: 300 CAPSULE ORAL at 22:16

## 2017-11-27 RX ADMIN — LITHIUM CARBONATE 900 MG: 450 TABLET, EXTENDED RELEASE ORAL at 22:17

## 2017-11-27 RX ADMIN — NICOTINE 1 PATCH: 21 PATCH, EXTENDED RELEASE TRANSDERMAL at 09:12

## 2017-11-27 RX ADMIN — RISPERIDONE 2 MG: 2 TABLET ORAL at 09:12

## 2017-11-27 ASSESSMENT — ACTIVITIES OF DAILY LIVING (ADL)
DRESS: SCRUBS (BEHAVIORAL HEALTH)
GROOMING: INDEPENDENT
DRESS: INDEPENDENT
GROOMING: INDEPENDENT
ORAL_HYGIENE: INDEPENDENT
ORAL_HYGIENE: INDEPENDENT
LAUNDRY: UNABLE TO COMPLETE
LAUNDRY: UNABLE TO COMPLETE

## 2017-11-27 NOTE — PROGRESS NOTES
"   11/26/17 2200   Behavioral Health   Hallucinations denies / not responding to hallucinations   Thinking distractable   Orientation person: oriented;place: oriented;date: oriented;time: oriented   Memory baseline memory   Insight poor   Judgement impaired   Eye Contact at examiner   Affect irritable   Mood mood is calm   Physical Appearance/Attire attire appropriate to age and situation   Hygiene well groomed   Suicidality thoughts only  (says meds cause SI)   Self Injury other (see comment)  (none stated)   Elopement (none)   Activity (present and social in milieu)   Speech rambling   Medication Sensitivity no observed side effects;other (see comment)  (\"SI, erectile dysfunction, agitation\")   Psychomotor / Gait balanced;steady   Activities of Daily Living   Hygiene/Grooming independent   Oral Hygiene independent   Dress independent   Laundry unable to complete   Room Organization independent   Activity   Activity Assistance Provided independent   Pt spent evening in lounge watching TV and socializing with peers. Pt says that medications specifically risperdal are causing him to have suicidal ideation among other side effects. \"I sit around all day thinking of ways to kill myself to end all this\"  Pt is irritable and feels as though he is not being listened to by doctors and other staff, and continued complaints about medication changes. Pt made statements about how awful being on the unit is \"a personal hell.\" Pt was overheard yelling on the phone.  Pt had no additional significant events this evening and writer has no additional concerns to report.   "

## 2017-11-27 NOTE — PLAN OF CARE
"Problem: Manic Symptoms  Goal: Manic Symptoms  Signs and symptoms of listed problems will be absent or manageable.   Outcome: No Change  Assessment:  Pt was up ad iesha, spent more than 50% of the shift resting or sleeping in his room. Pt was up for meals, made phone calls. Pt reported having left wrist pain, applied lido-derm patch which he says gives effective relief. Pt denies needing lido-derm patch for low back pain this shift. Tyler reports feeling anxious, depressed, and says that having to take Risperdal makes \"all my symptoms worse, why would I take something that makes me anxious, depressed, suicidal'.  When asked to expand on his suicidal ideation, he said he was having thoughts only, has no concrete plan. Pt said he wanted to live, wants to discharge, \"I just don't want to take the Risperdal\".  Writer discussed with MD at Team rounds, MD will meet with patient to discuss his reported ideation and potential medication alternatives.       "

## 2017-11-27 NOTE — PROGRESS NOTES
"Pt was on the phone speaking with his mother, and at one point was talking about how miserable he is here, and that while he's here he just \"thinks of ways to kill myself.\"  "

## 2017-11-27 NOTE — PROGRESS NOTES
"[facility], [place]   Psychiatric Progress Note  Hospital Day: 17        Interim History:   The patient's care was discussed with the treatment team during the daily team meeting and/or staff's chart notes were reviewed.  Staff report patient continues to state that Risperdal makes him feel depressed and socially anxious. He adds that Risperdal \"is making feel suicidal. He said \"I sit around all day thinking of ways to kill myself to end all of this.\" Although he remains agitated at times, this appears to be improving since admission.     Upon interview, patient denies SI today, stating that \"I don't know why people keep asking me about that I am not going to hurt myself! I am trying to point out that any antipsychotic causes me to feel suicidal.\" When discussing depression, he said \"I am not depressed at all, but I am sick of taking these f*ing medications!\" When exploring other alternatives, patient said \"I hate Zyprexa, Risperdal, Abilify, Latuda, and Haldol was the worst one! I am not taking any antipsychotic.\" We discussed option of Lithium again as he noted that this medication helped him remain stable in the past. I also informed him that once at a stable dose of Lithium, he may not need current does of Risperdal. We again discussed option of IGNACIO. Initially he was adamantly opposed, though now states he will consider. 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 He requests access to his credit card \"to make some transactions to keep my company going.\" I discussed hospital policy and informed him that this would not be an option. He was quite agitated.     Psychiatric Symptoms: Grandiose, Agitation, Paranoid, Delusional, labile though somewhat improved    Medication side effects reported: Ongoing reported \"passive suicidal ideations, erectile dysfunction, and severe social anxiety.\"           Medications:       risperiDONE  2 mg Oral Daily    Or     " OLANZapine  10 mg Intramuscular Daily     risperiDONE  3 mg Oral At Bedtime    Or     OLANZapine  10 mg Intramuscular At Bedtime     gabapentin  300 mg Oral At Bedtime     lidocaine   Transdermal Q24h     lidocaine   Transdermal Q8H     cholecalciferol  2,000 Units Oral Daily     multivitamin, therapeutic with minerals  1 tablet Oral Daily     fish oil-omega-3 fatty acids  1 g Oral Daily     nicotine   Transdermal Q8H     nicotine   Transdermal Daily     nicotine  1 patch Transdermal Daily          Allergies:     Allergies   Allergen Reactions     Abilify Discmelt      Suicidal thoughts       Compazine Other (See Comments)     Seizure           Labs:     No results found for this or any previous visit (from the past 24 hour(s)).          Psychiatric Examination:     /68  Pulse 83  Temp 97.4  F (36.3  C) (Tympanic)  Resp 16  Wt 79.8 kg (176 lb)  SpO2 97%  BMI 28.41 kg/m2  Weight is 176 lbs 0 oz  Body mass index is 28.41 kg/(m^2).    Appearance: awake, alert, adequately groomed and dressed in hospital scrubs, wearing earrings  Attitude:  uncooperative  Eye Contact:  intense  Mood:  angry  Affect:  intensity is heightened, labile and reactive though slightly improved  Speech:  pressured speech and rambling  Language: fluent and intact in English  Psychomotor, Gait, Musculoskeletal: agitation, no evidence of tardive dyskinesia, dystonia, or tics and intact station, gait and muscle tone  Throught Process:  disorganized, illogical and perseverative  Associations:  loosening of associations present  Thought Content:  significant paranoia with grandiose thinking  Insight:  absent  Judgement:  poor  Oriented to:  time, person, and place  Attention Span and Concentration:  intact  Recent and Remote Memory:  intact  Fund of Knowledge:  appropriate         Precautions:     Behavioral Orders   Procedures     Assault precautions     Code 1 - Restrict to Unit     Elopement precautions     Routine Programming     As  "clinically indicated     Status 15     Every 15 minutes.     Withdrawal precautions          Diagnoses:      Bipolar I, recurrent manic episode, severe with psychosis  Cannabis use disorder, severe, dependence  Cocaine use disorder, moderate, dependence  Moderate benzodiazepine use disorder  Cigarette nicotine dependence with withdrawal         Assessment & Plan:     Assessment and hospital summary:  The patient is a 42-year-old man that was admitted due to increased denisha with aggressive behaviors in the setting of non-compliance with treatment. He initially was not agreeable to any medications but took the risperidone to avoid getting the olanzapine injection. Given Dr. Gill's conversation with his outpatient provider on 11/14, he has updated patient's diagnoses. He has significantly more substance use issues than previously documented here. She was concerned about his unsafe and inconsistent use of lithium outside of the hospital. Because of this, the decision was made to transition to risperidone only with tentative plan to transition to long acting injection.     Target psychiatric symptoms and interventions:  Significant psychosis with denisha  --Continue risperidone 2 mg daily and 3 mg QHS with IM backup of Zyprexa.   -- Add Lithium 900 mg QHS to target suicidal ideation and for mood stabilization, and check level on Saturday.    Continue with nicotine replacement    Medical Problems and Treatments:  # Nutrition: nutrition has seen patient for snack requests. Continue MVI and Fish oil. Mg and B12 levels normal, no need for supplementation. Will add snacks per pt request  # Vitamin D deficiency: Continue Vit D 2000 IU daliy  # Continue Lidocaine patches for pain management    Behavioral/Psychological/Social:  - Monitor closely for emergence of active SI or plan. Patient continues to report that \"all medications you prescribe make me suicidal.\" He denies any intent or plan. He agreed to inform staff if suicidal " thoughts worsen.     Legal: PD revoked, now on full commitment with [facility]    Disposition:  Ongoing denisha with psychosis requiring stabilization in the hospital.     Maggi Molina MD  [facility]iat[facility]

## 2017-11-28 PROCEDURE — 25000132 ZZH RX MED GY IP 250 OP 250 PS 637: Mod: GY | Performed by: STUDENT IN AN ORGANIZED HEALTH CARE EDUCATION/TRAINING PROGRAM

## 2017-11-28 PROCEDURE — 12400003 ZZH R&B MH CRITICAL UMMC

## 2017-11-28 PROCEDURE — A9270 NON-COVERED ITEM OR SERVICE: HCPCS | Mod: GY | Performed by: PSYCHIATRY & NEUROLOGY

## 2017-11-28 PROCEDURE — A9270 NON-COVERED ITEM OR SERVICE: HCPCS | Mod: GY | Performed by: STUDENT IN AN ORGANIZED HEALTH CARE EDUCATION/TRAINING PROGRAM

## 2017-11-28 PROCEDURE — 99232 SBSQ HOSP IP/OBS MODERATE 35: CPT | Performed by: PSYCHIATRY & NEUROLOGY

## 2017-11-28 PROCEDURE — 25000132 ZZH RX MED GY IP 250 OP 250 PS 637: Mod: GY | Performed by: PSYCHIATRY & NEUROLOGY

## 2017-11-28 PROCEDURE — 99207 ZZC CDG-MDM COMPONENT: MEETS MODERATE - DOWN CODED: CPT | Performed by: PSYCHIATRY & NEUROLOGY

## 2017-11-28 RX ADMIN — LITHIUM CARBONATE 900 MG: 450 TABLET, EXTENDED RELEASE ORAL at 22:16

## 2017-11-28 RX ADMIN — ACETAMINOPHEN 650 MG: 325 TABLET, FILM COATED ORAL at 09:11

## 2017-11-28 RX ADMIN — NICOTINE POLACRILEX 4 MG: 2 GUM, CHEWING ORAL at 09:11

## 2017-11-28 RX ADMIN — VITAMIN D, TAB 1000IU (100/BT) 2000 UNITS: 25 TAB at 08:22

## 2017-11-28 RX ADMIN — GABAPENTIN 300 MG: 300 CAPSULE ORAL at 22:16

## 2017-11-28 RX ADMIN — NICOTINE 1 PATCH: 21 PATCH, EXTENDED RELEASE TRANSDERMAL at 08:22

## 2017-11-28 RX ADMIN — RISPERIDONE 3 MG: 2 TABLET ORAL at 22:16

## 2017-11-28 RX ADMIN — LIDOCAINE 2 PATCH: 50 PATCH CUTANEOUS at 13:05

## 2017-11-28 RX ADMIN — Medication 1 G: at 08:22

## 2017-11-28 RX ADMIN — RISPERIDONE 2 MG: 2 TABLET ORAL at 08:22

## 2017-11-28 RX ADMIN — MULTIPLE VITAMINS W/ MINERALS TAB 1 TABLET: TAB at 08:22

## 2017-11-28 RX ADMIN — HYDROXYZINE HYDROCHLORIDE 100 MG: 50 TABLET, FILM COATED ORAL at 20:49

## 2017-11-28 ASSESSMENT — ACTIVITIES OF DAILY LIVING (ADL)
DRESS: SCRUBS (BEHAVIORAL HEALTH)
ORAL_HYGIENE: INDEPENDENT
GROOMING: INDEPENDENT
DRESS: SCRUBS (BEHAVIORAL HEALTH)
ORAL_HYGIENE: INDEPENDENT
LAUNDRY: UNABLE TO COMPLETE
GROOMING: INDEPENDENT

## 2017-11-28 NOTE — PROGRESS NOTES
" met with pt today.   He envisions his aftercare plan as returning to his apartment and \"working with my ACT team.\"   I asked him whether he is willing to do any mental health day treatment program.   He said no.   I asked him about a Rule 25 and subsequent CD treatment.   He says he really doesn't need that either.  He says he uses pot every day and will continue to do so.  He says he drinks occasionally and uses cocaine occasionally.   He denies that substances are a problem.  He says he wants to go to [place] for a second opinion on his \"primary diagnosis, which is Bipolar.\"       I told him I would be talking again soon with his [facility] ACT worker, Jett Hernandez, to see what Jett expects of patient regarding an aftercare plan.      "

## 2017-11-28 NOTE — PROGRESS NOTES
"Pt was present in the milieu after dinner; he was pleasant with staff and peers. Pt had a visit from his son. While being checked into the unit, staff told pts son that he could not wear his coat on the unit. Pt told staff that he did not have a shirt on underneath. Once pts son got a shirt, he stayed on the unit for about 5 minutes. This staff asked Tyler how his visit went with his son and questioned why he wasn't wearing a shirt in the winter time. Pt told this staff that his son had, \"been dipping into a bottle with his girl before they came here,\" and that's why he did not have a shirt on. Pt was just happy to see his son. There are no behavioral issues to report.   "

## 2017-11-28 NOTE — PROGRESS NOTES
"Patient was more quiet than usual today.  I asked him, \"how are you doing today?\"  He looked at me silently a moment and said, \"Not doing do good today. How about you?\" Patient did not rail about anything today as he often or even usually has. He watched tv in the lounge and socialized quietly with peers and staff.  "

## 2017-11-29 PROCEDURE — A9270 NON-COVERED ITEM OR SERVICE: HCPCS | Mod: GY | Performed by: PSYCHIATRY & NEUROLOGY

## 2017-11-29 PROCEDURE — 25000132 ZZH RX MED GY IP 250 OP 250 PS 637: Mod: GY | Performed by: PSYCHIATRY & NEUROLOGY

## 2017-11-29 PROCEDURE — A9270 NON-COVERED ITEM OR SERVICE: HCPCS | Mod: GY | Performed by: STUDENT IN AN ORGANIZED HEALTH CARE EDUCATION/TRAINING PROGRAM

## 2017-11-29 PROCEDURE — 25000132 ZZH RX MED GY IP 250 OP 250 PS 637: Mod: GY | Performed by: STUDENT IN AN ORGANIZED HEALTH CARE EDUCATION/TRAINING PROGRAM

## 2017-11-29 PROCEDURE — 12400003 ZZH R&B MH CRITICAL UMMC

## 2017-11-29 RX ADMIN — VITAMIN D, TAB 1000IU (100/BT) 2000 UNITS: 25 TAB at 08:37

## 2017-11-29 RX ADMIN — GABAPENTIN 300 MG: 300 CAPSULE ORAL at 22:02

## 2017-11-29 RX ADMIN — Medication 1 G: at 08:37

## 2017-11-29 RX ADMIN — ACETAMINOPHEN 650 MG: 325 TABLET, FILM COATED ORAL at 04:56

## 2017-11-29 RX ADMIN — NICOTINE 1 PATCH: 21 PATCH, EXTENDED RELEASE TRANSDERMAL at 08:38

## 2017-11-29 RX ADMIN — MULTIPLE VITAMINS W/ MINERALS TAB 1 TABLET: TAB at 08:37

## 2017-11-29 RX ADMIN — LITHIUM CARBONATE 900 MG: 450 TABLET, EXTENDED RELEASE ORAL at 22:02

## 2017-11-29 RX ADMIN — RISPERIDONE 3 MG: 2 TABLET ORAL at 22:02

## 2017-11-29 RX ADMIN — RISPERIDONE 2 MG: 2 TABLET ORAL at 08:37

## 2017-11-29 RX ADMIN — LIDOCAINE 1 PATCH: 50 PATCH CUTANEOUS at 12:06

## 2017-11-29 ASSESSMENT — ACTIVITIES OF DAILY LIVING (ADL)
GROOMING: INDEPENDENT
DRESS: SCRUBS (BEHAVIORAL HEALTH)
ORAL_HYGIENE: INDEPENDENT
LAUNDRY: UNABLE TO COMPLETE
ORAL_HYGIENE: INDEPENDENT
GROOMING: INDEPENDENT
DRESS: SCRUBS (BEHAVIORAL HEALTH)

## 2017-11-29 NOTE — PROGRESS NOTES
Talked about having some pain to the upper gastric area into the chest area that he has had before and it goes away.  Given some tylenol for this pain and given some hot tea for this.  Refused to take any other meds.  Later he stated that the pain was better and is relaxing in his room

## 2017-11-29 NOTE — PROGRESS NOTES
11/28/17 2200   Behavioral Health   Hallucinations denies / not responding to hallucinations   Thinking poor concentration;paranoid   Orientation person: oriented;place: oriented;date: oriented;time: oriented   Memory baseline memory   Insight poor   Judgement impaired   Eye Contact at examiner   Affect full range affect   Mood mood is calm   Physical Appearance/Attire other (see comment)  (adequate )   Hygiene other (see comment)  (adequate )   Suicidality other (see comments)  (none stated nor observed )   Self Injury other (see comment)  (none stated nor observed )   Activity other (see comment)  (social in miliue with peers and staff )   Speech clear;coherent   Medication Sensitivity no stated side effects;no observed side effects   Psychomotor / Gait balanced;steady   Activities of Daily Living   Hygiene/Grooming independent   Oral Hygiene independent   Dress scrubs (behavioral health)   Room Organization independent   Pt spent a majority of the evening in the lounge. He was polite with staff and peers. He also spent a good amount of time on the phone. His son came to visit him this evening and that seemed to go well according to his son. No noted irritability, aggression, or otherwise behavioral issues from the pt this evening. No SI, SIB, AVH stated or observed this evening.

## 2017-11-30 PROCEDURE — 99207 ZZC CDG-MDM COMPONENT: MEETS MODERATE - DOWN CODED: CPT | Performed by: PSYCHIATRY & NEUROLOGY

## 2017-11-30 PROCEDURE — A9270 NON-COVERED ITEM OR SERVICE: HCPCS | Mod: GY | Performed by: PSYCHIATRY & NEUROLOGY

## 2017-11-30 PROCEDURE — 25000132 ZZH RX MED GY IP 250 OP 250 PS 637: Mod: GY | Performed by: STUDENT IN AN ORGANIZED HEALTH CARE EDUCATION/TRAINING PROGRAM

## 2017-11-30 PROCEDURE — A9270 NON-COVERED ITEM OR SERVICE: HCPCS | Mod: GY | Performed by: STUDENT IN AN ORGANIZED HEALTH CARE EDUCATION/TRAINING PROGRAM

## 2017-11-30 PROCEDURE — 25000132 ZZH RX MED GY IP 250 OP 250 PS 637: Mod: GY | Performed by: PSYCHIATRY & NEUROLOGY

## 2017-11-30 PROCEDURE — 99232 SBSQ HOSP IP/OBS MODERATE 35: CPT | Performed by: PSYCHIATRY & NEUROLOGY

## 2017-11-30 PROCEDURE — 12400003 ZZH R&B MH CRITICAL UMMC

## 2017-11-30 RX ADMIN — LITHIUM CARBONATE 900 MG: 450 TABLET, EXTENDED RELEASE ORAL at 21:58

## 2017-11-30 RX ADMIN — HYDROXYZINE HYDROCHLORIDE 100 MG: 50 TABLET, FILM COATED ORAL at 12:29

## 2017-11-30 RX ADMIN — MULTIPLE VITAMINS W/ MINERALS TAB 1 TABLET: TAB at 08:54

## 2017-11-30 RX ADMIN — VITAMIN D, TAB 1000IU (100/BT) 2000 UNITS: 25 TAB at 08:54

## 2017-11-30 RX ADMIN — NICOTINE 1 PATCH: 21 PATCH, EXTENDED RELEASE TRANSDERMAL at 08:55

## 2017-11-30 RX ADMIN — RISPERIDONE 2 MG: 2 TABLET ORAL at 08:54

## 2017-11-30 RX ADMIN — ACETAMINOPHEN 650 MG: 325 TABLET, FILM COATED ORAL at 21:59

## 2017-11-30 RX ADMIN — Medication 1 G: at 08:55

## 2017-11-30 RX ADMIN — GABAPENTIN 300 MG: 300 CAPSULE ORAL at 21:58

## 2017-11-30 RX ADMIN — RISPERIDONE 3 MG: 2 TABLET ORAL at 21:57

## 2017-11-30 RX ADMIN — LIDOCAINE 2 PATCH: 50 PATCH CUTANEOUS at 12:13

## 2017-11-30 ASSESSMENT — ACTIVITIES OF DAILY LIVING (ADL)
LAUNDRY: UNABLE TO COMPLETE
HYGIENE/GROOMING: INDEPENDENT
ORAL_HYGIENE: INDEPENDENT
DRESS: SCRUBS (BEHAVIORAL HEALTH)
DRESS: INDEPENDENT;SCRUBS (BEHAVIORAL HEALTH)
HYGIENE/GROOMING: INDEPENDENT
ORAL_HYGIENE: INDEPENDENT

## 2017-11-30 NOTE — PLAN OF CARE
"Problem: Manic Symptoms  Goal: Manic Symptoms  Signs and symptoms of listed problems will be absent or manageable.   Outcome: Therapy, progress towards functional goals is fair  Pt continues to deny all mental health symptoms, and discusses the \"fraud of psychiatry.\" States he needs to move to [place] \"[place]oly [place]\" to get \"natual healing.\" Overall, he is calmer and less argumentative. Affect blunted, and reports that his mood is \"okay.\" Mostly pleasant on approach. Discusses inappropriate topics in the milieu at times (such as illegal drugs), though he is accepting of redirection. Compliant with all scheduled medications. No observed adverse effects. Will continue to monitor.       "

## 2017-11-30 NOTE — PROGRESS NOTES
called and left a vm message for Jett Hernandez of pt's ACT team.   I already left him one a couple days ago.   We're trying to find out what the ACT team expects regarding aftercare plans.

## 2017-11-30 NOTE — PROGRESS NOTES
"[facility], [place]   Psychiatric Progress Note  Hospital Day: 20        Interim History:   The patient's care was discussed with the treatment team during the daily team meeting and/or staff's chart notes were reviewed.  Staff report patient continues to discuss the \"fraud of psychiatry.\" He continues to deny MICD issues. He appears less argumentative and calmer. He is pleasant on approach. He is accepting of redirection. No observed adverse effects from medications. No mention of SI.     Upon interview, patient denies SI. He acknowledges that he has \"no control in this place accept for making the decision not to have the injection [Consta], and taking the oral medication instead.\" He denies side effects with the exception of \"chronic erectile dysfunction from risperidone.\" He continues to state that no antipsychotic medications will be helpful or cause him less side effects than any other. He continues to have poor insight into his CD use. He states that he is willing to complete a CD assessment, though is unwilling to participate in a MICD or CD program. He states that he wants a new ACT team psychiatrist because she reportedly \"lied and said that I was addicted to Klonopin.\" He explains that he was given a very limited supply of Klonopin on a daily basis and so \"there was no way possible I could abuse it.\" He states that it was the only helpful prescribed medication for him and he is frustrated that he is not being given Klonopin here in the hospital.     Medication side effects reported: as above. Notes history of \"chronic diarrhea\" with Lithium though denies any diarrhea currently.            Medications:       lithium  900 mg Oral At Bedtime     risperiDONE  2 mg Oral Daily    Or     OLANZapine  10 mg Intramuscular Daily     risperiDONE  3 mg Oral At Bedtime    Or     OLANZapine  10 mg Intramuscular At Bedtime     gabapentin  300 mg Oral At Bedtime     lidocaine   Transdermal Q24h " "    lidocaine   Transdermal Q8H     cholecalciferol  2,000 Units Oral Daily     multivitamin, therapeutic with minerals  1 tablet Oral Daily     fish oil-omega-3 fatty acids  1 g Oral Daily     nicotine   Transdermal Q8H     nicotine   Transdermal Daily     nicotine  1 patch Transdermal Daily          Allergies:     Allergies   Allergen Reactions     Abilify Discmelt      Suicidal thoughts       Compazine Other (See Comments)     Seizure           Labs:     No results found for this or any previous visit (from the past 24 hour(s)).          Psychiatric Examination:     /75 (BP Location: Left arm)  Pulse 103  Temp 97.4  F (36.3  C) (Tympanic)  Resp 16  Ht 1.676 m (5' 5.98\")  Wt 81.2 kg (179 lb)  SpO2 97%  BMI 28.91 kg/m2  Weight is 179 lbs 0 oz  Body mass index is 28.91 kg/(m^2).    Appearance: awake, alert, adequately groomed and dressed in hospital scrubs, wearing earrings  Attitude:  uncooperative, argumentative  Eye Contact:  intense  Mood:  angry  Affect:  intensity is heightened, though less labile  Speech:  Less pressured   Language: fluent and intact in English  Psychomotor, Gait, Musculoskeletal: agitation intermittently, no evidence of tardive dyskinesia, dystonia, or tics and intact station, gait and muscle tone  Throught Process: more organized, continues to be illogical and perseverative  Associations:  no loosening of associations   Thought Content:  Ongoing grandiose thinking about \"being the best medicine man and I know more about psychiatry than you. I have studied it for years,\" though improved  Insight:  poor  Judgement:  Fair, improved  Oriented to:  time, person, and place  Attention Span and Concentration:  intact  Recent and Remote Memory:  intact  Fund of Knowledge:  appropriate         Precautions:     Behavioral Orders   Procedures     Assault precautions     Code 1 - Restrict to Unit     Elopement precautions     Routine Programming     As clinically indicated     Status 15     " Every 15 minutes.     Withdrawal precautions          Diagnoses:      Bipolar I, recurrent manic episode, severe with psychosis  Cannabis use disorder, severe, dependence  Cocaine use disorder, moderate, dependence  Moderate benzodiazepine use disorder  Cigarette nicotine dependence with withdrawal         Assessment & Plan:     Assessment and hospital summary:  The patient is a 42-year-old man that was admitted due to increased denisha with aggressive behaviors in the setting of non-compliance with treatment. He initially was not agreeable to any medications but took the risperidone to avoid getting the olanzapine injection. Given Dr. Gill's conversation with his outpatient provider on 11/14, he has updated patient's diagnoses. He has significantly more substance use issues than previously documented here. She was concerned about his unsafe and inconsistent use of lithium outside of the hospital. Because of this, the decision was made to transition to risperidone only with tentative plan to transition to long acting injection.     Target psychiatric symptoms and interventions:  --Continue risperidone 2 mg daily and 3 mg QHS with IM backup of Zyprexa.   -- Continue Lithium 900 mg QHS to target suicidal ideation and for mood stabilization, and check level on Saturday, December 2nd.    Continue with nicotine replacement    Medical Problems and Treatments:  # Nutrition: nutrition has seen patient for snack requests. Continue MVI and Fish oil. Mg and B12 levels normal, no need for supplementation. Will add snacks per pt request  # Vitamin D deficiency: Continue Vit D 2000 IU daliy  # Continue Lidocaine patches for pain management    Behavioral/Psychological/Social:  - Monitor closely for emergence of active SI or plan.     Legal: PD revoked, now on full commitment with [facility]    Disposition:  Ongoing denisha with psychosis requiring stabilization in the hospital.     Maggi Molina MD  [facility] Psychiatry

## 2017-12-01 PROCEDURE — A9270 NON-COVERED ITEM OR SERVICE: HCPCS | Mod: GY | Performed by: PSYCHIATRY & NEUROLOGY

## 2017-12-01 PROCEDURE — 99207 ZZC CDG-MDM COMPONENT: MEETS MODERATE - DOWN CODED: CPT | Performed by: PSYCHIATRY & NEUROLOGY

## 2017-12-01 PROCEDURE — 12400003 ZZH R&B MH CRITICAL UMMC

## 2017-12-01 PROCEDURE — 99232 SBSQ HOSP IP/OBS MODERATE 35: CPT | Performed by: PSYCHIATRY & NEUROLOGY

## 2017-12-01 PROCEDURE — 25000132 ZZH RX MED GY IP 250 OP 250 PS 637: Mod: GY | Performed by: PSYCHIATRY & NEUROLOGY

## 2017-12-01 PROCEDURE — 25000132 ZZH RX MED GY IP 250 OP 250 PS 637: Mod: GY | Performed by: STUDENT IN AN ORGANIZED HEALTH CARE EDUCATION/TRAINING PROGRAM

## 2017-12-01 PROCEDURE — A9270 NON-COVERED ITEM OR SERVICE: HCPCS | Mod: GY | Performed by: STUDENT IN AN ORGANIZED HEALTH CARE EDUCATION/TRAINING PROGRAM

## 2017-12-01 RX ADMIN — RISPERIDONE 3 MG: 2 TABLET ORAL at 22:14

## 2017-12-01 RX ADMIN — GABAPENTIN 300 MG: 300 CAPSULE ORAL at 22:14

## 2017-12-01 RX ADMIN — NICOTINE 1 PATCH: 21 PATCH, EXTENDED RELEASE TRANSDERMAL at 11:35

## 2017-12-01 RX ADMIN — RISPERIDONE 2 MG: 2 TABLET ORAL at 09:53

## 2017-12-01 RX ADMIN — MULTIPLE VITAMINS W/ MINERALS TAB 1 TABLET: TAB at 09:53

## 2017-12-01 RX ADMIN — Medication 1 G: at 09:52

## 2017-12-01 RX ADMIN — LITHIUM CARBONATE 900 MG: 450 TABLET, EXTENDED RELEASE ORAL at 22:14

## 2017-12-01 RX ADMIN — ACETAMINOPHEN 650 MG: 325 TABLET, FILM COATED ORAL at 20:10

## 2017-12-01 RX ADMIN — VITAMIN D, TAB 1000IU (100/BT) 2000 UNITS: 25 TAB at 09:53

## 2017-12-01 RX ADMIN — LIDOCAINE 2 PATCH: 50 PATCH CUTANEOUS at 11:36

## 2017-12-01 RX ADMIN — ACETAMINOPHEN 650 MG: 325 TABLET, FILM COATED ORAL at 12:18

## 2017-12-01 ASSESSMENT — ACTIVITIES OF DAILY LIVING (ADL)
GROOMING: INDEPENDENT
DRESS: SCRUBS (BEHAVIORAL HEALTH)
ORAL_HYGIENE: INDEPENDENT
ORAL_HYGIENE: INDEPENDENT
LAUNDRY: UNABLE TO COMPLETE
GROOMING: INDEPENDENT
DRESS: INDEPENDENT

## 2017-12-01 NOTE — PROGRESS NOTES
Isolative to room much of shift. Pleasant and polite when out for requests, phone and meals. Ate in his room. Blunted affect, calm mood.       11/30/17 2100   Behavioral Health   Hallucinations denies / not responding to hallucinations   Thinking poor concentration;delusional   Orientation person: oriented;place: oriented   Memory baseline memory   Insight poor   Judgement impaired   Affect blunted, flat   Mood mood is calm   Physical Appearance/Attire attire appropriate to age and situation   Hygiene other (see comment)  (adequate)   Activity isolative   Speech clear;coherent   Psychomotor / Gait balanced;steady   Activities of Daily Living   Hygiene/Grooming independent   Oral Hygiene independent   Dress independent;scrubs (behavioral health)   Room Organization independent

## 2017-12-01 NOTE — PROGRESS NOTES
SPIRITUAL HEALTH SERVICES    [employer] ([facility]) [address]      REFERRAL SOURCE: verbal request of patient    Pt Tyler saw me talking with another patient yesterday, and asked to speak with me about his interest in receiving smudging while on the unit. Tyler has not be allowed to attend spirituality group and his request for smudging has been deferred until his symptoms are somewhat improved, as his delusions have strong spiritual aspects that have lead to codes in past admissions. On my encounter with him yesterday, however, Tyler was calm and conciliatory about what might be possible.    Based on conversation with his nurse on Monday, I will assess whether at this point a rosetta tea option would be possible once a week for spiritual support. Tyler was sleeping today and I was unable to connect with his nurse while on the unit.    PLAN: Follow up next week in concert with unit staff regarding Tyler's request for spiritual ritual support.                                                                                                                            Gina Garcias  Staff   Pager [phone]

## 2017-12-01 NOTE — PLAN OF CARE
Problem: Manic Symptoms  Goal: Manic Symptoms  Signs and symptoms of listed problems will be absent or manageable.   Outcome: Improving  Pt presents with impaired judgement and poor insight. Affect is blunted. Mood is calm and depressed. Pt endorses active SI with no plan. Pt contracts for safety. Pt states his depression and active SI are secondary to being on Risperidone. Pt s activity is isolative. Pt does not agree with his Tx plan. Pt explains that his appetite has been  Regular. No issues.  Pt states he is  sleeping too much from being depressed.   Pt explains that he is experiencing adverse effects to his Risperidone medication. Pt stated he is experiencing depression, aggravation and chronic SI thoughts.   Pt explains that he is experiencing acute physical ailments. Physical ailments brought up were L wrist pain. Pt states he has experienced this L wrist pain  on and off for years but it hadn t been hurting for a while until I came here.  Pt is also experiencing lower back pain. Pt attributes this to him  being taken down upon admission. My back got jacked up.  L wrist pain and back pain currently being treated with lidocaine patches and Tylenol. Pt explains that these Tx interventions are helping but that he still feels pain in both locations.   Pt denies SIB/HI. Pt denies AH and VH. Pt rates his anxiety a 4-5/10 and his depression a 5/10.

## 2017-12-01 NOTE — PLAN OF CARE
Problem: Patient Care Overview  Goal: Team Discussion  Team Plan:    BEHAVIORAL TEAM DISCUSSION    Participants: antonio navarro rn, lloyd fountain rn, cain concepcion  Progress: His mood stability has improved.  He is calm and mostly pleasant.   He becomes agitated, however, when speaking about treatment and/or aftercare.   He tries to make his points of view in a very rambling, tangential manner.   He has grandiose delusions.   He is completely mistrustful of his providers and all of psychiatry in general.   Poor to no insight.   Med compliant but barely - he refuses Risperdal Consta.   He takes the oral meds because he know he has to in order to get released from the hospital; not because he thinks he needs it.  Continued Stay Criteria/Rationale: above symptoms  Medical/Physical: per internal medicine  Precautions:   Behavioral Orders   Procedures     Assault precautions     Code 1 - Restrict to Unit     Elopement precautions     Routine Programming     As clinically indicated     Status 15     Every 15 minutes.     Withdrawal precautions     Plan:He is consistent in stating he will not do any inpt MI/CD or CD treatment.    He says he will sit through a Rule 25 eval (which would be meaningless if he is not willing to do treatment.)    He won't take Consta.   He won't consider MI day tx.   He only wants to go back to his apartment and work with his ACT team.     Rationale for change in precautions or plan:no change at this moment.      Problem: General Plan of Care (Inpatient Behavioral)  Goal: Team Discussion  Team Plan:    BEHAVIORAL TEAM DISCUSSION    Participants: antonio navarro rn, lloyd fountain rn, cain concepcion  Progress: His mood stability has improved.  He is calm and mostly pleasant.   He becomes agitated, however, when speaking about treatment and/or aftercare.   He tries to make his points of view in a very rambling, tangential manner.   He has grandiose delusions.   He is  completely mistrustful of his providers and all of psychiatry in general.   Poor to no insight.   Med compliant but barely - he refuses Risperdal Consta.   He takes the oral meds because he know he has to in order to get released from the hospital; not because he thinks he needs it.  Continued Stay Criteria/Rationale: above symptoms  Medical/Physical: per internal medicine  Precautions:   Behavioral Orders   Procedures     Assault precautions     Code 1 - Restrict to Unit     Elopement precautions     Routine Programming     As clinically indicated     Status 15     Every 15 minutes.     Withdrawal precautions     Plan:He is consistent in stating he will not do any inpt MI/CD or CD treatment.    He says he will sit through a Rule 25 eval (which would be meaningless if he is not willing to do treatment.)    He won't take Consta.   He won't consider MI day tx.   He only wants to go back to his apartment and work with his ACT team.     Rationale for change in precautions or plan:no change at this moment.

## 2017-12-01 NOTE — PROGRESS NOTES
"[facility], [place]   Psychiatric Progress Note  Hospital Day: 21        Interim History:   The patient's care was discussed with the treatment team during the daily team meeting and/or staff's chart notes were reviewed.  Staff report patient has been isolative to his room. He is pleasant and polite with requests. He is reporting ongoing back pain and left wrist pain. Continues to report anxiety and chronic SI on risperidone.     Upon interview, patient again endorsing \"chronic SI, which was the case for two years when I was on risperidone.\" He denies active SI or intent. States he can remain safe on the unit. He continues to deny that he was at imminent risk of danger prior to his admission to the hospital. Continues to decline MICD treatment. He continues to deny need for any medications. He became agitated when discussing indications for ongoing risperidone use. He currently denies side effects from Lithium. He states that he is \"my normal self, and I have been since I was given a couple of shots of Zyprexa and some sleep.\" Admits that he was not taking Lithium prior to admission, though does not feel this contributed to his sleep disturbances.       Medication side effects reported: as above.            Medications:       lithium  900 mg Oral At Bedtime     risperiDONE  2 mg Oral Daily    Or     OLANZapine  10 mg Intramuscular Daily     risperiDONE  3 mg Oral At Bedtime    Or     OLANZapine  10 mg Intramuscular At Bedtime     gabapentin  300 mg Oral At Bedtime     lidocaine   Transdermal Q24h     lidocaine   Transdermal Q8H     cholecalciferol  2,000 Units Oral Daily     multivitamin, therapeutic with minerals  1 tablet Oral Daily     fish oil-omega-3 fatty acids  1 g Oral Daily     nicotine   Transdermal Q8H     nicotine   Transdermal Daily     nicotine  1 patch Transdermal Daily          Allergies:     Allergies   Allergen Reactions     Abilify Discmelt      Suicidal thoughts       " "Compazine Other (See Comments)     Seizure           Labs:     No results found for this or any previous visit (from the past 24 hour(s)).          Psychiatric Examination:     /75 (BP Location: Left arm)  Pulse 103  Temp 97.4  F (36.3  C) (Tympanic)  Resp 16  Ht 1.676 m (5' 5.98\")  Wt 81.2 kg (179 lb)  SpO2 97%  BMI 28.91 kg/m2  Weight is 179 lbs 0 oz  Body mass index is 28.91 kg/(m^2).    Appearance: awake, alert, adequately groomed and dressed in hospital scrubs, wearing earrings  Attitude:  uncooperative, argumentative  Eye Contact:  intense  Mood:  angry  Affect:  intensity is heightened, though less labile  Speech:  Less pressured   Language: fluent and intact in English  Psychomotor, Gait, Musculoskeletal: agitation intermittently, no evidence of tardive dyskinesia, dystonia, or tics and intact station, gait and muscle tone  Throught Process: more organized, continues to be illogical and perseverative  Associations:  no loosening of associations   Thought Content:  Endorses brief, transient passive SI.Denies intent or plan.   Insight:  poor  Judgement:  Fair, improved  Oriented to:  time, person, and place  Attention Span and Concentration:  intact  Recent and Remote Memory:  intact  Fund of Knowledge:  appropriate         Precautions:     Behavioral Orders   Procedures     Assault precautions     Code 1 - Restrict to Unit     Elopement precautions     Routine Programming     As clinically indicated     Status 15     Every 15 minutes.     Withdrawal precautions          Diagnoses:      Bipolar I, recurrent manic episode, severe with psychosis  Cannabis use disorder, severe, dependence  Cocaine use disorder, moderate, dependence  Moderate benzodiazepine use disorder  Cigarette nicotine dependence with withdrawal         Assessment & Plan:     Assessment and hospital summary:  The patient is a 42-year-old man that was admitted due to increased denisha with aggressive behaviors in the setting of " "non-compliance with treatment. He initially was not agreeable to any medications but took the risperidone to avoid getting the olanzapine injection. Given Dr. Gill's conversation with his outpatient provider on 11/14, he has updated patient's diagnoses. He has significantly more substance use issues than previously documented here. She was concerned about his unsafe and inconsistent use of lithium outside of the hospital. Because of this, the decision was made to transition to risperidone. Patient now amenable to Lithium, though states that \"there will be a fight\" if Riperdal Consta is initiated.      Target psychiatric symptoms and interventions:  --Continue risperidone 2 mg daily and 3 mg QHS with IM backup of Zyprexa.   -- Continue Lithium 900 mg QHS to target suicidal ideation and for mood stabilization, and check level on Saturday, December 2nd.    Continue with nicotine replacement    Medical Problems and Treatments:  # Nutrition: nutrition has seen patient for snack requests. Continue MVI and Fish oil. Mg and B12 levels normal, no need for supplementation. Will add snacks per pt request  # Vitamin D deficiency: Continue Vit D 2000 IU daliy  # Continue Lidocaine patches for pain management    Behavioral/Psychological/Social:  - Monitor closely for emergence of active SI or plan.     Legal: PD revoked, now on full commitment with [facility]    Disposition:  Ongoing hypomania requiring stabilization in the hospital. Will coordinate with outpatient ACT team regarding safe dispo plan.      Maggi Molina MD  [facility] Psychiatry       "

## 2017-12-02 LAB — LITHIUM SERPL-SCNC: 0.74 MMOL/L (ref 0.6–1.2)

## 2017-12-02 PROCEDURE — 25000132 ZZH RX MED GY IP 250 OP 250 PS 637: Mod: GY | Performed by: STUDENT IN AN ORGANIZED HEALTH CARE EDUCATION/TRAINING PROGRAM

## 2017-12-02 PROCEDURE — 25000132 ZZH RX MED GY IP 250 OP 250 PS 637: Mod: GY | Performed by: PSYCHIATRY & NEUROLOGY

## 2017-12-02 PROCEDURE — A9270 NON-COVERED ITEM OR SERVICE: HCPCS | Mod: GY | Performed by: STUDENT IN AN ORGANIZED HEALTH CARE EDUCATION/TRAINING PROGRAM

## 2017-12-02 PROCEDURE — A9270 NON-COVERED ITEM OR SERVICE: HCPCS | Mod: GY | Performed by: PSYCHIATRY & NEUROLOGY

## 2017-12-02 PROCEDURE — 80178 ASSAY OF LITHIUM: CPT | Performed by: PSYCHIATRY & NEUROLOGY

## 2017-12-02 PROCEDURE — 36415 COLL VENOUS BLD VENIPUNCTURE: CPT | Performed by: PSYCHIATRY & NEUROLOGY

## 2017-12-02 PROCEDURE — 12400003 ZZH R&B MH CRITICAL UMMC

## 2017-12-02 RX ADMIN — MULTIPLE VITAMINS W/ MINERALS TAB 1 TABLET: TAB at 10:04

## 2017-12-02 RX ADMIN — LITHIUM CARBONATE 900 MG: 450 TABLET, EXTENDED RELEASE ORAL at 22:27

## 2017-12-02 RX ADMIN — ACETAMINOPHEN 650 MG: 325 TABLET, FILM COATED ORAL at 22:35

## 2017-12-02 RX ADMIN — VITAMIN D, TAB 1000IU (100/BT) 2000 UNITS: 25 TAB at 10:04

## 2017-12-02 RX ADMIN — Medication 1 G: at 10:05

## 2017-12-02 RX ADMIN — LIDOCAINE 2 PATCH: 50 PATCH CUTANEOUS at 11:58

## 2017-12-02 RX ADMIN — ACETAMINOPHEN 650 MG: 325 TABLET, FILM COATED ORAL at 15:23

## 2017-12-02 RX ADMIN — RISPERIDONE 2 MG: 2 TABLET ORAL at 10:04

## 2017-12-02 RX ADMIN — RISPERIDONE 3 MG: 2 TABLET ORAL at 22:26

## 2017-12-02 RX ADMIN — NICOTINE 1 PATCH: 21 PATCH, EXTENDED RELEASE TRANSDERMAL at 10:04

## 2017-12-02 RX ADMIN — GABAPENTIN 300 MG: 300 CAPSULE ORAL at 22:27

## 2017-12-02 NOTE — PROGRESS NOTES
Pt spent most of the shift in his room, laying in bed watching television, and sleeping. Pt was awakened for dinner, which he ate in the lounge. Pt made a lengthy phone call, then returned to his room, where he spent the remainder of the shift. There are no behavioral issues to report.

## 2017-12-03 PROCEDURE — 25000132 ZZH RX MED GY IP 250 OP 250 PS 637: Mod: GY | Performed by: PSYCHIATRY & NEUROLOGY

## 2017-12-03 PROCEDURE — A9270 NON-COVERED ITEM OR SERVICE: HCPCS | Mod: GY | Performed by: PSYCHIATRY & NEUROLOGY

## 2017-12-03 PROCEDURE — A9270 NON-COVERED ITEM OR SERVICE: HCPCS | Mod: GY | Performed by: STUDENT IN AN ORGANIZED HEALTH CARE EDUCATION/TRAINING PROGRAM

## 2017-12-03 PROCEDURE — 12400003 ZZH R&B MH CRITICAL UMMC

## 2017-12-03 PROCEDURE — 25000132 ZZH RX MED GY IP 250 OP 250 PS 637: Mod: GY | Performed by: STUDENT IN AN ORGANIZED HEALTH CARE EDUCATION/TRAINING PROGRAM

## 2017-12-03 RX ADMIN — ACETAMINOPHEN 650 MG: 325 TABLET, FILM COATED ORAL at 22:16

## 2017-12-03 RX ADMIN — RISPERIDONE 3 MG: 2 TABLET ORAL at 22:16

## 2017-12-03 RX ADMIN — RISPERIDONE 2 MG: 2 TABLET ORAL at 09:13

## 2017-12-03 RX ADMIN — MULTIPLE VITAMINS W/ MINERALS TAB 1 TABLET: TAB at 09:13

## 2017-12-03 RX ADMIN — VITAMIN D, TAB 1000IU (100/BT) 2000 UNITS: 25 TAB at 09:13

## 2017-12-03 RX ADMIN — Medication 1 G: at 09:13

## 2017-12-03 RX ADMIN — LITHIUM CARBONATE 900 MG: 450 TABLET, EXTENDED RELEASE ORAL at 22:16

## 2017-12-03 RX ADMIN — GABAPENTIN 300 MG: 300 CAPSULE ORAL at 22:16

## 2017-12-03 RX ADMIN — ACETAMINOPHEN 650 MG: 325 TABLET, FILM COATED ORAL at 09:17

## 2017-12-03 RX ADMIN — LIDOCAINE 2 PATCH: 50 PATCH CUTANEOUS at 14:35

## 2017-12-03 RX ADMIN — NICOTINE 1 PATCH: 21 PATCH, EXTENDED RELEASE TRANSDERMAL at 09:13

## 2017-12-03 ASSESSMENT — ACTIVITIES OF DAILY LIVING (ADL)
GROOMING: INDEPENDENT
ORAL_HYGIENE: INDEPENDENT
GROOMING: INDEPENDENT
ORAL_HYGIENE: INDEPENDENT
DRESS: SCRUBS (BEHAVIORAL HEALTH)
DRESS: SCRUBS (BEHAVIORAL HEALTH);INDEPENDENT

## 2017-12-03 NOTE — PROGRESS NOTES
"[facility], [place]   Psychiatric Progress Note  Hospital Day: 23        Interim History:   The patient's care was discussed with the treatment team during the daily team meeting and/or staff's chart notes were reviewed.  Staff report patient has been isolative to room Patient denied SI, SIB, HA, VH, and paranoia. He endorsed significant anxiety, though only mild depression. He has not made any grandiose or overtly delusional statements recently. He has not reported that Risperdal has been leading to SI recently. Lithium level therapeutic at 0.74 on 12/2. Reports wrist and back pain, though has declined non-pharmaceutical pain management interventions that have been consistently offered.    Upon interview, patient denies side effects from Lithium. He admits that he \"didn't always take [place] the way I should've taken it before\" when discussing the higher Lithium dose he was on prior to hospitalization. Of note, OP provider voiced concerns that patient was taking more than prescribed the night prior to his blood draw to falsely elevated Lithium levels. He is currently adherent, and is at a therapeutic dose. He acknowledged for the first time today that he has \"mental health diagnoses,\" and he expressed desire to meet with a \"PhD psychologist on a weekly basis to talk about those.\" Again declines Risperdal Consta, stating \"It is was one of the few decisions I have control over and I really rather not have the injection.\" He denies SI. He did not report erectile dysfunction, SI, or severe anxiety as he had during previous interviews. He noted that he last had \"a panic attack at the beginning of last week.\" He expresses desire to return home after discharge and continue working with his ACT team, including his psychiatrist.            Medications:       lithium  900 mg Oral At Bedtime     risperiDONE  2 mg Oral Daily    Or     OLANZapine  10 mg Intramuscular Daily     risperiDONE  3 mg Oral " "At Bedtime    Or     OLANZapine  10 mg Intramuscular At Bedtime     gabapentin  300 mg Oral At Bedtime     lidocaine   Transdermal Q24h     lidocaine   Transdermal Q8H     cholecalciferol  2,000 Units Oral Daily     multivitamin, therapeutic with minerals  1 tablet Oral Daily     fish oil-omega-3 fatty acids  1 g Oral Daily     nicotine   Transdermal Q8H     nicotine   Transdermal Daily     nicotine  1 patch Transdermal Daily          Allergies:     Allergies   Allergen Reactions     Abilify Discmelt      Suicidal thoughts       Compazine Other (See Comments)     Seizure           Labs:     No results found for this or any previous visit (from the past 24 hour(s)).          Psychiatric Examination:     /75 (BP Location: Left arm)  Pulse 103  Temp 97.4  F (36.3  C) (Tympanic)  Resp 16  Ht 1.676 m (5' 5.98\")  Wt 81.2 kg (179 lb)  SpO2 97%  BMI 28.91 kg/m2  Weight is 179 lbs 0 oz  Body mass index is 28.91 kg/(m^2).    Appearance: awake, alert, adequately groomed and dressed in hospital scrubs  Attitude:  More cooperative and pleasant today  Eye Contact:  Less intense  Mood:  better  Affect:  Affect constricted and mood congruent  Speech:  Less pressured   Language: fluent and intact in English  Psychomotor, Gait, Musculoskeletal: no evidence of tardive dyskinesia, dystonia, or tics and intact station, gait and muscle tone  Throught Process: more organized, linear, goal-oriented  Associations:  no loosening of associations   Thought Content:  Denies SI, HI, and SIB.  Insight:  Fair, improving  Judgement:  Fair, improved  Oriented to:  time, person, and place  Attention Span and Concentration:  intact  Recent and Remote Memory:  intact  Fund of Knowledge:  appropriate         Precautions:     Behavioral Orders   Procedures     Assault precautions     Code 1 - Restrict to Unit     Elopement precautions     Routine Programming     As clinically indicated     Status 15     Every 15 minutes.     Withdrawal " "precautions          Diagnoses:      Bipolar I, recurrent manic episode, severe with psychosis  Cannabis use disorder, severe, dependence  Cocaine use disorder, moderate, dependence  Moderate benzodiazepine use disorder  Cigarette nicotine dependence with withdrawal         Assessment & Plan:     Assessment and hospital summary:  The patient is a 42-year-old man that was admitted due to increased denisha with aggressive behaviors in the setting of non-compliance with treatment. He initially was not agreeable to any medications but took the risperidone to avoid getting the olanzapine injection. Given Dr. Gill's conversation with his outpatient provider on 11/14, he has updated patient's diagnoses. He has significantly more substance use issues than previously documented here. She was concerned about his unsafe and inconsistent use of lithium outside of the hospital. Because of this, the decision was made to transition to risperidone. Patient now amenable to Lithium, though states that \"there will be a fight\" if Riperdal Consta is initiated.      Target psychiatric symptoms and interventions:  No medication changes today  --Continue risperidone 2 mg daily and 3 mg QHS with IM backup of Zyprexa.   -- Continue Lithium 900 mg QHS to target suicidal ideation and for mood stabilization. Lithium level therapeutic at 0.74 on 12/2.    Continue with nicotine replacement    Medical Problems and Treatments:  # Nutrition: nutrition has seen patient for snack requests. Continue MVI and Fish oil. Mg and B12 levels normal, no need for supplementation. Will add snacks per pt request  # Vitamin D deficiency: Continue Vit D 2000 IU daliy  # Continue Lidocaine patches for pain management    Behavioral/Psychological/Social:  - Monitor closely for emergence of active SI or plan. Patient has denied SI over the weekend.     Legal: PD revoked, now on full commitment with [facility]    Disposition:  Patient is nearing his baseline and continues to " improve. Will coordinate with outpatient ACT team regarding safe dispo plan. Patient declining MICD treatment. Likely DC to home with ACT team visiting more frequently once patient is stable     Maggi Molina MD  [facility]

## 2017-12-03 NOTE — PLAN OF CARE
"Problem: Manic Symptoms  Goal: Manic Symptoms  Signs and symptoms of listed problems will be absent or manageable.   Outcome: Improving  Patient endorses anxiety and depressive symptoms, but he denies that he is experiencing any manic or psychotic symptoms.  He denies any SI.   His affect and eye contact are notable for increased intensity.  His speech is less pressured.  He has not made any grandiose or overtly delusional statements this shift.  He is pleasant and mostly cooperative on approach.  He has been more isolative this weekend, napping and resting in bed.  He has been less perseverative on being \"force medicated\".  He has accepted all of his scheduled medications without incident, and he has not complained of Risperdal leading to suicidal thoughts.  His lithium level came back therapeutic yesterday, 12/2/17, at \"0.74\".  He c/o back and wrist pain, requesting PRN Lidocaine patches and Tylenol to manage this pain.  He reports only getting mild relief from these pharmaceutical interventions.  He has not been interested in non-pharmaceutical pain management interventions that have been offered.       "

## 2017-12-03 NOTE — PROGRESS NOTES
"Tyler was mostly isolative in his room and when he is out in the milieu appeared minimally interactive. Pt denied SI, SIB, HA, VH, and paranoia. Pt stated that he has \"lot of anxiety\" and \"mildly depressed\". Pt didn't shower, but said that he did took one yesterday evening. Pt said his appetite is good.        12/02/17 1949   Behavioral Health   Hallucinations denies / not responding to hallucinations   Thinking other (see comment)  (Somewhat intact)   Orientation place: oriented;person: oriented;date: oriented   Memory baseline memory   Insight poor   Judgement impaired   Eye Contact at examiner   Affect full range affect   Mood depressed;anxious;mood is calm   Physical Appearance/Attire attire appropriate to age and situation   Hygiene (Adequate)   Suicidality (None)   1. Wish to be Dead No   2. Non-Specific Active Suicidal Thoughts  No   3. Active Sucidal Ideation with any Methods (Not Plan) Without Intent to Act  No   4. Active Suicidal Ideation with Some Intent to Act, Without Specific Plan  No   5. Active Suicidal Ideation with Specific Plan and Intent  No   Self Injury other (see comment)  (None)   Activity isolative;withdrawn   Speech clear;coherent   Medication Sensitivity no stated side effects;no observed side effects   Psychomotor / Gait balanced;steady   Activities of Daily Living   Hygiene/Grooming independent   Oral Hygiene independent   Dress scrubs (behavioral health)   Laundry unable to complete   Room Organization independent     "

## 2017-12-04 PROCEDURE — A9270 NON-COVERED ITEM OR SERVICE: HCPCS | Mod: GY | Performed by: PSYCHIATRY & NEUROLOGY

## 2017-12-04 PROCEDURE — 99207 ZZC CDG-MDM COMPONENT: MEETS LOW - DOWN CODED: CPT | Performed by: PSYCHIATRY & NEUROLOGY

## 2017-12-04 PROCEDURE — 12400003 ZZH R&B MH CRITICAL UMMC

## 2017-12-04 PROCEDURE — 99232 SBSQ HOSP IP/OBS MODERATE 35: CPT | Performed by: PSYCHIATRY & NEUROLOGY

## 2017-12-04 PROCEDURE — 25000132 ZZH RX MED GY IP 250 OP 250 PS 637: Mod: GY | Performed by: STUDENT IN AN ORGANIZED HEALTH CARE EDUCATION/TRAINING PROGRAM

## 2017-12-04 PROCEDURE — H2032 ACTIVITY THERAPY, PER 15 MIN: HCPCS

## 2017-12-04 PROCEDURE — A9270 NON-COVERED ITEM OR SERVICE: HCPCS | Mod: GY | Performed by: STUDENT IN AN ORGANIZED HEALTH CARE EDUCATION/TRAINING PROGRAM

## 2017-12-04 PROCEDURE — 25000132 ZZH RX MED GY IP 250 OP 250 PS 637: Mod: GY | Performed by: PSYCHIATRY & NEUROLOGY

## 2017-12-04 RX ADMIN — LITHIUM CARBONATE 900 MG: 450 TABLET, EXTENDED RELEASE ORAL at 22:04

## 2017-12-04 RX ADMIN — GABAPENTIN 300 MG: 300 CAPSULE ORAL at 22:04

## 2017-12-04 RX ADMIN — SALINE NASAL SPRAY 1 SPRAY: 1.5 SOLUTION NASAL at 19:15

## 2017-12-04 RX ADMIN — HYDROXYZINE HYDROCHLORIDE 100 MG: 50 TABLET, FILM COATED ORAL at 22:31

## 2017-12-04 RX ADMIN — LIDOCAINE 2 PATCH: 50 PATCH CUTANEOUS at 14:31

## 2017-12-04 RX ADMIN — MULTIPLE VITAMINS W/ MINERALS TAB 1 TABLET: TAB at 10:16

## 2017-12-04 RX ADMIN — NICOTINE 1 PATCH: 21 PATCH, EXTENDED RELEASE TRANSDERMAL at 10:16

## 2017-12-04 RX ADMIN — Medication 1 G: at 10:16

## 2017-12-04 RX ADMIN — RISPERIDONE 2 MG: 2 TABLET ORAL at 10:16

## 2017-12-04 RX ADMIN — VITAMIN D, TAB 1000IU (100/BT) 2000 UNITS: 25 TAB at 10:15

## 2017-12-04 RX ADMIN — RISPERIDONE 3 MG: 2 TABLET ORAL at 22:04

## 2017-12-04 ASSESSMENT — ACTIVITIES OF DAILY LIVING (ADL)
ORAL_HYGIENE: INDEPENDENT
GROOMING: INDEPENDENT
DRESS: SCRUBS (BEHAVIORAL HEALTH)
ORAL_HYGIENE: INDEPENDENT
GROOMING: INDEPENDENT
DRESS: SCRUBS (BEHAVIORAL HEALTH);INDEPENDENT

## 2017-12-04 NOTE — PROGRESS NOTES
received call back from pt's [facility] ACT team worker, Jett Hernandez.  He said they will work with pt in the community despite pt's unwillingness to do CD treatment and [facility] [place].

## 2017-12-04 NOTE — PROGRESS NOTES
"Pt denies SI/SIB. When approached by staff for check in, pt agreeable, but distracted. Pt requested to shower a few moments into check in. Pt states his emotions are \"pretty good\". Pt denies auditory and visual hallucinations. Pt on phone and in milieu much of shift. Pt did shower this shift.      12/04/17 1400   Behavioral Health   Hallucinations denies / not responding to hallucinations   Thinking distractable   Orientation person: oriented;place: oriented;date: oriented;time: oriented   Memory baseline memory   Insight poor   Judgement impaired   Eye Contact staring   Affect full range affect   Mood anxious   Physical Appearance/Attire attire appropriate to age and situation;appears stated age   Hygiene well groomed   Suicidality other (see comments)  (denies)   1. Wish to be Dead No   2. Non-Specific Active Suicidal Thoughts  No   Self Injury other (see comment)  (denies)   Elopement (no concerns noted)   Activity other (see comment)  (visible in milieu, often on phone, requests from staff)   Speech coherent;clear   Medication Sensitivity no observed side effects;no stated side effects   Psychomotor / Gait balanced;steady   Safety   Assault status 15;behavioral scrubs (rubia)   Elopement status 15;room away from unit doors;signs posted on unit entrance / exit doors   Psycho Education   Type of Intervention 1:1 intervention   Response participates with encouragement   Hours 0.5   Treatment Detail Check in   Daily Care   Activity up ad iesha   Patient Performed Hygiene shower   Activities of Daily Living   Hygiene/Grooming independent   Oral Hygiene independent   Dress scrubs (behavioral health)   Room Organization independent   Activity   Activity Assistance Provided independent     "

## 2017-12-05 PROCEDURE — A9270 NON-COVERED ITEM OR SERVICE: HCPCS | Mod: GY | Performed by: STUDENT IN AN ORGANIZED HEALTH CARE EDUCATION/TRAINING PROGRAM

## 2017-12-05 PROCEDURE — 25000132 ZZH RX MED GY IP 250 OP 250 PS 637: Mod: GY | Performed by: PSYCHIATRY & NEUROLOGY

## 2017-12-05 PROCEDURE — 99207 ZZC CDG-MDM COMPONENT: MEETS LOW - DOWN CODED: CPT | Performed by: PSYCHIATRY & NEUROLOGY

## 2017-12-05 PROCEDURE — 25000132 ZZH RX MED GY IP 250 OP 250 PS 637: Mod: GY | Performed by: STUDENT IN AN ORGANIZED HEALTH CARE EDUCATION/TRAINING PROGRAM

## 2017-12-05 PROCEDURE — 99232 SBSQ HOSP IP/OBS MODERATE 35: CPT | Performed by: PSYCHIATRY & NEUROLOGY

## 2017-12-05 PROCEDURE — A9270 NON-COVERED ITEM OR SERVICE: HCPCS | Mod: GY | Performed by: PSYCHIATRY & NEUROLOGY

## 2017-12-05 PROCEDURE — 12400003 ZZH R&B MH CRITICAL UMMC

## 2017-12-05 RX ORDER — GABAPENTIN 300 MG/1
300 CAPSULE ORAL 3 TIMES DAILY
Status: DISCONTINUED | OUTPATIENT
Start: 2017-12-05 | End: 2017-12-05

## 2017-12-05 RX ORDER — GABAPENTIN 300 MG/1
300 CAPSULE ORAL 3 TIMES DAILY
Status: DISCONTINUED | OUTPATIENT
Start: 2017-12-05 | End: 2017-12-08 | Stop reason: HOSPADM

## 2017-12-05 RX ADMIN — MULTIPLE VITAMINS W/ MINERALS TAB 1 TABLET: TAB at 10:50

## 2017-12-05 RX ADMIN — LIDOCAINE 2 PATCH: 50 PATCH CUTANEOUS at 14:49

## 2017-12-05 RX ADMIN — GABAPENTIN 300 MG: 300 CAPSULE ORAL at 21:00

## 2017-12-05 RX ADMIN — Medication 1 G: at 10:50

## 2017-12-05 RX ADMIN — LITHIUM CARBONATE 900 MG: 450 TABLET, EXTENDED RELEASE ORAL at 21:00

## 2017-12-05 RX ADMIN — GABAPENTIN 300 MG: 300 CAPSULE ORAL at 16:27

## 2017-12-05 RX ADMIN — RISPERIDONE 3 MG: 2 TABLET ORAL at 21:00

## 2017-12-05 RX ADMIN — NICOTINE 1 PATCH: 21 PATCH, EXTENDED RELEASE TRANSDERMAL at 10:51

## 2017-12-05 RX ADMIN — HYDROXYZINE HYDROCHLORIDE 100 MG: 50 TABLET, FILM COATED ORAL at 21:58

## 2017-12-05 RX ADMIN — RISPERIDONE 2 MG: 2 TABLET ORAL at 10:51

## 2017-12-05 RX ADMIN — VITAMIN D, TAB 1000IU (100/BT) 2000 UNITS: 25 TAB at 10:50

## 2017-12-05 ASSESSMENT — ACTIVITIES OF DAILY LIVING (ADL)
DRESS: SCRUBS (BEHAVIORAL HEALTH);INDEPENDENT
ORAL_HYGIENE: INDEPENDENT
ORAL_HYGIENE: INDEPENDENT
DRESS: SCRUBS (BEHAVIORAL HEALTH)
GROOMING: INDEPENDENT
LAUNDRY: WITH SUPERVISION
GROOMING: INDEPENDENT

## 2017-12-05 NOTE — PROGRESS NOTES
Pt visible in the milieu although napped for a significant portion of the shift. He ate meals, did not attend groups, and appeared anxious to discharge. Pt denies SI/SIB, hallucinations, and medication side affects. He stated he had some bad dreams last night which is why he uses marijuana to avoid them. Pt had no issues or concerns this shift.      12/05/17 1300   Behavioral Health   Hallucinations denies / not responding to hallucinations   Thinking poor concentration   Orientation person: oriented;place: oriented;date: oriented;time: oriented   Memory baseline memory   Insight poor   Judgement impaired   Eye Contact at examiner   Affect irritable;blunted, flat   Mood anxious   Physical Appearance/Attire attire appropriate to age and situation   Hygiene neglected grooming - unclean body, hair, teeth   Suicidality other (see comments)  (denies )   Self Injury other (see comment)  (denies )   Elopement Statements about wanting to leave   Activity other (see comment);withdrawn  (visible in the milieu )   Speech clear;coherent   Medication Sensitivity no stated side effects;no observed side effects   Psychomotor / Gait balanced;steady   Activities of Daily Living   Hygiene/Grooming independent   Oral Hygiene independent   Dress scrubs (behavioral health);independent   Room Organization independent

## 2017-12-05 NOTE — PROGRESS NOTES
"[facility], [place]   Psychiatric Progress Note  Hospital Day: 25        Interim History:   The patient's care was discussed with the treatment team during the daily team meeting and/or staff's chart notes were reviewed.  Staff report patient stated that he feels \"pretty good.\" Denies sx of psychosis. Did shower. He has been cooperative with staff. He continues to have poor insight into adverse effects of marijuana use. He noted increased anxiety due to his inability to use marijuana. He mentioned that \"everyone\" on his care team is \"totally okay with\" his marijuana use. He denies SI, SIB, and HI.     Upon interview, patient continues to minimize CD use. He attributes ongoing anxiety to \"being stuck here.\" He notes that cannabis has helped him with this anxiety in the past. He states that he will not agree to abstain from marijuana after leaving the hospital. He states that he met with his ACT team  who reportedly did not believe Tyler would benefit from treatment. He continued to discuss all of the perceived benefits from marijuana, and became irritable when I discussed evidence that demonstrates otherwise. He denies side effects to medications. He agreed to remain adherent following discharge. We discussed possible discharge later this week if ongoing improvement noted.           Medications:       lithium  900 mg Oral At Bedtime     risperiDONE  2 mg Oral Daily    Or     OLANZapine  10 mg Intramuscular Daily     risperiDONE  3 mg Oral At Bedtime    Or     OLANZapine  10 mg Intramuscular At Bedtime     gabapentin  300 mg Oral At Bedtime     lidocaine   Transdermal Q24h     lidocaine   Transdermal Q8H     cholecalciferol  2,000 Units Oral Daily     multivitamin, therapeutic with minerals  1 tablet Oral Daily     fish oil-omega-3 fatty acids  1 g Oral Daily     nicotine   Transdermal Q8H     nicotine   Transdermal Daily     nicotine  1 patch Transdermal Daily          " "Allergies:     Allergies   Allergen Reactions     Abilify Discmelt      Suicidal thoughts       Compazine Other (See Comments)     Seizure           Labs:     No results found for this or any previous visit (from the past 24 hour(s)).          Psychiatric Examination:     /59  Pulse 86  Temp 97.3  F (36.3  C) (Tympanic)  Resp 16  Ht 1.676 m (5' 5.98\")  Wt 81.2 kg (179 lb)  SpO2 93%  BMI 28.91 kg/m2  Weight is 179 lbs 0 oz  Body mass index is 28.91 kg/(m^2).    Appearance: awake, alert, adequately groomed and dressed in hospital scrubs  Attitude:  More cooperative  Eye Contact:  Less intense  Mood:  better  Affect:  Affect constricted and mood congruent  Speech:  Less pressured   Language: fluent and intact in English  Psychomotor, Gait, Musculoskeletal: no evidence of tardive dyskinesia, dystonia, or tics and intact station, gait and muscle tone  Throught Process: more organized, linear, goal-oriented  Associations:  no loosening of associations   Thought Content:  Denies SI, HI, and SIB.  Insight:  Fair, improving  Judgement:  Fair, improved  Oriented to:  time, person, and place  Attention Span and Concentration:  intact  Recent and Remote Memory:  intact  Fund of Knowledge:  appropriate         Precautions:     Behavioral Orders   Procedures     Assault precautions     Code 1 - Restrict to Unit     Elopement precautions     Routine Programming     As clinically indicated     Status 15     Every 15 minutes.     Withdrawal precautions          Diagnoses:      Bipolar I, recurrent manic episode, severe with psychosis  Cannabis use disorder, severe, dependence  Cocaine use disorder, moderate, dependence  Moderate benzodiazepine use disorder  Cigarette nicotine dependence with withdrawal         Assessment & Plan:     Assessment and hospital summary:  The patient is a 42-year-old man that was admitted due to increased denisha with aggressive behaviors in the setting of non-compliance with treatment. He " "initially was not agreeable to any medications but took the risperidone to avoid getting the olanzapine injection. Given Dr. Gill's conversation with his outpatient provider on 11/14, he has updated patient's diagnoses. He has significantly more substance use issues than previously documented here. She was concerned about his unsafe and inconsistent use of lithium outside of the hospital. Because of this, the decision was made to transition to risperidone. Patient now amenable to Lithium, though states that \"there will be a fight\" if Riperdal Consta is initiated.      Target psychiatric symptoms and interventions:  No medication changes today  --Continue risperidone 2 mg daily and 3 mg QHS with IM backup of Zyprexa.   -- Continue Lithium 900 mg QHS to target suicidal ideation and for mood stabilization. Lithium level therapeutic at 0.74 on 12/2.  - Increase Gabapentin to 300 mg TID to target acute anxiety    Continue with nicotine replacement    Medical Problems and Treatments:  # Nutrition: nutrition has seen patient for snack requests. Continue MVI and Fish oil. Mg and B12 levels normal, no need for supplementation. Will add snacks per pt request  # Vitamin D deficiency: Continue Vit D 2000 IU daliy  # Continue Lidocaine patches for pain management    Behavioral/Psychological/Social:  - Monitor closely for emergence of active SI or plan. Patient has denied SI over the weekend.     Legal: PD revoked, now on full commitment with [employer]    Disposition:  Patient is nearing his baseline and continues to improve. Will coordinate with outpatient ACT team regarding safe dispo plan. Patient declining MICD treatment. Likely DC to home with ACT team visiting more frequently once patient is stable. Possibly later this week or early next week.      Maggi Molina MD  [facility] Psychiat[facility]         "

## 2017-12-05 NOTE — PROGRESS NOTES
"Tyler was cooperative this evening. He stated that he was more anxious tonight, partly due to his current situation with his son (arguments, several disagreements), and partly due to his inability to use marijuana to curb his anxiety, which \"everyone on [his] careteam is totally ok with.\"    He denied any SI/SIB/HI and any AVH. His appetite is ok and he said he is sleeping alright, with some difficulty falling asleep.     12/04/17 0904   Behavioral Health   Hallucinations denies / not responding to hallucinations   Thinking poor concentration   Orientation person: oriented;place: oriented;date: oriented;time: oriented   Memory baseline memory   Insight poor   Judgement impaired   Eye Contact at examiner   Affect irritable;blunted, flat   Mood anxious;mood is calm   Physical Appearance/Attire appears stated age;attire appropriate to age and situation;untidy   Hygiene neglected grooming - unclean body, hair, teeth   Suicidality other (see comments)  (denied)   1. Wish to be Dead No   2. Non-Specific Active Suicidal Thoughts  No   Self Injury other (see comment)  (denied and none observed)   Activity other (see comment)  (visible in milieu, occassionally social, otherwise withdrawn)   Speech clear;coherent   Medication Sensitivity no stated side effects;no observed side effects   Psychomotor / Gait balanced;steady   Activities of Daily Living   Hygiene/Grooming independent   Oral Hygiene independent   Dress scrubs (behavioral health);independent   Room Organization independent     "

## 2017-12-06 PROCEDURE — A9270 NON-COVERED ITEM OR SERVICE: HCPCS | Mod: GY | Performed by: PSYCHIATRY & NEUROLOGY

## 2017-12-06 PROCEDURE — 25000132 ZZH RX MED GY IP 250 OP 250 PS 637: Mod: GY | Performed by: PSYCHIATRY & NEUROLOGY

## 2017-12-06 PROCEDURE — A9270 NON-COVERED ITEM OR SERVICE: HCPCS | Mod: GY | Performed by: STUDENT IN AN ORGANIZED HEALTH CARE EDUCATION/TRAINING PROGRAM

## 2017-12-06 PROCEDURE — 25000132 ZZH RX MED GY IP 250 OP 250 PS 637: Mod: GY | Performed by: STUDENT IN AN ORGANIZED HEALTH CARE EDUCATION/TRAINING PROGRAM

## 2017-12-06 PROCEDURE — 12400003 ZZH R&B MH CRITICAL UMMC

## 2017-12-06 RX ADMIN — Medication 1 G: at 08:34

## 2017-12-06 RX ADMIN — GABAPENTIN 300 MG: 300 CAPSULE ORAL at 08:34

## 2017-12-06 RX ADMIN — NICOTINE 1 PATCH: 21 PATCH, EXTENDED RELEASE TRANSDERMAL at 08:34

## 2017-12-06 RX ADMIN — VITAMIN D, TAB 1000IU (100/BT) 2000 UNITS: 25 TAB at 08:33

## 2017-12-06 RX ADMIN — LIDOCAINE 1 PATCH: 50 PATCH CUTANEOUS at 18:44

## 2017-12-06 RX ADMIN — RISPERIDONE 2 MG: 2 TABLET ORAL at 08:33

## 2017-12-06 RX ADMIN — GABAPENTIN 300 MG: 300 CAPSULE ORAL at 22:35

## 2017-12-06 RX ADMIN — MULTIPLE VITAMINS W/ MINERALS TAB 1 TABLET: TAB at 08:33

## 2017-12-06 RX ADMIN — RISPERIDONE 3 MG: 2 TABLET ORAL at 22:35

## 2017-12-06 RX ADMIN — GABAPENTIN 300 MG: 300 CAPSULE ORAL at 14:18

## 2017-12-06 RX ADMIN — LIDOCAINE 1 PATCH: 50 PATCH CUTANEOUS at 11:01

## 2017-12-06 RX ADMIN — ACETAMINOPHEN 650 MG: 325 TABLET, FILM COATED ORAL at 16:24

## 2017-12-06 RX ADMIN — LITHIUM CARBONATE 900 MG: 450 TABLET, EXTENDED RELEASE ORAL at 22:35

## 2017-12-06 ASSESSMENT — ACTIVITIES OF DAILY LIVING (ADL)
DRESS: SCRUBS (BEHAVIORAL HEALTH);INDEPENDENT
ORAL_HYGIENE: INDEPENDENT
GROOMING: INDEPENDENT
LAUNDRY: WITH SUPERVISION

## 2017-12-06 NOTE — PLAN OF CARE
Problem: Manic Symptoms  Goal: Manic Symptoms  Signs and symptoms of listed problems will be absent or manageable.   Outcome: No Change  Tyler had an uneventful shift. He was present and social in milieu with peers and staff, did not attend groups. Full affect, irritable in AM regarding discharge planning, but no further agitation noted today. Denies SI/SIB/AH/HI. Med compliant. Left eye appears slightly swollen, states it is soar, but denied warm/cold washcloth for it. Used lidocaine patch for left wrist, states he has some carpal tunnel like symptoms, but denies handout on stretches or extra pillow for night time positioning, states he will address it after discharge.    Still endorsed some anxiety and depression, but states it is manageable. States he feels ready to discharge to his apartment this week. Unit  will visit tomorrow to provide pt with rosetta tea. Denied further requests at this time. Will continue to monitor.     Assessment of pt's progress toward meeting careplan goals: no change.

## 2017-12-06 NOTE — PLAN OF CARE
"Problem: Manic Symptoms  Goal: Manic Symptoms  Signs and symptoms of listed problems will be absent or manageable.   Outcome: Improving    Nurse 48 Hour Note:  Pt states he feels \"good\".  Pt presents with a flat affect, but brightens on approach.  Pt was calm, cooperative, and visible in the lounge.  Pt appears slightly disheveled.  Pt is hoping to d/c on 12/8, or early next week.    Pt endorses depression \"ever since I got here, but it will go away once I am out of here\".  Pt endorses anxiety - requested PRN hydroxyzine. Pt denies suicidal ideation, self injury urges, racing thoughts, auditory hallucinations, visual hallucinations, and homicidal ideation.  Pt compliant with HS medications.    Pt states \"I will take my lithium, so I don't have to come back here\" and \"I want to have a therapist, I heard that there are some that do house visits, which would be helpful for me\".    Pt denied medical concerns.  Pt denied pain.  Pt states he is eating and sleeping well.    Will continue to monitor and assess.      "

## 2017-12-07 PROCEDURE — 99232 SBSQ HOSP IP/OBS MODERATE 35: CPT | Performed by: PSYCHIATRY & NEUROLOGY

## 2017-12-07 PROCEDURE — A9270 NON-COVERED ITEM OR SERVICE: HCPCS | Mod: GY | Performed by: PSYCHIATRY & NEUROLOGY

## 2017-12-07 PROCEDURE — 99207 ZZC CDG-MDM COMPONENT: MEETS MODERATE - UP CODED: CPT | Performed by: PSYCHIATRY & NEUROLOGY

## 2017-12-07 PROCEDURE — 25000132 ZZH RX MED GY IP 250 OP 250 PS 637: Mod: GY | Performed by: PSYCHIATRY & NEUROLOGY

## 2017-12-07 PROCEDURE — 12400003 ZZH R&B MH CRITICAL UMMC

## 2017-12-07 PROCEDURE — A9270 NON-COVERED ITEM OR SERVICE: HCPCS | Mod: GY | Performed by: STUDENT IN AN ORGANIZED HEALTH CARE EDUCATION/TRAINING PROGRAM

## 2017-12-07 PROCEDURE — 25000132 ZZH RX MED GY IP 250 OP 250 PS 637: Mod: GY | Performed by: STUDENT IN AN ORGANIZED HEALTH CARE EDUCATION/TRAINING PROGRAM

## 2017-12-07 RX ADMIN — GABAPENTIN 300 MG: 300 CAPSULE ORAL at 21:29

## 2017-12-07 RX ADMIN — NICOTINE 1 PATCH: 21 PATCH, EXTENDED RELEASE TRANSDERMAL at 09:14

## 2017-12-07 RX ADMIN — RISPERIDONE 2 MG: 2 TABLET ORAL at 09:15

## 2017-12-07 RX ADMIN — HYDROXYZINE HYDROCHLORIDE 100 MG: 50 TABLET, FILM COATED ORAL at 18:12

## 2017-12-07 RX ADMIN — MULTIPLE VITAMINS W/ MINERALS TAB 1 TABLET: TAB at 09:15

## 2017-12-07 RX ADMIN — GABAPENTIN 300 MG: 300 CAPSULE ORAL at 14:28

## 2017-12-07 RX ADMIN — LITHIUM CARBONATE 900 MG: 450 TABLET, EXTENDED RELEASE ORAL at 21:29

## 2017-12-07 RX ADMIN — VITAMIN D, TAB 1000IU (100/BT) 2000 UNITS: 25 TAB at 09:15

## 2017-12-07 RX ADMIN — Medication 1 G: at 09:15

## 2017-12-07 RX ADMIN — GABAPENTIN 300 MG: 300 CAPSULE ORAL at 09:15

## 2017-12-07 RX ADMIN — RISPERIDONE 3 MG: 2 TABLET ORAL at 21:29

## 2017-12-07 ASSESSMENT — ACTIVITIES OF DAILY LIVING (ADL)
GROOMING: INDEPENDENT
DRESS: SCRUBS (BEHAVIORAL HEALTH);INDEPENDENT
LAUNDRY: UNABLE TO COMPLETE
ORAL_HYGIENE: INDEPENDENT
DRESS: SCRUBS (BEHAVIORAL HEALTH)
HYGIENE/GROOMING: INDEPENDENT
LAUNDRY: UNABLE TO COMPLETE
ORAL_HYGIENE: INDEPENDENT

## 2017-12-07 NOTE — PROGRESS NOTES
"Patient sat in the lounge this morning talking intelligently and calmly with staff and peers about events on the TV news.  When another patient wanted to change the channel, he calmly said, \"that's OK, I'll go watch the news in my room\" and got up and left. He seemed to remain in his room for much of the shift sleeping and watching TV.  His affect was full, he smiled and laughed a bit during conversation.    "

## 2017-12-07 NOTE — PROGRESS NOTES
12/06/17 2623   Behavioral Health   Hallucinations denies / not responding to hallucinations   Thinking poor concentration   Orientation person: oriented;place: oriented;date: oriented;time: oriented   Memory baseline memory   Insight poor   Judgement impaired   Eye Contact at examiner   Affect blunted, flat   Mood mood is calm;anxious   Physical Appearance/Attire untidy;disheveled   Hygiene neglected grooming - unclean body, hair, teeth   Suicidality other (see comments)  (Denies)   Wish to be Dead Description Denies   Non-Specific Active Suicidal Thought Description Denies   Self Injury other (see comment)  (Denies)   Elopement Statements about wanting to leave   Activity restless   Speech coherent;clear   Psychomotor / Gait balanced;steady     Pt. Was Active in [facility] with other Pt. And staff. Appropriate overall throughout the shift. Healthy eating and sleeping habits but voiced concern over weight gain multiple times. Was agitated at times while on the phone. Pt. Needs to work oh his [employer] hygiene practices. Pt. Was pleasant to be around during our limited interaction throughout shift.

## 2017-12-07 NOTE — DISCHARGE INSTRUCTIONS
Behavioral Discharge Planning and Instructions      Summary:  You were admitted on 11/9/2017  due to Disorganized Thinking/Behaviors, Delusional Thoughts, Psychotic Symptomology, Homicidal Ideations/Threatening Behaviors and Agressive Behaviors.  You were treated by Dr. Abdelrahman Gill MD and by Dr Molina.     While on [facility], your Provisional Discharge was revoked on 11-14-17.     Your original commitment consisted of:  - Committed on 10-4-17 as MI to [place] and the Commissioner of Human Services.   You are also under a [facility] order for Zyprexa, Haldol, Risperdal, Seroquel.    You are being Provisionally Discharged today back to your apartment.      Principal Diagnoses:   Bipolar I, recurrent manic episode, severe with psychosis  Cannabis use disorder, severe, dependence  Cocaine use disorder, moderate, dependence  Moderate benzodiazepine use disorder      Health Care Follow-up Appointments:   - ACT Team - Psychiatrist comes to your home w/ the ACT team  Dr. Lydia Vasquez  Phone: [phone]  Fax: [phone]        - [facility] ACT Team :   Jett Hernandez  Phone: [phone]  Fax: [phone]    - You will also have an RN that will come to your home to administer your Injection     through [facility]:   [phone]  [phone]    Attend all scheduled appointments with your outpatient providers. Call at least 24 hours in advance if you need to reschedule an appointment to ensure continued access to your outpatient providers.   Major Treatments, Procedures and Findings:  You were provided with: a psychiatric assessment, assessed for medical stability, group therapy and CD evaluation/assessment    Symptoms to Report: feeling more aggressive, increased confusion, losing more sleep, mood getting worse or thoughts of suicide    Early warning signs can include: increased depression or anxiety sleep disturbances increased thoughts or behaviors of suicide or self-harm  increased  "unusual thinking, such as paranoia or hearing voices    Safety and Wellness:  Take all medicines as directed.  Make no changes unless your doctor suggests them.      Follow treatment recommendations.  Refrain from alcohol and non-prescribed drugs.  If there is a concern for safety, call 911.    Resources:   Crisis Intervention: 476.303.9196 or 842-183-5599 (TTY: 843.708.9528).  Call anytime for help.  [employer] [place] [employer] Illness (www.mn.luis.org): 756.324.1639 or 748-662-3745.  Alcoholics Anonymous (www.alcoholics-anonymous.org): Check your phone book for your local chapter.  National Suicide Prevention Line (www.mentalhealthmn.org): 240-476-LSQK (8066)  Mental Health Consumer/Survivor Network of [place] (www.mhcsn.net): 198.763.6137 or 198-565-7901  Mental Health Association of [place] (www.mentalhealth.org): 897.960.3301 or 931-819-4836  [facility] Crisis (COPE) Response - Adult 902 713-6607  Text 4 Life: txt \"LIFE\" to 86292 for immediate support and crisis intervention  Crisis text line: Text \"START\" to 998-344. Free, confidential, 24/7.      The treatment team has appreciated the opportunity to work with you and thank you for choosing the [facility]. If you have any questions or concerns our unit number is [phone].      "

## 2017-12-08 VITALS
WEIGHT: 179 LBS | SYSTOLIC BLOOD PRESSURE: 105 MMHG | TEMPERATURE: 96.8 F | HEART RATE: 73 BPM | BODY MASS INDEX: 28.77 KG/M2 | RESPIRATION RATE: 16 BRPM | DIASTOLIC BLOOD PRESSURE: 72 MMHG | HEIGHT: 66 IN | OXYGEN SATURATION: 93 %

## 2017-12-08 PROCEDURE — 25000132 ZZH RX MED GY IP 250 OP 250 PS 637: Mod: GY | Performed by: PSYCHIATRY & NEUROLOGY

## 2017-12-08 PROCEDURE — 25000132 ZZH RX MED GY IP 250 OP 250 PS 637: Mod: GY | Performed by: STUDENT IN AN ORGANIZED HEALTH CARE EDUCATION/TRAINING PROGRAM

## 2017-12-08 PROCEDURE — A9270 NON-COVERED ITEM OR SERVICE: HCPCS | Mod: GY | Performed by: PSYCHIATRY & NEUROLOGY

## 2017-12-08 PROCEDURE — A9270 NON-COVERED ITEM OR SERVICE: HCPCS | Mod: GY | Performed by: STUDENT IN AN ORGANIZED HEALTH CARE EDUCATION/TRAINING PROGRAM

## 2017-12-08 RX ORDER — HYDROXYZINE HYDROCHLORIDE 50 MG/1
50 TABLET, FILM COATED ORAL 3 TIMES DAILY PRN
Qty: 90 TABLET | Refills: 1 | Status: ON HOLD | OUTPATIENT
Start: 2017-12-08 | End: 2018-01-10

## 2017-12-08 RX ORDER — LITHIUM CARBONATE 300 MG/1
900 TABLET, FILM COATED, EXTENDED RELEASE ORAL AT BEDTIME
Qty: 90 TABLET | Refills: 1 | Status: SHIPPED | OUTPATIENT
Start: 2017-12-08 | End: 2019-11-04

## 2017-12-08 RX ORDER — GABAPENTIN 300 MG/1
300 CAPSULE ORAL 3 TIMES DAILY
Qty: 90 CAPSULE | Refills: 1 | Status: SHIPPED | OUTPATIENT
Start: 2017-12-08 | End: 2019-11-04

## 2017-12-08 RX ORDER — RISPERIDONE 2 MG/1
TABLET ORAL
Qty: 60 TABLET | Refills: 1 | Status: ON HOLD | OUTPATIENT
Start: 2017-12-08 | End: 2017-12-20

## 2017-12-08 RX ORDER — RISPERIDONE 1 MG/1
TABLET ORAL
Qty: 30 TABLET | Refills: 1 | Status: ON HOLD | OUTPATIENT
Start: 2017-12-08 | End: 2017-12-20

## 2017-12-08 RX ADMIN — Medication 1 G: at 08:51

## 2017-12-08 RX ADMIN — GABAPENTIN 300 MG: 300 CAPSULE ORAL at 08:50

## 2017-12-08 RX ADMIN — NICOTINE 1 PATCH: 21 PATCH, EXTENDED RELEASE TRANSDERMAL at 08:51

## 2017-12-08 RX ADMIN — RISPERIDONE 2 MG: 2 TABLET ORAL at 08:51

## 2017-12-08 RX ADMIN — ACETAMINOPHEN 650 MG: 325 TABLET, FILM COATED ORAL at 08:51

## 2017-12-08 RX ADMIN — VITAMIN D, TAB 1000IU (100/BT) 2000 UNITS: 25 TAB at 08:51

## 2017-12-08 RX ADMIN — MULTIPLE VITAMINS W/ MINERALS TAB 1 TABLET: TAB at 08:50

## 2017-12-08 NOTE — PROGRESS NOTES
Pt is being Provisionally Discharged home today.   PD Agreement reviewed with pt.  Pt received a copy.  I also faxed a copy to [place] [address].  His ACT team worker also received a copy.  There is a copy in the chart to be scanned into EPIC.

## 2017-12-08 NOTE — PROGRESS NOTES
[facility], [place]   Psychiatric Progress Note  Hospital Day: 27        Interim History:   The patient's care was discussed with the treatment team during the daily team meeting and/or staff's chart notes were reviewed.  Staff report patient denied SI/SIB, hallucinations, and medication side effects.  He appeared to have better insight into the events leading to his hospitalization.  He agreed to remain adherent to lithium so that he does not have to return to the hospital.  He expressed desire to engage in therapy after discharge.  Staff noticed poor hygiene.  Patient was pleasant and cooperative with staff.  No behavioral issues.  He is sleeping well.    Upon interview, patient states that he is aware that he was experiencing denisha prior to hospitalization, and attributes this to medication nonadherence with regards to lithium.  He expressed some concerns about his ability to make rational decisions as he noted that he was opening up his home to friends that he now realizes were not trustworthy.  He states that he recently had some conflict with his son after his son allowed his friend to stay in the patient's apartment while the patient was hospitalized.  He notes that since that time, his parents have gone to his apartment and ensured that it is secure.  He denies suicidal thoughts, and notes that they have lessened significantly since lithium was initiated.  He expresses desire to discharge tomorrow.  He agrees to take his medications consistently after discharge, and expresses understanding of the risks associated with medication nonadherence.  He agrees to actively participate in his care and meet with the act team regularly.  He notes overall reduction in his anxiety since addition of scheduled gabapentin.  He denies side effects to medications.         Medications:       gabapentin  300 mg Oral TID     lithium  900 mg Oral At Bedtime     risperiDONE  2 mg Oral Daily    Or      "OLANZapine  10 mg Intramuscular Daily     risperiDONE  3 mg Oral At Bedtime    Or     OLANZapine  10 mg Intramuscular At Bedtime     lidocaine   Transdermal Q24h     lidocaine   Transdermal Q8H     cholecalciferol  2,000 Units Oral Daily     multivitamin, therapeutic with minerals  1 tablet Oral Daily     fish oil-omega-3 fatty acids  1 g Oral Daily     nicotine   Transdermal Q8H     nicotine   Transdermal Daily     nicotine  1 patch Transdermal Daily          Allergies:     Allergies   Allergen Reactions     Abilify Discmelt      Suicidal thoughts       Compazine Other (See Comments)     Seizure           Labs:     No results found for this or any previous visit (from the past 24 hour(s)).          Psychiatric Examination:     /72  Pulse 73  Temp 96.8  F (36  C) (Tympanic)  Resp 16  Ht 1.676 m (5' 5.98\")  Wt 81.2 kg (179 lb)  SpO2 93%  BMI 28.91 kg/m2  Weight is 179 lbs 0 oz  Body mass index is 28.91 kg/(m^2).    Appearance: awake, alert, adequately groomed and dressed in hospital scrubs  Attitude:  More cooperative  Eye Contact:  Less intense  Mood:  better  Affect:  Affect constricted and mood congruent  Speech:  Less pressured   Language: fluent and intact in English  Psychomotor, Gait, Musculoskeletal: no evidence of tardive dyskinesia, dystonia, or tics and intact station, gait and muscle tone  Throught Process: more organized, linear, goal-oriented  Associations:  no loosening of associations   Thought Content:  Denies SI, HI, and SIB.  Insight:  Fair, improving daily  Judgement:  Fair, improved  Oriented to:  time, person, and place  Attention Span and Concentration:  intact  Recent and Remote Memory:  intact  Fund of Knowledge:  appropriate         Precautions:     Behavioral Orders   Procedures     Assault precautions     Code 1 - Restrict to Unit     Elopement precautions     Routine Programming     As clinically indicated     Status 15     Every 15 minutes.     Withdrawal precautions         " " Diagnoses:      Bipolar I, recurrent manic episode, severe with psychosis  Cannabis use disorder, severe, dependence  Cocaine use disorder, moderate, dependence  Moderate benzodiazepine use disorder  Cigarette nicotine dependence with withdrawal         Assessment & Plan:     Assessment and hospital summary:  The patient is a 42-year-old man that was admitted due to increased denisha with aggressive behaviors in the setting of non-compliance with treatment. He initially was not agreeable to any medications but took the risperidone to avoid getting the olanzapine injection. Given Dr. Gill's conversation with his outpatient provider on 11/14, he has updated patient's diagnoses. He has significantly more substance use issues than previously documented here. She was concerned about his unsafe and inconsistent use of lithium outside of the hospital. Because of this, the decision was made to transition to risperidone. Patient now amenable to Lithium, though states that \"there will be a fight\" if Riperdal Consta is initiated.      Target psychiatric symptoms and interventions:  No medication changes today  --Continue risperidone 2 mg daily and 3 mg QHS with IM backup of Zyprexa.   -- Continue Lithium 900 mg QHS to target suicidal ideation and for mood stabilization. Lithium level therapeutic at 0.74 on 12/2.  - Continue gabapentin 300 mg TID to target acute anxiety    Continue with nicotine replacement    Medical Problems and Treatments:  # Nutrition: nutrition has seen patient for snack requests. Continue MVI and Fish oil. Mg and B12 levels normal, no need for supplementation. Will add snacks per pt request  # Vitamin D deficiency: Continue Vit D 2000 IU daliy  # Continue Lidocaine patches for pain management    Behavioral/Psychological/Social:  Continue to encourage participation in groups  Continue to encourage good hygiene    Legal: PD revoked, now on full commitment with [facility]    Disposition:  Patient is nearing his " baseline and continues to improve.  A member of patients act team will be picking patient up at 11:30 AM on December 8 with plan to discharge at that time.     Maggi Molina MD  [facility]

## 2017-12-08 NOTE — PROGRESS NOTES
12/07/17 2143   Behavioral Health   Hallucinations denies / not responding to hallucinations   Thinking intact   Orientation person: oriented;place: oriented   Memory baseline memory   Insight poor   Judgement impaired   Eye Contact at examiner   Affect full range affect   Mood mood is calm   Physical Appearance/Attire untidy;disheveled   Hygiene neglected grooming - unclean body, hair, teeth   Suicidality other (see comments)  (denies)   Self Injury other (see comment)  (denies)   Elopement Statements about wanting to leave   Activity withdrawn;other (see comment)  (quiet but sociable)   Speech clear;coherent   Medication Sensitivity no stated side effects;no observed side effects   Psychomotor / Gait balanced;steady   Overt Aggression Scale   Verbal Aggression 0   Aggression against Property 0   Auto-Aggression 0   Physical Aggression 0   Overt Aggression Total Score 0   Activities of Daily Living   Hygiene/Grooming independent   Oral Hygiene independent   Dress scrubs (behavioral health);independent   Laundry unable to complete   Room Organization independent     Pt was sociable in the lounge, if a little quiet and withdrawn. Pt denied SI and SIB. Pt stated he was ready to leave, is looking forward to leaving and feeling good about everything. Pt stated he is a lifelong marijuana user, but that he was going to try to stay away from other drugs. No issues throughout shift.

## 2017-12-08 NOTE — PLAN OF CARE
Problem: Patient Care Overview  Goal: Team Discussion  Team Plan:    BEHAVIORAL TEAM DISCUSSION    Participants:    chay navarro rn, cain concepcion  Progress: good progress.  Pt is ready for dc today  Continued Stay Criteria/Rationale: none.  Pt is being discharged today.  Medical/Physical: per internal medicine  Precautions:   Behavioral Orders   Procedures     Assault precautions     Code 1 - Restrict to Unit     Elopement precautions     Routine Programming     As clinically indicated     Status 15     Every 15 minutes.     Withdrawal precautions     Plan: The plan is for patient to be Provisionally Discharged today back to his apartment.   He will resume working with his ACT team.    He refused CD tx.   He refused mental health day tx.  Rationale for change in precautions or plan: no change at this moment.      Problem: General Plan of Care (Inpatient Behavioral)  Goal: Team Discussion  Team Plan:    BEHAVIORAL TEAM DISCUSSION    Participants:    chay navarro rn, cain concepcion  Progress: good progress.  Pt is ready for dc today  Continued Stay Criteria/Rationale: none.  Pt is being discharged today.  Medical/Physical: per internal medicine  Precautions:   Behavioral Orders   Procedures     Assault precautions     Code 1 - Restrict to Unit     Elopement precautions     Routine Programming     As clinically indicated     Status 15     Every 15 minutes.     Withdrawal precautions     Plan: The plan is for patient to be Provisionally Discharged today back to his apartment.   He will resume working with his ACT team.    He refused CD tx.   He refused mental health day tx.  Rationale for change in precautions or plan: no change at this moment.

## 2017-12-09 NOTE — DISCHARGE SUMMARY
"Psychiatric Discharge Summary    Tyler Case MRN# [id number]   Age: 42 year old YOB: 1975     Date of Admission:  11/9/2017  Date of Discharge:  12/8/2017 12:00 PM  Admitting Physician:  Jennifer Molina MD  Discharge Physician:  Jennifer Molina MD (Contact: [phone])         Event Leading to Hospitalization:   Per H&P:    \"Patient is a 42-year-old man who was discharged from [facility] on 10/10/2017.  He presented to our emergency room with manic behavior.  He was agitated.  He is noncompliant with his prescribed lithium.  He was quite agitated in the ER, required restraints, code 21 to be called as well as IM medications.  His outpatient provider had indicated that she is unable to manage him due to his repeated noncompliance with medications.       When I attempted to meet with the patient, he simply started screaming at me and told me to go away.  He claimed that I am part of the problem, that I am poisoning him, that he refuses to take any medications and denies that he is mentally ill.  This is very consistent with his past presentation where he was admitted on 09/24/2017.  At that point in time, he claimed that Psychiatry was a cult and that the FDA approved medications they knew were poisonous in order to wipe out the human species.  He continues to make similar-sounding paranoid statements and is completely uncooperative.  After I had met with him, patient continued to be agitated; he is swearing at staff.  He declined medications.  A code was called.  When other staff arrived and responded to the code, he finally agreed to take medications and he returned to his room.\"            See Admission note by Dr. Abdelrahman Gill on 11/10/17 for additional details.          Diagnoses:     Bipolar I, most recent episode severe denisha with psychosis  Cannabis use disorder, severe, dependence  Cocaine use disorder, moderate, dependence  Moderate benzodiazepine use " disorder  Cigarette nicotine dependence with withdrawal          Labs:     Recent Results (from the past 672 hour(s))   CBC with platelets differential    Collection Time: 11/15/17  7:36 AM   Result Value Ref Range    WBC 8.8 4.0 - 11.0 10e9/L    RBC Count 5.03 4.4 - 5.9 10e12/L    Hemoglobin 14.5 13.3 - 17.7 g/dL    Hematocrit 44.0 40.0 - 53.0 %    MCV 88 78 - 100 fl    MCH 28.8 26.5 - 33.0 pg    MCHC 33.0 31.5 - 36.5 g/dL    RDW 13.2 10.0 - 15.0 %    Platelet Count 226 150 - 450 10e9/L    Diff Method Automated Method     % Neutrophils 43.9 %    % Lymphocytes 42.9 %    % Monocytes 9.1 %    % Eosinophils 2.9 %    % Basophils 0.6 %    % Immature Granulocytes 0.6 %    Nucleated RBCs 0 0 /100    Absolute Neutrophil 3.9 1.6 - 8.3 10e9/L    Absolute Lymphocytes 3.8 0.8 - 5.3 10e9/L    Absolute Monocytes 0.8 0.0 - 1.3 10e9/L    Absolute Eosinophils 0.3 0.0 - 0.7 10e9/L    Absolute Basophils 0.1 0.0 - 0.2 10e9/L    Abs Immature Granulocytes 0.1 0 - 0.4 10e9/L    Absolute Nucleated RBC 0.0    Comprehensive metabolic panel    Collection Time: 11/15/17  7:36 AM   Result Value Ref Range    Sodium 134 133 - 144 mmol/L    Potassium 4.9 3.4 - 5.3 mmol/L    Chloride 101 94 - 109 mmol/L    Carbon Dioxide 21 20 - 32 mmol/L    Anion Gap 12 3 - 14 mmol/L    Glucose 116 (H) 70 - 99 mg/dL    Urea Nitrogen 15 7 - 30 mg/dL    Creatinine 0.75 0.66 - 1.25 mg/dL    GFR Estimate >90 >60 mL/min/1.7m2    GFR Estimate If Black >90 >60 mL/min/1.7m2    Calcium 8.8 8.5 - 10.1 mg/dL    Bilirubin Total 0.2 0.2 - 1.3 mg/dL    Albumin 3.6 3.4 - 5.0 g/dL    Protein Total 7.1 6.8 - 8.8 g/dL    Alkaline Phosphatase 77 40 - 150 U/L    ALT 64 0 - 70 U/L    AST 35 0 - 45 U/L   Magnesium    Collection Time: 11/15/17  7:36 AM   Result Value Ref Range    Magnesium 2.2 1.6 - 2.3 mg/dL   Vitamin B12    Collection Time: 11/15/17  7:36 AM   Result Value Ref Range    Vitamin B12 478 193 - 986 pg/mL   Vitamin D    Collection Time: 11/15/17  7:36 AM   Result Value Ref  "Range    Vitamin D Deficiency screening 12 (L) 20 - 75 ug/L   Lithium level    Collection Time: 12/02/17  8:49 AM   Result Value Ref Range    Lithium Level 0.74 0.60 - 1.20 mmol/L          Consults:   No consultations were requested during this admission         Hospital Course:   Tyler Case was admitted to [facility] with attending Dr. Abdelrahman iGll on a 72 hour mental health hold. Outpatient ACT team revoked PD. The patient was placed under status 15 (15 minute checks) to ensure patient safety.     On admission, patient was exhibited signs and symptoms consistent with severe denisha with psychosis in context of medication non-adherence. He was initially unwilling to restart Lithium and thus decision was made to start Risperidone with tentative plan to transition to Risperdal Consta. He was also placed on a phenobarbital taper given recent history of benzodiazepine withdrawal. Patient's outpatient ACT team psychiatrist expressed concerns about Benzodiazepine abuse and thus Clonazepam was discontinued.     On 11/17, patient requested new physician and thus his care was transferred to me. At that time, he had been titrated to risperidone dose of 2 mg BID. He also completed phenobarbital taper. Given ongoing evidence of denisha, Risperdal was increased to current dose of 5 mg daily on 11/24. In the days following, symptoms of denisha improved though patient reported perceived side effects of \"chronic suicidal ideation, severe social anxiety, and erectile dysfunction.\" He expressed strong desire to slowly taper and discontinue risperidone. We discussed alternative neuroleptic options though he declined all other options. On 11/27, he was amenable to reinitiation of Lithium to target reported suicidal thoughts and with plan to eventually taper risperidone. Lithium level was therapeutic at 0.74 (corresponding dose of 900 mg QHS) on 12/2. It was not increased given patient's report of diarrhea at higher doses and given " resolution of manic symptoms. He did not report any side effects associated with Lithium. Gabapentin 300 mg TID was also added to target acute anxiety. Patient frequently utilized hydroxyzine as well for anxiety. In the days following these changes, patient denied SI, demonstrated improvement insight into his mental illness and role of illicit drugs in events leading to hospitalization (w/exception of marijuana) and medication non-adherence, and became more cooperative and pleasant with staff. He adamantly declined Risperdal Consta and continues to express strong desire to taper slowly under the close supervision of his outpatient psychiatrist.    We attempted to pursue MICD treatment as patient would certainly benefit from this intervention, however, he was adamantly opposed to this and was not willing to participate in this process for that reason. His ACT team members visited patient regularly, and stated that they will work with patient in the community despite patient's unwillingness to engage in CD and/or MI day treatment. Per patient, ACT team has arranged for patient to begin psychotherapy to further improve insight regarding MICD issues.     Tyler Case did participate in groups and was visible in the milieu.     The patient's symptoms of denisha resolved and intermittent SI completely resolved.    Tyler Case was released to home with ongoing ACT team involvement. At the time of discharge Tyler Case was determined to not be a danger to himself or others.     SUICIDE RISK ASSESSMENT:  Today Tyler Case denies SI, SIB, and HI.  He has notable risk factors for self-harm including recent inpatient hospitalization, lack of MICD treatment and thus at higher risk of substance relapse, and history of medication non-adherence..  However, risk is mitigated by no h/o suicide attempt, no plan or intent, no access to lethal means, describes a safety plan, symptom improvement, future oriented, feeling  hopeful, commitment to family, good social support  , Yazdanism beliefs, stable housing and wilingness and desire to participate in psychotherapy following discharge.  Based on all available evidence he does not appear to be at imminent risk for self-harm therefore does not meet criteria for ongoing involuntary hospitalization.  However, based on degree of symptoms substance use treatment was strongly recommended which the pt did not agree to. Patient also agreed to call 911/present to ED if any imminent safety concerns arise, including re-emergence of SI. Patient was provided with crisis resources at the time of discharge. Patient agreed to further reduce risk of self-harm by completely abstaining from illicit substances (with exception of marijuana as patient states he will continue to use long-term) and alcohol, and agreed to remain medication adherent. Expressed understanding of the risks associated with alcohol use, illicit drug use, and medication non-adherence.           Discharge Medications:     Discharge Medication List as of 12/8/2017 12:01 PM      START taking these medications    Details   gabapentin (NEURONTIN) 300 MG capsule Take 1 capsule (300 mg) by mouth 3 times daily, Disp-90 capsule, R-1, E-Prescribe      cholecalciferol 2000 UNITS tablet Take 2,000 Units by mouth daily, Disp-30 tablet, No Print Out         CONTINUE these medications which have CHANGED    Details   hydrOXYzine (ATARAX) 50 MG tablet Take 1 tablet (50 mg) by mouth 3 times daily as needed for anxiety, Disp-90 tablet, R-1, E-Prescribe      lithium (ESKALITH/LITHOBID) 300 MG CR tablet Take 3 tablets (900 mg) by mouth At Bedtime, Disp-90 tablet, R-1, E-Prescribe      !! risperiDONE (RISPERDAL) 1 MG tablet Take one 2 mg tablet in the morning and one 2 mg tablet at bedtime  Take one 1 mg tablet at bedtime   For a total daily dose of 5 mg, Disp-30 tablet, R-1, E-Prescribe      !! risperiDONE (RISPERDAL) 2 MG tablet Take one 2 mg tablet in  the morning and one 2 mg tablet at bedtime  Take one 1 mg tablet at bedtime   For a total daily dose of 5 mg, Disp-60 tablet, R-1, E-Prescribe       !! - Potential duplicate medications found. Please discuss with provider.      CONTINUE these medications which have NOT CHANGED    Details   lidocaine (XYLOCAINE) 2 % topical gel Apply topically to left knee three times per dayHistorical         STOP taking these medications       nicotine (NICODERM CQ) 21 MG/24HR 24 hr patch Comments:   Reason for Stopping:         nicotine polacrilex (NICORETTE) 4 MG gum Comments:   Reason for Stopping:         clonazePAM (KLONOPIN) 1 MG tablet Comments:   Reason for Stopping:             Note: Patient declined nicotine replacement         Psychiatric Examination:     Appearance: awake, alert, adequately groomed   Attitude:  cooperative and pleasant  Eye Contact:  good  Mood:  better  Affect:  affect full range and mood congruent  Speech:  Clear, coherent, nonpressured  Language: fluent and intact in English  Psychomotor, Gait, Musculoskeletal: no evidence of tardive dyskinesia, dystonia, or tics and intact station, gait and muscle tone  Throught Process: organized, linear, logical, goal-oriented  Associations:  no loosening of associations   Thought Content:  Denies SI, HI, and SIB.  Insight:  intact (discussed openly the dangerousness of his behavior in the context of denisha and the significant negative impact cocaine use has had on his MH and overall wellbeing)  Judgement:  intact  Oriented to:  time, person, and place  Attention Span and Concentration:  intact  Recent and Remote Memory:  intact  Fund of Knowledge:  appropriate         Discharge Plan:      Health Care Follow-up Appointments:   - ACT Team - Psychiatrist comes to your home w/ the ACT team  Dr. Lydia Vasquez  Phone: [phone]  Fax: [phone]      - [facility] ACT Team :   Jett Hernandez  Phone: [phone]  Fax: [phone]     - You will also  have an RN that will come to your home to administer your Injection    Attestation:  The patient has been seen and evaluated by me,  Jennifer Molina MD

## 2017-12-19 ENCOUNTER — TRANSFERRED RECORDS (OUTPATIENT)
Dept: HEALTH INFORMATION MANAGEMENT | Facility: CLINIC | Age: 42
End: 2017-12-19

## 2017-12-20 ENCOUNTER — HOSPITAL ENCOUNTER (INPATIENT)
Facility: CLINIC | Age: 42
LOS: 21 days | Discharge: HOME HEALTH | DRG: 885 | End: 2018-01-10
Attending: EMERGENCY MEDICINE | Admitting: PSYCHIATRY & NEUROLOGY
Payer: MEDICARE

## 2017-12-20 DIAGNOSIS — F31.9 BIPOLAR 1 DISORDER (H): ICD-10-CM

## 2017-12-20 DIAGNOSIS — F60.89 CHRONIC HYPOMANIC PERSONALITY DISORDER (H): ICD-10-CM

## 2017-12-20 DIAGNOSIS — F12.20 CANNABIS USE DISORDER, SEVERE, DEPENDENCE (H): ICD-10-CM

## 2017-12-20 DIAGNOSIS — F31.2 SEVERE MANIC BIPOLAR I DISORDER WITH PSYCHOTIC FEATURES (H): ICD-10-CM

## 2017-12-20 DIAGNOSIS — F19.10 POLYSUBSTANCE ABUSE (H): ICD-10-CM

## 2017-12-20 DIAGNOSIS — G47.00 INSOMNIA, UNSPECIFIED TYPE: ICD-10-CM

## 2017-12-20 DIAGNOSIS — F25.0 SCHIZOAFFECTIVE DISORDER, BIPOLAR TYPE (H): ICD-10-CM

## 2017-12-20 DIAGNOSIS — F15.20 AMPHETAMINE DEPENDENCE (H): ICD-10-CM

## 2017-12-20 DIAGNOSIS — F41.9 ANXIETY: Primary | ICD-10-CM

## 2017-12-20 PROBLEM — F30.10 MANIC BEHAVIOR (H): Status: ACTIVE | Noted: 2017-12-20

## 2017-12-20 LAB
AMPHETAMINES UR QL SCN: NEGATIVE
BARBITURATES UR QL: NEGATIVE
BENZODIAZ UR QL: NEGATIVE
CANNABINOIDS UR QL SCN: POSITIVE
COCAINE UR QL: NEGATIVE
ETHANOL UR QL SCN: NEGATIVE
OPIATES UR QL SCN: NEGATIVE

## 2017-12-20 PROCEDURE — A9270 NON-COVERED ITEM OR SERVICE: HCPCS | Mod: GY | Performed by: PSYCHIATRY & NEUROLOGY

## 2017-12-20 PROCEDURE — 90791 PSYCH DIAGNOSTIC EVALUATION: CPT

## 2017-12-20 PROCEDURE — 99285 EMERGENCY DEPT VISIT HI MDM: CPT | Mod: 25 | Performed by: EMERGENCY MEDICINE

## 2017-12-20 PROCEDURE — 12400001 ZZH R&B MH UMMC

## 2017-12-20 PROCEDURE — 80307 DRUG TEST PRSMV CHEM ANLYZR: CPT | Performed by: FAMILY MEDICINE

## 2017-12-20 PROCEDURE — 80320 DRUG SCREEN QUANTALCOHOLS: CPT | Performed by: FAMILY MEDICINE

## 2017-12-20 PROCEDURE — 25000132 ZZH RX MED GY IP 250 OP 250 PS 637: Mod: GY | Performed by: PSYCHIATRY & NEUROLOGY

## 2017-12-20 PROCEDURE — 99283 EMERGENCY DEPT VISIT LOW MDM: CPT | Mod: Z6 | Performed by: EMERGENCY MEDICINE

## 2017-12-20 RX ORDER — OLANZAPINE 10 MG/1
10 TABLET ORAL
Status: DISCONTINUED | OUTPATIENT
Start: 2017-12-20 | End: 2018-01-10 | Stop reason: HOSPADM

## 2017-12-20 RX ORDER — LITHIUM CARBONATE 450 MG
900 TABLET, EXTENDED RELEASE ORAL AT BEDTIME
Status: DISCONTINUED | OUTPATIENT
Start: 2017-12-20 | End: 2018-01-10 | Stop reason: HOSPADM

## 2017-12-20 RX ORDER — ALUMINA, MAGNESIA, AND SIMETHICONE 2400; 2400; 240 MG/30ML; MG/30ML; MG/30ML
30 SUSPENSION ORAL EVERY 4 HOURS PRN
Status: DISCONTINUED | OUTPATIENT
Start: 2017-12-20 | End: 2018-01-10 | Stop reason: HOSPADM

## 2017-12-20 RX ORDER — RISPERIDONE 2 MG/1
2 TABLET ORAL DAILY
Status: DISCONTINUED | OUTPATIENT
Start: 2017-12-21 | End: 2017-12-21

## 2017-12-20 RX ORDER — OLANZAPINE 10 MG/2ML
10 INJECTION, POWDER, FOR SOLUTION INTRAMUSCULAR
Status: DISCONTINUED | OUTPATIENT
Start: 2017-12-20 | End: 2018-01-10 | Stop reason: HOSPADM

## 2017-12-20 RX ORDER — HYDROXYZINE HYDROCHLORIDE 25 MG/1
25-50 TABLET, FILM COATED ORAL EVERY 4 HOURS PRN
Status: DISCONTINUED | OUTPATIENT
Start: 2017-12-20 | End: 2018-01-10 | Stop reason: HOSPADM

## 2017-12-20 RX ORDER — TRAZODONE HYDROCHLORIDE 50 MG/1
50 TABLET, FILM COATED ORAL
Status: DISCONTINUED | OUTPATIENT
Start: 2017-12-20 | End: 2018-01-10 | Stop reason: HOSPADM

## 2017-12-20 RX ORDER — BISACODYL 10 MG
10 SUPPOSITORY, RECTAL RECTAL DAILY PRN
Status: DISCONTINUED | OUTPATIENT
Start: 2017-12-20 | End: 2018-01-10 | Stop reason: HOSPADM

## 2017-12-20 RX ORDER — ACETAMINOPHEN 325 MG/1
650 TABLET ORAL EVERY 4 HOURS PRN
Status: DISCONTINUED | OUTPATIENT
Start: 2017-12-20 | End: 2018-01-10 | Stop reason: HOSPADM

## 2017-12-20 RX ORDER — GABAPENTIN 300 MG/1
300 CAPSULE ORAL 3 TIMES DAILY
Status: DISCONTINUED | OUTPATIENT
Start: 2017-12-20 | End: 2018-01-10 | Stop reason: HOSPADM

## 2017-12-20 RX ADMIN — LITHIUM CARBONATE 900 MG: 450 TABLET, EXTENDED RELEASE ORAL at 21:44

## 2017-12-20 ASSESSMENT — ENCOUNTER SYMPTOMS: HALLUCINATIONS: 0

## 2017-12-20 NOTE — IP AVS SNAPSHOT
[address] [address]    Phone:  [phone]                                       After Visit Summary   12/20/2017    Tyler Case    MRN: [id number]           After Visit Summary Signature Page     I have received my discharge instructions, and my questions have been answered. I have discussed any challenges I see with this plan with the nurse or doctor.    ..........................................................................................................................................  Patient/Patient Representative Signature      ..........................................................................................................................................  Patient Representative Print Name and Relationship to Patient    ..................................................               ................................................  Date                                            Time    ..........................................................................................................................................  Reviewed by Signature/Title    ...................................................              ..............................................  Date                                                            Time

## 2017-12-20 NOTE — ED NOTES
Pt was brought here under Ex parte order for emergency return to facility by [place] [address], probate/mental health division.  He was brought here by 2 .    Paper work states his  is Olaf Fernandez who applied to hospitalse court on 12/19/17 for an order directing patienb to be returned to [facility]/ [facility].  HE violated material  Conditions of his provisional discharge from [facility] on 12/8/17 for felony possession of controlled substance and DWI for controlled substance.  Also, he made a number of threats to harm his family.    So far, patient has been cooperative, but he can get aggitated easily if provoked.  Speech can be loud and rapid.

## 2017-12-20 NOTE — ED PROVIDER NOTES
History     Chief Complaint   Patient presents with     Psychiatric Evaluation     HPI  Tyler Case is a 42 year old male with a history of bipolar disorder and substance abuse who presents for psychiatric evaluation and admission. Patient was discharged from the Psychiatry Service on 12/08/17 under provisional discharge from his commitment here at [facility]. Yesterday, 12/18/17, patient was arrested for felony possession of a controlled substance and DWI and taken to [facility] custodial. This arrest is a violation of his provisional discharge, so his  applied for ex parte and emergency order return. Patient reportedly has been using marijuana and methamphetamines, and making threats towards his family. Patient believes he is a medicine man and thinks the apprupt [place] are after him.     I have reviewed the Medications, Allergies, Past Medical and Surgical History, and Social History in the [facility] system.  Past Medical History:   Diagnosis Date     Bipolar 1 disorder (H) 8/5/2013     Uses marijuana 10/17/2013       Past Surgical History:   Procedure Laterality Date     leg fractures  2002       Family History   Problem Relation Age of Onset     DIABETES Maternal Grandmother      Hypertension Paternal Grandfather      CEREBROVASCULAR DISEASE Paternal Grandmother      Alcohol/Drug Mother      Alcohol/Drug Father      Alcohol/Drug Maternal Grandmother      Alcohol/Drug Maternal Grandfather      Alcohol/Drug Paternal Grandmother      Alcohol/Drug Paternal Grandfather      Alcohol/Drug Sister      Alcohol/Drug Brother      Alzheimer Disease Maternal Grandmother      Arthritis Maternal Grandmother      Arthritis Maternal Grandfather      Depression Mother      Depression Father      Depression Maternal Grandmother      Depression Maternal Grandfather      Eye Disorder Mother      Eye Disorder Father      Eye Disorder Maternal Grandmother      Eye Disorder Maternal Grandfather      Eye Disorder  Paternal Grandmother      Eye Disorder Paternal Grandfather      Eye Disorder Sister      Eye Disorder Brother      GASTROINTESTINAL DISEASE Mother      GASTROINTESTINAL DISEASE Father      GASTROINTESTINAL DISEASE Maternal Grandmother      GASTROINTESTINAL DISEASE Maternal Grandfather      GASTROINTESTINAL DISEASE Paternal Grandmother      GASTROINTESTINAL DISEASE Paternal Grandfather      GASTROINTESTINAL DISEASE Sister      GASTROINTESTINAL DISEASE Brother      Neurologic Disorder Mother      Neurologic Disorder Father      Neurologic Disorder Maternal Grandmother      Neurologic Disorder Maternal Grandfather      Neurologic Disorder Paternal Grandmother      Neurologic Disorder Paternal Grandfather      Neurologic Disorder Sister      Neurologic Disorder Brother      Lipids Maternal Grandfather      Lipids Paternal Grandmother      OSTEOPOROSIS Maternal Grandmother      OSTEOPOROSIS Paternal Grandmother      Psychotic Disorder Mother      Psychotic Disorder Father      Psychotic Disorder Maternal Grandmother      Psychotic Disorder Maternal Grandfather      Psychotic Disorder Paternal Grandmother      Psychotic Disorder Paternal Grandfather      Psychotic Disorder Sister      Psychotic Disorder Brother        Social History   Substance Use Topics     Smoking status: Current Every Day Smoker     Types: Cigarettes, Pipe     Smokeless tobacco: Current User     Types: Snuff, Chew     Alcohol use No       No current facility-administered medications for this encounter.      Current Outpatient Prescriptions   Medication     hydrOXYzine (ATARAX) 50 MG tablet     gabapentin (NEURONTIN) 300 MG capsule     lithium (ESKALITH/LITHOBID) 300 MG CR tablet     cholecalciferol 2000 UNITS tablet     lidocaine (XYLOCAINE) 2 % topical gel     risperiDONE (RISPERDAL) 1 MG tablet     risperiDONE (RISPERDAL) 2 MG tablet        Allergies   Allergen Reactions     Abilify Discmelt      Suicidal thoughts       Compazine Other (See  Comments)     Seizure        Review of Systems   Psychiatric/Behavioral: Negative for hallucinations and suicidal ideas.   All other systems reviewed and are negative.      Physical Exam   BP: 109/66  Pulse: 64  Temp: 97.8  F (36.6  C)  Resp: 15  Weight: 74.8 kg (165 lb)  SpO2: 97 %      Physical Exam   Constitutional: He is oriented to person, place, and time. He appears well-nourished. No distress.   HENT:   Head: Normocephalic and atraumatic.   Right Ear: External ear normal.   Left Ear: External ear normal.   Nose: Nose normal.   Mouth/Throat: Oropharynx is clear and moist.   Eyes: EOM are normal. Pupils are equal, round, and reactive to light.   Neck: Normal range of motion.   Cardiovascular: Normal rate, regular rhythm and normal heart sounds.    Pulmonary/Chest: Effort normal and breath sounds normal.   Abdominal: Soft.   Musculoskeletal: Normal range of motion.   Neurological: He is alert and oriented to person, place, and time.   Skin: Skin is warm and dry. He is not diaphoretic.   Psychiatric: His speech is normal. His affect is not angry. He is not actively hallucinating. Thought content is delusional. Cognition and memory are normal. He expresses inappropriate judgment.   Nursing note and vitals reviewed.      ED Course     ED Course     Procedures           Labs Ordered and Resulted from Time of ED Arrival Up to the Time of Departure from the ED   DRUG ABUSE SCREEN 6 CHEM DEP URINE ([facility]) - Abnormal; Notable for the following:        Result Value    Cannabinoids Qual Urine Positive (*)     All other components within normal limits            Assessments & Plan (with Medical Decision Making)   The patient has hx of bipolar 1 disorder and polysubstance abuse and was brought here today for admission due to failure to follow restrictions of his provisional discharge.  He was assessed by the DEC  who finds that he is delusional.  His utox is positive for thc today.  His provisional discharge has been  revoked by the courts.  He will be admitted to inpatient mental health.  Copy of this paperwork is here so 72 hour hold not needed.     I have reviewed the nursing notes.    I have reviewed the findings, diagnosis, plan and need for follow up with the patient.    New Prescriptions    No medications on file       Final diagnoses:   Bipolar 1 disorder (H)   Polysubstance abuse   I, Shahida Gonzales, am serving as a trained medical scribe to document services personally performed by Kenna Kelly MD, based on the provider's statements to me.   I, Kenna Kelly MD, was physically present and have reviewed and verified the accuracy of this note documented by Shahida Gonzales.      12/20/2017   [facility], [place], EMERGENCY DEPARTMENT     Kenna Kelly MD  12/20/17 8023

## 2017-12-20 NOTE — IP AVS SNAPSHOT
MRN:[id number]                      After Visit Summary   12/20/2017    Tyler Case    MRN: [id number]           Thank you!     Thank you for choosing [facility] for your care. Our goal is always to provide you with excellent care.        Patient Information     Date Of Birth          1975        Designated Caregiver       Most Recent Value    Caregiver    Will someone help with your care after discharge? no      About your hospital stay     You were admitted on:  December 20, 2017 You last received care in the:  [facility]    You were discharged on:  January 10, 2018       Who to Call     For medical emergencies, please call 911.  For non-urgent questions about your medical care, please call your primary care provider or clinic, [phone]          Attending Provider     Provider Specialty    Kenna Kelly MD Emergency Medicine    [facility], Tarun Su MD Psychiatry    LouiseWang gifford MD Psychiatry       Primary Care Provider Office Phone # Fax #    Radha BishnuSCARLETT Bautista [phone]      Further instructions from your care team        Behavioral Discharge Planning and Instructions                                                                              Patient will need a PD if he does not go to a state run facility.  Summary:  You were admitted on 12/20/2017 after his ACT Team recommended hospitalization..  You were treated by Dr. Wang Posada MD and discharged on ***/***/*** from [facility] to {[facility] D/C Locations:[id number]}      Principal Diagnosis: Bi polar, Cannabis use disorder        Health Care Follow-up Appointments:   - ACT Team - Psychiatrist comes to your home w/ the ACT team  Dr. Lydia Vasquez  Phone: [phone]  Fax: [phone]       - [facility] ACT Team :   Jett Hernandez  Phone: [phone]  Fax: [phone]      Out Patient Chemical Dependency Treatment Options   [facility]  [phone]  [facility]  [phone]  [facility] [phone]          - You will also have an RN that will come to your home to administer your Injection  Attend all scheduled appointments with your outpatient providers. Call at least 24 hours in advance if you need to reschedule an appointment to ensure continued access to your outpatient providers.   Major Treatments, Procedures and Findings:  You were provided with: a psychiatric assessment, medication evaluation and/or management and CD evaluation/assessment    Symptoms to Report: feeling more aggressive, increased confusion, losing more sleep, mood getting worse or thoughts of suicide    Early warning signs can include: increased depression or anxiety sleep disturbances increased thoughts or behaviors of suicide or self-harm  increased unusual thinking, such as paranoia or hearing voices    Safety and Wellness:  Take all medicines as directed.  Make no changes unless your doctor suggests them.      Follow treatment recommendations.  Refrain from alcohol and non-prescribed drugs.  If there is a concern for safety, call 911.    Resources:   Crisis Intervention: 928.769.6227 or 774-786-9471 (TTY: 502.835.5739).  Call anytime for help.  National [place] on Mental Illness (www.[place].luis.org): 892.152.1022 or 301-358-1730.  Alcoholics Anonymous (www.alcoholics-anonymous.org): Check your phone book for your local chapter.  Mental Health Consumer/Survivor Network of [place] (www.mhcsn.net): 355.954.6808 or 064-147-1287  [facility] Crisis (COPE) Response - Adult [phone]    The treatment team has appreciated the opportunity to work with you.     If you have any questions or concerns our unit number is [phone].  You may be receiving a follow-up phone call within the next three days from a representative from behavioral health.          Pending Results     No orders found from 12/18/2017 to 12/21/2017.            Statement of Approval     Ordered          01/10/18 1210   "I have reviewed and agree with all the recommendations and orders detailed in this document.  EFFECTIVE NOW     Approved and electronically signed by:  Wang Posada MD             Admission Information     Date & Time Provider Department Dept. Phone    2017 Wang Posada MD  20[facility] [phone]      Your Vitals Were     Blood Pressure Pulse Temperature Respirations Height Weight    144/66 82 98.2  F (36.8  C) (Oral) 16 1.676 m (5' 6\") 79.8 kg (176 lb)    Pulse Oximetry BMI (Body Mass Index)                98% 28.41 kg/m2          MyChart Information     [facility] lets you send messages to your doctor, view your test results, renew your prescriptions, schedule appointments and more. To sign up, go to [email or web address] . Click on \"Log in\" on the left side of the screen, which will take you to the Welcome page. Then click on \"Sign up Now\" on the right side of the page.     You will be asked to enter the access code listed below, as well as some personal information. Please follow the directions to create your username and password.     Your access code is: [email or web address]  Expires: 4/10/2018  2:09 PM     Your access code will  in 90 days. If you need help or a new code, please call your [facility] clinic or [phone].        Care EveryWhere ID     This is your Care EveryWhere ID. This could be used by other organizations to access your [facility] medical records  [id number]        Equal Access to Services     [facility]: Hadii onesimo rabago Sogiorgio, waaxda luqadaha, qaybta kaalmada alicia, tuan morrison . So [facility] [phone].    ATENCIÓN: Si habla español, tiene a sarabia disposición servicios gratuitos de asistencia lingüística. Llame al [phone].    We comply with applicable federal civil rights laws and [place] laws. We do not discriminate on the basis of race, color, national origin, age, disability, sex, sexual orientation, or gender " identity.               Review of your medicines      START taking        Dose / Directions    haloperidol 10 MG tablet   Commonly known as:  HALDOL   Used for:  Bipolar 1 disorder (H)        Dose:  10 mg   Take 1 tablet (10 mg) by mouth 2 times daily   Quantity:  60 tablet   Refills:  1       haloperidol decanoate 50 MG/ML Soln injection   Commonly known as:  HALDOL DECANOATE   Used for:  Schizoaffective disorder, bipolar type (H)        Dose:  50 mg   Start taking on:  1/28/2018   Inject 50 mg into the muscle every 30 days   Quantity:  2.1 mL   Refills:  1       traZODone 50 MG tablet   Commonly known as:  DESYREL   Used for:  Insomnia, unspecified type        Dose:  50 mg   Take 1 tablet (50 mg) by mouth nightly as needed for sleep   Quantity:  30 tablet   Refills:  1         CONTINUE these medicines which may have CHANGED, or have new prescriptions. If we are uncertain of the size of tablets/capsules you have at home, strength may be listed as something that might have changed.        Dose / Directions    hydrOXYzine 25 MG tablet   Commonly known as:  ATARAX   This may have changed:    - medication strength  - how much to take  - when to take this   Used for:  Anxiety        Dose:  25-50 mg   Take 1-2 tablets (25-50 mg) by mouth every 4 hours as needed for anxiety   Quantity:  120 tablet   Refills:  0         CONTINUE these medicines which have NOT CHANGED        Dose / Directions    cholecalciferol 2000 UNITS tablet   Used for:  Vitamin D deficiency        Dose:  2000 Units   Take 2,000 Units by mouth daily   Quantity:  30 tablet   Refills:  0       gabapentin 300 MG capsule   Commonly known as:  NEURONTIN   Used for:  Acute anxiety        Dose:  300 mg   Take 1 capsule (300 mg) by mouth 3 times daily   Quantity:  90 capsule   Refills:  1       lidocaine 2 % topical gel   Commonly known as:  XYLOCAINE        Apply topically to left knee three times per day   Refills:  0       lithium 300 MG CR tablet   Commonly  known as:  ESKALITH/LITHOBID   Used for:  Bipolar affective disorder, manic, severe, with psychotic behavior (H)        Dose:  900 mg   Take 3 tablets (900 mg) by mouth At Bedtime   Quantity:  90 tablet   Refills:  1         STOP taking     RISPERIDONE PO                Where to get your medicines      These medications were sent to [facility] [place], [place] - [address]  [address] [place] [address], [facility] [address]     Phone:  [phone]     haloperidol 10 MG tablet    haloperidol decanoate 50 MG/ML Soln injection    hydrOXYzine 25 MG tablet    traZODone 50 MG tablet                Protect others around you: Learn how to safely use, store and throw away your medicines at www.disposemymeds.org.             Medication List: This is a list of all your medications and when to take them. Check marks below indicate your daily home schedule. Keep this list as a reference.      Medications           Morning Afternoon Evening Bedtime As Needed    cholecalciferol 2000 UNITS tablet   Take 2,000 Units by mouth daily   Last time this was given:  2,000 Units on 1/10/2018  8:16 AM                                gabapentin 300 MG capsule   Commonly known as:  NEURONTIN   Take 1 capsule (300 mg) by mouth 3 times daily   Last time this was given:  300 mg on 1/10/2018  1:59 PM                                haloperidol 10 MG tablet   Commonly known as:  HALDOL   Take 1 tablet (10 mg) by mouth 2 times daily   Last time this was given:  10 mg on 1/10/2018  8:16 AM                                haloperidol decanoate 50 MG/ML Soln injection   Commonly known as:  HALDOL DECANOATE   Inject 50 mg into the muscle every 30 days   Start taking on:  1/28/2018   Last time this was given:  50 mg on 12/30/2017  5:21 PM                                hydrOXYzine 25 MG tablet   Commonly known as:  ATARAX   Take 1-2 tablets (25-50 mg) by mouth every 4 hours as needed for anxiety   Last time this was given:  50 mg on  12/21/2017  2:38 AM                                lidocaine 2 % topical gel   Commonly known as:  XYLOCAINE   Apply topically to left knee three times per day                                lithium 300 MG CR tablet   Commonly known as:  ESKALITH/LITHOBID   Take 3 tablets (900 mg) by mouth At Bedtime   Last time this was given:  900 mg on 1/9/2018  8:38 PM                                traZODone 50 MG tablet   Commonly known as:  DESYREL   Take 1 tablet (50 mg) by mouth nightly as needed for sleep   Last time this was given:  50 mg on 1/9/2018 10:19 PM

## 2017-12-21 PROCEDURE — 25000132 ZZH RX MED GY IP 250 OP 250 PS 637: Mod: GY | Performed by: PSYCHIATRY & NEUROLOGY

## 2017-12-21 PROCEDURE — 99223 1ST HOSP IP/OBS HIGH 75: CPT | Mod: AI | Performed by: PSYCHIATRY & NEUROLOGY

## 2017-12-21 PROCEDURE — 25000125 ZZHC RX 250: Performed by: PSYCHIATRY & NEUROLOGY

## 2017-12-21 PROCEDURE — A9270 NON-COVERED ITEM OR SERVICE: HCPCS | Mod: GY | Performed by: PSYCHIATRY & NEUROLOGY

## 2017-12-21 PROCEDURE — 12400001 ZZH R&B MH UMMC

## 2017-12-21 RX ORDER — HALOPERIDOL 5 MG/1
5 TABLET ORAL 2 TIMES DAILY
Status: DISCONTINUED | OUTPATIENT
Start: 2017-12-21 | End: 2017-12-22 | Stop reason: CLARIF

## 2017-12-21 RX ORDER — HALOPERIDOL 5 MG/ML
5 INJECTION INTRAMUSCULAR 2 TIMES DAILY
Status: DISCONTINUED | OUTPATIENT
Start: 2017-12-21 | End: 2017-12-22 | Stop reason: CLARIF

## 2017-12-21 RX ADMIN — TRAZODONE HYDROCHLORIDE 50 MG: 50 TABLET ORAL at 02:37

## 2017-12-21 RX ADMIN — HYDROXYZINE HYDROCHLORIDE 50 MG: 25 TABLET ORAL at 02:38

## 2017-12-21 RX ADMIN — LITHIUM CARBONATE 900 MG: 450 TABLET, EXTENDED RELEASE ORAL at 22:09

## 2017-12-21 RX ADMIN — HALOPERIDOL 5 MG: 5 TABLET ORAL at 22:09

## 2017-12-21 RX ADMIN — GABAPENTIN 300 MG: 300 CAPSULE ORAL at 22:09

## 2017-12-21 ASSESSMENT — ACTIVITIES OF DAILY LIVING (ADL)
GROOMING: INDEPENDENT
ORAL_HYGIENE: INDEPENDENT
DRESS: INDEPENDENT

## 2017-12-21 NOTE — PROGRESS NOTES
PATIENT BLEONGINGS:  T-shirt, jeans, sandals, belt, bracelet x2, pendant x 2, earrings x 3, rings x 2, jacket (green)    BELONGINGS SENT TO SECURITY:  None      A               Admission:  I am responsible for any personal items that are not sent to the safe or pharmacy.  [facility] is not responsible for loss, theft or damage of any property in my possession.    Signature:  _________________________________ Date: _______  Time: _____                                              Staff Signature:  ____________________________ Date: ________  Time: _____      2nd Staff person, if patient is unable/unwilling to sign:    Signature: ________________________________ Date: ________  Time: _____     Discharge:  [facility] has returned all of my personal belongings:    Signature: _________________________________ Date: ________  Time: _____                                          Staff Signature:  ____________________________ Date: ________  Time: _____

## 2017-12-21 NOTE — PLAN OF CARE
Problem: Patient Care Overview  Goal: Team Discussion  Team Plan:   BEHAVIORAL TEAM DISCUSSION    Participants: Dr. Posada, CTC  Neena Humphrey, RN Cathryn Haddad  Progress: new admission, further assessment and coordination with out pt team needed.  Continued Stay Criteria/Rationale: PD revoked  Medical/Physical: none  Precautions:   Behavioral Orders   Procedures     Code 1 - Restrict to Unit     Elopement precautions     Routine Programming     As clinically indicated     Single Room     Status 15     Every 15 minutes.     Plan: Provide safe environment, CTC to coordinate discharge plan with ACT team, manage meds per psychiatry  Rationale for change in precautions or plan: no changes

## 2017-12-21 NOTE — PROGRESS NOTES
"   12/21/17 1436   Behavioral Health   Hallucinations denies / not responding to hallucinations   Thinking paranoid   Orientation person: oriented;place: oriented;date: oriented   Memory baseline memory   Insight poor   Judgement impaired   Eye Contact at examiner   Affect irritable;tense;angry   Mood irritable   Physical Appearance/Attire attire appropriate to age and situation   Hygiene well groomed   Suicidality other (see comments)  (denies)   1. Wish to be Dead No   2. Non-Specific Active Suicidal Thoughts  No   Self Injury other (see comment)  (denies)   Elopement (nothing to report)   Activity isolative;withdrawn   Speech rambling;flight of ideas   Medication Sensitivity no observed side effects   Psychomotor / Gait balanced;steady   Psycho Education   Type of Intervention 1:1 intervention   Response participates, initiates socially appropriate   Hours 0.5   Treatment Detail (Check In)   Activities of Daily Living   Hygiene/Grooming independent   Oral Hygiene independent   Dress independent   Room Organization independent   Behavioral Health Interventions   Behavioral Disturbance simple, clear language;establish therapeutic relationship   Social and Therapeutic Interventions (Behavioral Disturbance) encourage participation in therapeutic groups and milieu activities   Patient was in his room most of the shift and was not social with peers.  The patient was irritable starting with refusal to have a lab blood draw.  The patient was making statements that \"he's ready to fight\" and was using repetitive foul language during times he was agitated about stuff.  The patient was angry about the amount of chicken on his salad and verbally berated staff and was highly agitated.  The patient denies and SI or SiB.  The patient states that he only experiences SI when he is forced to tae neuroleptic medications and is refusing all medications.  The patient reports that his belief system is again the idea of medications for " the treatment of mental health.  The patient reports that in his past he has studied with native shaman's and believes this is they type of help he needs.  The patient is clear in his desire for more holistic treatments, but then continues with these ideas with flights of ideas about a corruption, manipulation, and such things in reference to hospitals, drug companies, and ramblings of conspiracies to control people thought medications.

## 2017-12-21 NOTE — PROGRESS NOTES
Pt came to staff and stated that he wanted to make sure his thoughts were true or not. Pt stated that he thinks that all human are sexually connected. Staff reassured pt that those thoughts are probably not true and could be due to pt's illness. Pt was somehow reassured but seemed to have hard time discerning what is real and what is not.

## 2017-12-21 NOTE — PROGRESS NOTES
"Refused all morning medications.  \"If you fucking keep trying to give me that Risperidone there's going to be a fucking bloodbath.\"   "
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
## 2017-12-21 NOTE — PROGRESS NOTES
"Writer went to pt to offer his HS medications. Pt became agitated and stated \"If they try to force me to take that med (risperidone) I'm going to become violent, there will be a blood bath\". Pt refused all his HS medications except for the lithium. Pt continue to make these statements at the med window while writer was getting the lithium. After pt took the lithium he returned to his room.  "

## 2017-12-21 NOTE — PHARMACY-ADMISSION MEDICATION HISTORY
Admission Medication History status for the 12/20/2017 admission is complete.  See EPIC admission navigator for Prior to Admission medications.    Medication history sources:  Patient, [facility] Records     Medication history source reliability: Good    Medication adherence:  Poor    Changes made to PTA medication list (reason)  Added: None  Deleted: None  Changed: None    Additional medication history information (including reliability of information, actions taken by pharmacist):   - Patient was a fine historian, he knew his medications and most of the strengths  - He stated he has been without his lithium since being arrested on 12/17  - Patient was discharged from [facility] on 12/8, on this date he picked up all his current medications (except for the vitamin D) from the discharge pharmacy. Gabapentin 300mg #90 was picked up on this date as well.   - Patient said he hasn't taken any of his medications, other than lithium, since he was last inpatient.   - Patient said he takes many other supplements and vitamins, but wouldn't give any information on them. He said he gets them from his own company.    Time spent in this activity: 20 min    Medication history completed by: Ramonita Hernandez    Prior to Admission medications    Medication Sig Last Dose Taking? Auth Provider   RISPERIDONE PO Take 2 mg in the morning and 3 mg at bedtime  Yes Unknown, Entered By History   hydrOXYzine (ATARAX) 50 MG tablet Take 1 tablet (50 mg) by mouth 3 times daily as needed for anxiety Past Month at Unknown time Yes Jennifer Molina MD   gabapentin (NEURONTIN) 300 MG capsule Take 1 capsule (300 mg) by mouth 3 times daily 12/8/2017 at Unknown time Yes Jennifer Molina MD   lithium (ESKALITH/LITHOBID) 300 MG CR tablet Take 3 tablets (900 mg) by mouth At Bedtime 12/17/2017 at Unknown time Yes Jennifer Molina MD   cholecalciferol 2000 UNITS tablet Take 2,000 Units by mouth daily 12/20/2017 at Unknown time Yes  Jennifer Molina MD   lidocaine (XYLOCAINE) 2 % topical gel Apply topically to left knee three times per day 12/8/2017 at Unknown time Yes Unknown, Entered By History

## 2017-12-22 PROCEDURE — 25000132 ZZH RX MED GY IP 250 OP 250 PS 637: Mod: GY | Performed by: PSYCHIATRY & NEUROLOGY

## 2017-12-22 PROCEDURE — A9270 NON-COVERED ITEM OR SERVICE: HCPCS | Mod: GY | Performed by: PSYCHIATRY & NEUROLOGY

## 2017-12-22 PROCEDURE — 99233 SBSQ HOSP IP/OBS HIGH 50: CPT | Performed by: PSYCHIATRY & NEUROLOGY

## 2017-12-22 PROCEDURE — 12400001 ZZH R&B MH UMMC

## 2017-12-22 PROCEDURE — 99207 ZZC CDG-MDM COMPONENT: MEETS MODERATE - UP CODED: CPT | Performed by: PSYCHIATRY & NEUROLOGY

## 2017-12-22 RX ORDER — HALOPERIDOL 5 MG/ML
5 INJECTION INTRAMUSCULAR 2 TIMES DAILY
Status: DISCONTINUED | OUTPATIENT
Start: 2017-12-22 | End: 2017-12-27

## 2017-12-22 RX ORDER — HALOPERIDOL 5 MG/1
5 TABLET ORAL 2 TIMES DAILY
Status: DISCONTINUED | OUTPATIENT
Start: 2017-12-22 | End: 2017-12-27

## 2017-12-22 RX ADMIN — GABAPENTIN 300 MG: 300 CAPSULE ORAL at 20:34

## 2017-12-22 RX ADMIN — HALOPERIDOL 5 MG: 5 TABLET ORAL at 09:05

## 2017-12-22 RX ADMIN — HALOPERIDOL 5 MG: 5 TABLET ORAL at 20:34

## 2017-12-22 RX ADMIN — GABAPENTIN 300 MG: 300 CAPSULE ORAL at 09:05

## 2017-12-22 RX ADMIN — LITHIUM CARBONATE 900 MG: 450 TABLET, EXTENDED RELEASE ORAL at 20:39

## 2017-12-22 ASSESSMENT — ACTIVITIES OF DAILY LIVING (ADL)
LAUNDRY: WITH SUPERVISION
DRESS: INDEPENDENT
GROOMING: INDEPENDENT
GROOMING: INDEPENDENT
ORAL_HYGIENE: INDEPENDENT
DRESS: SCRUBS (BEHAVIORAL HEALTH);INDEPENDENT
ORAL_HYGIENE: INDEPENDENT

## 2017-12-22 NOTE — PROGRESS NOTES
"[facility], [place]   Psychiatric Progress Note        Interim History:   The patient's care was discussed with the treatment team during the daily team meeting and/or staff's chart notes were reviewed.  Staff report patient spent a lot of time in his room, was irritable and, when approached by staff, used foul language complaining about people giving him: \"toxic substances they call medications\". He also was quite vocal about mental health in general, blaming it for many patients deaths. Stated that he was against taking any medication, period and believed in holistic approach. Yesterday he agreed to take oral Haldol when was given choice between getting it orally or IM, he also took it orally today am. Was irritable during today's visit with me (he was seen in presence of OT who accompanied me during my rounds). Sarcastically asked OT if she was also in business of giving people poisonous substances. Later on I was informed by staff that patient was very angry with loud autistic patient whose room was next to him and even threatened to to go off if she continued to yell.          Medications:       haloperidol  5 mg Oral BID    Or     haloperidol lactate  5 mg Intramuscular BID     cholecalciferol  2,000 Units Oral Daily     gabapentin  300 mg Oral TID     lidocaine   Topical TID     lithium  900 mg Oral At Bedtime          Allergies:     Allergies   Allergen Reactions     Abilify Discmelt      Suicidal thoughts       Compazine Other (See Comments)     Seizure           Labs:   No results found for this or any previous visit (from the past 24 hour(s)).       Psychiatric Examination:     /66  Pulse 82  Temp 98.2  F (36.8  C) (Oral)  Resp 16  Ht 1.676 m (5' 6\")  Wt 79.8 kg (176 lb)  SpO2 98%  BMI 28.41 kg/m2  Weight is 176 lbs 0 oz  Body mass index is 28.41 kg/(m^2).  Orthostatic Vitals       Most Recent      Sitting Orthostatic BP --  Comment: pt refused  12/21 0852    " Standing Orthostatic BP --  Comment: pt refused  12/21 0852            Appearance: awake, alert and appeared as age stated  Attitude:  guarded and uncooperative  Eye Contact:  poor   Mood:  angry  Affect:  constricted mobility  Speech:  decreased prosody  Psychomotor Behavior:  no evidence of tardive dyskinesia, dystonia, or tics and physical agitation  Throught Process:  illogical  Associations:  no loose associations  Thought Content:  delusions are likely present  Insight:  limited  Judgement:  limited  Oriented to:  time, person, and place  Attention Span and Concentration:  fair  Recent and Remote Memory:  fair         Precautions:     Behavioral Orders   Procedures     Code 1 - Restrict to Unit     Elopement precautions     Routine Programming     As clinically indicated     Single Room     Status 15     Every 15 minutes.          DIagnoses:     1.  Bipolar affective disorder, manic, severe with psychotic features.   2.  Cannabis use disorder, moderate.          Plan:   Will continue current meds. Per CTC, patient's CM and ACT team feel that he would need Rule 25 and MICD treatment after his mental health stabilization.
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
## 2017-12-22 NOTE — PLAN OF CARE
Problem: Patient Care Overview  Goal: Plan of Care/Patient Progress Review  Outcome: No Change  48 Hour Nursing Assessment:  Day 2 of hospitalization.  Tyler refused to have a 1:1 today and refused to answer any questions about suicidality.  However, he has not made any statements about being suicidal nor has he made any suicidal gestures.  He is Jarvised and last night staff went in with both an IM and the oral forms of his medication.  He took the oral medication.  He also took the oral medication this morning all the while swearing and complaining that we were giving him toxic medications.  He doesn't attend any groups nor does her socialize with others.  He is frequently irritable.  He always fills out his menu and is interested in food.

## 2017-12-22 NOTE — PROGRESS NOTES
P/C from ACT-  [phone].  He reports the ACT Team would like a rule 25 they think he needs CD Treament.

## 2017-12-23 PROCEDURE — 25000132 ZZH RX MED GY IP 250 OP 250 PS 637: Mod: GY | Performed by: PSYCHIATRY & NEUROLOGY

## 2017-12-23 PROCEDURE — A9270 NON-COVERED ITEM OR SERVICE: HCPCS | Mod: GY | Performed by: PSYCHIATRY & NEUROLOGY

## 2017-12-23 PROCEDURE — 12400001 ZZH R&B MH UMMC

## 2017-12-23 RX ADMIN — HALOPERIDOL 5 MG: 5 TABLET ORAL at 09:19

## 2017-12-23 RX ADMIN — HALOPERIDOL 5 MG: 5 TABLET ORAL at 20:59

## 2017-12-23 RX ADMIN — GABAPENTIN 300 MG: 300 CAPSULE ORAL at 20:59

## 2017-12-23 RX ADMIN — LITHIUM CARBONATE 900 MG: 450 TABLET, EXTENDED RELEASE ORAL at 20:59

## 2017-12-23 ASSESSMENT — ACTIVITIES OF DAILY LIVING (ADL)
HYGIENE/GROOMING: INDEPENDENT
ORAL_HYGIENE: INDEPENDENT
DRESS: INDEPENDENT

## 2017-12-23 NOTE — PLAN OF CARE
Problem: Behavioral Disturbance  Goal: Behavioral Disturbance  Signs and symptoms of listed problems will be absent or manageable by discharge or transition of care.   Outcome: No Change  PT has angry affect was up on phone yelling at person on other end. Pt up for meals only, minimal responses when checking in. Pt in bed between meals sleeping, did take Haldol without resistance. Pt asked if he's feeling better, stated in angry voice,I was fine before came in here. Pt has poor insight into illness.

## 2017-12-23 NOTE — PROGRESS NOTES
"Pt isolated to his room for the majority of the evening. He ate dinner and snacks in the lounge. When asked how he is feeling, pt states, \"Like shit. I was arrested and charged with a felony, and no one cares about me. I wanted to be on station 12 so I could at least have a TV in my room, but now I just get to sit here and rot.\"     Pt took his medications without issue. No additional concerns at this time. Will continue to assess and offer support.  "

## 2017-12-23 NOTE — PROGRESS NOTES
"   12/22/17 2000   Behavioral Health   Hallucinations denies / not responding to hallucinations   Thinking poor concentration   Orientation person: oriented;place: oriented   Memory baseline memory   Insight poor   Judgement impaired   Eye Contact at examiner   Affect blunted, flat   Mood mood is calm   Physical Appearance/Attire neat   Hygiene well groomed   Suicidality other (see comments)  (denies )   Self Injury other (see comment)  (denies )   Elopement (Pt is on elopment precaution )   Activity isolative;withdrawn   Speech clear;coherent   Activities of Daily Living   Hygiene/Grooming independent   Oral Hygiene independent   Dress independent   Laundry with supervision   Room Organization independent       Pt denied SI and SIB.  Pt refused to answer the rest of the questions on the reported outline sheet \" I'm just laying here I don't have anything to check in about \" I don't have anything to talk about.\"  Pt seems calm, ate supper, did not attend group, isolative, withdrawn and spend most of the shift in his room sleeping.    "

## 2017-12-24 PROCEDURE — 25000132 ZZH RX MED GY IP 250 OP 250 PS 637: Mod: GY | Performed by: PSYCHIATRY & NEUROLOGY

## 2017-12-24 PROCEDURE — 12400007 ZZH R&B MH INTERMEDIATE UMMC

## 2017-12-24 PROCEDURE — A9270 NON-COVERED ITEM OR SERVICE: HCPCS | Mod: GY | Performed by: PSYCHIATRY & NEUROLOGY

## 2017-12-24 RX ADMIN — LITHIUM CARBONATE 900 MG: 450 TABLET, EXTENDED RELEASE ORAL at 21:45

## 2017-12-24 RX ADMIN — GABAPENTIN 300 MG: 300 CAPSULE ORAL at 20:19

## 2017-12-24 RX ADMIN — GABAPENTIN 300 MG: 300 CAPSULE ORAL at 09:16

## 2017-12-24 RX ADMIN — HALOPERIDOL 5 MG: 5 TABLET ORAL at 09:16

## 2017-12-24 RX ADMIN — HALOPERIDOL 5 MG: 5 TABLET ORAL at 20:19

## 2017-12-24 ASSESSMENT — ACTIVITIES OF DAILY LIVING (ADL)
ORAL_HYGIENE: INDEPENDENT
GROOMING: INDEPENDENT
LAUNDRY: WITH SUPERVISION
DRESS: INDEPENDENT

## 2017-12-24 NOTE — PROGRESS NOTES
12/24/17 1340   Behavioral Health   Hallucinations denies / not responding to hallucinations   Thinking poor concentration;distractable   Orientation time: oriented;date: oriented   Insight poor   Judgement impaired   Eye Contact at examiner   Affect blunted, flat;tense   Mood mood is calm   Physical Appearance/Attire attire appropriate to age and situation   Suicidality other (see comments)  (Denies it)   1. Wish to be Dead No   2. Non-Specific Active Suicidal Thoughts  No   Self Injury other (see comment)  (none observed )   Elopement (None observed)   Activity withdrawn;isolative   Speech clear     Pt was mostly in his room this shift. Up for meals, and to make requests. Declined vitals, or to attend groups. Brief and dismissive with this writer when approached. Goal is to make some phone calls today, but mostly wants to rest in his room. Denies all psych symptoms.

## 2017-12-24 NOTE — PROGRESS NOTES
Had a very negative attitude throughout the shift. Was vocal about wanting to leave and about how much he hates his life when at dinner tonight. Was withdrawn most of the evening. His thinking was distracted. He appeared irritable this evening and was agitated while on the phone. His judgment and insight on being here was poor as well tonight.      12/23/17 3608   Behavioral Health   Hallucinations denies / not responding to hallucinations   Thinking distractable;poor concentration   Orientation person: oriented;place: oriented;date: oriented;time: oriented   Memory baseline memory   Insight poor   Judgement impaired   Eye Contact at examiner   Affect sad;irritable   Mood depressed   Physical Appearance/Attire appears stated age   Hygiene well groomed   Suicidality other (see comments)  (n/a)   Enviromental Risk Factors None   Self Injury other (see comment)  (n/a)   Elopement Statements about wanting to leave   Activity isolative;withdrawn   Speech clear;coherent   Medication Sensitivity no stated side effects;no observed side effects   Psychomotor / Gait balanced;steady   Activities of Daily Living   Hygiene/Grooming independent   Oral Hygiene independent   Dress independent   Room Organization independent   Activity   Activity Assistance Provided independent

## 2017-12-25 PROCEDURE — 12400007 ZZH R&B MH INTERMEDIATE UMMC

## 2017-12-25 PROCEDURE — A9270 NON-COVERED ITEM OR SERVICE: HCPCS | Mod: GY | Performed by: PSYCHIATRY & NEUROLOGY

## 2017-12-25 PROCEDURE — 25000132 ZZH RX MED GY IP 250 OP 250 PS 637: Mod: GY | Performed by: PSYCHIATRY & NEUROLOGY

## 2017-12-25 RX ADMIN — TRAZODONE HYDROCHLORIDE 50 MG: 50 TABLET ORAL at 23:12

## 2017-12-25 RX ADMIN — HALOPERIDOL 5 MG: 5 TABLET ORAL at 21:24

## 2017-12-25 RX ADMIN — GABAPENTIN 300 MG: 300 CAPSULE ORAL at 21:24

## 2017-12-25 RX ADMIN — GABAPENTIN 300 MG: 300 CAPSULE ORAL at 09:58

## 2017-12-25 RX ADMIN — LITHIUM CARBONATE 900 MG: 450 TABLET, EXTENDED RELEASE ORAL at 21:24

## 2017-12-25 RX ADMIN — HALOPERIDOL 5 MG: 5 TABLET ORAL at 09:58

## 2017-12-25 ASSESSMENT — ACTIVITIES OF DAILY LIVING (ADL)
GROOMING: INDEPENDENT
DRESS: INDEPENDENT
DRESS: SCRUBS (BEHAVIORAL HEALTH)
ORAL_HYGIENE: INDEPENDENT
ORAL_HYGIENE: INDEPENDENT
LAUNDRY: WITH SUPERVISION
GROOMING: INDEPENDENT

## 2017-12-25 NOTE — PLAN OF CARE
"Problem: Behavioral Disturbance  Goal: Behavioral Disturbance  Signs and symptoms of listed problems will be absent or manageable by discharge or transition of care.   Outcome: No Change  48 Hour Assessment    Pt was visible in milieu off and on throughout the shift. He was withdrawn, displaying a tense and blunted affect. When approached by writer for check-in, pt stated, \"I shouldn't even be here. I was admitted when I have nothing wrong with me. I have nothing to talk about.\"     Pt stated having no pain or eating disturbances.       SI/SIB: Pt denies. No comments made, or indicators present.    Auditory/Visual Hallucinations: Pt denies. Does not appear to be responding.    Pt took all HS medications without issue. No additional concerns at this time. Will continue to assess and offer support.        "

## 2017-12-25 NOTE — PROGRESS NOTES
"Pt was socially withdrawn and isolative throughout the shift. Pt did not attend groups. Pt was observed sleeping, eating meals, talking on the phone, and arguing with visitors. During a visit from the pt's mother, the pt began yelling at her. Pt was stating that he did not think he belonged in the hospital, was observed throwing things around his room, and was heard saying that he wanted to \"hurt someone so he could get sent to the acute unit, where he could have his own TV.\" After the pt's mother left the pt the appeared to calm down.        12/25/17 1136   Behavioral Health   Hallucinations denies / not responding to hallucinations   Thinking distractable;poor concentration   Orientation person: oriented;place: oriented;date: oriented;time: oriented   Memory baseline memory   Insight poor   Judgement impaired   Eye Contact out of corner of eyes;at examiner   Affect angry;irritable   Mood irritable   Physical Appearance/Attire untidy   Hygiene neglected grooming - unclean body, hair, teeth   Suicidality (pt denies thoughts and urges)   Self Injury (pt denies thoughts and urges)   Elopement Statements about wanting to leave   Activity isolative;withdrawn   Speech pressured;clear   Medication Sensitivity no stated side effects;no observed side effects   Psychomotor / Gait balanced;steady   Activities of Daily Living   Hygiene/Grooming independent   Oral Hygiene independent   Dress scrubs (behavioral health)   Laundry (pt did not complete)   Room Organization independent   Activity   Activity Assistance Provided independent     "

## 2017-12-26 PROCEDURE — 25000132 ZZH RX MED GY IP 250 OP 250 PS 637: Mod: GY | Performed by: PSYCHIATRY & NEUROLOGY

## 2017-12-26 PROCEDURE — A9270 NON-COVERED ITEM OR SERVICE: HCPCS | Mod: GY | Performed by: PSYCHIATRY & NEUROLOGY

## 2017-12-26 PROCEDURE — 12400007 ZZH R&B MH INTERMEDIATE UMMC

## 2017-12-26 PROCEDURE — 99232 SBSQ HOSP IP/OBS MODERATE 35: CPT | Performed by: PSYCHIATRY & NEUROLOGY

## 2017-12-26 RX ADMIN — HALOPERIDOL 5 MG: 5 TABLET ORAL at 09:09

## 2017-12-26 RX ADMIN — GABAPENTIN 300 MG: 300 CAPSULE ORAL at 21:35

## 2017-12-26 RX ADMIN — VITAMIN D, TAB 1000IU (100/BT) 2000 UNITS: 25 TAB at 09:09

## 2017-12-26 RX ADMIN — GABAPENTIN 300 MG: 300 CAPSULE ORAL at 09:10

## 2017-12-26 RX ADMIN — LITHIUM CARBONATE 900 MG: 450 TABLET, EXTENDED RELEASE ORAL at 21:35

## 2017-12-26 RX ADMIN — HALOPERIDOL 5 MG: 5 TABLET ORAL at 21:35

## 2017-12-26 RX ADMIN — TRAZODONE HYDROCHLORIDE 50 MG: 50 TABLET ORAL at 22:02

## 2017-12-26 ASSESSMENT — ACTIVITIES OF DAILY LIVING (ADL)
GROOMING: INDEPENDENT
DRESS: INDEPENDENT
ORAL_HYGIENE: INDEPENDENT

## 2017-12-26 NOTE — PLAN OF CARE
Problem: Behavioral Disturbance  Goal: Behavioral Disturbance  Signs and symptoms of listed problems will be absent or manageable by discharge or transition of care.   Outcome: No Change  Patient is isolative, sleeping much of shift Does come out for meals.  Was medication compliant.  Denies any thoughts or self harm.  Encouraged to do self cares.  Will continue to monitor and assess.

## 2017-12-26 NOTE — PROGRESS NOTES
Patient refused 2pm medication no interest in taking medication. Stayed in bed all shift except for meals.

## 2017-12-26 NOTE — PROGRESS NOTES
12/25/17 1900   Behavioral Health   Hallucinations denies / not responding to hallucinations   Thinking poor concentration   Orientation person: oriented;place: oriented   Memory baseline memory   Insight poor   Judgement impaired   Eye Contact at examiner   Affect full range affect   Mood mood is calm   Physical Appearance/Attire disheveled   Hygiene neglected grooming - unclean body, hair, teeth   Suicidality other (see comments)  (refused )   Self Injury other (see comment)  (refused )   Elopement (pt is on elopment precaution )   Activity isolative;withdrawn   Speech clear;coherent   Activities of Daily Living   Hygiene/Grooming independent   Oral Hygiene independent   Dress independent   Laundry with supervision   Room Organization independent       Pt refused to check in with this writer.  Pt seems calm, ate supper, did not attend group, came out of his room few times during the shift, but spend most of the shift in bed sleeping.
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
## 2017-12-26 NOTE — PLAN OF CARE
Problem: Behavioral Disturbance  Goal: Behavioral Disturbance  Signs and symptoms of listed problems will be absent or manageable by discharge or transition of care.   Outcome: No Change  Difficult to assess, remains isolative with few words, I don't need the medication.  I didn't take it yesterday.  Head covered with blankets. Eats meals in dining room can be social with others at table.

## 2017-12-27 PROCEDURE — A9270 NON-COVERED ITEM OR SERVICE: HCPCS | Mod: GY | Performed by: PSYCHIATRY & NEUROLOGY

## 2017-12-27 PROCEDURE — 99233 SBSQ HOSP IP/OBS HIGH 50: CPT | Performed by: PSYCHIATRY & NEUROLOGY

## 2017-12-27 PROCEDURE — 12400007 ZZH R&B MH INTERMEDIATE UMMC

## 2017-12-27 PROCEDURE — 25000132 ZZH RX MED GY IP 250 OP 250 PS 637: Mod: GY | Performed by: PSYCHIATRY & NEUROLOGY

## 2017-12-27 RX ORDER — HALOPERIDOL 5 MG/1
10 TABLET ORAL 2 TIMES DAILY
Status: DISCONTINUED | OUTPATIENT
Start: 2017-12-27 | End: 2018-01-10 | Stop reason: HOSPADM

## 2017-12-27 RX ORDER — HALOPERIDOL 5 MG/ML
5 INJECTION INTRAMUSCULAR 2 TIMES DAILY
Status: DISCONTINUED | OUTPATIENT
Start: 2017-12-27 | End: 2018-01-10 | Stop reason: HOSPADM

## 2017-12-27 RX ADMIN — GABAPENTIN 300 MG: 300 CAPSULE ORAL at 14:21

## 2017-12-27 RX ADMIN — LITHIUM CARBONATE 900 MG: 450 TABLET, EXTENDED RELEASE ORAL at 20:48

## 2017-12-27 RX ADMIN — GABAPENTIN 300 MG: 300 CAPSULE ORAL at 08:02

## 2017-12-27 RX ADMIN — VITAMIN D, TAB 1000IU (100/BT) 2000 UNITS: 25 TAB at 08:03

## 2017-12-27 RX ADMIN — TRAZODONE HYDROCHLORIDE 50 MG: 50 TABLET ORAL at 22:25

## 2017-12-27 RX ADMIN — GABAPENTIN 300 MG: 300 CAPSULE ORAL at 20:48

## 2017-12-27 RX ADMIN — HALOPERIDOL 5 MG: 5 TABLET ORAL at 08:02

## 2017-12-27 RX ADMIN — HALOPERIDOL 10 MG: 5 TABLET ORAL at 20:48

## 2017-12-27 ASSESSMENT — ACTIVITIES OF DAILY LIVING (ADL)
LAUNDRY: WITH SUPERVISION
ORAL_HYGIENE: INDEPENDENT
GROOMING: INDEPENDENT
DRESS: SCRUBS (BEHAVIORAL HEALTH)
ORAL_HYGIENE: INDEPENDENT
GROOMING: INDEPENDENT;PROMPTS
DRESS: INDEPENDENT

## 2017-12-27 NOTE — PLAN OF CARE
Problem: General Plan of Care (Inpatient Behavioral)  Goal: Team Discussion  Team Plan:   BEHAVIORAL TEAM DISCUSSION    Participants: Dr. Posada, RN Gurmeet Balderas, Dir. Kacy Spicer, CTC Neena Humphrey  Progress: minimal  Continued Stay Criteria/Rationale: pt's PD was revoked, awaiting MICD placement  Medical/Physical: none  Precautions:   Behavioral Orders   Procedures     Code 1 - Restrict to Unit     Elopement precautions     Routine Programming     As clinically indicated     Single Room     Status 15     Every 15 minutes.     Plan: transfer to MICD treatment  Rationale for change in precautions or plan: no change

## 2017-12-27 NOTE — PROGRESS NOTES
12/27/17 1346   Behavioral Health   Hallucinations denies / not responding to hallucinations   Orientation person: oriented;place: oriented   Memory baseline memory   Insight poor   Judgement impaired   Eye Contact at examiner   Affect blunted, flat   Mood mood is calm   Physical Appearance/Attire disheveled   Hygiene neglected grooming - unclean body, hair, teeth   Suicidality other (see comments)  (Denies)   1. Wish to be Dead No   2. Non-Specific Active Suicidal Thoughts  No   Elopement (No signs.)   Activity isolative;other (see comment)  (See note.)   Speech coherent;clear   Medication Sensitivity no observed side effects   Psychomotor / Gait balanced;steady   Activities of Daily Living   Hygiene/Grooming independent;prompts   Oral Hygiene independent   Dress scrubs (behavioral health)   Room Organization independent   Activity   Activity Assistance Provided independent     Pt was isolative during the day shift. He only came out for meals. Pt did not attend any groups. He was short with his responses.

## 2017-12-27 NOTE — PROGRESS NOTES
"[facility], [place]   Psychiatric Progress Note        Interim History:   The patient's care was discussed with the treatment team during the daily team meeting and/or staff's chart notes were reviewed.  Staff report patient has been calm for the most part, spends most of his time in bed. He passively accepts medications but makes it very clear that he doesn't believe that he needs to be at this hospital and doesn't need meds. He continues to talk very negatively about mental health practitioners and mental health in general. He became agitated today when I mentioned that his outpatient team and our team want him to go to [facility]D program. he loudly stated that he didn't have problems with drugs, that all records about his previous drug use were untrue and intended to keep him at this hospital. After talking to the patient I talked to his MH provider, Lydia Goetz CNP who confirmed that patient was historically very poorly compliant with his meds, had severe addiction to stimulants such as cocaine and meth, had very poor insight into his symptoms.           Medications:       haloperidol  10 mg Oral BID    Or     haloperidol lactate  5 mg Intramuscular BID     cholecalciferol  2,000 Units Oral Daily     gabapentin  300 mg Oral TID     lidocaine   Topical TID     lithium  900 mg Oral At Bedtime          Allergies:     Allergies   Allergen Reactions     Abilify Discmelt      Suicidal thoughts       Compazine Other (See Comments)     Seizure           Labs:   No results found for this or any previous visit (from the past 24 hour(s)).       Psychiatric Examination:     /66  Pulse 82  Temp 98.2  F (36.8  C) (Oral)  Resp 16  Ht 1.676 m (5' 6\")  Wt 79.8 kg (176 lb)  SpO2 98%  BMI 28.41 kg/m2  Weight is 176 lbs 0 oz  Body mass index is 28.41 kg/(m^2).                      Sitting Orthostatic BP:  (refused )         Standing Orthostatic BP:  (refused )     Appearance: awake, alert " and appeared as age stated  Attitude:  guarded and uncooperative  Eye Contact:  poor   Mood:  angry  Affect:  constricted mobility  Speech:  decreased prosody  Psychomotor Behavior:  no evidence of tardive dyskinesia, dystonia, or tics and physical agitation  Throught Process:  illogical  Associations:  no loose associations  Thought Content:  delusions are likely present  Insight:  limited  Judgement:  limited  Oriented to:  time, person, and place  Attention Span and Concentration:  fair  Recent and Remote Memory:  fair         Precautions:     Behavioral Orders   Procedures     Code 1 - Restrict to Unit     Elopement precautions     Routine Programming     As clinically indicated     Single Room     Status 15     Every 15 minutes.          DIagnoses:     1.  Bipolar affective disorder, manic, severe with psychotic features.   2.  Cannabis use disorder, moderate.          Plan:     Will increase Haldol with plan to eventually, start Haldol Decanoate. Per CTC, patient's CM and ACT team feel that he would need Rule 25 and MICD treatment after his mental health stabilization.

## 2017-12-28 LAB — LITHIUM SERPL-SCNC: 0.71 MMOL/L (ref 0.6–1.2)

## 2017-12-28 PROCEDURE — 12400007 ZZH R&B MH INTERMEDIATE UMMC

## 2017-12-28 PROCEDURE — A9270 NON-COVERED ITEM OR SERVICE: HCPCS | Mod: GY | Performed by: PSYCHIATRY & NEUROLOGY

## 2017-12-28 PROCEDURE — 25000125 ZZHC RX 250: Performed by: PSYCHIATRY & NEUROLOGY

## 2017-12-28 PROCEDURE — 36415 COLL VENOUS BLD VENIPUNCTURE: CPT | Performed by: PSYCHIATRY & NEUROLOGY

## 2017-12-28 PROCEDURE — 99232 SBSQ HOSP IP/OBS MODERATE 35: CPT | Performed by: PSYCHIATRY & NEUROLOGY

## 2017-12-28 PROCEDURE — 80178 ASSAY OF LITHIUM: CPT | Performed by: PSYCHIATRY & NEUROLOGY

## 2017-12-28 PROCEDURE — 25000132 ZZH RX MED GY IP 250 OP 250 PS 637: Mod: GY | Performed by: PSYCHIATRY & NEUROLOGY

## 2017-12-28 RX ADMIN — GABAPENTIN 300 MG: 300 CAPSULE ORAL at 09:12

## 2017-12-28 RX ADMIN — TRAZODONE HYDROCHLORIDE 50 MG: 50 TABLET ORAL at 22:05

## 2017-12-28 RX ADMIN — HALOPERIDOL 10 MG: 5 TABLET ORAL at 21:20

## 2017-12-28 RX ADMIN — VITAMIN D, TAB 1000IU (100/BT) 2000 UNITS: 25 TAB at 09:12

## 2017-12-28 RX ADMIN — GABAPENTIN 300 MG: 300 CAPSULE ORAL at 21:21

## 2017-12-28 RX ADMIN — LITHIUM CARBONATE 900 MG: 450 TABLET, EXTENDED RELEASE ORAL at 21:20

## 2017-12-28 RX ADMIN — GABAPENTIN 300 MG: 300 CAPSULE ORAL at 14:16

## 2017-12-28 RX ADMIN — HALOPERIDOL 10 MG: 5 TABLET ORAL at 09:12

## 2017-12-28 ASSESSMENT — ACTIVITIES OF DAILY LIVING (ADL)
HYGIENE/GROOMING: INDEPENDENT
ORAL_HYGIENE: INDEPENDENT
DRESS: SCRUBS (BEHAVIORAL HEALTH)

## 2017-12-28 NOTE — PROGRESS NOTES
"[facility], [place]   Psychiatric Progress Note        Interim History:   The patient's care was discussed with the treatment team during the daily team meeting and/or staff's chart notes were reviewed.  Staff report patient has been calm for the most part, spends most of his time in bed. He passively accepts medications but makes it very clear that he doesn't believe that he needs to be at this hospital and doesn't need meds. He became agitated after I mentioned that I talked to his outpatient provider Lydia Goetz CNP and blamed her for: \"getting into my business. I do use drugs off and on, so what?\" He made it clear that he didn't believe that he needed to go to MICD treatment as his outpatient team recommended. Tolerates Haldol without immediate side effects.          Medications:       haloperidol  10 mg Oral BID    Or     haloperidol lactate  5 mg Intramuscular BID     cholecalciferol  2,000 Units Oral Daily     gabapentin  300 mg Oral TID     lidocaine   Topical TID     lithium  900 mg Oral At Bedtime          Allergies:     Allergies   Allergen Reactions     Abilify Discmelt      Suicidal thoughts       Compazine Other (See Comments)     Seizure           Labs:     Recent Results (from the past 24 hour(s))   Lithium level    Collection Time: 12/28/17  7:47 AM   Result Value Ref Range    Lithium Level 0.71 0.60 - 1.20 mmol/L          Psychiatric Examination:     /66  Pulse 82  Temp 98.2  F (36.8  C) (Oral)  Resp 16  Ht 1.676 m (5' 6\")  Wt 79.8 kg (176 lb)  SpO2 98%  BMI 28.41 kg/m2  Weight is 176 lbs 0 oz  Body mass index is 28.41 kg/(m^2).                         Sitting Orthostatic BP:  (refused )         Standing Orthostatic BP:  (refused )     Appearance: awake, alert and appeared as age stated  Attitude:  guarded and uncooperative  Eye Contact:  poor   Mood: slightly less angry  Affect:  constricted mobility  Speech:  decreased prosody  Psychomotor Behavior:  " no evidence of tardive dyskinesia, dystonia, or tics and physical agitation  Throught Process:  illogical  Associations:  no loose associations  Thought Content:  delusions are likely present  Insight:  limited  Judgement:  limited  Oriented to:  time, person, and place  Attention Span and Concentration:  fair  Recent and Remote Memory:  fair         Precautions:     Behavioral Orders   Procedures     Code 1 - Restrict to Unit     Elopement precautions     Routine Programming     As clinically indicated     Single Room     Status 15     Every 15 minutes.          DIagnoses:     1.  Bipolar affective disorder, manic, severe with psychotic features.   2.  Cannabis use disorder, moderate.          Plan:     Haldol was increased yesterday with plan to eventually, start Haldol Decanoate. Per CTC, patient's CM and ACT team feel that he would need Rule 25 and MICD treatment after his mental health stabilization. CM will arrange a meeting with outpatient team prior to the patient's discharge.

## 2017-12-28 NOTE — PROGRESS NOTES
"   12/27/17 2100   Behavioral Health   Hallucinations denies / not responding to hallucinations   Thinking distractable   Orientation person: oriented;place: oriented   Memory baseline memory   Insight insight appropriate to situation   Judgement impaired   Eye Contact at examiner   Affect full range affect   Mood mood is calm   Physical Appearance/Attire neat   Hygiene well groomed   Suicidality other (see comments)  (denies )   Self Injury other (see comment)   Elopement Statements about wanting to leave   Activity other (see comment)  (visible in the miliue)   Speech clear;coherent   Activities of Daily Living   Hygiene/Grooming independent   Oral Hygiene independent   Dress independent   Laundry with supervision   Room Organization independent       Pt denied SI and SIB.  Pt reported feeling sad (5) and anxious (6) \" kind of suck being here\" \" kristina I don't like being in here.\"  Pt reported overall \" bummed out to be here.\"  Pt seems calm, visible in the milieu, ate supper, went to community meeting, but did not participated.  Pt attended music therapy group and participated.  Pt daily goal \" didn't have one.\"  Pt goal after discharge \" just keep living my life.\"  Pt is independent with ADL's.  Pt reported no nutritional concerns.  Pt wants visitors \" I don't mind.\"  Pt seem a lot more calm, social, attended groups, take his medication from his nurse and checked in with his staff.  Pt is doing a lot better compared to when he came in.    "

## 2017-12-28 NOTE — PROGRESS NOTES
12/28/17 1444   Behavioral Health   Hallucinations denies / not responding to hallucinations   Thinking intact   Orientation person, disoriented;place, disoriented;date, disoriented;time, disoriented   Memory baseline memory   Insight insight appropriate to situation   Judgement impaired   Eye Contact at examiner   Affect blunted, flat   Mood mood is calm   Physical Appearance/Attire appears stated age;attire appropriate to age and situation   Hygiene well groomed   Suicidality other (see comments)  (denies)   1. Wish to be Dead No   2. Non-Specific Active Suicidal Thoughts  No   Self Injury other (see comment)  (denies)   Elopement (no attempts)   Activity withdrawn   Speech clear;coherent   Psychomotor / Gait balanced;steady   Psycho Education   Type of Intervention 1:1 intervention   Response participates with encouragement   Hours 0.5   Behavioral Health Interventions   Behavioral Disturbance maintain safe secure environment   Social and Therapeutic Interventions (Behavioral Disturbance) encourage participation in therapeutic groups and milieu activities   Patient was in room much of shift.  Patient out for brief periods and for meals.  Patient is more pleasant on approach but speaks minimally with staff.  Did not have any outbursts today.

## 2017-12-29 PROCEDURE — 25000132 ZZH RX MED GY IP 250 OP 250 PS 637: Mod: GY | Performed by: PSYCHIATRY & NEUROLOGY

## 2017-12-29 PROCEDURE — 99207 ZZC CDG-MDM COMPONENT: MEETS LOW - DOWN CODED: CPT | Performed by: PSYCHIATRY & NEUROLOGY

## 2017-12-29 PROCEDURE — A9270 NON-COVERED ITEM OR SERVICE: HCPCS | Mod: GY | Performed by: PSYCHIATRY & NEUROLOGY

## 2017-12-29 PROCEDURE — 12400007 ZZH R&B MH INTERMEDIATE UMMC

## 2017-12-29 PROCEDURE — 99232 SBSQ HOSP IP/OBS MODERATE 35: CPT | Performed by: PSYCHIATRY & NEUROLOGY

## 2017-12-29 RX ORDER — HALOPERIDOL DECANOATE 50 MG/ML
50 INJECTION INTRAMUSCULAR
Status: DISCONTINUED | OUTPATIENT
Start: 2017-12-29 | End: 2018-01-10 | Stop reason: HOSPADM

## 2017-12-29 RX ADMIN — GABAPENTIN 300 MG: 300 CAPSULE ORAL at 21:27

## 2017-12-29 RX ADMIN — HALOPERIDOL 10 MG: 5 TABLET ORAL at 08:46

## 2017-12-29 RX ADMIN — HALOPERIDOL 10 MG: 5 TABLET ORAL at 21:27

## 2017-12-29 RX ADMIN — GABAPENTIN 300 MG: 300 CAPSULE ORAL at 14:18

## 2017-12-29 RX ADMIN — GABAPENTIN 300 MG: 300 CAPSULE ORAL at 08:46

## 2017-12-29 RX ADMIN — VITAMIN D, TAB 1000IU (100/BT) 2000 UNITS: 25 TAB at 08:46

## 2017-12-29 RX ADMIN — LITHIUM CARBONATE 900 MG: 450 TABLET, EXTENDED RELEASE ORAL at 21:27

## 2017-12-29 ASSESSMENT — ACTIVITIES OF DAILY LIVING (ADL)
ORAL_HYGIENE: INDEPENDENT
GROOMING: INDEPENDENT
LAUNDRY: WITH SUPERVISION
DRESS: SCRUBS (BEHAVIORAL HEALTH)
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
## 2017-12-29 NOTE — PROGRESS NOTES
Case Management Note    Met with patient to conduct Rule 25 assessment.  The patient was minimally cooperative throughout the interview and while he did complete the assessment, he refused to sign the documents for funding from [facility].  The patient was assessed at the residential level of care and will be referred to [facility] MI/CD Program ([phone]/fax [phone].  Patient has not signed RAPHAEL for the program, but when he does, this with the Rule 25  Should be sent to [facility] to complete the referral.  [facility] says that at present, the are 1-2 weeks out in terms of bed availability.

## 2017-12-29 NOTE — PROGRESS NOTES
12/29/17 1500   Behavioral Health   Hallucinations denies / not responding to hallucinations   Thinking intact   Orientation person, disoriented;place, disoriented;date, disoriented;time, disoriented   Memory baseline memory   Insight poor   Judgement impaired   Eye Contact at examiner   Affect blunted, flat;irritable   Mood mood is calm   Physical Appearance/Attire appears stated age   Hygiene well groomed   Suicidality other (see comments)  (denies)   1. Wish to be Dead No   2. Non-Specific Active Suicidal Thoughts  No   Self Injury other (see comment)  (denies)   Elopement (no attempts)   Activity withdrawn   Speech clear;coherent   Medication Sensitivity no stated side effects   Psychomotor / Gait balanced;steady   Psycho Education   Type of Intervention 1:1 intervention   Response participates with encouragement   Hours 0.5   Behavioral Health Interventions   Behavioral Disturbance maintain safety precautions   Social and Therapeutic Interventions (Behavioral Disturbance) encourage participation in therapeutic groups and milieu activities   Patient spent much of shift in room.  States he doesn't need to be here.  States he does not want to do any further treatment after here.

## 2017-12-29 NOTE — PROGRESS NOTES
Affect was blunt this evening. He was irritable at times talking to others in [facility] providing un-insightful advice at times. Spent half of the shift sleeping and the other half in the lounge dozing off every now and then. He presents hopeless that there is nothing he can do that he is here leading to his irritability with staff at times. He was mostly calm, quiet, and withdrawn tonight. No significant concerns.      12/28/17 2204   Behavioral Health   Hallucinations denies / not responding to hallucinations   Thinking intact   Orientation person: oriented;place: oriented;date: oriented;time: oriented   Memory baseline memory   Insight poor   Judgement impaired   Eye Contact at examiner   Affect irritable   Mood hopeless   Physical Appearance/Attire appears stated age   Hygiene well groomed   Suicidality other (see comments)  (n/a)   Enviromental Risk Factors None   Self Injury other (see comment)  (n/a)   Elopement (n/a)   Activity withdrawn   Speech clear;coherent   Medication Sensitivity no stated side effects;no observed side effects   Psychomotor / Gait balanced;steady   Activities of Daily Living   Hygiene/Grooming independent   Oral Hygiene independent   Dress scrubs (behavioral health)   Room Organization independent   Activity   Activity Assistance Provided independent

## 2017-12-29 NOTE — PROGRESS NOTES
"[facility], [place]   Psychiatric Progress Note        Interim History:   The patient's care was discussed with the treatment team during the daily team meeting and/or staff's chart notes were reviewed.  Staff report patient has been calm for the most part, spends most of his time in bed. He passively accepts medications but makes it very clear that he doesn't believe that he needs to be at this hospital and doesn't need meds. When he gets out of his bed and sits at the patients' lounge, he doses on and off. He also made it clear that he didn't believe that he needed to go to MICD treatment as his outpatient team recommended. Tolerates Haldol without immediate side effects. Was irritable during today's conversation.          Medications:       haloperidol  10 mg Oral BID    Or     haloperidol lactate  5 mg Intramuscular BID     cholecalciferol  2,000 Units Oral Daily     gabapentin  300 mg Oral TID     lidocaine   Topical TID     lithium  900 mg Oral At Bedtime          Allergies:     Allergies   Allergen Reactions     Abilify Discmelt      Suicidal thoughts       Compazine Other (See Comments)     Seizure           Labs:     No results found for this or any previous visit (from the past 24 hour(s)).       Psychiatric Examination:     /66  Pulse 82  Temp 98.2  F (36.8  C) (Oral)  Resp 16  Ht 1.676 m (5' 6\")  Wt 79.8 kg (176 lb)  SpO2 98%  BMI 28.41 kg/m2  Weight is 176 lbs 0 oz  Body mass index is 28.41 kg/(m^2).                           Sitting Orthostatic BP:  (pt refused )         Standing Orthostatic BP:  (pt refused )     Appearance: awake, alert and appeared as age stated  Attitude:  guarded and uncooperative  Eye Contact:  poor   Mood: slightly less angry  Affect:  constricted mobility  Speech:  decreased prosody  Psychomotor Behavior:  no evidence of tardive dyskinesia, dystonia, or tics and physical agitation  Throught Process:  linear  Associations:  no loose " associations  Thought Content:  delusions are likely present  Insight:  limited  Judgement:  limited  Oriented to:  time, person, and place  Attention Span and Concentration:  fair  Recent and Remote Memory:  fair         Precautions:     Behavioral Orders   Procedures     Code 1 - Restrict to Unit     Elopement precautions     Routine Programming     As clinically indicated     Single Room     Status 15     Every 15 minutes.          DIagnoses:     1.  Bipolar affective disorder, manic, severe with psychotic features.   2.  Cannabis use disorder, moderate.          Plan:     Haldol was increased yesterday with plan to eventually, start Haldol Decanoate today. Per CTC, patient's CM and ACT team feel that he would need Rule 25 and MICD treatment after his mental health stabilization. CM will arrange a meeting with outpatient team prior to the patient's discharge.

## 2017-12-30 PROCEDURE — 25000132 ZZH RX MED GY IP 250 OP 250 PS 637: Mod: GY | Performed by: PSYCHIATRY & NEUROLOGY

## 2017-12-30 PROCEDURE — 12400007 ZZH R&B MH INTERMEDIATE UMMC

## 2017-12-30 PROCEDURE — 25000128 H RX IP 250 OP 636: Performed by: PSYCHIATRY & NEUROLOGY

## 2017-12-30 PROCEDURE — A9270 NON-COVERED ITEM OR SERVICE: HCPCS | Mod: GY | Performed by: PSYCHIATRY & NEUROLOGY

## 2017-12-30 RX ADMIN — VITAMIN D, TAB 1000IU (100/BT) 2000 UNITS: 25 TAB at 08:25

## 2017-12-30 RX ADMIN — GABAPENTIN 300 MG: 300 CAPSULE ORAL at 20:53

## 2017-12-30 RX ADMIN — GABAPENTIN 300 MG: 300 CAPSULE ORAL at 08:26

## 2017-12-30 RX ADMIN — LITHIUM CARBONATE 900 MG: 450 TABLET, EXTENDED RELEASE ORAL at 20:53

## 2017-12-30 RX ADMIN — GABAPENTIN 300 MG: 300 CAPSULE ORAL at 14:16

## 2017-12-30 RX ADMIN — HALOPERIDOL DECANOATE 50 MG: 50 INJECTION INTRAMUSCULAR at 17:21

## 2017-12-30 RX ADMIN — HALOPERIDOL 10 MG: 5 TABLET ORAL at 20:53

## 2017-12-30 RX ADMIN — HALOPERIDOL 10 MG: 5 TABLET ORAL at 08:25

## 2017-12-30 ASSESSMENT — ACTIVITIES OF DAILY LIVING (ADL)
GROOMING: INDEPENDENT
ORAL_HYGIENE: INDEPENDENT
LAUNDRY: WITH SUPERVISION
DRESS: SCRUBS (BEHAVIORAL HEALTH);INDEPENDENT

## 2017-12-30 NOTE — PLAN OF CARE
Problem: Behavioral Disturbance  Goal: Behavioral Disturbance  Signs and symptoms of listed problems will be absent or manageable by discharge or transition of care.   Outcome: No Change  Patient visible in milieu, pleasant and cooperative.  Flat affect, denies SI/SIB this shift, denies depression, anxiety.  Socialized and watched T.V with peers.  No aggressive behavior noted, will continue to monitor closely.

## 2017-12-30 NOTE — PLAN OF CARE
Problem: Behavioral Disturbance  Intervention: Social and Therapeutic Interv (Behavioral Disturbance)  48 Hour Nursing Assessment:  Day 10 of hospitalization.  Tyler comes out for meals and last night was in the lounge for a bit.  He does not want to have 1;1's but will deny SI and hallucinations if asked.  He does not feel he is ill.  He is more pleasant than a week ago.

## 2017-12-31 PROCEDURE — 25000132 ZZH RX MED GY IP 250 OP 250 PS 637: Mod: GY | Performed by: PSYCHIATRY & NEUROLOGY

## 2017-12-31 PROCEDURE — 12400007 ZZH R&B MH INTERMEDIATE UMMC

## 2017-12-31 PROCEDURE — A9270 NON-COVERED ITEM OR SERVICE: HCPCS | Mod: GY | Performed by: PSYCHIATRY & NEUROLOGY

## 2017-12-31 RX ADMIN — VITAMIN D, TAB 1000IU (100/BT) 2000 UNITS: 25 TAB at 09:00

## 2017-12-31 RX ADMIN — HALOPERIDOL 10 MG: 5 TABLET ORAL at 20:20

## 2017-12-31 RX ADMIN — GABAPENTIN 300 MG: 300 CAPSULE ORAL at 20:20

## 2017-12-31 RX ADMIN — GABAPENTIN 300 MG: 300 CAPSULE ORAL at 13:49

## 2017-12-31 RX ADMIN — LITHIUM CARBONATE 900 MG: 450 TABLET, EXTENDED RELEASE ORAL at 22:44

## 2017-12-31 RX ADMIN — HALOPERIDOL 10 MG: 5 TABLET ORAL at 09:00

## 2017-12-31 RX ADMIN — GABAPENTIN 300 MG: 300 CAPSULE ORAL at 09:00

## 2017-12-31 NOTE — PROGRESS NOTES
12/31/17 1442   Behavioral Health   Hallucinations denies / not responding to hallucinations   Thinking intact   Orientation person, disoriented;place, disoriented;date, disoriented;time, disoriented   Memory baseline memory   Insight denial of illness   Judgement impaired   Eye Contact at examiner   Affect blunted, flat   Mood mood is calm   Physical Appearance/Attire appears stated age;attire appropriate to age and situation   Hygiene neglected grooming - unclean body, hair, teeth   Suicidality other (see comments)  (denies)   1. Wish to be Dead No   2. Non-Specific Active Suicidal Thoughts  No   Self Injury other (see comment)  (denies)   Activity isolative;withdrawn   Speech clear;coherent   Psychomotor / Gait balanced;steady   Psycho Education   Type of Intervention 1:1 intervention   Response participates, initiates socially appropriate   Hours 0.5   Behavioral Health Interventions   Behavioral Disturbance maintain safety precautions;maintain safe secure environment   Social and Therapeutic Interventions (Behavioral Disturbance) encourage participation in therapeutic groups and milieu activities   Patient spends much of free-time in room and in bed.  Patient out for meals.  Patient also out to visit with his mother when she visited.

## 2018-01-01 PROCEDURE — 25000132 ZZH RX MED GY IP 250 OP 250 PS 637: Mod: GY | Performed by: PSYCHIATRY & NEUROLOGY

## 2018-01-01 PROCEDURE — A9270 NON-COVERED ITEM OR SERVICE: HCPCS | Mod: GY | Performed by: PSYCHIATRY & NEUROLOGY

## 2018-01-01 PROCEDURE — 12400007 ZZH R&B MH INTERMEDIATE UMMC

## 2018-01-01 RX ADMIN — HALOPERIDOL 10 MG: 5 TABLET ORAL at 21:52

## 2018-01-01 RX ADMIN — GABAPENTIN 300 MG: 300 CAPSULE ORAL at 08:30

## 2018-01-01 RX ADMIN — TRAZODONE HYDROCHLORIDE 50 MG: 50 TABLET ORAL at 22:01

## 2018-01-01 RX ADMIN — LITHIUM CARBONATE 900 MG: 450 TABLET, EXTENDED RELEASE ORAL at 21:52

## 2018-01-01 RX ADMIN — TRAZODONE HYDROCHLORIDE 50 MG: 50 TABLET ORAL at 00:53

## 2018-01-01 RX ADMIN — TRAZODONE HYDROCHLORIDE 50 MG: 50 TABLET ORAL at 23:03

## 2018-01-01 RX ADMIN — GABAPENTIN 300 MG: 300 CAPSULE ORAL at 13:50

## 2018-01-01 RX ADMIN — HALOPERIDOL 10 MG: 5 TABLET ORAL at 08:30

## 2018-01-01 RX ADMIN — VITAMIN D, TAB 1000IU (100/BT) 2000 UNITS: 25 TAB at 08:30

## 2018-01-01 RX ADMIN — GABAPENTIN 300 MG: 300 CAPSULE ORAL at 21:52

## 2018-01-01 ASSESSMENT — ACTIVITIES OF DAILY LIVING (ADL)
GROOMING: INDEPENDENT
DRESS: SCRUBS (BEHAVIORAL HEALTH);INDEPENDENT
ORAL_HYGIENE: INDEPENDENT
LAUNDRY: WITH SUPERVISION

## 2018-01-01 NOTE — PROGRESS NOTES
At 0053 medicated for complaints of insomnia. Has slept at intervals. Walking out to lounge on occasion to get a drink of water.

## 2018-01-01 NOTE — PROGRESS NOTES
01/01/18 1423   Behavioral Health   Hallucinations denies / not responding to hallucinations   Thinking distractable   Orientation person: oriented;place: oriented;date: oriented   Memory baseline memory   Insight poor   Judgement impaired   Eye Contact at examiner   Affect blunted, flat   Mood depressed   Physical Appearance/Attire attire appropriate to age and situation   Hygiene body odor   Suicidality other (see comments)  (denies)   1. Wish to be Dead No   2. Non-Specific Active Suicidal Thoughts  No   Self Injury other (see comment)  (denies)   Elopement (nothing to report)   Activity isolative;withdrawn   Speech clear;coherent   Medication Sensitivity no observed side effects   Psychomotor / Gait balanced;steady   Psycho Education   Type of Intervention 1:1 intervention   Response refuses   Hours 0.5   Treatment Detail (minimal chekc in and refused to talk in detail)   Behavioral Health Interventions   Behavioral Disturbance encourage participation / independence with adls   Social and Therapeutic Interventions (Behavioral Disturbance) encourage participation in therapeutic groups and milieu activities;encourage socialization with peers   Patient was in his room sleeping most of the shift and was not attending groups.  The patient came for meals and sat in the lounge for a while when maintenance was working in his room, but slept the rest of the shift.  The patient denies SI and SiB, but did not want to talk any further.  The patient was give some encouragement to try and put himself out in the milieu more and engage in groups/social activities with peers.  The patient was minimally receptive to the enragement, but still did not wish to talk further.

## 2018-01-01 NOTE — PROGRESS NOTES
Pt declined to sign RAPHAEL for [facility] stating he does not want to go to treatment. CTC will need to contact CM to discuss discharge planning.

## 2018-01-02 PROCEDURE — 12400007 ZZH R&B MH INTERMEDIATE UMMC

## 2018-01-02 PROCEDURE — 25000132 ZZH RX MED GY IP 250 OP 250 PS 637: Mod: GY | Performed by: PSYCHIATRY & NEUROLOGY

## 2018-01-02 PROCEDURE — A9270 NON-COVERED ITEM OR SERVICE: HCPCS | Mod: GY | Performed by: PSYCHIATRY & NEUROLOGY

## 2018-01-02 PROCEDURE — 99232 SBSQ HOSP IP/OBS MODERATE 35: CPT | Performed by: PSYCHIATRY & NEUROLOGY

## 2018-01-02 RX ADMIN — TRAZODONE HYDROCHLORIDE 50 MG: 50 TABLET ORAL at 22:33

## 2018-01-02 RX ADMIN — HALOPERIDOL 10 MG: 5 TABLET ORAL at 08:07

## 2018-01-02 RX ADMIN — VITAMIN D, TAB 1000IU (100/BT) 2000 UNITS: 25 TAB at 08:07

## 2018-01-02 RX ADMIN — LITHIUM CARBONATE 900 MG: 450 TABLET, EXTENDED RELEASE ORAL at 20:49

## 2018-01-02 RX ADMIN — GABAPENTIN 300 MG: 300 CAPSULE ORAL at 20:49

## 2018-01-02 RX ADMIN — HALOPERIDOL 10 MG: 5 TABLET ORAL at 20:49

## 2018-01-02 RX ADMIN — GABAPENTIN 300 MG: 300 CAPSULE ORAL at 08:07

## 2018-01-02 RX ADMIN — GABAPENTIN 300 MG: 300 CAPSULE ORAL at 15:29

## 2018-01-02 ASSESSMENT — ACTIVITIES OF DAILY LIVING (ADL)
LAUNDRY: WITH SUPERVISION
DRESS: SCRUBS (BEHAVIORAL HEALTH)
ORAL_HYGIENE: INDEPENDENT
DRESS: SCRUBS (BEHAVIORAL HEALTH)
GROOMING: INDEPENDENT
HYGIENE/GROOMING: INDEPENDENT
ORAL_HYGIENE: INDEPENDENT

## 2018-01-02 NOTE — PROGRESS NOTES
"[facility], [place]   Psychiatric Progress Note        Interim History:   The patient's care was discussed with the treatment team during the daily team meeting and/or staff's chart notes were reviewed.  Staff report patient has been doing fair on the unit. Isolating but no incidents.     The patient reports that he is \"fine.\" Mood is \"so-so.\" Sleeping and eating well. Denies SI or HI. Denies AH or VH. Discussed the recommendation of going to MICD treatment and the patients states, \"I'd rather not.\"          Medications:       haloperidol decanoate  50 mg Intramuscular Q30 Days     haloperidol  10 mg Oral BID    Or     haloperidol lactate  5 mg Intramuscular BID     cholecalciferol  2,000 Units Oral Daily     gabapentin  300 mg Oral TID     lithium  900 mg Oral At Bedtime          Allergies:     Allergies   Allergen Reactions     Abilify Discmelt      Suicidal thoughts       Compazine Other (See Comments)     Seizure           Labs:     No results found for this or any previous visit (from the past 24 hour(s)).       Psychiatric Examination:     /66  Pulse 82  Temp 98.2  F (36.8  C) (Oral)  Resp 16  Ht 1.676 m (5' 6\")  Wt 79.8 kg (176 lb)  SpO2 98%  BMI 28.41 kg/m2  Weight is 176 lbs 0 oz  Body mass index is 28.41 kg/(m^2).                           Sitting Orthostatic BP:  (pt refused )         Standing Orthostatic BP:  (pt refused )     Appearance: awake, alert and appeared as age stated  Attitude:  guarded and somewhat cooperative  Eye Contact:  fair  Mood: \"so-so\"  Affect:  constricted mobility  Speech:  decreased prosody  Psychomotor Behavior:  no evidence of tardive dyskinesia, dystonia, or tics and physical agitation  Throught Process:  linear  Associations:  no loose associations  Thought Content:  delusions are likely present  Insight:  limited  Judgement:  limited  Oriented to:  time, person, and place  Attention Span and Concentration:  fair  Recent and Remote " Memory:  fair         Precautions:     Behavioral Orders   Procedures     Code 1 - Restrict to Unit     Elopement precautions     Routine Programming     As clinically indicated     Single Room     Status 15     Every 15 minutes.          DIagnoses:     1.  Bipolar affective disorder, manic, severe with psychotic features.   2.  Cannabis use disorder, moderate.          Plan:     Patient started on Haldol Dec. Per CTC, patient's CM and ACT team feel that he would need Rule 25 and MICD treatment after his mental health stabilization. CM will arrange a meeting with outpatient team prior to the patient's discharge.

## 2018-01-02 NOTE — PROGRESS NOTES
01/02/18 1323   Behavioral Health   Hallucinations denies / not responding to hallucinations   Thinking intact   Orientation person: oriented;place: oriented;date: oriented;time: oriented   Memory baseline memory   Insight poor   Judgement impaired   Eye Contact at examiner   Affect full range affect   Mood mood is calm   Physical Appearance/Attire appears stated age   Hygiene well groomed   Suicidality other (see comments)  (Denies)   1. Wish to be Dead No   Wish to be Dead Description none stated   2. Non-Specific Active Suicidal Thoughts  No   Non-Specific Active Suicidal Thought Description None stated   Self Injury other (see comment)  (Denies)   Activity isolative;withdrawn   Speech clear;coherent   Medication Sensitivity no stated side effects   Psychomotor / Gait balanced;steady   Psycho Education   Type of Intervention 1:1 intervention   Response participates, initiates socially appropriate   Hours 0.5   Activities of Daily Living   Hygiene/Grooming independent   Oral Hygiene independent   Dress scrubs (behavioral health)   Laundry with supervision   Room Organization independent   Activity   Activity Assistance Provided independent   Behavioral Health Interventions   Behavioral Disturbance maintain safety precautions;assist patient in developing safety plan;provide emotional support;establish therapeutic relationship;build upon strengths   Social and Therapeutic Interventions (Behavioral Disturbance) encourage socialization with peers;encourage participation in therapeutic groups and milieu activities   He spent most of his time in the room sleeping. He denies suicidal ideation or being depressed. Pt did attend any groups this shift. He appears isolative to self and out for meals or making phone calls.

## 2018-01-02 NOTE — PLAN OF CARE
"Problem: Behavioral Disturbance  Goal: Behavioral Disturbance  Signs and symptoms of listed problems will be absent or manageable by discharge or transition of care.   Outcome: No Change  Tyler had an uneventful shift. Mostly isolated to room, intermittently pacing hallway, but withdrawn, flat affect, rates his mood and anxiety as \"average.\" Denies SI/SIB. Med compliant. States \"They're gonna have me go to a residential place after this, I guess I don't have a choice.\" Declined further check in with writer.     Assessment of pt's progress toward meeting careplan goals: no change.          "

## 2018-01-03 PROCEDURE — 99232 SBSQ HOSP IP/OBS MODERATE 35: CPT | Performed by: PSYCHIATRY & NEUROLOGY

## 2018-01-03 PROCEDURE — 12400007 ZZH R&B MH INTERMEDIATE UMMC

## 2018-01-03 PROCEDURE — 25000132 ZZH RX MED GY IP 250 OP 250 PS 637: Mod: GY | Performed by: PSYCHIATRY & NEUROLOGY

## 2018-01-03 PROCEDURE — A9270 NON-COVERED ITEM OR SERVICE: HCPCS | Mod: GY | Performed by: PSYCHIATRY & NEUROLOGY

## 2018-01-03 RX ADMIN — HALOPERIDOL 10 MG: 5 TABLET ORAL at 08:35

## 2018-01-03 RX ADMIN — TRAZODONE HYDROCHLORIDE 50 MG: 50 TABLET ORAL at 22:37

## 2018-01-03 RX ADMIN — VITAMIN D, TAB 1000IU (100/BT) 2000 UNITS: 25 TAB at 08:35

## 2018-01-03 RX ADMIN — GABAPENTIN 300 MG: 300 CAPSULE ORAL at 15:31

## 2018-01-03 RX ADMIN — HALOPERIDOL 10 MG: 5 TABLET ORAL at 21:38

## 2018-01-03 RX ADMIN — LITHIUM CARBONATE 900 MG: 450 TABLET, EXTENDED RELEASE ORAL at 21:38

## 2018-01-03 RX ADMIN — GABAPENTIN 300 MG: 300 CAPSULE ORAL at 21:38

## 2018-01-03 RX ADMIN — GABAPENTIN 300 MG: 300 CAPSULE ORAL at 08:35

## 2018-01-03 ASSESSMENT — ACTIVITIES OF DAILY LIVING (ADL)
HYGIENE/GROOMING: INDEPENDENT
ORAL_HYGIENE: INDEPENDENT
ORAL_HYGIENE: INDEPENDENT
DRESS: INDEPENDENT
GROOMING: INDEPENDENT
DRESS: INDEPENDENT

## 2018-01-03 NOTE — PROGRESS NOTES
"   01/03/18 1157   Behavioral Health   Hallucinations denies / not responding to hallucinations   Thinking distractable;poor concentration   Orientation person: oriented;place: oriented;date: oriented   Memory baseline memory   Insight poor   Judgement impaired   Eye Contact at examiner   Affect blunted, flat   Mood mood is calm   Physical Appearance/Attire attire appropriate to age and situation   Hygiene well groomed   Suicidality other (see comments)  (denies)   1. Wish to be Dead No   2. Non-Specific Active Suicidal Thoughts  No   Self Injury other (see comment)  (denies)   Elopement Statements about wanting to leave   Activity isolative;withdrawn   Speech clear;coherent   Medication Sensitivity no observed side effects   Psychomotor / Gait balanced;steady   Psycho Education   Type of Intervention 1:1 intervention   Response participates, initiates socially appropriate   Hours 0.5   Treatment Detail (check in)   Activities of Daily Living   Hygiene/Grooming independent   Oral Hygiene independent   Dress independent   Room Organization independent   Behavioral Health Interventions   Behavioral Disturbance encourage nutrition and hydration;provide emotional support   Social and Therapeutic Interventions (Behavioral Disturbance) encourage socialization with peers;encourage participation in therapeutic groups and milieu activities   Patient was in his room most of the shift, but did come out for meals.  The patient reports that his mood is not changing and he wants to go home.  The patient stated that he does not agree with being on a court hold and does not trust or believe in western medicine model of psy. medications for mental health conditions.  The patient denies SI and SiB.  The patient was encouraged to engage more with going to groups or being social with peers, but the patient reports that he does not want to be here and he \"can choose to do what 'he' want with 'my' time here.\"  "

## 2018-01-03 NOTE — PROGRESS NOTES
"[facility], [place]   Psychiatric Progress Note        Interim History:   The patient's care was discussed with the treatment team during the daily team meeting and/or staff's chart notes were reviewed.  Staff report patient has been calm for the most part, spends most of his time in bed. He passively accepts medications but makes it very clear that he doesn't believe that he needs to be at this hospital and doesn't need meds. He specifically stated that he didn't believe in Western Medicine. He also said that he didn't want to go to CD treatment, even, though, he acknowledged that his outpatient team wanted him to go there. He told me that last time his team also wanted him to go to treatment, but he refused to do so and, eventually, was allowed not to go. He refused to sign RAPHAEL to go to CD program.     See CTC's note below: \"Writer met with pt to secure signatures [place] [facility] and [facility] paperwork regarding the Rule 25 assessment. Pt refused to sign paperwork stating he was not interested in treatment or any referrals. Stated that last time he was here he was committed and declined treatment and was eventually able to leave and this is his plan again. Pt stated that he felt coerced into signing paperwork. Writer stated that it is his choice but that the longer he delays plans for follow-through and case management, the longer he will remain on the unit since he is under commitment and there is no d/c plan yet. Pt stated that \"i guess i'll stay here then.\"  Writer placed call to pt's ResCare Worker Jett Hernandez, [phone]. Left voicemail message requesting return call to discuss aftercare plans since pt is refusing treatment.\"           Medications:       haloperidol decanoate  50 mg Intramuscular Q30 Days     haloperidol  10 mg Oral BID    Or     haloperidol lactate  5 mg Intramuscular BID     cholecalciferol  2,000 Units Oral Daily     gabapentin  300 mg Oral " "TID     lithium  900 mg Oral At Bedtime          Allergies:     Allergies   Allergen Reactions     Abilify Discmelt      Suicidal thoughts       Compazine Other (See Comments)     Seizure           Labs:     No results found for this or any previous visit (from the past 24 hour(s)).       Psychiatric Examination:     /66  Pulse 82  Temp 98.2  F (36.8  C) (Oral)  Resp 16  Ht 1.676 m (5' 6\")  Wt 79.8 kg (176 lb)  SpO2 98%  BMI 28.41 kg/m2  Weight is 176 lbs 0 oz  Body mass index is 28.41 kg/(m^2).                                        Sitting Orthostatic BP:  (ref)         Standing Orthostatic BP:  (Ref)     Appearance: awake, alert and appeared as age stated  Attitude:  guarded and uncooperative  Eye Contact:  poor   Mood: slightly less angry  Affect:  constricted mobility  Speech:  decreased prosody  Psychomotor Behavior:  no evidence of tardive dyskinesia, dystonia, or tics and physical agitation  Throught Process:  linear  Associations:  no loose associations  Thought Content:  delusions are likely present  Insight:  limited  Judgement:  limited  Oriented to:  time, person, and place  Attention Span and Concentration:  fair  Recent and Remote Memory:  fair         Precautions:     Behavioral Orders   Procedures     Code 1 - Restrict to Unit     Elopement precautions     Routine Programming     As clinically indicated     Single Room     Status 15     Every 15 minutes.          DIagnoses:     1. Schizoaffective disorder vs Bipolar affective disorder, manic, severe with psychotic features.   2.  Cannabis use disorder, moderate.          Plan:     Patient is currently on both oral haldol and Haldol Decanoate. Per CTC, patient's CM and ACT team feel that he would need Rule 25 and MICD treatment after his mental health stabilization. CM will arrange a meeting with outpatient team prior to the patient's discharge.          "

## 2018-01-03 NOTE — PROGRESS NOTES
Said he was feeling blah today and that he is just waiting for discharge. Affect was blunted, flat this evening and was withdrawn from the milieu most of the evening.      01/02/18 2303   Behavioral Health   Hallucinations denies / not responding to hallucinations   Thinking intact   Orientation person: oriented;place: oriented;date: oriented;time: oriented   Memory baseline memory   Insight poor   Judgement impaired   Eye Contact at examiner   Affect blunted, flat   Mood mood is calm   Physical Appearance/Attire appears stated age   Hygiene well groomed   Suicidality other (see comments)  (n/a)   Enviromental Risk Factors None   Self Injury other (see comment)  (n/a)   Activity isolative;withdrawn   Speech clear;coherent   Medication Sensitivity no stated side effects;no observed side effects   Psychomotor / Gait balanced;steady   Group Therapy Session   Group Attendance attended group session   Time Session Began 1700   Time Session Ended 1720   Total Time (minutes) 20   Group Type community   Activities of Daily Living   Hygiene/Grooming independent   Oral Hygiene independent   Dress scrubs (behavioral health)   Room Organization independent   Activity   Activity Assistance Provided independent

## 2018-01-03 NOTE — PROGRESS NOTES
"Pt took medications without issue. When asked how he is feeling on the medications, pt states \"I don't feel any different. I didn't have issues prior to coming in here.\"     No additional concerns at this time. Will continue to assess and offer support.  "

## 2018-01-03 NOTE — PROGRESS NOTES
"Writer met with pt to secure signatures for [facility] and [facility] paperwork regarding the Rule 25 assessment. Pt refused to sign paperwork stating he was not interested in treatment or any referrals. Stated that last time he was here he was committed and declined treatment and was eventually able to leave and this is his plan again. Pt stated that he felt coerced into signing paperwork. Writer stated that it is his choice but that the longer he delays plans for follow-through and case management, the longer he will remain on the unit since he is under commitment and there is no d/c plan yet. Pt stated that \"i guess i'll stay here then.\"  Writer placed call to pt's ResCare Worker Jett David, [phone]. Left voicemail message requesting return call to discuss aftercare plans since pt is refusing treatment.   "

## 2018-01-04 PROCEDURE — A9270 NON-COVERED ITEM OR SERVICE: HCPCS | Mod: GY | Performed by: PSYCHIATRY & NEUROLOGY

## 2018-01-04 PROCEDURE — 99232 SBSQ HOSP IP/OBS MODERATE 35: CPT | Performed by: PSYCHIATRY & NEUROLOGY

## 2018-01-04 PROCEDURE — 25000132 ZZH RX MED GY IP 250 OP 250 PS 637: Mod: GY | Performed by: PSYCHIATRY & NEUROLOGY

## 2018-01-04 PROCEDURE — 12400007 ZZH R&B MH INTERMEDIATE UMMC

## 2018-01-04 RX ADMIN — VITAMIN D, TAB 1000IU (100/BT) 2000 UNITS: 25 TAB at 08:46

## 2018-01-04 RX ADMIN — HALOPERIDOL 10 MG: 5 TABLET ORAL at 21:00

## 2018-01-04 RX ADMIN — GABAPENTIN 300 MG: 300 CAPSULE ORAL at 21:00

## 2018-01-04 RX ADMIN — LITHIUM CARBONATE 900 MG: 450 TABLET, EXTENDED RELEASE ORAL at 21:00

## 2018-01-04 RX ADMIN — GABAPENTIN 300 MG: 300 CAPSULE ORAL at 14:12

## 2018-01-04 RX ADMIN — HALOPERIDOL 10 MG: 5 TABLET ORAL at 08:46

## 2018-01-04 RX ADMIN — GABAPENTIN 300 MG: 300 CAPSULE ORAL at 08:46

## 2018-01-04 ASSESSMENT — ACTIVITIES OF DAILY LIVING (ADL)
GROOMING: INDEPENDENT
DRESS: SCRUBS (BEHAVIORAL HEALTH);INDEPENDENT
DRESS: INDEPENDENT
LAUNDRY: WITH SUPERVISION
ORAL_HYGIENE: INDEPENDENT
ORAL_HYGIENE: INDEPENDENT
GROOMING: INDEPENDENT

## 2018-01-04 NOTE — PROGRESS NOTES
"[facility], [place]   Psychiatric Progress Note        Interim History:   The patient's care was discussed with the treatment team during the daily team meeting and/or staff's chart notes were reviewed.  Staff report patient has been calm and isolative. Spends most of the time in his bed. When asked how he was just said: \"OK\". Refused to elaborate. Continues to refuse to sign RAPHAEL for a referral to MICD program.          Medications:       haloperidol decanoate  50 mg Intramuscular Q30 Days     haloperidol  10 mg Oral BID    Or     haloperidol lactate  5 mg Intramuscular BID     cholecalciferol  2,000 Units Oral Daily     gabapentin  300 mg Oral TID     lithium  900 mg Oral At Bedtime          Allergies:     Allergies   Allergen Reactions     Abilify Discmelt      Suicidal thoughts       Compazine Other (See Comments)     Seizure           Labs:     No results found for this or any previous visit (from the past 24 hour(s)).       Psychiatric Examination:     /66  Pulse 82  Temp 98.2  F (36.8  C) (Oral)  Resp 16  Ht 1.676 m (5' 6\")  Wt 79.8 kg (176 lb)  SpO2 98%  BMI 28.41 kg/m2  Weight is 176 lbs 0 oz  Body mass index is 28.41 kg/(m^2).                                  Sitting Orthostatic BP:  (ref)         Standing Orthostatic BP:  (Ref)     Appearance: awake, alert and appeared as age stated  Attitude:  guarded and uncooperative  Eye Contact:  poor   Mood: irritable, likely, depressed  Affect:  constricted mobility  Speech:  decreased prosody  Psychomotor Behavior:  no evidence of tardive dyskinesia, dystonia, or tics and physical agitation  Throught Process:  linear  Associations:  no loose associations  Thought Content:  delusions are likely present  Insight:  limited  Judgement:  limited  Oriented to:  time, person, and place  Attention Span and Concentration:  fair  Recent and Remote Memory:  fair         Precautions:     Behavioral Orders   Procedures     Code 1 - " Restrict to Unit     Elopement precautions     Routine Programming     As clinically indicated     Single Room     Status 15     Every 15 minutes.          DIagnoses:     1. Schizoaffective disorder vs Bipolar affective disorder, manic, severe with psychotic features.   2.  Cannabis use disorder, moderate.          Plan:     Patient is currently on both oral haldol and Haldol Decanoate. Per CTC, patient's CM and ACT team feel that he would need Rule 25 and MICD treatment after his mental health stabilization. He unfortunately, refuses to sign RAPHAEL hoping that he would be allowed not to go to MICD program, but return to community. This prolongs his hospital stay. Will talk to the outpatient team re: need for long term and locked MICD facility.

## 2018-01-04 NOTE — PLAN OF CARE
Problem: Behavioral Disturbance  Goal: Behavioral Disturbance  Signs and symptoms of listed problems will be absent or manageable by discharge or transition of care.   Outcome: No Change  48 Hour Nursing Assessment:  Day 15 of hospitalization.  Tyler does not go to groups and he does not participate in 1:1s.  He eats meals and snacks out of his room.  Sometimes he watches television in the evening.  He does not socialize.  He denies SI/SIB and hallucinations.

## 2018-01-04 NOTE — PROGRESS NOTES
CTC: Called and left a message for pt's ResCare Worker - Jett Hernandez. Writer requested a follow-up from [facility]'s message of 1/3/2018.

## 2018-01-04 NOTE — PROGRESS NOTES
Behavioral Health  Note   Behavioral Health  Spirituality Group Note     Unit 20    Name: Tyler Case    YOB: 1975   MRN: [id number]    Age: 42 year old     Patient attended -led group, which included discussion of spirituality, coping with illness and building resilience.   Patient attended group for 0.5 hrs.   The patient actively participated in group discussion     [facility]  Staff    Page [phone]

## 2018-01-04 NOTE — PROGRESS NOTES
Pt calm this shift but still much the same. In room sleeping and then out in milieu more after dinner. Flat and blunted affect. Sister visited this shift. Still says he doesn't want to go to treatment so there is just a back and forth with him and his doctor. Otherwise, no behavioral issues.       01/03/18 4749   Behavioral Health   Hallucinations denies / not responding to hallucinations   Thinking distractable   Orientation person: oriented;place: oriented;date: oriented;time: oriented   Memory baseline memory   Insight poor   Judgement impaired   Eye Contact at examiner   Affect blunted, flat   Mood mood is calm   Physical Appearance/Attire disheveled   Hygiene body odor   Suicidality other (see comments)  (none stated/observed)   1. Wish to be Dead No   2. Non-Specific Active Suicidal Thoughts  No   Self Injury other (see comment)  (none stated/observed)   Elopement Statements about wanting to leave   Activity withdrawn;isolative   Speech clear;coherent   Medication Sensitivity no observed side effects;no stated side effects   Psychomotor / Gait balanced;steady   Activities of Daily Living   Hygiene/Grooming independent   Oral Hygiene independent   Dress independent   Room Organization independent

## 2018-01-05 PROCEDURE — 12400007 ZZH R&B MH INTERMEDIATE UMMC

## 2018-01-05 PROCEDURE — 99232 SBSQ HOSP IP/OBS MODERATE 35: CPT | Performed by: PSYCHIATRY & NEUROLOGY

## 2018-01-05 PROCEDURE — 25000132 ZZH RX MED GY IP 250 OP 250 PS 637: Mod: GY | Performed by: PSYCHIATRY & NEUROLOGY

## 2018-01-05 PROCEDURE — A9270 NON-COVERED ITEM OR SERVICE: HCPCS | Mod: GY | Performed by: PSYCHIATRY & NEUROLOGY

## 2018-01-05 RX ADMIN — GABAPENTIN 300 MG: 300 CAPSULE ORAL at 14:08

## 2018-01-05 RX ADMIN — GABAPENTIN 300 MG: 300 CAPSULE ORAL at 08:16

## 2018-01-05 RX ADMIN — HALOPERIDOL 10 MG: 5 TABLET ORAL at 20:40

## 2018-01-05 RX ADMIN — GABAPENTIN 300 MG: 300 CAPSULE ORAL at 20:40

## 2018-01-05 RX ADMIN — VITAMIN D, TAB 1000IU (100/BT) 2000 UNITS: 25 TAB at 08:17

## 2018-01-05 RX ADMIN — TRAZODONE HYDROCHLORIDE 50 MG: 50 TABLET ORAL at 22:23

## 2018-01-05 RX ADMIN — LITHIUM CARBONATE 900 MG: 450 TABLET, EXTENDED RELEASE ORAL at 20:40

## 2018-01-05 RX ADMIN — HALOPERIDOL 10 MG: 5 TABLET ORAL at 08:16

## 2018-01-05 ASSESSMENT — ACTIVITIES OF DAILY LIVING (ADL)
LAUNDRY: WITH SUPERVISION
DRESS: SCRUBS (BEHAVIORAL HEALTH)
ORAL_HYGIENE: INDEPENDENT
GROOMING: INDEPENDENT

## 2018-01-05 NOTE — PROGRESS NOTES
P/C with Jett [facility] ACT Team  [phone].  He reports Sha is refusing to go to treatment.  Confirmed Pt is refusing to sign RAPHAEL's for referrals.  Jett would like to meet with the Team.  He is coming for a visit with Pt on Tuesday 1/9/18, 11am and would like to meet with [facility] at least.  Told him writer is not sure who will be covering on Tuesday, hopefully Neena GARCIA will be here.  If the time will not work he may be called to discuss alternate times.

## 2018-01-05 NOTE — PROGRESS NOTES
Pt presents with a flat affects. Isolates in his room most of the time. Pt states he likes staying alone. Visible in the milieu after supper briefly then went back to his room. Pt declined to come out and sit for interview so writer spoke to pt in his room in his bed and covered in his blankets. Pt denies SI/SIB. Denies audible/vicible hallucinations. Pt states he he feels that there has been no change since admission but states he thinks things can get better. States he feels depressed rating at 4/10 and anxiety at 5/10. Pt encouraged to come out more for groups.

## 2018-01-05 NOTE — PROGRESS NOTES
01/05/18 1441   Behavioral Health   Hallucinations denies / not responding to hallucinations   Thinking intact   Orientation time: oriented;date: oriented;place: oriented;person: oriented   Memory baseline memory   Insight poor   Judgement impaired   Eye Contact at examiner   Affect blunted, flat   Mood mood is calm   Physical Appearance/Attire appears stated age   Hygiene well groomed   Suicidality other (see comments)  (Denies)   1. Wish to be Dead No   Wish to be Dead Description None   2. Non-Specific Active Suicidal Thoughts  No   Non-Specific Active Suicidal Thought Description none   Elopement Distress about legal situation (holds for mental health or criminal)   Activity isolative;withdrawn;refusal   Speech clear;coherent   Medication Sensitivity no stated side effects   Psychomotor / Gait balanced;steady   Psycho Education   Type of Intervention 1:1 intervention   Response participates, initiates socially appropriate   Hours 0.5   Activities of Daily Living   Hygiene/Grooming independent   Oral Hygiene independent   Dress scrubs (behavioral health)   Laundry with supervision   Room Organization independent   Activity   Activity Assistance Provided independent   Behavioral Health Interventions   Behavioral Disturbance maintain safety precautions;maintain safe secure environment;assist patient in developing safety plan;establish therapeutic relationship;build upon strengths   Social and Therapeutic Interventions (Behavioral Disturbance) encourage socialization with peers;encourage participation in therapeutic groups and milieu activities   Pt states that he is waiting for his discharge time. He spent time in room and he comes out for meals or making requests. He reports doing well except upset of being here.

## 2018-01-05 NOTE — PROGRESS NOTES
"[facility], [place]   Psychiatric Progress Note        Interim History:   The patient's care was discussed with the treatment team during the daily team meeting and/or staff's chart notes were reviewed.  Staff report patient has been calm and isolative. Spends most of the time in his bed. Refuses to go to groups. When asked why, responded: \"Is this against the law?\" looked depressed, disheveled, but adamantly refused to take antidepressant. He continues to refuse to sign RAPHAEL to go to MICD program. Passively takes his meds, including Haldol.  Per CTC: \"P/C with Jett PIERRE Team  [phone].  He reports Sha is refusing to go to treatment.  Confirmed Pt is refusing to sign RAPHAEL's for referrals.  Jett would like to meet with the Team.  He is coming for a visit with Pt on Tuesday 1/9/18, 11am and would like to meet with [facility] at least.  Told him writer is not sure who will be covering on Tuesday, hopefully Neena GARCIA will be here.  If the time will not work he may be called to discuss alternate times.\"         Medications:       haloperidol decanoate  50 mg Intramuscular Q30 Days     haloperidol  10 mg Oral BID    Or     haloperidol lactate  5 mg Intramuscular BID     cholecalciferol  2,000 Units Oral Daily     gabapentin  300 mg Oral TID     lithium  900 mg Oral At Bedtime          Allergies:     Allergies   Allergen Reactions     Abilify Discmelt      Suicidal thoughts       Compazine Other (See Comments)     Seizure           Labs:     No results found for this or any previous visit (from the past 24 hour(s)).       Psychiatric Examination:     /66  Pulse 82  Temp 98.2  F (36.8  C) (Oral)  Resp 16  Ht 1.676 m (5' 6\")  Wt 79.8 kg (176 lb)  SpO2 98%  BMI 28.41 kg/m2  Weight is 176 lbs 0 oz  Body mass index is 28.41 kg/(m^2).                                  Sitting Orthostatic BP:  (ref)         Standing Orthostatic BP:  (Ref)     Appearance: awake, alert and " appeared as age stated, disheveled  Attitude:  guarded and uncooperative  Eye Contact:  poor   Mood: irritable, likely, depressed  Affect:  constricted mobility  Speech:  decreased prosody  Psychomotor Behavior:  no evidence of tardive dyskinesia, dystonia, or tics and physical agitation  Throught Process:  linear  Associations:  no loose associations  Thought Content:  delusions are likely present  Insight:  limited  Judgement:  limited  Oriented to:  time, person, and place  Attention Span and Concentration:  fair  Recent and Remote Memory:  fair         Precautions:     Behavioral Orders   Procedures     Code 1 - Restrict to Unit     Elopement precautions     Routine Programming     As clinically indicated     Single Room     Status 15     Every 15 minutes.          DIagnoses:     1. Schizoaffective disorder vs Bipolar affective disorder, manic, severe with psychotic features.   2.  Cannabis use disorder, moderate.          Plan:     Patient is currently on both oral haldol and Haldol Decanoate. Per CTC, patient's CM and ACT team feel that he would need Rule 25 and MICD treatment after his mental health stabilization. He unfortunately, refuses to sign RAPHAEL hoping that he would be allowed not to go to MICD program, but return to community. This prolongs his hospital stay. Will talk to the outpatient team re: need for long term and locked MICD facility. CM plans to come on Tuesday to meet with treatment team.

## 2018-01-06 PROCEDURE — 12400007 ZZH R&B MH INTERMEDIATE UMMC

## 2018-01-06 PROCEDURE — A9270 NON-COVERED ITEM OR SERVICE: HCPCS | Mod: GY | Performed by: PSYCHIATRY & NEUROLOGY

## 2018-01-06 PROCEDURE — 25000132 ZZH RX MED GY IP 250 OP 250 PS 637: Mod: GY | Performed by: PSYCHIATRY & NEUROLOGY

## 2018-01-06 RX ADMIN — HALOPERIDOL 10 MG: 5 TABLET ORAL at 10:25

## 2018-01-06 RX ADMIN — VITAMIN D, TAB 1000IU (100/BT) 2000 UNITS: 25 TAB at 10:25

## 2018-01-06 RX ADMIN — HALOPERIDOL 10 MG: 5 TABLET ORAL at 22:03

## 2018-01-06 RX ADMIN — TRAZODONE HYDROCHLORIDE 50 MG: 50 TABLET ORAL at 22:46

## 2018-01-06 RX ADMIN — GABAPENTIN 300 MG: 300 CAPSULE ORAL at 10:25

## 2018-01-06 RX ADMIN — LITHIUM CARBONATE 900 MG: 450 TABLET, EXTENDED RELEASE ORAL at 22:03

## 2018-01-06 RX ADMIN — GABAPENTIN 300 MG: 300 CAPSULE ORAL at 14:35

## 2018-01-06 RX ADMIN — GABAPENTIN 300 MG: 300 CAPSULE ORAL at 22:03

## 2018-01-06 RX ADMIN — TRAZODONE HYDROCHLORIDE 50 MG: 50 TABLET ORAL at 02:09

## 2018-01-06 ASSESSMENT — ACTIVITIES OF DAILY LIVING (ADL)
ORAL_HYGIENE: INDEPENDENT
DRESS: INDEPENDENT
GROOMING: INDEPENDENT
LAUNDRY: UNABLE TO COMPLETE

## 2018-01-06 NOTE — PROGRESS NOTES
"   01/06/18 1309   Behavioral Health   Hallucinations denies / not responding to hallucinations   Thinking intact   Orientation time: oriented;date: oriented;place: oriented   Memory baseline memory   Insight insight appropriate to situation   Eye Contact at examiner   Affect blunted, flat   Mood mood is calm   Physical Appearance/Attire untidy   Suicidality chronic thoughts with no stated plan  (Denies it)   1. Wish to be Dead No   2. Non-Specific Active Suicidal Thoughts  No   Self Injury other (see comment)  (Denies it)   Elopement (None observed)   Activity isolative;withdrawn   Speech coherent;clear   Psychomotor / Gait balanced     Pt was up for breakfast, and to make requests. Continues to rest in his room during the shift. When approached by writer, pt was brief, but pleasant. When asked about any goal, pt stated, \"just waiting d/c.\" Denies thoughts to hurt self or others.   "

## 2018-01-07 PROCEDURE — 12400007 ZZH R&B MH INTERMEDIATE UMMC

## 2018-01-07 PROCEDURE — 25000132 ZZH RX MED GY IP 250 OP 250 PS 637: Mod: GY | Performed by: PSYCHIATRY & NEUROLOGY

## 2018-01-07 PROCEDURE — A9270 NON-COVERED ITEM OR SERVICE: HCPCS | Mod: GY | Performed by: PSYCHIATRY & NEUROLOGY

## 2018-01-07 RX ADMIN — LITHIUM CARBONATE 900 MG: 450 TABLET, EXTENDED RELEASE ORAL at 20:49

## 2018-01-07 RX ADMIN — VITAMIN D, TAB 1000IU (100/BT) 2000 UNITS: 25 TAB at 10:20

## 2018-01-07 RX ADMIN — HALOPERIDOL 10 MG: 5 TABLET ORAL at 20:48

## 2018-01-07 RX ADMIN — GABAPENTIN 300 MG: 300 CAPSULE ORAL at 20:48

## 2018-01-07 RX ADMIN — GABAPENTIN 300 MG: 300 CAPSULE ORAL at 14:38

## 2018-01-07 RX ADMIN — HALOPERIDOL 10 MG: 5 TABLET ORAL at 10:20

## 2018-01-07 RX ADMIN — GABAPENTIN 300 MG: 300 CAPSULE ORAL at 10:21

## 2018-01-07 ASSESSMENT — ACTIVITIES OF DAILY LIVING (ADL)
ORAL_HYGIENE: INDEPENDENT
HYGIENE/GROOMING: HANDWASHING;SHOWER;INDEPENDENT
LAUNDRY: WITH SUPERVISION
DRESS: INDEPENDENT;SCRUBS (BEHAVIORAL HEALTH)

## 2018-01-07 NOTE — PROGRESS NOTES
Pt had an okay shift. Pt spent most of the shift in bed. Pt came out of room later in the shift. Pt got a visit from his parents. Pt parents stated pt wants to to shave his head and beard.    01/06/18 1971   Behavioral Health   Hallucinations denies / not responding to hallucinations   Thinking distractable;poor concentration   Orientation time: oriented;date: oriented;place: oriented   Memory baseline memory   Insight insight appropriate to situation   Eye Contact at examiner   Affect blunted, flat   Mood mood is calm   Physical Appearance/Attire untidy   Suicidality other (see comments)  (non stated)   Self Injury other (see comment)  (non stated)   Activity withdrawn;isolative   Speech clear;coherent   Psychomotor / Gait balanced;steady   Activities of Daily Living   Hygiene/Grooming independent   Oral Hygiene independent   Dress independent   Laundry unable to complete   Room Organization independent

## 2018-01-07 NOTE — PLAN OF CARE
"Problem: Behavioral Disturbance  Goal: Behavioral Disturbance  Signs and symptoms of listed problems will be absent or manageable by discharge or transition of care.   Outcome: Improving  Pt continues to isolate to room other than meals, pt is med compliant, but has irritable affect, poor insight, when asked if he is feeling better states \"I was feeling fine before I came in here\". Pt denies SI or SIB, denies AH/VH.      "

## 2018-01-08 PROCEDURE — 12400007 ZZH R&B MH INTERMEDIATE UMMC

## 2018-01-08 PROCEDURE — 99231 SBSQ HOSP IP/OBS SF/LOW 25: CPT | Performed by: PSYCHIATRY & NEUROLOGY

## 2018-01-08 PROCEDURE — A9270 NON-COVERED ITEM OR SERVICE: HCPCS | Mod: GY | Performed by: PSYCHIATRY & NEUROLOGY

## 2018-01-08 PROCEDURE — 25000132 ZZH RX MED GY IP 250 OP 250 PS 637: Mod: GY | Performed by: PSYCHIATRY & NEUROLOGY

## 2018-01-08 RX ADMIN — VITAMIN D, TAB 1000IU (100/BT) 2000 UNITS: 25 TAB at 08:12

## 2018-01-08 RX ADMIN — GABAPENTIN 300 MG: 300 CAPSULE ORAL at 13:59

## 2018-01-08 RX ADMIN — LITHIUM CARBONATE 900 MG: 450 TABLET, EXTENDED RELEASE ORAL at 20:30

## 2018-01-08 RX ADMIN — GABAPENTIN 300 MG: 300 CAPSULE ORAL at 08:12

## 2018-01-08 RX ADMIN — HALOPERIDOL 10 MG: 5 TABLET ORAL at 20:30

## 2018-01-08 RX ADMIN — GABAPENTIN 300 MG: 300 CAPSULE ORAL at 20:30

## 2018-01-08 RX ADMIN — HALOPERIDOL 10 MG: 5 TABLET ORAL at 08:12

## 2018-01-08 ASSESSMENT — ACTIVITIES OF DAILY LIVING (ADL)
ORAL_HYGIENE: INDEPENDENT
LAUNDRY: WITH SUPERVISION
GROOMING: INDEPENDENT
DRESS: SCRUBS (BEHAVIORAL HEALTH)

## 2018-01-08 NOTE — PROGRESS NOTES
"[facility], [place]   Psychiatric Progress Note        Interim History:   The patient's care was discussed with the treatment team during the daily team meeting and/or staff's chart notes were reviewed.  Staff report patient has been calm and isolative. Spends most of the time in his bed. Refuses to go to groups. He showered over weekend and shaved which is new for him. Continues to refuse to sign ROIs for MICD treatment. Denies med side effects and passively accepts meds.          Medications:       haloperidol decanoate  50 mg Intramuscular Q30 Days     haloperidol  10 mg Oral BID    Or     haloperidol lactate  5 mg Intramuscular BID     cholecalciferol  2,000 Units Oral Daily     gabapentin  300 mg Oral TID     lithium  900 mg Oral At Bedtime          Allergies:     Allergies   Allergen Reactions     Abilify Discmelt      Suicidal thoughts       Compazine Other (See Comments)     Seizure           Labs:     No results found for this or any previous visit (from the past 24 hour(s)).       Psychiatric Examination:     /66  Pulse 82  Temp 98.2  F (36.8  C) (Oral)  Resp 16  Ht 1.676 m (5' 6\")  Wt 79.8 kg (176 lb)  SpO2 98%  BMI 28.41 kg/m2  Weight is 176 lbs 0 oz  Body mass index is 28.41 kg/(m^2).                             Sitting Orthostatic BP:  (pt refused )         Standing Orthostatic BP:  (pt refused )     Appearance: awake, alert and appeared as age stated, disheveled, seen in bed  Attitude:  guarded and uncooperative  Eye Contact:  poor   Mood: irritable, likely, depressed  Affect:  constricted mobility  Speech:  decreased prosody  Psychomotor Behavior:  no evidence of tardive dyskinesia, dystonia, or tics and physical agitation  Throught Process:  linear  Associations:  no loose associations  Thought Content:  delusions are likely present, he is less vocal about them.  Insight:  limited  Judgement:  limited  Oriented to:  time, person, and place  Attention Span " and Concentration:  fair  Recent and Remote Memory:  fair         Precautions:     Behavioral Orders   Procedures     Code 1 - Restrict to Unit     Elopement precautions     Routine Programming     As clinically indicated     Single Room     Status 15     Every 15 minutes.          DIagnoses:     1. Schizoaffective disorder vs Bipolar affective disorder, manic, severe with psychotic features.   2.  Cannabis use disorder, moderate.          Plan:     Patient is currently on both oral haldol and Haldol Decanoate. Per CTC, patient's CM and ACT team feel that he would need Rule 25 and MICD treatment after his mental health stabilization. He unfortunately, refuses to sign RAPHAEL hoping that he would be allowed not to go to MICD program, but return to community. This prolongs his hospital stay. His attitude has not changed since last week. Will talk to the outpatient team re: need for long term and locked MICD facility. CM plans to come on Tuesday to meet with treatment team.

## 2018-01-08 NOTE — PROGRESS NOTES
01/07/18 2247   Behavioral Health   Hallucinations denies / not responding to hallucinations   Thinking poor concentration   Orientation person: oriented;place: oriented;date: oriented;time: oriented   Memory baseline memory   Insight poor   Judgement impaired   Eye Contact at examiner   Affect blunted, flat   Mood irritable   Physical Appearance/Attire attire appropriate to age and situation   Hygiene neglected grooming - unclean body, hair, teeth   Suicidality other (see comments)  (None Stated)   1. Wish to be Dead No   2. Non-Specific Active Suicidal Thoughts  No   3. Active Sucidal Ideation with any Methods (Not Plan) Without Intent to Act  No   4. Active Suicidal Ideation with Some Intent to Act, Without Specific Plan  No   5. Active Suicidal Ideation with Specific Plan and Intent  No   Self Injury other (see comment)  (None Observed)   Activity isolative;withdrawn   Speech clear;coherent   Medication Sensitivity no stated side effects;no observed side effects   Psychomotor / Gait balanced;steady   Overt Aggression Scale   Verbal Aggression 0   Aggression against Property 0   Auto-Aggression 0   Physical Aggression 0   Overt Aggression Total Score 0   Coping/Psychosocial   Verbalized Emotional State frustration   Activities of Daily Living   Hygiene/Grooming handwashing;shower;independent   Oral Hygiene independent   Dress independent;scrubs (behavioral health)   Laundry with supervision   Room Organization independent   Behavioral Health Interventions   Behavioral Disturbance maintain safety precautions;maintain safe secure environment;reality orientation;simple, clear language;decrease environmental stimulation;encourage clear communication of needs;encourage participation / independence with adls;provide emotional support;establish therapeutic relationship;build upon strengths   Social and Therapeutic Interventions (Behavioral Disturbance) encourage socialization with peers;encourage effective boundaries  with peers;encourage participation in therapeutic groups and milieu activities

## 2018-01-08 NOTE — PROGRESS NOTES
01/08/18 1340   Behavioral Health   Hallucinations denies / not responding to hallucinations   Thinking intact   Orientation time: oriented;date: oriented;place: oriented;person: oriented   Memory baseline memory   Insight poor   Judgement impaired   Eye Contact at examiner   Affect full range affect   Mood mood is calm   Physical Appearance/Attire appears stated age   Hygiene well groomed   Suicidality other (see comments)  (Denies)   1. Wish to be Dead No   Wish to be Dead Description none   2. Non-Specific Active Suicidal Thoughts  No   Non-Specific Active Suicidal Thought Description none   Self Injury other (see comment)  (Denies)   Elopement Statements about wanting to leave   Activity isolative;withdrawn;refusal   Speech clear;coherent   Psychomotor / Gait balanced;steady   Psycho Education   Type of Intervention 1:1 intervention   Response participates, initiates socially appropriate   Hours 0.5   Activities of Daily Living   Hygiene/Grooming independent   Oral Hygiene independent   Dress scrubs (behavioral health)   Laundry with supervision   Room Organization independent   Activity   Activity Assistance Provided independent   Behavioral Health Interventions   Behavioral Disturbance maintain safety precautions;assist patient in developing safety plan;establish therapeutic relationship;assist with developing & utilizing healthy coping strategies   Social and Therapeutic Interventions (Behavioral Disturbance) encourage socialization with peers;encourage participation in therapeutic groups and milieu activities   Pt spent most of his time in the room sleeping or lying in bed. He denies suicidal ideation /SIB. He comes out for medications or meals. He appears isolative and minimal interaction with peers during meals or watching TV.

## 2018-01-09 PROCEDURE — A9270 NON-COVERED ITEM OR SERVICE: HCPCS | Mod: GY | Performed by: PSYCHIATRY & NEUROLOGY

## 2018-01-09 PROCEDURE — 12400007 ZZH R&B MH INTERMEDIATE UMMC

## 2018-01-09 PROCEDURE — 25000132 ZZH RX MED GY IP 250 OP 250 PS 637: Mod: GY | Performed by: PSYCHIATRY & NEUROLOGY

## 2018-01-09 RX ADMIN — VITAMIN D, TAB 1000IU (100/BT) 2000 UNITS: 25 TAB at 08:42

## 2018-01-09 RX ADMIN — TRAZODONE HYDROCHLORIDE 50 MG: 50 TABLET ORAL at 01:00

## 2018-01-09 RX ADMIN — HALOPERIDOL 10 MG: 5 TABLET ORAL at 08:42

## 2018-01-09 RX ADMIN — GABAPENTIN 300 MG: 300 CAPSULE ORAL at 08:42

## 2018-01-09 RX ADMIN — HALOPERIDOL 10 MG: 5 TABLET ORAL at 20:37

## 2018-01-09 RX ADMIN — GABAPENTIN 300 MG: 300 CAPSULE ORAL at 14:30

## 2018-01-09 RX ADMIN — GABAPENTIN 300 MG: 300 CAPSULE ORAL at 20:37

## 2018-01-09 RX ADMIN — LITHIUM CARBONATE 900 MG: 450 TABLET, EXTENDED RELEASE ORAL at 20:38

## 2018-01-09 RX ADMIN — TRAZODONE HYDROCHLORIDE 50 MG: 50 TABLET ORAL at 22:19

## 2018-01-09 ASSESSMENT — ACTIVITIES OF DAILY LIVING (ADL)
DRESS: SCRUBS (BEHAVIORAL HEALTH);INDEPENDENT
GROOMING: INDEPENDENT
ORAL_HYGIENE: INDEPENDENT
ORAL_HYGIENE: INDEPENDENT
HYGIENE/GROOMING: INDEPENDENT
DRESS: SCRUBS (BEHAVIORAL HEALTH)

## 2018-01-09 NOTE — PLAN OF CARE
"Problem: General Plan of Care (Inpatient Behavioral)  Goal: Plan of Care Review (Adult,OB,Behavioral)  The patient and/or their representative will communicate an understanding of their plan of care.   Outcome: No Change  48 Hour Nursing Assessment:  Day 20 of hospitalization.  Tyler had a meeting today with his county  and the unit CTC.  Afterwards, he said his doctor needs to be part of the discussion but he could possibly be discharged tomorrow.  It is difficult to refer him to another facility when he refuses to sign RAPHAEL's.  He declined a 1:1.  He denied SI/SIB and hallucinations.  He has been out of his room for meals and the meeting with his .  He is using \"please\" and \"thank you\" in interactions with staff.  He has family visitors dur visiting hours (his sister and mother).      "

## 2018-01-09 NOTE — PROGRESS NOTES
The pt was only out for meals, medications and a visit from his mother.  He was pleasant enough in passing.  He refused a check-in, but was polite about the refusal.  He denies ever having SI or SIB urges. He was well groomed.  He didn't attend community meeting today, which is odd behavior for the pt, as he has always attended - for the most part - in the past.  The visit with his mother seems to have gone well, she brought him candy, which he shared with others.

## 2018-01-10 VITALS
HEART RATE: 82 BPM | TEMPERATURE: 98.2 F | HEIGHT: 66 IN | SYSTOLIC BLOOD PRESSURE: 144 MMHG | BODY MASS INDEX: 28.28 KG/M2 | OXYGEN SATURATION: 98 % | WEIGHT: 176 LBS | DIASTOLIC BLOOD PRESSURE: 66 MMHG | RESPIRATION RATE: 16 BRPM

## 2018-01-10 PROCEDURE — 25000132 ZZH RX MED GY IP 250 OP 250 PS 637: Mod: GY | Performed by: PSYCHIATRY & NEUROLOGY

## 2018-01-10 PROCEDURE — A9270 NON-COVERED ITEM OR SERVICE: HCPCS | Mod: GY | Performed by: PSYCHIATRY & NEUROLOGY

## 2018-01-10 PROCEDURE — 99239 HOSP IP/OBS DSCHRG MGMT >30: CPT | Performed by: PSYCHIATRY & NEUROLOGY

## 2018-01-10 RX ORDER — TRAZODONE HYDROCHLORIDE 50 MG/1
50 TABLET, FILM COATED ORAL
Qty: 30 TABLET | Refills: 1 | Status: SHIPPED | OUTPATIENT
Start: 2018-01-10 | End: 2019-11-04

## 2018-01-10 RX ORDER — HYDROXYZINE HYDROCHLORIDE 25 MG/1
25-50 TABLET, FILM COATED ORAL EVERY 4 HOURS PRN
Qty: 120 TABLET | Refills: 0 | Status: SHIPPED | OUTPATIENT
Start: 2018-01-10 | End: 2019-11-04

## 2018-01-10 RX ORDER — HALOPERIDOL 10 MG/1
10 TABLET ORAL 2 TIMES DAILY
Qty: 60 TABLET | Refills: 1 | Status: SHIPPED | OUTPATIENT
Start: 2018-01-10 | End: 2019-11-04

## 2018-01-10 RX ORDER — HALOPERIDOL DECANOATE 50 MG/ML
50 INJECTION INTRAMUSCULAR
Qty: 2.1 ML | Refills: 1 | Status: SHIPPED | OUTPATIENT
Start: 2018-01-28 | End: 2019-11-04

## 2018-01-10 RX ADMIN — GABAPENTIN 300 MG: 300 CAPSULE ORAL at 13:59

## 2018-01-10 RX ADMIN — GABAPENTIN 300 MG: 300 CAPSULE ORAL at 08:16

## 2018-01-10 RX ADMIN — VITAMIN D, TAB 1000IU (100/BT) 2000 UNITS: 25 TAB at 08:16

## 2018-01-10 RX ADMIN — HALOPERIDOL 10 MG: 5 TABLET ORAL at 08:16

## 2018-01-10 ASSESSMENT — ACTIVITIES OF DAILY LIVING (ADL)
ORAL_HYGIENE: INDEPENDENT
GROOMING: INDEPENDENT
DRESS: SCRUBS (BEHAVIORAL HEALTH);INDEPENDENT

## 2018-01-10 NOTE — PROGRESS NOTES
Our outpatient pharmacy cannot have Sha's Haldol decanoate ready today.  I called the  Jett to ask him to pick this up tomorrow or the next day.  I also called mom who said she would be backup.  This message was also left with Jett.  I left the phone number for the outpatient pharmacy with Jett so he can call to make sure the medicine has arrived first.   Received message from Jett Hernandez that he will  the Haldol decanoate on Friday 1-12-18.  I called the pharmacy to let them know.

## 2018-01-10 NOTE — DISCHARGE INSTRUCTIONS
Behavioral Discharge Planning and Instructions                                                                              Patient will need a PD if he does not go to a state run facility.  Summary:  You were admitted on 12/20/2017 after his ACT Team recommended hospitalization..  You were treated by Dr. Wang Posada MD and discharged on ***/***/*** from [facility] to {[facility] D/C Locations:[id number]}      Principal Diagnosis: Bi polar, Cannabis use disorder        Health Care Follow-up Appointments:   - ACT Team - Psychiatrist comes to your home w/ the ACT team  Dr. Lydia Vasquez  Phone: [phone]  Fax: [phone]       - [facility] ACT Team :   Jett Hernandez  Phone: [phone]  Fax: [phone]      Out Patient Chemical Dependency Treatment Options   [facility]  [phone]  [facility] [phone]  [facility] [phone]          - You will also have an RN that will come to your home to administer your Injection  Attend all scheduled appointments with your outpatient providers. Call at least 24 hours in advance if you need to reschedule an appointment to ensure continued access to your outpatient providers.   Major Treatments, Procedures and Findings:  You were provided with: a psychiatric assessment, medication evaluation and/or management and CD evaluation/assessment    Symptoms to Report: feeling more aggressive, increased confusion, losing more sleep, mood getting worse or thoughts of suicide    Early warning signs can include: increased depression or anxiety sleep disturbances increased thoughts or behaviors of suicide or self-harm  increased unusual thinking, such as paranoia or hearing voices    Safety and Wellness:  Take all medicines as directed.  Make no changes unless your doctor suggests them.      Follow treatment recommendations.  Refrain from alcohol and non-prescribed drugs.  If there is a concern for safety, call 241.    Resources:   Crisis  Intervention: 854.320.3044 or 467-080-4747 (TTY: 604.595.8642).  Call anytime for help.  National [place] on Mental Illness (www.[place].luis.org): 332.787.7394 or 499-327-3311.  Alcoholics Anonymous (www.alcoholics-anonymous.org): Check your phone book for your local chapter.  Mental Health Consumer/Survivor Network of [place] (www.mhcsn.net): 160.125.3593 or 104-147-6499  [facility] [place] (COPE) Response - Adult [phone]    The treatment team has appreciated the opportunity to work with you.     If you have any questions or concerns our unit number is [phone].  You may be receiving a follow-up phone call within the next three days from a representative from behavioral health.

## 2018-01-10 NOTE — PROGRESS NOTES
Pt discharged at this time. Pt picked up by his mum. Denied any SI/SIB. Discharge instructions discussed with pt and verbalized understanding. Pt took home with medications from pharmacy (Haloperidol 10mg, Trazodone 50mg and hydroxyzine 25mg). Pt took all his belonging with him.

## 2018-01-10 NOTE — PLAN OF CARE
Problem: Overarching Goals (Adult)  Goal: Develops/Participates in Therapeutic [place] to Support Successful Transition  Outcome: Adequate for Discharge Date Met: 01/10/18  Tyler declined a 1:1 today.  He is being discharged to day and agreed to meet when it is time to go over the discharge paperwork.  He denies SI/SIB.  He has taken all medications ordered.  We are waiting on his discharge medications and then he will call his mother for a ride.

## 2018-01-10 NOTE — PROGRESS NOTES
Patient refused to check in with staff but was calm and compliant with medications. Patient was visible in the milieu but withdrawn with flat affect.     01/09/18 2100   Behavioral Health   Hallucinations denies / not responding to hallucinations   Thinking intact   Orientation person: oriented;place: oriented;date: oriented;time: oriented   Memory baseline memory   Insight denial of illness   Judgement impaired   Eye Contact at examiner   Affect blunted, flat   Mood mood is calm   Physical Appearance/Attire appears stated age;attire appropriate to age and situation   Hygiene well groomed   Suicidality other (see comments)  (Denies)   1. Wish to be Dead No   2. Non-Specific Active Suicidal Thoughts  No   Self Injury other (see comment)  (Denies)   Activity withdrawn   Speech clear;coherent   Medication Sensitivity no stated side effects;no observed side effects   Psychomotor / Gait balanced;steady   Activities of Daily Living   Hygiene/Grooming independent   Oral Hygiene independent   Dress scrubs (behavioral health)   Room Organization independent
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
## 2019-11-02 ENCOUNTER — HOSPITAL ENCOUNTER (EMERGENCY)
Facility: CLINIC | Age: 44
Discharge: PSYCHIATRIC OR SUBSTANCE USE FACILITY | End: 2019-11-05
Attending: EMERGENCY MEDICINE | Admitting: EMERGENCY MEDICINE
Payer: COMMERCIAL

## 2019-11-02 DIAGNOSIS — F31.9 BIPOLAR AFFECTIVE DISORDER, REMISSION STATUS UNSPECIFIED (H): ICD-10-CM

## 2019-11-02 DIAGNOSIS — F23 ACUTE PSYCHOSIS (H): ICD-10-CM

## 2019-11-02 LAB
ALBUMIN SERPL-MCNC: 3.6 G/DL (ref 3.4–5)
ALP SERPL-CCNC: 95 U/L (ref 40–150)
ALT SERPL W P-5'-P-CCNC: 28 U/L (ref 0–70)
AMPHETAMINES UR QL SCN: POSITIVE
ANION GAP SERPL CALCULATED.3IONS-SCNC: 7 MMOL/L (ref 3–14)
AST SERPL W P-5'-P-CCNC: 29 U/L (ref 0–45)
BARBITURATES UR QL: NEGATIVE
BASOPHILS # BLD AUTO: 0 10E9/L (ref 0–0.2)
BASOPHILS NFR BLD AUTO: 0.3 %
BENZODIAZ UR QL: NEGATIVE
BILIRUB SERPL-MCNC: 0.9 MG/DL (ref 0.2–1.3)
BUN SERPL-MCNC: 14 MG/DL (ref 7–30)
CALCIUM SERPL-MCNC: 8.5 MG/DL (ref 8.5–10.1)
CANNABINOIDS UR QL SCN: POSITIVE
CHLORIDE SERPL-SCNC: 112 MMOL/L (ref 94–109)
CK SERPL-CCNC: 337 U/L (ref 30–300)
CO2 SERPL-SCNC: 22 MMOL/L (ref 20–32)
COCAINE UR QL: NEGATIVE
CREAT SERPL-MCNC: 0.84 MG/DL (ref 0.66–1.25)
DIFFERENTIAL METHOD BLD: ABNORMAL
EOSINOPHIL # BLD AUTO: 0.2 10E9/L (ref 0–0.7)
EOSINOPHIL NFR BLD AUTO: 1.9 %
ERYTHROCYTE [DISTWIDTH] IN BLOOD BY AUTOMATED COUNT: 14.1 % (ref 10–15)
ETHANOL SERPL-MCNC: <0.01 G/DL
GFR SERPL CREATININE-BSD FRML MDRD: >90 ML/MIN/{1.73_M2}
GLUCOSE SERPL-MCNC: 102 MG/DL (ref 70–99)
HCT VFR BLD AUTO: 40.7 % (ref 40–53)
HGB BLD-MCNC: 13.2 G/DL (ref 13.3–17.7)
IMM GRANULOCYTES # BLD: 0.1 10E9/L (ref 0–0.4)
IMM GRANULOCYTES NFR BLD: 0.5 %
LITHIUM SERPL-SCNC: <0.2 MMOL/L (ref 0.6–1.2)
LYMPHOCYTES # BLD AUTO: 1.4 10E9/L (ref 0.8–5.3)
LYMPHOCYTES NFR BLD AUTO: 14.3 %
MCH RBC QN AUTO: 28.3 PG (ref 26.5–33)
MCHC RBC AUTO-ENTMCNC: 32.4 G/DL (ref 31.5–36.5)
MCV RBC AUTO: 87 FL (ref 78–100)
MONOCYTES # BLD AUTO: 0.9 10E9/L (ref 0–1.3)
MONOCYTES NFR BLD AUTO: 8.9 %
NEUTROPHILS # BLD AUTO: 7.4 10E9/L (ref 1.6–8.3)
NEUTROPHILS NFR BLD AUTO: 74.1 %
NRBC # BLD AUTO: 0 10*3/UL
NRBC BLD AUTO-RTO: 0 /100
OPIATES UR QL SCN: NEGATIVE
PCP UR QL SCN: NEGATIVE
PLATELET # BLD AUTO: 201 10E9/L (ref 150–450)
POTASSIUM SERPL-SCNC: 4.1 MMOL/L (ref 3.4–5.3)
PROT SERPL-MCNC: 6.7 G/DL (ref 6.8–8.8)
RBC # BLD AUTO: 4.67 10E12/L (ref 4.4–5.9)
SODIUM SERPL-SCNC: 141 MMOL/L (ref 133–144)
WBC # BLD AUTO: 10 10E9/L (ref 4–11)

## 2019-11-02 PROCEDURE — 25000128 H RX IP 250 OP 636

## 2019-11-02 PROCEDURE — 80178 ASSAY OF LITHIUM: CPT | Performed by: EMERGENCY MEDICINE

## 2019-11-02 PROCEDURE — 90791 PSYCH DIAGNOSTIC EVALUATION: CPT

## 2019-11-02 PROCEDURE — 85025 COMPLETE CBC W/AUTO DIFF WBC: CPT | Performed by: EMERGENCY MEDICINE

## 2019-11-02 PROCEDURE — 80053 COMPREHEN METABOLIC PANEL: CPT | Performed by: EMERGENCY MEDICINE

## 2019-11-02 PROCEDURE — 99285 EMERGENCY DEPT VISIT HI MDM: CPT | Mod: 25

## 2019-11-02 PROCEDURE — 80307 DRUG TEST PRSMV CHEM ANLYZR: CPT | Performed by: EMERGENCY MEDICINE

## 2019-11-02 PROCEDURE — 96372 THER/PROPH/DIAG INJ SC/IM: CPT

## 2019-11-02 PROCEDURE — 25000128 H RX IP 250 OP 636: Performed by: EMERGENCY MEDICINE

## 2019-11-02 PROCEDURE — 80320 DRUG SCREEN QUANTALCOHOLS: CPT | Performed by: EMERGENCY MEDICINE

## 2019-11-02 PROCEDURE — 82550 ASSAY OF CK (CPK): CPT | Performed by: EMERGENCY MEDICINE

## 2019-11-02 RX ORDER — OLANZAPINE 10 MG/1
10 TABLET ORAL 2 TIMES DAILY
Status: DISCONTINUED | OUTPATIENT
Start: 2019-11-02 | End: 2019-11-05 | Stop reason: HOSPADM

## 2019-11-02 RX ORDER — OLANZAPINE 10 MG/2ML
10 INJECTION, POWDER, FOR SOLUTION INTRAMUSCULAR ONCE
Status: COMPLETED | OUTPATIENT
Start: 2019-11-02 | End: 2019-11-02

## 2019-11-02 RX ORDER — OLANZAPINE 10 MG/2ML
INJECTION, POWDER, FOR SOLUTION INTRAMUSCULAR
Status: COMPLETED
Start: 2019-11-02 | End: 2019-11-02

## 2019-11-02 RX ORDER — OLANZAPINE 10 MG/2ML
10 INJECTION, POWDER, FOR SOLUTION INTRAMUSCULAR DAILY PRN
Status: DISCONTINUED | OUTPATIENT
Start: 2019-11-02 | End: 2019-11-05 | Stop reason: HOSPADM

## 2019-11-02 RX ADMIN — OLANZAPINE 10 MG: 10 INJECTION, POWDER, FOR SOLUTION INTRAMUSCULAR at 12:23

## 2019-11-02 RX ADMIN — OLANZAPINE 10 MG: 10 INJECTION, POWDER, FOR SOLUTION INTRAMUSCULAR at 13:35
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
## 2019-11-02 NOTE — ED NOTES
"Pt not cooperative and staff unable to obtain vitals. Pt stating that he is a \"grandmother 3 times removed.\" Pt asking for biohazard bags because \"my stuff is radioactive.\"  "

## 2019-11-02 NOTE — ED NOTES
Bed: ED07  Expected date: 11/2/19  Expected time: 11:47 AM  Means of arrival: Ambulance  Comments:  Berta Loera
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
## 2019-11-02 NOTE — ED NOTES
Pt yelling and swearing at staff when this RN trying to start IV. Staff left room and notified MD of behaviors. Orders for IM zyprexa given. Pt in room lying in bed with security watching.

## 2019-11-03 PROCEDURE — 25000132 ZZH RX MED GY IP 250 OP 250 PS 637: Mod: GY | Performed by: EMERGENCY MEDICINE

## 2019-11-03 RX ORDER — BENZONATATE 100 MG/1
200 CAPSULE ORAL ONCE
Status: COMPLETED | OUTPATIENT
Start: 2019-11-03 | End: 2019-11-03

## 2019-11-03 RX ADMIN — BENZONATATE 200 MG: 100 CAPSULE, LIQUID FILLED ORAL at 06:50

## 2019-11-03 RX ADMIN — OLANZAPINE 10 MG: 10 TABLET, FILM COATED ORAL at 00:34

## 2019-11-03 RX ADMIN — OLANZAPINE 10 MG: 10 TABLET, FILM COATED ORAL at 21:21

## 2019-11-03 RX ADMIN — OLANZAPINE 10 MG: 10 TABLET, FILM COATED ORAL at 07:57

## 2019-11-03 NOTE — ED NOTES
RN attempted to reassess patient. Pt continuously yelled at RN while in the room, swearing, and calling RN names. RN reinforced that this behavior is unacceptable.
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
## 2019-11-03 NOTE — ED PROVIDER NOTES
Assumed care from Dr. Toth at 2300.  Patient awaiting psychiatry bed.    Patient noted cough.  Lungs clear on exam.  Tessalon pearls ordered.  Patient slept overnight without events.  Scheduled zyprexa ordered for maintenance control of agitation.    MD Valdemar Mendez Kristi Jo Schneider, MD  11/03/19 0703

## 2019-11-03 NOTE — ED NOTES
"Pt given water. Pt yelling that he doesn't want water. Pt educated that he will not receive anything else due to his behaviors. Pt yelling \"chelsea, motherfucker. I own this place.\"  "

## 2019-11-03 NOTE — ED PROVIDER NOTES
Brief provider note:    Patient continued to board in the emergency department during my shift as there is no appropriate high-level inpatient psychiatric beds available.  He continued to be intermittently agitated with both me and the staff and required repeat dosing of his Zyprexa at this evening.  He has no additional complaints at this time.         Armando Jean MD  11/03/19 [id number]

## 2019-11-03 NOTE — ED PROVIDER NOTES
Patient has continued to board this shift as there are no appropriate inpatient psychiatric beds available.  We have been in contact with intake for updates with no changes reported.  Patient has intermittently been agitated but has not required emergent repeat pharmacologic restraint.  He is receiving maintenance oral Zyprexa.  No new medical changes.     Larry Joya MD  11/03/19 1500

## 2019-11-03 NOTE — ED NOTES
Pt given meal box and snacks.  Pt then requested more food.  Pt then escalated that he had to wait for more food as staff went to get it.  Yelling, hitting the door, cussing at staff.  Pt directed back to bed and fell back asleep.  More food given to him for when he wakes up.  Will continue to monitor.

## 2019-11-03 NOTE — ED NOTES
"Patient attempting to come out of room, informing us that \"he owns this hospital, he is low on nutrients, vitamins, fluids, and he can do whatever he wants because he owns this hospital\". Patient given food and fluid.   "

## 2019-11-03 NOTE — ED NOTES
"Pt requested something to help him sleep that \"wouldn't damage his brain.\"  Offered pt zyprexa, pt started yelling asking why he is under arrest.  Educated pt on SHARDA hold and that he is not under arrest.  Pt agreeable to take Zyprexa, pt threw med cup and empty glass of water at floor after.   Pt with very pressured speech.  "

## 2019-11-03 NOTE — ED NOTES
"This RN went in to give patient tessalon.  Pt then started yelling that he has been asking for cough medicine for 3 hours.  Writer stated that if he was going to yell, she would leave the room.  Pt then stated he wanted the medicine, and that this is \"psychological abuse.\"  Med given, Pt throwing cups on ground.  "

## 2019-11-04 VITALS
TEMPERATURE: 97.7 F | OXYGEN SATURATION: 99 % | HEART RATE: 66 BPM | DIASTOLIC BLOOD PRESSURE: 74 MMHG | SYSTOLIC BLOOD PRESSURE: 121 MMHG | RESPIRATION RATE: 18 BRPM

## 2019-11-04 PROCEDURE — 25000132 ZZH RX MED GY IP 250 OP 250 PS 637: Mod: GY | Performed by: EMERGENCY MEDICINE

## 2019-11-04 RX ORDER — METHYLPREDNISOLONE SODIUM SUCCINATE 125 MG/2ML
125 INJECTION, POWDER, LYOPHILIZED, FOR SOLUTION INTRAMUSCULAR; INTRAVENOUS ONCE
Status: DISCONTINUED | OUTPATIENT
Start: 2019-11-04 | End: 2019-11-04

## 2019-11-04 RX ORDER — GUAIFENESIN/DEXTROMETHORPHAN 100-10MG/5
5 SYRUP ORAL EVERY 4 HOURS PRN
Status: DISCONTINUED | OUTPATIENT
Start: 2019-11-04 | End: 2019-11-05 | Stop reason: HOSPADM

## 2019-11-04 RX ORDER — OLANZAPINE 5 MG/1
5 TABLET, ORALLY DISINTEGRATING ORAL
Status: DISCONTINUED | OUTPATIENT
Start: 2019-11-04 | End: 2019-11-05 | Stop reason: HOSPADM

## 2019-11-04 RX ORDER — ACETAMINOPHEN 325 MG/1
650 TABLET ORAL EVERY 4 HOURS PRN
Status: DISCONTINUED | OUTPATIENT
Start: 2019-11-04 | End: 2019-11-05 | Stop reason: HOSPADM

## 2019-11-04 RX ORDER — LORAZEPAM 1 MG/1
2 TABLET ORAL ONCE
Status: COMPLETED | OUTPATIENT
Start: 2019-11-04 | End: 2019-11-04

## 2019-11-04 RX ADMIN — GUAIFENESIN AND DEXTROMETHORPHAN 5 ML: 100; 10 SYRUP ORAL at 07:45

## 2019-11-04 RX ADMIN — LORAZEPAM 2 MG: 1 TABLET ORAL at 11:20

## 2019-11-04 NOTE — ED NOTES
"Pt requested to be called \"Quid\", states his preferred pronoun is she because he is a \"great, great, great, great, great, great grandmother\"  "

## 2019-11-04 NOTE — ED PROVIDER NOTES
Assumed care at 2300 from Dr. Steen.  No events overnight.  Slept.  Taking scheduled zyprexa as ordered.  Continue to await bed placement.    MD Valdemar Mendez Kristi Jo Schneider, MD  11/04/19 0906

## 2019-11-04 NOTE — ED PROVIDER NOTES
Assumed care from Dr. Aldrich at 0800. No acute events during the day. Ativan PO given for agitation and anxiety with good response. Patient received bed at [facility] and will be transferred for inpatient psychiatry admission. Patient transferred in stable condition.    Clinical Impression:    ICD-10-CM    1. Acute psychosis (H) F23 CBC with platelets differential     Comprehensive metabolic panel     Drug abuse screen 77 urine (FL, RH, SH)     Alcohol ethyl     Lithium level     CK total     CK total     CANCELED: CK total   2. Bipolar affective disorder, remission status unspecified (H) F31.9        Disposition:  Transferred to [facility] for inpatient mental health admission.     Madhu Borrero MD  11/04/19 1600

## 2019-11-04 NOTE — PHARMACY-ADMISSION MEDICATION HISTORY
Med rec completed.   Talked to patient.   Per his report he has not taken meds for at least 6 weeks except for finishing up some hydroxyzine close to that time.   He states he cannot take Lithium carbonate and uses a natural product with lithium that he gets from a water place in [place].   Patient states he should have/would like  Vitamin D, gabapentin, lorazepam, lidocaine, hydroxyzine.  Similar to other notes he told me how toxic meds are.
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
## 2019-11-05 ENCOUNTER — HOSPITAL ENCOUNTER (INPATIENT)
Facility: CLINIC | Age: 44
LOS: 41 days | Discharge: PSYCHIATRIC OR SUBSTANCE USE FACILITY | DRG: 885 | End: 2019-12-16
Attending: PSYCHIATRY & NEUROLOGY | Admitting: PSYCHIATRY & NEUROLOGY
Payer: COMMERCIAL

## 2019-11-05 DIAGNOSIS — F31.2 BIPOLAR I, RECURRENT MANIC EPISODE, SEVERE WITH PSYCHOTIC BEHAVIOR (H): ICD-10-CM

## 2019-11-05 DIAGNOSIS — F41.0 ANXIETY ATTACK: Primary | ICD-10-CM

## 2019-11-05 DIAGNOSIS — E56.9 VITAMIN DEFICIENCY: ICD-10-CM

## 2019-11-05 PROBLEM — F23 ACUTE PSYCHOSIS (H): Status: ACTIVE | Noted: 2019-11-05

## 2019-11-05 PROCEDURE — 25000132 ZZH RX MED GY IP 250 OP 250 PS 637: Mod: GY | Performed by: PSYCHIATRY & NEUROLOGY

## 2019-11-05 PROCEDURE — H2032 ACTIVITY THERAPY, PER 15 MIN: HCPCS

## 2019-11-05 PROCEDURE — 99223 1ST HOSP IP/OBS HIGH 75: CPT | Mod: AI | Performed by: PSYCHIATRY & NEUROLOGY

## 2019-11-05 PROCEDURE — G0177 OPPS/PHP; TRAIN & EDUC SERV: HCPCS

## 2019-11-05 PROCEDURE — 12400001 ZZH R&B MH UMMC

## 2019-11-05 PROCEDURE — 25000132 ZZH RX MED GY IP 250 OP 250 PS 637: Performed by: STUDENT IN AN ORGANIZED HEALTH CARE EDUCATION/TRAINING PROGRAM

## 2019-11-05 RX ORDER — HYDROXYZINE HYDROCHLORIDE 25 MG/1
25 TABLET, FILM COATED ORAL EVERY 4 HOURS PRN
Status: DISCONTINUED | OUTPATIENT
Start: 2019-11-05 | End: 2019-12-16 | Stop reason: HOSPADM

## 2019-11-05 RX ORDER — BISACODYL 10 MG
10 SUPPOSITORY, RECTAL RECTAL DAILY PRN
Status: DISCONTINUED | OUTPATIENT
Start: 2019-11-05 | End: 2019-12-16 | Stop reason: HOSPADM

## 2019-11-05 RX ORDER — VITAMIN B COMPLEX
25 TABLET ORAL DAILY
Status: DISCONTINUED | OUTPATIENT
Start: 2019-11-05 | End: 2019-11-25

## 2019-11-05 RX ORDER — TRAZODONE HYDROCHLORIDE 50 MG/1
50 TABLET, FILM COATED ORAL
Status: DISCONTINUED | OUTPATIENT
Start: 2019-11-05 | End: 2019-12-16 | Stop reason: HOSPADM

## 2019-11-05 RX ORDER — OLANZAPINE 10 MG/2ML
10 INJECTION, POWDER, FOR SOLUTION INTRAMUSCULAR
Status: DISCONTINUED | OUTPATIENT
Start: 2019-11-05 | End: 2019-12-16 | Stop reason: HOSPADM

## 2019-11-05 RX ORDER — MULTIVITAMIN,THERAPEUTIC
1 TABLET ORAL DAILY
Status: DISCONTINUED | OUTPATIENT
Start: 2019-11-05 | End: 2019-12-16 | Stop reason: HOSPADM

## 2019-11-05 RX ORDER — OLANZAPINE 10 MG/1
10 TABLET ORAL
Status: DISCONTINUED | OUTPATIENT
Start: 2019-11-05 | End: 2019-12-16 | Stop reason: HOSPADM

## 2019-11-05 RX ORDER — DIPHENHYDRAMINE HCL 25 MG
25 CAPSULE ORAL EVERY 6 HOURS PRN
Status: DISCONTINUED | OUTPATIENT
Start: 2019-11-05 | End: 2019-11-07

## 2019-11-05 RX ORDER — QUETIAPINE FUMARATE 200 MG/1
200 TABLET, FILM COATED ORAL AT BEDTIME
Status: DISCONTINUED | OUTPATIENT
Start: 2019-11-05 | End: 2019-11-06

## 2019-11-05 RX ORDER — ACETAMINOPHEN 325 MG/1
650 TABLET ORAL EVERY 4 HOURS PRN
Status: DISCONTINUED | OUTPATIENT
Start: 2019-11-05 | End: 2019-12-16 | Stop reason: HOSPADM

## 2019-11-05 RX ORDER — LORAZEPAM 1 MG/1
1-2 TABLET ORAL 3 TIMES DAILY PRN
Status: DISCONTINUED | OUTPATIENT
Start: 2019-11-05 | End: 2019-12-16 | Stop reason: HOSPADM

## 2019-11-05 RX ADMIN — ACETAMINOPHEN 650 MG: 325 TABLET, FILM COATED ORAL at 05:38

## 2019-11-05 RX ADMIN — MELATONIN 25 MCG: at 11:34

## 2019-11-05 RX ADMIN — TRAZODONE HYDROCHLORIDE 50 MG: 50 TABLET ORAL at 05:05

## 2019-11-05 RX ADMIN — LORAZEPAM 2 MG: 1 TABLET ORAL at 14:53

## 2019-11-05 RX ADMIN — THERA TABS 1 TABLET: TAB at 11:34

## 2019-11-05 RX ADMIN — B-COMPLEX W/ C & FOLIC ACID TAB 1 TABLET: TAB at 11:34

## 2019-11-05 RX ADMIN — HYDROXYZINE HYDROCHLORIDE 25 MG: 25 TABLET, FILM COATED ORAL at 05:05

## 2019-11-05 RX ADMIN — QUETIAPINE FUMARATE 200 MG: 200 TABLET ORAL at 20:17

## 2019-11-05 ASSESSMENT — ACTIVITIES OF DAILY LIVING (ADL)
HYGIENE/GROOMING: PROMPTS;INDEPENDENT;SHOWER
TOILETING: 0-->INDEPENDENT
DRESS: INDEPENDENT
RETIRED_EATING: 0-->INDEPENDENT
TRANSFERRING: 0-->INDEPENDENT
RETIRED_COMMUNICATION: 0-->UNDERSTANDS/COMMUNICATES WITHOUT DIFFICULTY
DRESS: SCRUBS (BEHAVIORAL HEALTH)
LAUNDRY: UNABLE TO COMPLETE
FALL_HISTORY_WITHIN_LAST_SIX_MONTHS: NO
AMBULATION: 0-->INDEPENDENT
DRESS: 0-->INDEPENDENT
HYGIENE/GROOMING: INDEPENDENT
COGNITION: 2 - DIFFICULTY WITH ORGANIZING THOUGHTS
ORAL_HYGIENE: INDEPENDENT
BATHING: 0-->INDEPENDENT
ORAL_HYGIENE: PROMPTS
SWALLOWING: 0-->SWALLOWS FOODS/LIQUIDS WITHOUT DIFFICULTY
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
## 2019-11-05 NOTE — ED NOTES
Pt updated about plan to transfer to [place]. Pt states he could leave. Writer reminded patient he is not medically stable to go home right now.

## 2019-11-05 NOTE — PROGRESS NOTES
"   11/05/19 0303   Patient Belongings   Did you bring any home meds/supplements to the hospital?  No   Patient Belongings locker   Patient Belongings Put in Hospital Secure Location (Security or Locker, etc.) bracelet;cell phone/electronics;clothing;keys;plastic bag;shoes;other (see comments)   Belongings Search Yes   Clothing Search Yes   Patients belongings  Camo Backpack  Misc papers  Tobacco  2 Lighters  Pair of brown boots  Pair of Black tennis shoes  Black long coat  White multi colored sweater  Yellow Sweater  Green Sweater  Purple Sweater  Brown Socks  Blue Scarf  Multi colored Scarf  Black Scarf  White Scarf  Hat  Star Spangled Overalls  White Pants  Black tshirt  Cracked Screen Cell Phone  2  Cables  Flashlight  2 Books  Purple water Bottle  Keychain with misc items hanging from it  Bag with rocks and beaded bracelet, Key pull.    11/12/2019- Found in Pt's belongings- 1 silver \"wedding ring\"- ring placed in plastic bag of Pt's belongings. Ez Lovett RN  A               Admission:  I am responsible for any personal items that are not sent to the safe or pharmacy.  [facility] is not responsible for loss, theft or damage of any property in my possession.    Signature:  _________________________________ Date: _______  Time: _____                                              Staff Signature:  ____________________________ Date: ________  Time: _____      2nd Staff person, if patient is unable/unwilling to sign:    Signature: ________________________________ Date: ________  Time: _____     Discharge:  [facility] has returned all of my personal belongings:    Signature: _________________________________ Date: ________  Time: _____                                          Staff Signature:  ____________________________ Date: ________  Time: _____         "

## 2019-11-05 NOTE — PROGRESS NOTES
"Pt states that Armando Perez is his son, and Isis is his wife. 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 He adds that any complaints regarding the Chris administration should be sent to \"[email or web address],\" and that \"the #1 haterade of the week gets a pineda ticket.\"   Pt recognized writer from previous admissions, though he stated \"my brain is so fried from government ordered psychiatric meds that I can't remember your name.\" When writer introduced self, he responded \"oh yeah, I remember - cause we're family you and I! We're bonded forever.\" He added that his new name is \"Quid\" or \"Q\" or that writer could call him \"Sha the great.\"   "

## 2019-11-05 NOTE — PHARMACY-ADMISSION MEDICATION HISTORY
Please see Admission Medication History completed on 11/4/19 under previous encounter at [facility] for information regarding prior to admission medications.    Pablo Hernandez PharmD  [facility]: [place] *[phone]

## 2019-11-05 NOTE — PLAN OF CARE
BEHAVIORAL TEAM DISCUSSION    Participants: Dr. Jennifer Varner, Stacie Hawkins MD (Resident), Jacques Ivey RN, Yi Lind RN, [facility]- Bettie Benson LMFT, [facility], [facility]   Progress: pt is psychotic, delusional and grandiose   Anticipated length of stay: 2 -3 weeks  Continued Stay Criteria/Rationale: Pt requires stabilization of mh symptoms, medication management and substance abuse help    Medical/Physical: no medical concerns   Precautions:   Behavioral Orders   Procedures    Assault precautions    Code 1 - Restrict to Unit    Routine Programming     As clinically indicated    Status 15     Every 15 minutes.     Plan: conduct initial assessment, meet with psychiatrist and treatment team.  We have petitioned for MI/CD commitment with [facility] through [facility] as well.   Rationale for change in precautions or plan:no change

## 2019-11-05 NOTE — PROGRESS NOTES
This writer called Prepetition Screening to petition for MI/CD commitment with [facility] for this patient. Examiner's Statement, Exhibit A, Petition for commitment and petition for [facility] completed and faxed to [facility] as well.

## 2019-11-05 NOTE — ED NOTES
Assumed care from . No needs on this shift. Placement at [facility] at 2300.    MD Margarito Frances Wenlan, MD  11/04/19 9503
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
## 2019-11-05 NOTE — PROGRESS NOTES
"Pt attended verbal group for only about 10-15 minutes.  The topic was positive affirmation as self esteem boosters.    He filled out about 2/3 of it before he abruptly got up and requested Ativan from the nurse.   He stated he was having an \"anxiety attack.\"    The themes in the responses he wrote on the worksheet were drug related and other responses that were very grandiose.  "

## 2019-11-05 NOTE — ED PROVIDER NOTES
Awaiting placement at [facility].  Hx of aggression. Patient transferred to inpatient psych.     Valentin Carrizales MD  [employer]  11:07 PM 11/04/19        Valentin Carrizales MD  11/06/19 0621
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
## 2019-11-05 NOTE — PROGRESS NOTES
"Patient presents as paranoid, delusional and grandiose with elated mood and pressured speech.  He has no appreciable insight into his manic and psychotic symptoms.  He refers to being in the hospital as a \"concentration camp\" and says all psychotropic medications are \"poison\".  He is quite intrusive on approach and makes very frequent requests/ demands.  He is hyper-verbal and makes it difficult to terminate any conversation with him.  His affect and eye contact are notable for increased intensity.  He denies any self harm or aggressive ideation today.    "

## 2019-11-06 PROCEDURE — 12400001 ZZH R&B MH UMMC

## 2019-11-06 PROCEDURE — 25000132 ZZH RX MED GY IP 250 OP 250 PS 637: Performed by: STUDENT IN AN ORGANIZED HEALTH CARE EDUCATION/TRAINING PROGRAM

## 2019-11-06 PROCEDURE — G0177 OPPS/PHP; TRAIN & EDUC SERV: HCPCS

## 2019-11-06 RX ORDER — QUETIAPINE FUMARATE 300 MG/1
300 TABLET, FILM COATED ORAL AT BEDTIME
Status: DISCONTINUED | OUTPATIENT
Start: 2019-11-06 | End: 2019-11-07

## 2019-11-06 RX ADMIN — LORAZEPAM 2 MG: 1 TABLET ORAL at 08:43

## 2019-11-06 RX ADMIN — B-COMPLEX W/ C & FOLIC ACID TAB 1 TABLET: TAB at 08:52

## 2019-11-06 RX ADMIN — THERA TABS 1 TABLET: TAB at 08:52

## 2019-11-06 RX ADMIN — MELATONIN 25 MCG: at 08:52

## 2019-11-06 RX ADMIN — LORAZEPAM 2 MG: 1 TABLET ORAL at 17:18

## 2019-11-06 ASSESSMENT — ACTIVITIES OF DAILY LIVING (ADL)
HYGIENE/GROOMING: PROMPTS
LAUNDRY: UNABLE TO COMPLETE
LAUNDRY: UNABLE TO COMPLETE
DRESS: SCRUBS (BEHAVIORAL HEALTH)
ORAL_HYGIENE: PROMPTS
HYGIENE/GROOMING: INDEPENDENT
DRESS: SCRUBS (BEHAVIORAL HEALTH)
ORAL_HYGIENE: INDEPENDENT
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
## 2019-11-06 NOTE — PROGRESS NOTES
Participated with redirection in Music Therapy group with focus on mood elevation, validation and decreasing anxiety and improved group cohesiveness. Engaged and cooperative in music listening interventions.   Showed progress in session goals.    Accepted redirection for being in writer's personal space and for talking over the music.  Tangential speech, some appears to be true and representative of his life, while other statements appear to not be true.  Exhibits flight of ideas at times.

## 2019-11-06 NOTE — PROGRESS NOTES
"   11/06/19 1300   Psycho Education   Type of Intervention structured groups   Response participates with cues/redirection   Hours 0.5   This pt participated in \"group\" he was the only person in attendance.  He was hyperverbal, tangential, grandiose and argumentative at times.  He spoke about being that leader, god, above all beings.  He spoke of psychotropic medications being \"killer\" and \"killed 80% of the population.\"  He is only interested in taking the following medications, ativan, xanax, adderall, valium, klonopin or lorazepam.  We discussed the addictive aspects of those types of medication and he stated that \"I've never been addicted to anything any day of my life.\"   "

## 2019-11-06 NOTE — PROGRESS NOTES
SPIRITUAL HEALTH SERVICES  SPIRITUAL ASSESSMENT Progress Note  [facility] ([facility]) 12NB     REFERRAL SOURCE: Responded to a Spiritual Health Consult    I spoke with unit staff to inform them that the unit  will follow up with them tomorrow when they return to the hospital.     PLAN: I will inform the unit  of the pending request.     Eliseo Roach, [facility], DMin  Staff   Pager [phone]

## 2019-11-06 NOTE — PLAN OF CARE
"Patient is visible on the unit, active in the milieu. Patient presents with hyperactive activity, appears disheveled and can be intrusive towards staff and peers. Appears elated, grandiose, is hyper-verbal and has pressured speech. Patient presents with delusions of grandeur and persecution with poor concentration. Patient reports, \"I'm changing my name to Quid, do not call me Tyler! 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   Patient required redirection for inappropriate discussion at times and eventually complied. Patient has copies of his bill or rights and paperwork from admission where he has highlighted and circled areas that he disagrees with. Patient asked to speak to this writer several times regarding how he feels he has been wronged, and requested to be seen by a medical doctor immediately because his paperwork states he has the right to be seen within 48 hours for a full medical assessment. Patient was unable to specifically define any major emergent medical concerns. He eventually calmed, took HS medication and rested in room. Will continue to monitor for safety.    "
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
## 2019-11-06 NOTE — PROGRESS NOTES
This writer received a call from [facility] Screener Katie (phone: [phone]).  She plans to come to meet with the patient today between 10-11am.
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
## 2019-11-06 NOTE — PROGRESS NOTES
"Pt was very loud, hyperverbal and disruptive. He appears grandiose and at times elated. He was redirectable, but required redirection frequently. He attended a group but was unable to do anything because he wouldn't stop talking. Pt was in generally good spirits, but he became hostile after being told he had to lower his voice in the lounge and gave this writer \"two out of three strikes\" for \"being extremely disrespectful\". Pt was prompted to shower, but his hygiene remained poor despite having done so.    There were no concerns regarding elopement nor aggression. No SI/SIB noted and no AVH.     11/05/19 1200 Behavioral Health   Hallucinations denies / not responding to hallucinations   Thinking delusional;paranoid;distractable   Orientation person: oriented;place: oriented;date: oriented;time: oriented   Memory confabulation   Insight poor;denial of illness   Judgement impaired   Eye Contact at examiner   Affect full range affect;tense   Mood labile;grandiose   Physical Appearance/Attire disheveled;untidy   Hygiene neglected grooming - unclean body, hair, teeth;other (see comment)  (ppt stated he did shower)   Suicidality other (see comments)  (none stated)   1. Wish to be Dead (Recent) No   2. Non-Specific Active Suicidal Thoughts (Recent) No   Self Injury other (see comment)  (none stated nor observed)   Elopement   (no concerns)   Activity restless;hyperactive (agitated, impulsive)   Speech pressured;rambling;tangential;flight of ideas   Medication Sensitivity no stated side effects;no observed side effects   Psychomotor / Gait balanced;steady;tremors   Activities of Daily Living   Hygiene/Grooming prompts;independent;shower   Oral Hygiene prompts   Dress scrubs (behavioral health)   Room Organization independent     "

## 2019-11-07 PROCEDURE — 12400001 ZZH R&B MH UMMC

## 2019-11-07 PROCEDURE — 25000132 ZZH RX MED GY IP 250 OP 250 PS 637: Performed by: STUDENT IN AN ORGANIZED HEALTH CARE EDUCATION/TRAINING PROGRAM

## 2019-11-07 PROCEDURE — 99233 SBSQ HOSP IP/OBS HIGH 50: CPT | Mod: GC | Performed by: PSYCHIATRY & NEUROLOGY

## 2019-11-07 RX ORDER — DIPHENHYDRAMINE HCL 25 MG
25 CAPSULE ORAL EVERY 6 HOURS PRN
Status: DISCONTINUED | OUTPATIENT
Start: 2019-11-07 | End: 2019-11-07

## 2019-11-07 RX ORDER — DIPHENHYDRAMINE HCL 25 MG
25-50 CAPSULE ORAL EVERY 6 HOURS PRN
Status: DISCONTINUED | OUTPATIENT
Start: 2019-11-07 | End: 2019-12-16 | Stop reason: HOSPADM

## 2019-11-07 RX ADMIN — LORAZEPAM 2 MG: 1 TABLET ORAL at 08:44

## 2019-11-07 RX ADMIN — MELATONIN 25 MCG: at 07:59

## 2019-11-07 RX ADMIN — B-COMPLEX W/ C & FOLIC ACID TAB 1 TABLET: TAB at 07:59

## 2019-11-07 RX ADMIN — THERA TABS 1 TABLET: TAB at 07:59

## 2019-11-07 ASSESSMENT — ACTIVITIES OF DAILY LIVING (ADL)
LAUNDRY: UNABLE TO COMPLETE
ORAL_HYGIENE: INDEPENDENT
DRESS: SCRUBS (BEHAVIORAL HEALTH)
DRESS: SCRUBS (BEHAVIORAL HEALTH)
HYGIENE/GROOMING: INDEPENDENT
ORAL_HYGIENE: INDEPENDENT
HYGIENE/GROOMING: INDEPENDENT

## 2019-11-07 NOTE — PROGRESS NOTES
RN writer received a call from Meera at [facility] who reports that patient has been placed on a district court hold effective today, 11/7/19, at 10:36.
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
## 2019-11-07 NOTE — PROGRESS NOTES
"[facility], [place]   Psychiatric Progress Note  Hospital Day: 2        Interim History:   The patient's care was discussed with the treatment team during the daily team meeting and/or staff's chart notes were reviewed.  Staff report patient continues to be hyperverbal with pressured and tangential speech.       Upon interview, the patient was initially quieter and more slowed than on previous interactions.  However with further questioning his speech became pressured and tangential.  He stated that is not currently having any mental health symptoms.  He reported he would never take Seroquel again because it made him \"a zombie\".  He inquired about getting ADHD medications and was again informed that stimulants would not be prescribed on this unit.  He asked if team thought psychiatric medications were indicated.  When patient heard that psychiatric medications were recommended for his presentation he became upset, stating \"fuck you bitch, you are a psychokiller\".  He stuck up his middle finger and asked team to leave the room.       The patient denies SI, SIB, and HI.  Patient denies medication side effects and acute medical concerns.  Patient had no further requests or concerns.          Medications:       multivitamin, therapeutic  1 tablet Oral Daily     vitamin B complex with vitamin C  1 tablet Oral Daily     cholecalciferol  25 mcg Oral Daily          Allergies:     Allergies   Allergen Reactions     Benztropine      Other reaction(s): Seizures  Patient reported  Patient reported       Abilify Discmelt      Suicidal thoughts       Aripiprazole Other (See Comments)     Other reaction(s): Unknown  Suicidal thoughts  Suicidal thoughts       Benzyl Alcohol      Other reaction(s): Unknown     Compazine Other (See Comments)     Seizure      Haloperidol Other (See Comments)     Other reaction(s): Dystonia  Patient had EPS with haldol  Patient had EPS with haldol  Patient had EPS with " haldol       Phenothiazines Other (See Comments)     Other reaction(s): Dystonia, Muscle Aches/Weakness, Muscle Cramps, Seizures  Seizures  Patient report  Seizures            Labs:   No results found for this or any previous visit (from the past 24 hour(s)).       Psychiatric Examination:     /72   Pulse 94   Temp 97.6  F (36.4  C) (Oral)   Resp 16   Wt 78.2 kg (172 lb 8 oz)   SpO2 96%   BMI 27.84 kg/m    Weight is 172 lbs 8 oz  Body mass index is 27.84 kg/m .    Weight over time:  Vitals:    11/07/19 0700   Weight: 78.2 kg (172 lb 8 oz)       Orthostatic Vitals       Most Recent      Sitting Orthostatic /84 11/06 0700    Sitting Orthostatic Pulse (bpm) 103 11/06 0700    Standing Orthostatic /81 11/06 0700    Standing Orthostatic Pulse (bpm) 107 11/06 0700            Cardiometabolic risk assessment. 11/06/19      Reviewed patient profile for cardiometabolic risk factors    Date taken /Value  REFERENCE RANGE   Abdominal Obesity  (Waist Circumference)   See nursing flowsheet Women ?35 in (88 cm)   Men ?40 in (102 cm)      Triglycerides  Triglycerides   Date Value Ref Range Status   09/26/2017 284 (H) <150 mg/dL Final     Comment:     Borderline high:  150-199 mg/dl  High:             200-499 mg/dl  Very high:       >499 mg/dl         ?150 mg/dL (1.7 mmol/L) or current treatment for elevated triglycerides   HDL cholesterol  HDL Cholesterol   Date Value Ref Range Status   09/26/2017 38 (L) >39 mg/dL Final   ]   Women <50 mg/dL (1.3 mmol/L) in women or current treatment for low HDL cholesterol  Men <40 mg/dL (1 mmol/L) in men or current treatment for low HDL cholesterol     Fasting plasma glucose (FPG) Lab Results   Component Value Date     11/02/2019      FPG ?100 mg/dL (5.6 mmol/L) or treatment for elevated blood glucose   Blood pressure  BP Readings from Last 3 Encounters:   11/07/19 112/72   11/04/19 121/74   12/06/17 105/72    Blood pressure ?130/85 mmHg or treatment for elevated  "blood pressure   Family History  See family history     Appearance: awake, alert and poorly groomed  Attitude:  uncooperative  Eye Contact:  intense  Mood:  \"fine\"  Affect:  intensity is heightened  Speech:  pressured speech  Language: fluent and intact in English  Psychomotor, Gait, Musculoskeletal:  no evidence of tardive dyskinesia, dystonia, or tics  Throught Process:  disorganized and illogical  Associations:  no loose associations  Thought Content:  Suicidal ideation, homicidal ideation, and self-injurous statements made during interview  Insight:  limited  Judgement:  limited  Oriented to:  time, person, and place  Attention Span and Concentration:  limited  Recent and Remote Memory:  limited  Fund of Knowledge:  appropriate    Clinical Global Impressions  First:  Considering your total clinical experience with this particular patient population, how severe are the patient's symptoms at this time?: 7 (11/05/19 1325)  Compared to the patient's condition at the START of treatment, this patient's condition is:: 4 (11/05/19 1325)  Most recent:  Considering your total clinical experience with this particular patient population, how severe are the patient's symptoms at this time?: 7 (11/05/19 1325)  Compared to the patient's condition at the START of treatment, this patient's condition is:: 4 (11/05/19 1325)           Precautions:     Behavioral Orders   Procedures     Assault precautions     Code 1 - Restrict to Unit     Routine Programming     As clinically indicated     Status 15     Every 15 minutes.          Diagnoses:   # Bipolar I Disorder, Manic with psychotic features  # Methamphetamine Use Disorder  # Cannabis Use Disorder          Assessment & Plan:     Assessment and hospital summary:  This patient is a 44 year old male with previous diagnoses of Bipolar I Disorder, anxiety disorder-unspecified, PTSD, ADHD, Methamphetamine use disorder, and cannabis use disorder who presented to ED after calling EMS on " himself because his feet hurt after walking around the mall barefoot all day.  Patient has had numerous previous hospitalizations for similar symptoms and has prior MICD commitments.  MSE on admission was notable for delusional thought content, suicidal ideation, and homicidal ideation with no insight.  Patient's presentation is most consistent with diagnoses of Bipolar Disorder, manic with psychosis, methamphetamine use disorder, and cannabis use disorder.  Given patient's lack of insight and capacity to consent for treatment 72 hour hold was initiated and MICD petition was filled with [facility].       Target psychiatric symptoms and interventions:  #Psychosis, delusions: Seroquel discontinued as patient declined to take it.  Awaiting Frausto.  PRN Olanzapine available.     # Anxiety: Ativan 1-2mg TID PRN for anxiety     Medical Problems and Treatments:  # Multiple wounds in various stage of healing over body, patient declined Bacitracin, will monitor for signs of infection     Legal:  72 hour hold, signed 11/05/19 at 10:37am     Safety:  - Continue precautions as noted above  - Status 15 minute checks    Disposition:  Pending further evaluation by primary team and court assessment      Dr. Stacie Hawkins DO, KINSEY, MS  PGY-2 Psychiatry Resident Physician   ---------------------------------------------------------------------------------------

## 2019-11-07 NOTE — PROGRESS NOTES
Received phone call from [employer] [facility] [employer]. They are supporting the MI and CD portion of commitment.  They will send over court hold and upcoming court dates prior to the end of 72 hour hold.

## 2019-11-07 NOTE — PROGRESS NOTES
"Pt was no charge for group but came toward the end and was using inappropriate tone and language.  It wasn't specifically directed to writer , it was about hospital and his rights etc.When  A psych associate intervened he said \" Im talking to her, why are you in the conversation.\"  "
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
## 2019-11-08 PROCEDURE — 25000132 ZZH RX MED GY IP 250 OP 250 PS 637: Performed by: STUDENT IN AN ORGANIZED HEALTH CARE EDUCATION/TRAINING PROGRAM

## 2019-11-08 PROCEDURE — G0177 OPPS/PHP; TRAIN & EDUC SERV: HCPCS

## 2019-11-08 PROCEDURE — 99231 SBSQ HOSP IP/OBS SF/LOW 25: CPT | Mod: GC | Performed by: PSYCHIATRY & NEUROLOGY

## 2019-11-08 PROCEDURE — 12400001 ZZH R&B MH UMMC

## 2019-11-08 RX ADMIN — MELATONIN 25 MCG: at 10:06

## 2019-11-08 RX ADMIN — B-COMPLEX W/ C & FOLIC ACID TAB 1 TABLET: TAB at 10:07

## 2019-11-08 RX ADMIN — THERA TABS 1 TABLET: TAB at 10:06

## 2019-11-08 RX ADMIN — LORAZEPAM 2 MG: 1 TABLET ORAL at 14:40

## 2019-11-08 ASSESSMENT — ACTIVITIES OF DAILY LIVING (ADL)
ORAL_HYGIENE: INDEPENDENT;PROMPTS
HYGIENE/GROOMING: INDEPENDENT;PROMPTS
LAUNDRY: UNABLE TO COMPLETE
DRESS: SCRUBS (BEHAVIORAL HEALTH)
HYGIENE/GROOMING: INDEPENDENT;PROMPTS
LAUNDRY: UNABLE TO COMPLETE
ORAL_HYGIENE: INDEPENDENT;PROMPTS
DRESS: SCRUBS (BEHAVIORAL HEALTH);PROMPTS;INDEPENDENT

## 2019-11-08 NOTE — PROGRESS NOTES
"[facility], [place]   Psychiatric Progress Note  Hospital Day: 3        Interim History:   The patient's care was discussed with the treatment team during the daily team meeting and/or staff's chart notes were reviewed.  Staff report patient has been more irritable and tense.  He continues to make delusional and paranoid statements.  Denies SI, SIB, HI.     Treatment team requested to meet with patient today and patient stated he did not want to meet with team because, \"I don't speak with Nazis\".  Upon further encouragement to meet with team and discuss concerns, patient stated \"No, fucking Nazi bitches, fuck you\".       The patient denies SI, SIB, and HI.  Patient denies medication side effects and acute medical concerns.  Patient had no further requests or concerns.          Medications:       multivitamin, therapeutic  1 tablet Oral Daily     vitamin B complex with vitamin C  1 tablet Oral Daily     cholecalciferol  25 mcg Oral Daily          Allergies:     Allergies   Allergen Reactions     Benztropine      Other reaction(s): Seizures  Patient reported  Patient reported       Abilify Discmelt      Suicidal thoughts       Aripiprazole Other (See Comments)     Other reaction(s): Unknown  Suicidal thoughts  Suicidal thoughts       Benzyl Alcohol      Other reaction(s): Unknown     Compazine Other (See Comments)     Seizure      Haloperidol Other (See Comments)     Other reaction(s): Dystonia  Patient had EPS with haldol  Patient had EPS with haldol  Patient had EPS with haldol       Phenothiazines Other (See Comments)     Other reaction(s): Dystonia, Muscle Aches/Weakness, Muscle Cramps, Seizures  Seizures  Patient report  Seizures            Labs:   No results found for this or any previous visit (from the past 24 hour(s)).       Psychiatric Examination:     /72   Pulse 94   Temp 97.6  F (36.4  C) (Oral)   Resp 16   Wt 78.2 kg (172 lb 8 oz)   SpO2 96%   BMI 27.84 kg/m  "   Weight is 172 lbs 8 oz  Body mass index is 27.84 kg/m .    Weight over time:  Vitals:    11/07/19 0700   Weight: 78.2 kg (172 lb 8 oz)       Orthostatic Vitals       Most Recent      Sitting Orthostatic /84 11/06 0700    Sitting Orthostatic Pulse (bpm) 103 11/06 0700    Standing Orthostatic /81 11/06 0700    Standing Orthostatic Pulse (bpm) 107 11/06 0700            Cardiometabolic risk assessment. 11/06/19      Reviewed patient profile for cardiometabolic risk factors    Date taken /Value  REFERENCE RANGE   Abdominal Obesity  (Waist Circumference)   See nursing flowsheet Women ?35 in (88 cm)   Men ?40 in (102 cm)      Triglycerides  Triglycerides   Date Value Ref Range Status   09/26/2017 284 (H) <150 mg/dL Final     Comment:     Borderline high:  150-199 mg/dl  High:             200-499 mg/dl  Very high:       >499 mg/dl         ?150 mg/dL (1.7 mmol/L) or current treatment for elevated triglycerides   HDL cholesterol  HDL Cholesterol   Date Value Ref Range Status   09/26/2017 38 (L) >39 mg/dL Final   ]   Women <50 mg/dL (1.3 mmol/L) in women or current treatment for low HDL cholesterol  Men <40 mg/dL (1 mmol/L) in men or current treatment for low HDL cholesterol     Fasting plasma glucose (FPG) Lab Results   Component Value Date     11/02/2019      FPG ?100 mg/dL (5.6 mmol/L) or treatment for elevated blood glucose   Blood pressure  BP Readings from Last 3 Encounters:   11/07/19 112/72   11/04/19 121/74   12/06/17 105/72    Blood pressure ?130/85 mmHg or treatment for elevated blood pressure   Family History  See family history     Appearance: awake, alert and poorly groomed  Attitude:  uncooperative  Eye Contact:  intense  Mood:  angry  Affect:  intensity is heightened  Speech:  pressured speech  Language: fluent and intact in English  Psychomotor, Gait, Musculoskeletal:  no evidence of tardive dyskinesia, dystonia, or tics  Throught Process:  disorganized and illogical  Associations:  no  loose associations  Thought Content:  Unable to assess today  Insight:  limited  Judgement:  limited  Oriented to:  time, person, and place  Attention Span and Concentration:  limited  Recent and Remote Memory:  limited  Fund of Knowledge:  appropriate    Clinical Global Impressions  First:  Considering your total clinical experience with this particular patient population, how severe are the patient's symptoms at this time?: 7 (11/05/19 1325)  Compared to the patient's condition at the START of treatment, this patient's condition is:: 4 (11/05/19 1325)  Most recent:  Considering your total clinical experience with this particular patient population, how severe are the patient's symptoms at this time?: 7 (11/05/19 1325)  Compared to the patient's condition at the START of treatment, this patient's condition is:: 4 (11/05/19 1325)           Precautions:     Behavioral Orders   Procedures     Assault precautions     Code 1 - Restrict to Unit     Routine Programming     As clinically indicated     Status 15     Every 15 minutes.          Diagnoses:   # Bipolar I Disorder, Manic with psychotic features  # Methamphetamine Use Disorder  # Cannabis Use Disorder          Assessment & Plan:     Assessment and hospital summary:  This patient is a 44 year old male with previous diagnoses of Bipolar I Disorder, anxiety disorder-unspecified, PTSD, ADHD, Methamphetamine use disorder, and cannabis use disorder who presented to ED after calling EMS on himself because his feet hurt after walking around the mall barefoot all day.  Patient has had numerous previous hospitalizations for similar symptoms and has prior MICD commitments.  MSE on admission was notable for delusional thought content, suicidal ideation, and homicidal ideation with no insight.  Patient's presentation is most consistent with diagnoses of Bipolar Disorder, manic with psychosis, methamphetamine use disorder, and cannabis use disorder.  Given patient's lack of  insight and capacity to consent for treatment 72 hour hold was initiated and MICD petition was filled with [facility].       Target psychiatric symptoms and interventions:  #Psychosis, delusions: Seroquel discontinued as patient declined to take it.  Awaiting Frausto.  PRN Olanzapine available.     # Anxiety: Ativan 1-2mg TID PRN for anxiety     Medical Problems and Treatments:  # Multiple wounds in various stage of healing over body, patient declined Bacitracin, will monitor for signs of infection     Legal:  Court hold    Safety:  - Continue precautions as noted above  - Status 15 minute checks    Disposition:  Pending further evaluation by primary team and court assessment      Dr. Stacie Hawkins DO, MBA, MS  PGY-2 Psychiatry Resident Physician   ---------------------------------------------------------------------------------------
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
## 2019-11-08 NOTE — PROGRESS NOTES
"   11/08/19 1422   Behavioral Health   Hallucinations auditory;denies / not responding to hallucinations   Thinking delusional;distractable;paranoid;poor concentration   Orientation place: oriented;date: oriented;person: oriented   Memory baseline memory   Insight insight appropriate to situation;poor   Judgement impaired   Eye Contact at examiner   Affect full range affect;irritable;tense   Mood irritable;anxious   Hygiene body odor;neglected grooming - unclean body, hair, teeth   1. Wish to be Dead (Recent) No   2. Non-Specific Active Suicidal Thoughts (Recent) No   Activities of Daily Living   Hygiene/Grooming independent;prompts   Oral Hygiene independent;prompts   Dress scrubs (behavioral health);prompts;independent   Laundry unable to complete   Room Organization independent   Patient is time, place, and person oriented. He was preoccupied with talking about  \"what the American people did to the\" and his Rastafarian beliefs. He ate 100% of his meals during this shift. He did not take a shower and when staff offered shower supplies he replied \"I shower once a week because I do not want to lose my body oils\" He was agitated for a bit during this shift but he is currently meeting with the .   "

## 2019-11-08 NOTE — PROGRESS NOTES
Pt had poor insight into his mental health as he continues to deny all mental health symptoms. Pt is upset about petitioned for MI/CD commitment with [facility]. He accused his primary doctor of trying to damage his brain with psychiatric medications.  I pissed with that female doctor, bro! She keeps telling me shit about starting me on psychiatric medications. I m not sick, bro! I came in for medical assistance, and I end up on a mental health unit . Pt continues to be disorganized, grandiose, and hypervarbal. He was also upset that the  did not see him today. Pt was reassured that the  would see him tomorrow morning.

## 2019-11-08 NOTE — PROGRESS NOTES
Pt was irritable and tense this evening. He is demanding. He is malodorous. Pt made numerous delusional/paranoid statements tonight. No signs of SI/SIB/HI. No restraints or seclusions.

## 2019-11-09 PROCEDURE — 25000132 ZZH RX MED GY IP 250 OP 250 PS 637: Performed by: PSYCHIATRY & NEUROLOGY

## 2019-11-09 PROCEDURE — 12400001 ZZH R&B MH UMMC

## 2019-11-09 PROCEDURE — 25000132 ZZH RX MED GY IP 250 OP 250 PS 637: Performed by: STUDENT IN AN ORGANIZED HEALTH CARE EDUCATION/TRAINING PROGRAM

## 2019-11-09 RX ADMIN — MELATONIN 25 MCG: at 09:47

## 2019-11-09 RX ADMIN — NICOTINE POLACRILEX 4 MG: 2 GUM, CHEWING BUCCAL at 16:49

## 2019-11-09 RX ADMIN — THERA TABS 1 TABLET: TAB at 09:47

## 2019-11-09 RX ADMIN — B-COMPLEX W/ C & FOLIC ACID TAB 1 TABLET: TAB at 09:47
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
## 2019-11-10 PROCEDURE — 25000132 ZZH RX MED GY IP 250 OP 250 PS 637: Performed by: STUDENT IN AN ORGANIZED HEALTH CARE EDUCATION/TRAINING PROGRAM

## 2019-11-10 PROCEDURE — 12400001 ZZH R&B MH UMMC

## 2019-11-10 RX ADMIN — B-COMPLEX W/ C & FOLIC ACID TAB 1 TABLET: TAB at 08:02

## 2019-11-10 RX ADMIN — THERA TABS 1 TABLET: TAB at 08:02

## 2019-11-10 RX ADMIN — MELATONIN 25 MCG: at 08:02

## 2019-11-10 ASSESSMENT — ACTIVITIES OF DAILY LIVING (ADL)
ORAL_HYGIENE: INDEPENDENT
LAUNDRY: UNABLE TO COMPLETE
DRESS: SCRUBS (BEHAVIORAL HEALTH);INDEPENDENT
DRESS: SCRUBS (BEHAVIORAL HEALTH);INDEPENDENT
HYGIENE/GROOMING: INDEPENDENT
ORAL_HYGIENE: INDEPENDENT
HYGIENE/GROOMING: INDEPENDENT
LAUNDRY: UNABLE TO COMPLETE
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
## 2019-11-10 NOTE — PROGRESS NOTES
Tyler has been to the window requesting his name changed, his Syn[employer] changed, and announcing many other changes that he would like to see in his permanent medical record. He stated that he does not have a mental illness. He is not willing to take any medication tonight. He is active in the lounge with staff and peers, He is redirectable and able to make his needs known. He denies SI/SIB. He reported a stomach ache but stated he just wanted a soda.

## 2019-11-11 PROCEDURE — 99231 SBSQ HOSP IP/OBS SF/LOW 25: CPT | Performed by: PSYCHIATRY & NEUROLOGY

## 2019-11-11 PROCEDURE — 12400001 ZZH R&B MH UMMC

## 2019-11-11 PROCEDURE — 25000132 ZZH RX MED GY IP 250 OP 250 PS 637: Performed by: STUDENT IN AN ORGANIZED HEALTH CARE EDUCATION/TRAINING PROGRAM

## 2019-11-11 PROCEDURE — G0177 OPPS/PHP; TRAIN & EDUC SERV: HCPCS

## 2019-11-11 RX ADMIN — MELATONIN 25 MCG: at 08:55

## 2019-11-11 RX ADMIN — B-COMPLEX W/ C & FOLIC ACID TAB 1 TABLET: TAB at 08:55

## 2019-11-11 RX ADMIN — THERA TABS 1 TABLET: TAB at 08:55

## 2019-11-11 RX ADMIN — LORAZEPAM 2 MG: 1 TABLET ORAL at 11:31

## 2019-11-11 ASSESSMENT — ACTIVITIES OF DAILY LIVING (ADL)
DRESS: INDEPENDENT
HYGIENE/GROOMING: INDEPENDENT
ORAL_HYGIENE: INDEPENDENT
LAUNDRY: UNABLE TO COMPLETE
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
## 2019-11-12 PROCEDURE — 25000132 ZZH RX MED GY IP 250 OP 250 PS 637: Performed by: STUDENT IN AN ORGANIZED HEALTH CARE EDUCATION/TRAINING PROGRAM

## 2019-11-12 PROCEDURE — 99231 SBSQ HOSP IP/OBS SF/LOW 25: CPT | Performed by: PSYCHIATRY & NEUROLOGY

## 2019-11-12 PROCEDURE — 12400001 ZZH R&B MH UMMC

## 2019-11-12 RX ADMIN — B-COMPLEX W/ C & FOLIC ACID TAB 1 TABLET: TAB at 09:04

## 2019-11-12 RX ADMIN — MELATONIN 25 MCG: at 09:04

## 2019-11-12 RX ADMIN — THERA TABS 1 TABLET: TAB at 09:04

## 2019-11-12 ASSESSMENT — ACTIVITIES OF DAILY LIVING (ADL)
ORAL_HYGIENE: INDEPENDENT
DRESS: SCRUBS (BEHAVIORAL HEALTH)
HYGIENE/GROOMING: INDEPENDENT
DRESS: SCRUBS (BEHAVIORAL HEALTH)
ORAL_HYGIENE: INDEPENDENT
HYGIENE/GROOMING: INDEPENDENT
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
## 2019-11-12 NOTE — PLAN OF CARE
BEHAVIORAL TEAM DISCUSSION    Participants: Dr. Jennifer Varner, Ez Lovett RN, Bettie Benson LMFT, [facility]   Progress: pt continues to be highly delusional, loud, demanding   Anticipated length of stay: 2 weeks  Continued Stay Criteria/Rationale: on a court hold, stabilization  Medical/Physical: none  Precautions:   Behavioral Orders   Procedures    Assault precautions    Code 1 - Restrict to Unit    Routine Programming     As clinically indicated    Status 15     Every 15 minutes.     Plan: pt has preliminary exam on 11/13/19 for MI/CD petition   Rationale for change in precautions or plan: no change
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
## 2019-11-13 PROCEDURE — 12400001 ZZH R&B MH UMMC

## 2019-11-13 PROCEDURE — 25000132 ZZH RX MED GY IP 250 OP 250 PS 637: Performed by: STUDENT IN AN ORGANIZED HEALTH CARE EDUCATION/TRAINING PROGRAM

## 2019-11-13 RX ADMIN — THERA TABS 1 TABLET: TAB at 08:19

## 2019-11-13 RX ADMIN — MELATONIN 25 MCG: at 08:19

## 2019-11-13 RX ADMIN — B-COMPLEX W/ C & FOLIC ACID TAB 1 TABLET: TAB at 08:19

## 2019-11-13 ASSESSMENT — ACTIVITIES OF DAILY LIVING (ADL)
DRESS: SCRUBS (BEHAVIORAL HEALTH)
DRESS: SCRUBS (BEHAVIORAL HEALTH)
HYGIENE/GROOMING: INDEPENDENT
HYGIENE/GROOMING: INDEPENDENT
ORAL_HYGIENE: INDEPENDENT
ORAL_HYGIENE: INDEPENDENT

## 2019-11-13 NOTE — PROGRESS NOTES
[facility], [place]   Psychiatric Progress Note  Hospital Day: 8        Interim History:   The patient's care was discussed with the treatment team during the daily team meeting and/or staff's chart notes were reviewed.  Staff report patient has been happy, hyperverbal, loud and talking rapidly. He also talks about being persecuted and people trying to harm him. He is more redirectable. He slept 5.5 hours last night and did not have any PRNs.    Patient was in court this morning. Met with him when he was having lunch and watching the news. He was pleasant and calm through the interview though did have movements of excitement. He was confused as to if I was a physician or a psychiatrist and did not follow explanations for same. He went on to speak (hyperverbal and tangential) about the cult of psychiatry and how a decade of his life had been destroyed by medication. He repeatedly spoke about Risperidone causing bad erectile dysfunction so that he cannot have children. He referred to Inocencio Ospina and said that was the only psychiatrist he followed. 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 He said he had cured multiple disease, multiple sclerosis, diabetes type one and type two. He says his treatment worked in 60 days. He says the secret is in ancient chinese medicine. [place]. 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 He says that [place] [facility] [place] he was trained by [employer] in counter insurgency  tactics, both attack and defense. He says the FDA through psychiatry has been targeting indigenous peoples. He says that for many centuries the wrongs have not been righted. He is worried about court because he will again be forced to be on medicine that he does not want to be on with  forced injections if he does not take his oral medications. He speaks again about [place] and says all he needs to be treated is love, kindness and empathy. He was cordial and said he was glad to meet and talk today.          Medications:       multivitamin, therapeutic  1 tablet Oral Daily     vitamin B complex with vitamin C  1 tablet Oral Daily     cholecalciferol  25 mcg Oral Daily          Allergies:     Allergies   Allergen Reactions     Benztropine      Other reaction(s): Seizures  Patient reported  Patient reported       Abilify Discmelt      Suicidal thoughts       Aripiprazole Other (See Comments)     Other reaction(s): Unknown  Suicidal thoughts  Suicidal thoughts       Benzyl Alcohol      Other reaction(s): Unknown     Compazine Other (See Comments)     Seizure      Haloperidol Other (See Comments)     Other reaction(s): Dystonia  Patient had EPS with haldol  Patient had EPS with haldol  Patient had EPS with haldol       Phenothiazines Other (See Comments)     Other reaction(s): Dystonia, Muscle Aches/Weakness, Muscle Cramps, Seizures  Seizures  Patient report  Seizures            Labs:   No results found for this or any previous visit (from the past 24 hour(s)).       Psychiatric Examination:     /79   Pulse 90   Temp 97.8  F (36.6  C) (Tympanic)   Resp 16   Wt 78 kg (172 lb)   SpO2 97%   BMI 27.76 kg/m    Weight is 172 lbs 0 oz  Body mass index is 27.76 kg/m .    Weight over time:  Vitals:    11/07/19 0700 11/09/19 0845   Weight: 78.2 kg (172 lb 8 oz) 78 kg (172 lb)       Orthostatic Vitals       Most Recent      Sitting Orthostatic /84 11/06 0700    Sitting Orthostatic Pulse (bpm) 103 11/06 0700    Standing Orthostatic /81 11/06 0700    Standing Orthostatic Pulse (bpm) 107 11/06 0700            Cardiometabolic risk assessment. 11/06/19      Reviewed patient profile for cardiometabolic risk factors    Date taken /Value  REFERENCE RANGE   Abdominal Obesity  (Waist Circumference)    See nursing flowsheet Women ?35 in (88 cm)   Men ?40 in (102 cm)      Triglycerides  Triglycerides   Date Value Ref Range Status   09/26/2017 284 (H) <150 mg/dL Final     Comment:     Borderline high:  150-199 mg/dl  High:             200-499 mg/dl  Very high:       >499 mg/dl         ?150 mg/dL (1.7 mmol/L) or current treatment for elevated triglycerides   HDL cholesterol  HDL Cholesterol   Date Value Ref Range Status   09/26/2017 38 (L) >39 mg/dL Final   ]   Women <50 mg/dL (1.3 mmol/L) in women or current treatment for low HDL cholesterol  Men <40 mg/dL (1 mmol/L) in men or current treatment for low HDL cholesterol     Fasting plasma glucose (FPG) Lab Results   Component Value Date     11/02/2019      FPG ?100 mg/dL (5.6 mmol/L) or treatment for elevated blood glucose   Blood pressure  BP Readings from Last 3 Encounters:   11/12/19 110/79   11/04/19 121/74   12/06/17 105/72    Blood pressure ?130/85 mmHg or treatment for elevated blood pressure   Family History  See family history     Appearance: awake, alert and poorly groomed  Attitude:  Co-operative  Eye Contact:  good  Mood:  irritable  Affect:  intensity is heightened  Speech:  pressured speech, tangential, some clang associations  Language: fluent and intact in English  Psychomotor, Gait, Musculoskeletal:  no evidence of tardive dyskinesia, dystonia, or tics  Throught Process:  disorganized and illogical  Associations:  no loose associations  Thought Content:  Grandiose and persecutory delusions noted  Insight:  limited  Judgement:  limited  Oriented to:  time, person, and place  Attention Span and Concentration:  limited  Recent and Remote Memory:  limited  Fund of Knowledge:  appropriate    Clinical Global Impressions  First:  Considering your total clinical experience with this particular patient population, how severe are the patient's symptoms at this time?: 7 (11/05/19 9170)  Compared to the patient's condition at the START of treatment, this  patient's condition is:: 4 (11/05/19 1325)  Most recent:  Considering your total clinical experience with this particular patient population, how severe are the patient's symptoms at this time?: 7 (11/05/19 1325)  Compared to the patient's condition at the START of treatment, this patient's condition is:: 4 (11/05/19 1325)           Precautions:     Behavioral Orders   Procedures     Assault precautions     Code 1 - Restrict to Unit     Routine Programming     As clinically indicated     Status 15     Every 15 minutes.          Diagnoses:   # Bipolar I Disorder, Manic with psychotic features  # Methamphetamine Use Disorder  # Cannabis Use Disorder          Assessment & Plan:     Assessment and hospital summary:  This patient is a 44 year old male with previous diagnoses of Bipolar I Disorder, anxiety disorder-unspecified, PTSD, ADHD, Methamphetamine use disorder, and cannabis use disorder who presented to ED after calling EMS on himself because his feet hurt after walking around the mall barefoot all day.  Patient has had numerous previous hospitalizations for similar symptoms and has prior MICD commitments.  MSE on admission was notable for delusional thought content, suicidal ideation, and homicidal ideation with no insight.  Patient's presentation is most consistent with diagnoses of Bipolar Disorder, manic with psychosis, methamphetamine use disorder, and cannabis use disorder.  Given patient's lack of insight and capacity to consent for treatment 72 hour hold was initiated and MICD petition was filled with [facility].       Target psychiatric symptoms and interventions:  #Psychosis, delusions: Seroquel discontinued as patient consistently declined to take it.  Awaiting Jett, went to court 11/13/19.  PRN Olanzapine available.     # Anxiety: Ativan 1-2mg TID PRN for anxiety     Medical Problems and Treatments:  # Multiple wounds in various stage of healing over body, patient declined Bacitracin, will  monitor for signs of infection     Legal:  Court hold    Safety:  - Continue precautions as noted above  - Status 15 minute checks    Disposition:  Pending further evaluation by primary team and court assessment. Pt currently declining all treatment interventions despite clear ongoing evidence of severe psychotic symptoms.    Patient was seen and discussed with the attending.    Leslee Waters MD  PGY2 resident psychiatry      ---------------------------------------------------------------------------------------

## 2019-11-13 NOTE — PROGRESS NOTES
11/12/19 2025   Behavioral Health   Hallucinations denies / not responding to hallucinations   Thinking poor concentration;paranoid;delusional   Orientation place: oriented;person: oriented   Insight denial of illness   Judgement impaired   Eye Contact at examiner   Affect full range affect   Mood mood is calm   Physical Appearance/Attire appears stated age   Suicidality other (see comments)  (ELMO)   Elopement   (No signs)   Activity other (see comment)  (Out in lounge most of the evenin.)   Speech clear;coherent   Medication Sensitivity no observed side effects;no stated side effects   Psychomotor / Gait balanced;steady   Activities of Daily Living   Hygiene/Grooming independent   Oral Hygiene independent   Dress scrubs (behavioral health)   Room Organization independent   Activity   Activity Assistance Provided independent     Pt had a good evening. He was present in the lounge most of the evening.

## 2019-11-13 NOTE — PROGRESS NOTES
"   11/13/19 1313   Behavioral Health   Hallucinations denies / not responding to hallucinations   Thinking distractable;paranoid;poor concentration   Orientation person: oriented;place: oriented   Insight denial of illness   Judgement impaired   Eye Contact at examiner   Affect full range affect   Mood mood is calm   Physical Appearance/Attire appears stated age   Suicidality other (see comments)  (ELMO)   Elopement   (No signs.)   Activity other (see comment)  (In lounge part of the day.)   Speech clear;coherent   Medication Sensitivity no stated side effects;no observed side effects   Psychomotor / Gait balanced;steady   Activities of Daily Living   Hygiene/Grooming independent   Oral Hygiene independent   Dress scrubs (behavioral health)   Room Organization independent   Activity   Activity Assistance Provided independent   Pt told peers he attended an assessment today. He was heard singing in the lounge when he returned. Pt left a message for his ex.   During the call writer heard him begining as a prayer, but he said unordinary things such as \"I hope you get raped and frost bite.\" Writer told pt the   things he was saying were inappropriate and he stated that writer should here her messages.  "

## 2019-11-14 PROCEDURE — 99232 SBSQ HOSP IP/OBS MODERATE 35: CPT | Mod: GC | Performed by: PSYCHIATRY & NEUROLOGY

## 2019-11-14 PROCEDURE — 12400001 ZZH R&B MH UMMC

## 2019-11-14 PROCEDURE — 25000132 ZZH RX MED GY IP 250 OP 250 PS 637: Performed by: STUDENT IN AN ORGANIZED HEALTH CARE EDUCATION/TRAINING PROGRAM

## 2019-11-14 PROCEDURE — 25000132 ZZH RX MED GY IP 250 OP 250 PS 637: Performed by: NURSE PRACTITIONER

## 2019-11-14 RX ORDER — LOPERAMIDE HCL 2 MG
2 CAPSULE ORAL 4 TIMES DAILY PRN
Status: DISCONTINUED | OUTPATIENT
Start: 2019-11-14 | End: 2019-12-16 | Stop reason: HOSPADM

## 2019-11-14 RX ADMIN — THERA TABS 1 TABLET: TAB at 08:34

## 2019-11-14 RX ADMIN — LOPERAMIDE HYDROCHLORIDE 2 MG: 2 CAPSULE ORAL at 20:00

## 2019-11-14 RX ADMIN — B-COMPLEX W/ C & FOLIC ACID TAB 1 TABLET: TAB at 08:34

## 2019-11-14 RX ADMIN — MELATONIN 25 MCG: at 08:34

## 2019-11-14 ASSESSMENT — ACTIVITIES OF DAILY LIVING (ADL)
ORAL_HYGIENE: INDEPENDENT
ORAL_HYGIENE: INDEPENDENT
HYGIENE/GROOMING: INDEPENDENT
LAUNDRY: UNABLE TO COMPLETE
DRESS: SCRUBS (BEHAVIORAL HEALTH)
HYGIENE/GROOMING: INDEPENDENT
DRESS: SCRUBS (BEHAVIORAL HEALTH);INDEPENDENT
LAUNDRY: UNABLE TO COMPLETE

## 2019-11-14 NOTE — PROGRESS NOTES
Tyler had his preliminary exam and they continued court hold through the final hearing which will be on 11/18.

## 2019-11-14 NOTE — PLAN OF CARE
Pt presents with full-range affect and calm mood. Spent most of the evening in his room watching television. Pt reports anxiety 3/10 and depression 4/10. Pt denies SI/SIB and hallucinations. When pt was asked if he had any concerns, pt asked writer what he should do about his situation. Writer asked pt what he meant by this. Pt reports that he feels like he is here for no reason because he is not bipolar. Pt reports that he does have PTSD and ADHD though. Pt reports that the PTSD is managed with Ativan. He is concerned about his upcoming court date and the fact that he is going to be forced to take medication which he is unhappy about. 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 He reports that he knows more about medication than any doctor and that he can cure cancer and diabetes. Pt reports no other concerns or complaints at this time.

## 2019-11-14 NOTE — PROGRESS NOTES
Returned call from Kavya at [facility] Atty's office.  They will want Dr. harper to plan to testify for the rodarte hearing on the 18th between 9:45am and 10:45am. Provided her pager number to them.

## 2019-11-14 NOTE — PROGRESS NOTES
"Pt said, \"I feel sad + hopeless.  I've been to other [place].  You would think they would treat me better but just like here ([place]) they just lock me it their psych chavira.  Pt talked about, having lost most of his friends.  Denied ever having anyone who cared about him..  Next moment pt talked about living with a few of his friends.  \"I was supposed to  them on Halloween.  I hope they found a  at the last moment, once they found out I could not be there\"  Pt denies anx, SI, SIB but confirmed dep 10 of 10.  \"You know how it is to run out of hope.\"  "

## 2019-11-14 NOTE — PROGRESS NOTES
"[facility], [place]   Psychiatric Progress Note  Hospital Day: 9        Interim History:   The patient's care was discussed with the treatment team during the daily team meeting and/or staff's chart notes were reviewed. No behavioral outbursts. 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    [facility] He was watching space jam and was having breakfast.   He was cordial with writer during the interview though at times was more upset with statements of saying \"I appreciate\" him telling us about his issues. He continues  To describe how FDA and psychiatry persecutes native americans. 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 He says his only problem is his language and him describing all that he is. He says this gets interpreted as him having denisha. He does not think he has delusions or denisha or psychosis based on definitions in the dictionary and him being able to speak with us. He says again he has not tolerated antipsychotics in the past. He says once he experimented with 300 mg Seroquel when he ended up sleeping for a very long time that day and did not like the experience. He however did seem to listen when we spoke about working with him to figure out medications that work for him. No acute concerns reported and no additional questions.            Medications:       multivitamin, therapeutic  1 tablet Oral Daily     vitamin B complex with vitamin C  1 tablet Oral Daily     cholecalciferol  25 mcg Oral Daily          Allergies:     Allergies   Allergen Reactions     Benztropine      Other reaction(s): Seizures  Patient reported  Patient reported       Abilify Discmelt      " Suicidal thoughts       Aripiprazole Other (See Comments)     Other reaction(s): Unknown  Suicidal thoughts  Suicidal thoughts       Benzyl Alcohol      Other reaction(s): Unknown     Compazine Other (See Comments)     Seizure      Haloperidol Other (See Comments)     Other reaction(s): Dystonia  Patient had EPS with haldol  Patient had EPS with haldol  Patient had EPS with haldol       Phenothiazines Other (See Comments)     Other reaction(s): Dystonia, Muscle Aches/Weakness, Muscle Cramps, Seizures  Seizures  Patient report  Seizures            Labs:   No results found for this or any previous visit (from the past 24 hour(s)).       Psychiatric Examination:     /79   Pulse 90   Temp 97.3  F (36.3  C) (Tympanic)   Resp 17   Wt 78 kg (172 lb)   SpO2 97%   BMI 27.76 kg/m    Weight is 172 lbs 0 oz  Body mass index is 27.76 kg/m .    Weight over time:  Vitals:    11/07/19 0700 11/09/19 0845   Weight: 78.2 kg (172 lb 8 oz) 78 kg (172 lb)       Orthostatic Vitals       Most Recent      Sitting Orthostatic /84 11/06 0700    Sitting Orthostatic Pulse (bpm) 103 11/06 0700    Standing Orthostatic /81 11/06 0700    Standing Orthostatic Pulse (bpm) 107 11/06 0700            Cardiometabolic risk assessment. 11/06/19      Reviewed patient profile for cardiometabolic risk factors    Date taken /Value  REFERENCE RANGE   Abdominal Obesity  (Waist Circumference)   See nursing flowsheet Women ?35 in (88 cm)   Men ?40 in (102 cm)      Triglycerides  Triglycerides   Date Value Ref Range Status   09/26/2017 284 (H) <150 mg/dL Final     Comment:     Borderline high:  150-199 mg/dl  High:             200-499 mg/dl  Very high:       >499 mg/dl         ?150 mg/dL (1.7 mmol/L) or current treatment for elevated triglycerides   HDL cholesterol  HDL Cholesterol   Date Value Ref Range Status   09/26/2017 38 (L) >39 mg/dL Final   ]   Women <50 mg/dL (1.3 mmol/L) in women or current treatment for low HDL cholesterol  Men  <40 mg/dL (1 mmol/L) in men or current treatment for low HDL cholesterol     Fasting plasma glucose (FPG) Lab Results   Component Value Date     11/02/2019      FPG ?100 mg/dL (5.6 mmol/L) or treatment for elevated blood glucose   Blood pressure  BP Readings from Last 3 Encounters:   11/12/19 110/79   11/04/19 121/74   12/06/17 105/72    Blood pressure ?130/85 mmHg or treatment for elevated blood pressure   Family History  See family history     Appearance: awake, alert and poorly groomed  Attitude:  Co-operative  Eye Contact:  good  Mood:  Irritable   Affect:  intensity is heightened - shows a wide range of affect during interview, calmer when seated alone  Speech:  pressured speech, tangential  Language: fluent and intact in English  Psychomotor, Gait, Musculoskeletal:  no evidence of tardive dyskinesia, dystonia, or tics  Throught Process:  disorganized and illogical  Associations:  no loose associations  Thought Content:  Grandiose and persecutory delusions noted  Insight:  limited  Judgement:  limited  Oriented to:  time, person, and place  Attention Span and Concentration:  limited  Recent and Remote Memory:  limited  Fund of Knowledge:  appropriate    Clinical Global Impressions  First:  Considering your total clinical experience with this particular patient population, how severe are the patient's symptoms at this time?: 7 (11/05/19 1325)  Compared to the patient's condition at the START of treatment, this patient's condition is:: 4 (11/05/19 1325)  Most recent:  Considering your total clinical experience with this particular patient population, how severe are the patient's symptoms at this time?: 7 (11/05/19 1325)  Compared to the patient's condition at the START of treatment, this patient's condition is:: 4 (11/05/19 1325)           Precautions:     Behavioral Orders   Procedures     Assault precautions     Code 1 - Restrict to Unit     Routine Programming     As clinically indicated     Status 15      Every 15 minutes.          Diagnoses:   # Bipolar I Disorder, Manic with psychotic features  # Methamphetamine Use Disorder  # Cannabis Use Disorder          Assessment & Plan:     Assessment and hospital summary:  This patient is a 44 year old male with previous diagnoses of Bipolar I Disorder, anxiety disorder-unspecified, PTSD, ADHD, Methamphetamine use disorder, and cannabis use disorder who presented to ED after calling EMS on himself because his feet hurt after walking around the mall barefoot all day.  Patient has had numerous previous hospitalizations for similar symptoms and has prior MICD commitments.  MSE on admission was notable for delusional thought content, suicidal ideation, and homicidal ideation with no insight.  Patient's presentation is most consistent with diagnoses of Bipolar Disorder, manic with psychosis, methamphetamine use disorder, and cannabis use disorder.  Given patient's lack of insight and capacity to consent for treatment 72 hour hold was initiated and MICD petition was filled with [facility].       Target psychiatric symptoms and interventions:  #Psychosis, delusions: Seroquel discontinued as patient consistently declined to take it.  Awaiting [place], went to court 11/13/19.  PRN Olanzapine available.     # Anxiety: Ativan 1-2mg TID PRN for anxiety     Medical Problems and Treatments:  # Multiple wounds in various stage of healing over body, patient declined Bacitracin, will monitor for signs of infection     Legal:  Court hold    Safety:  - Continue precautions as noted above  - Status 15 minute checks    Disposition:  Pending further court assessment. Pt currently declining all treatment interventions despite clear ongoing evidence of severe psychotic symptoms.    Patient was seen and discussed with the attending.    Leslee Waters MD  PGY2 resident psychiatry      ---------------------------------------------------------------------------------------

## 2019-11-15 PROCEDURE — 12400001 ZZH R&B MH UMMC

## 2019-11-15 PROCEDURE — 99232 SBSQ HOSP IP/OBS MODERATE 35: CPT | Mod: GC | Performed by: PSYCHIATRY & NEUROLOGY

## 2019-11-15 PROCEDURE — 25000132 ZZH RX MED GY IP 250 OP 250 PS 637: Performed by: STUDENT IN AN ORGANIZED HEALTH CARE EDUCATION/TRAINING PROGRAM

## 2019-11-15 RX ADMIN — MELATONIN 25 MCG: at 08:51

## 2019-11-15 RX ADMIN — THERA TABS 1 TABLET: TAB at 08:50

## 2019-11-15 RX ADMIN — B-COMPLEX W/ C & FOLIC ACID TAB 1 TABLET: TAB at 08:51

## 2019-11-15 ASSESSMENT — ACTIVITIES OF DAILY LIVING (ADL)
ORAL_HYGIENE: INDEPENDENT
LAUNDRY: UNABLE TO COMPLETE
DRESS: SCRUBS (BEHAVIORAL HEALTH)
HYGIENE/GROOMING: INDEPENDENT

## 2019-11-15 NOTE — PROGRESS NOTES
"Pt has been politely refusing group the last few days, or will sit at the table, or in the lounge and page through magazines.  Speaks when spoken to, though has not initiated randomly carrying on about all his frustrations as he did at time of admit.  Pt did say today that he doesn't mind being here, most of the staff are nice, \"it's just the psychiatrists and psychiatry that I hate!\"  "

## 2019-11-15 NOTE — PROVIDER NOTIFICATION
11/14/19 1749   Vital Signs   Temp 97.6  F (36.4  C)   Temp src Tympanic   Resp 16   Pulse 74   Pulse/Heart Rate Source Monitor   /68   BP Location Right arm     Patient reported to writer that he has had loose stools x 3 today and has had loose stools for the last 2-3 days.  VS above.  Patient instructed to not flush next BM, so it can be assessed.  Educated to keep drinking fluids.    About 30 minutes later, patient had a BM.  Confirmed as a loose stool.      On-call provider paged (Flaco) and updated.  Order for PRN imodium placed.  Reinforced to patient to keep drinking fluids and to show all BMs to staff.    Addendum:  Administered imodium when available.  Given lemon/lime soda for comfort.
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
## 2019-11-16 PROCEDURE — 25000132 ZZH RX MED GY IP 250 OP 250 PS 637: Performed by: PSYCHIATRY & NEUROLOGY

## 2019-11-16 PROCEDURE — 12400001 ZZH R&B MH UMMC

## 2019-11-16 PROCEDURE — 25000132 ZZH RX MED GY IP 250 OP 250 PS 637: Performed by: STUDENT IN AN ORGANIZED HEALTH CARE EDUCATION/TRAINING PROGRAM

## 2019-11-16 RX ORDER — ACETAMINOPHEN 325 MG/1
650 TABLET ORAL EVERY 4 HOURS PRN
Status: DISCONTINUED | OUTPATIENT
Start: 2019-11-16 | End: 2019-11-16 | Stop reason: CLARIF

## 2019-11-16 RX ADMIN — B-COMPLEX W/ C & FOLIC ACID TAB 1 TABLET: TAB at 08:45

## 2019-11-16 RX ADMIN — MELATONIN 25 MCG: at 08:45

## 2019-11-16 RX ADMIN — ACETAMINOPHEN 650 MG: 325 TABLET, FILM COATED ORAL at 17:22

## 2019-11-16 RX ADMIN — THERA TABS 1 TABLET: TAB at 08:45

## 2019-11-16 ASSESSMENT — ACTIVITIES OF DAILY LIVING (ADL)
DRESS: SCRUBS (BEHAVIORAL HEALTH)
HYGIENE/GROOMING: INDEPENDENT
ORAL_HYGIENE: INDEPENDENT
HYGIENE/GROOMING: INDEPENDENT
LAUNDRY: UNABLE TO COMPLETE
DRESS: SCRUBS (BEHAVIORAL HEALTH)
LAUNDRY: UNABLE TO COMPLETE
ORAL_HYGIENE: INDEPENDENT

## 2019-11-16 NOTE — PLAN OF CARE
"  Problem: Behavioral Disturbance  Goal: Behavioral Disturbance  Description  Signs and symptoms of listed problems will be absent or manageable by discharge or transition of care.  Outcome: No Change   Patient isolative to his room. He was calm and quiet watching TV. His affect was blunted and tense. He denied anxiety, depression, SI, SIB, HI, AH and VH. No overt signs of psychosis. Patient was focused on leaving, and indicated he was frustrated because \"I don't need to be here.\" Patient was compliant with medications as prescribed.   "

## 2019-11-17 PROCEDURE — 12400001 ZZH R&B MH UMMC

## 2019-11-17 PROCEDURE — 25000132 ZZH RX MED GY IP 250 OP 250 PS 637: Performed by: PSYCHIATRY & NEUROLOGY

## 2019-11-17 PROCEDURE — 25000132 ZZH RX MED GY IP 250 OP 250 PS 637: Performed by: STUDENT IN AN ORGANIZED HEALTH CARE EDUCATION/TRAINING PROGRAM

## 2019-11-17 RX ADMIN — ACETAMINOPHEN 650 MG: 325 TABLET, FILM COATED ORAL at 11:38

## 2019-11-17 RX ADMIN — B-COMPLEX W/ C & FOLIC ACID TAB 1 TABLET: TAB at 11:04

## 2019-11-17 RX ADMIN — THERA TABS 1 TABLET: TAB at 11:05

## 2019-11-17 RX ADMIN — MELATONIN 25 MCG: at 11:05

## 2019-11-17 RX ADMIN — LORAZEPAM 2 MG: 1 TABLET ORAL at 12:04

## 2019-11-17 ASSESSMENT — ACTIVITIES OF DAILY LIVING (ADL)
LAUNDRY: WITH SUPERVISION
HYGIENE/GROOMING: HANDWASHING;INDEPENDENT
DRESS: SCRUBS (BEHAVIORAL HEALTH);INDEPENDENT
ORAL_HYGIENE: INDEPENDENT
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
## 2019-11-17 NOTE — PROGRESS NOTES
Pt was isolative in room most of evening shift watching television. Pt only coming out of room to use restroom and grab food tray. Pt appears delusional. Pt denies illness and expresses several problems in his life from his reproductive system to his relationship with his parents and the psychiatric field. No further concerns.

## 2019-11-18 PROCEDURE — 12400001 ZZH R&B MH UMMC

## 2019-11-18 PROCEDURE — 25000132 ZZH RX MED GY IP 250 OP 250 PS 637: Performed by: STUDENT IN AN ORGANIZED HEALTH CARE EDUCATION/TRAINING PROGRAM

## 2019-11-18 PROCEDURE — H2032 ACTIVITY THERAPY, PER 15 MIN: HCPCS

## 2019-11-18 PROCEDURE — 99232 SBSQ HOSP IP/OBS MODERATE 35: CPT | Mod: GC | Performed by: PSYCHIATRY & NEUROLOGY

## 2019-11-18 RX ADMIN — B-COMPLEX W/ C & FOLIC ACID TAB 1 TABLET: TAB at 08:29

## 2019-11-18 RX ADMIN — THERA TABS 1 TABLET: TAB at 08:29

## 2019-11-18 RX ADMIN — MELATONIN 25 MCG: at 08:29

## 2019-11-18 RX ADMIN — LORAZEPAM 2 MG: 1 TABLET ORAL at 18:54

## 2019-11-18 ASSESSMENT — ACTIVITIES OF DAILY LIVING (ADL)
ORAL_HYGIENE: INDEPENDENT
DRESS: SCRUBS (BEHAVIORAL HEALTH)
HYGIENE/GROOMING: INDEPENDENT
HYGIENE/GROOMING: INDEPENDENT
LAUNDRY: UNABLE TO COMPLETE
DRESS: SCRUBS (BEHAVIORAL HEALTH);INDEPENDENT
ORAL_HYGIENE: INDEPENDENT

## 2019-11-18 NOTE — PLAN OF CARE
Patient presents as labile and physically tense with no appreciable insight into his illness.  His mood changes quickly from elated and grandiose to angry and irritable.  His affect and eye contact are notable for increased intensity.  He makes many delusional comments and gets quite agitated when staff or peers challenge these delusions.  Shortly after returning from mental health court this morning, Tyler was intrusive with a male peer ([email or web address].), pushing his delusional and grandiose beliefs in a manner that the peer found to be offensive and belittling.  A verbal altercation began to unfold, requiring unit staff to intervene and redirect both patients.  The remainder of patient's shift was unremarkable.  He denies all symptoms of mental illness and denies any aggressive ideations.  Will continue to monitor closely and encourage appropriate social boundaries.

## 2019-11-18 NOTE — PROGRESS NOTES
11/17/19 2200   Behavioral Health   Hallucinations denies / not responding to hallucinations   Thinking poor concentration;distractable;delusional;paranoid   Orientation time: oriented;date: oriented;place: oriented;person: oriented   Memory confabulation;baseline memory   Insight poor;denial of illness   Judgement impaired   Eye Contact at examiner   Affect blunted, flat;tense;irritable   Mood irritable;labile   Physical Appearance/Attire untidy   1. Wish to be Dead (Recent) No   2. Non-Specific Active Suicidal Thoughts (Recent) No   Self Injury other (see comment)  (None Observed)   Speech coherent;clear;flight of ideas   Medication Sensitivity other (see comment)     Pt. Was agitated off and on through shift. Pt. Would then be isolated and withdrawn the rest of the shift. Pt. Was speaking with another pt. About his care plan and both pt. Became increasingly agitated. When staff tried to redirect the pt.s, the staff was met with agitation for redirecting them. After some time of re direction the pt.s Complied and  without no more issue. Pt. Eating and sleeping habits are standard. Pt. Denies SI/SIB. Will continue to observe.

## 2019-11-18 NOTE — PROGRESS NOTES
"[facility], [place]   Psychiatric Progress Note  Hospital Day: 13        Interim History:     The patient's care was discussed with the treatment team during the daily team meeting and/or staff's chart notes were reviewed. He has remained delusional and paranoid as per staff reports. He has continued to report that he is a  medicine man, is anti-psychiatry and has been talking about Nazi experiments and indirectly threatening staff as to what happened to staff in those experiments. Also appears to have been speaking to peer about treatment plan and both patients increasing in agitation. He had required redirection last evening. He also had PRN lorazepam and tylenol. He had reported loose stools to staff again but saying that he was not seeking medical attention. He had denied nausea and vomiting.     [place] [facility] [place] come from [place] court. He had been discussing with staff just prior to that about medicine and healing. He continued conversation with writer, discussing how he knew the chief and \"betty mukherjee\" of [employer], he also spoke about being invited to the [employer] 10 years ago after his vision quest. He also said he was part [place], said a friend of his had notice his skin and also that he had soul, in the music circles here he was known as the white Gurmeet Sagastume. He says he knows the [place] family. He spoke about an anti-racism group on facebook he joined. where he was accused of being racist. He says he did not go inside court and just gave the letter to the . He says if \"he values his life, he will do th right thing\". He says the  probably won't and is psychotic and part of the genocide that psychiatry and medications are doing. He says that for many years now he has found no help from the courts and does not see the point.      No acute concerns reported and no additional questions.            Medications:       multivitamin, " therapeutic  1 tablet Oral Daily     vitamin B complex with vitamin C  1 tablet Oral Daily     cholecalciferol  25 mcg Oral Daily          Allergies:     Allergies   Allergen Reactions     Benztropine      Other reaction(s): Seizures  Patient reported  Patient reported       Abilify Discmelt      Suicidal thoughts       Aripiprazole Other (See Comments)     Other reaction(s): Unknown  Suicidal thoughts  Suicidal thoughts       Benzyl Alcohol      Other reaction(s): Unknown     Compazine Other (See Comments)     Seizure      Haloperidol Other (See Comments)     Other reaction(s): Dystonia  Patient had EPS with haldol  Patient had EPS with haldol  Patient had EPS with haldol       Phenothiazines Other (See Comments)     Other reaction(s): Dystonia, Muscle Aches/Weakness, Muscle Cramps, Seizures  Seizures  Patient report  Seizures            Labs:   No results found for this or any previous visit (from the past 24 hour(s)).       Psychiatric Examination:     /81   Pulse 89   Temp 97.8  F (36.6  C) (Tympanic)   Resp 16   Wt 78 kg (172 lb)   SpO2 95%   BMI 27.76 kg/m    Weight is 172 lbs 0 oz  Body mass index is 27.76 kg/m .    Weight over time:  Vitals:    11/07/19 0700 11/09/19 0845   Weight: 78.2 kg (172 lb 8 oz) 78 kg (172 lb)       Orthostatic Vitals       Most Recent      Sitting Orthostatic /84 11/06 0700    Sitting Orthostatic Pulse (bpm) 103 11/06 0700    Standing Orthostatic /81 11/06 0700    Standing Orthostatic Pulse (bpm) 107 11/06 0700            Cardiometabolic risk assessment. 11/06/19      Reviewed patient profile for cardiometabolic risk factors    Date taken /Value  REFERENCE RANGE   Abdominal Obesity  (Waist Circumference)   See nursing flowsheet Women ?35 in (88 cm)   Men ?40 in (102 cm)      Triglycerides  Triglycerides   Date Value Ref Range Status   09/26/2017 284 (H) <150 mg/dL Final     Comment:     Borderline high:  150-199 mg/dl  High:             200-499 mg/dl  Very  high:       >499 mg/dl         ?150 mg/dL (1.7 mmol/L) or current treatment for elevated triglycerides   HDL cholesterol  HDL Cholesterol   Date Value Ref Range Status   09/26/2017 38 (L) >39 mg/dL Final   ]   Women <50 mg/dL (1.3 mmol/L) in women or current treatment for low HDL cholesterol  Men <40 mg/dL (1 mmol/L) in men or current treatment for low HDL cholesterol     Fasting plasma glucose (FPG) Lab Results   Component Value Date     11/02/2019      FPG ?100 mg/dL (5.6 mmol/L) or treatment for elevated blood glucose   Blood pressure  BP Readings from Last 3 Encounters:   11/17/19 122/81   11/04/19 121/74   12/06/17 105/72    Blood pressure ?130/85 mmHg or treatment for elevated blood pressure   Family History  See family history     Appearance: awake, alert and poorly groomed  Attitude:  Co-operative  Eye Contact:  intense  Mood:  okay  Affect: increased reactivity, heightened intensity.   Speech:  pressured speech, tangential  Language: fluent and intact in English  Psychomotor, Gait, Musculoskeletal:  no evidence of tardive dyskinesia, dystonia, or tics  Throught Process:  disorganized and illogical  Associations:  no loose associations  Thought Content:  Grandiose and persecutory delusions noted  Insight:  limited  Judgement:  limited  Oriented to:  time, person, and place  Attention Span and Concentration:  limited  Recent and Remote Memory:  limited  Fund of Knowledge:  appropriate    Clinical Global Impressions  First:  Considering your total clinical experience with this particular patient population, how severe are the patient's symptoms at this time?: 7 (11/05/19 1325)  Compared to the patient's condition at the START of treatment, this patient's condition is:: 4 (11/05/19 1325)  Most recent:  Considering your total clinical experience with this particular patient population, how severe are the patient's symptoms at this time?: 7 (11/05/19 1325)  Compared to the patient's condition at the START  of treatment, this patient's condition is:: 4 (11/05/19 1325)           Precautions:     Behavioral Orders   Procedures     Assault precautions     Code 1 - Restrict to Unit     Elopement precautions     Routine Programming     As clinically indicated     Status 15     Every 15 minutes.          Diagnoses:   # Bipolar I Disorder, Manic with psychotic features  # Methamphetamine Use Disorder  # Cannabis Use Disorder          Assessment & Plan:     Assessment and hospital summary:  This patient is a 44 year old male with previous diagnoses of Bipolar I Disorder, anxiety disorder-unspecified, PTSD, ADHD, Methamphetamine use disorder, and cannabis use disorder who presented to ED after calling EMS on himself because his feet hurt after walking around the mall barefoot all day.  Patient has had numerous previous hospitalizations for similar symptoms and has prior MICD commitments.  MSE on admission was notable for delusional thought content, suicidal ideation, and homicidal ideation with no insight.  Patient's presentation is most consistent with diagnoses of Bipolar Disorder, manic with psychosis, methamphetamine use disorder, and cannabis use disorder.  Given patient's lack of insight and capacity to consent for treatment 72 hour hold was initiated and MICD petition was filled with [facility].  Though the admission, he was observed to exhibit beliefs that he was a  medicine man, had healed people from multiple illnesses, had persecutory delusions about FDA and psychiatry, with other grandiose and persecutory delusions, was hyperverbal and tangential at times, with affect being irritable at times, though major behavioral outbursts. He was also concerned about sexual side-effects from antipsychotics and did not want to start any medications other than discussing treatment for anxiety and therapy.      Target psychiatric symptoms and interventions:  #Psychosis, delusions: Seroquel discontinued as  patient consistently declined to take it.  Awaiting [place], went to court 11/13/19.  PRN Olanzapine available.     # Anxiety: Ativan 1-2mg TID PRN for anxiety     Medical Problems and Treatments:  # Multiple wounds in various stage of healing over body, patient declined Bacitracin, will monitor for signs of infection     Legal:  Court hold    Safety:  - Continue precautions as noted above  - Status 15 minute checks    Disposition:  Pending further court assessment. Pt currently declining all treatment interventions despite clear ongoing evidence of severe psychotic symptoms.    Patient was seen and discussed with the attending.    Leslee Waters MD  PGY2 resident psychiatry      ---------------------------------------------------------------------------------------

## 2019-11-18 NOTE — PROGRESS NOTES
Participated, with redirection for an inappropriate story, in Music Therapy group with focus on mood elevation, validation and decreasing anxiety and improved group cohesiveness.  Was calm and engaged, talkative at times, sang along with the music often and moved to the music at times.  Has lots of stories he likes to tell at this point, and needs redirection for appropriateness at times.  Overall, participated well in group.

## 2019-11-19 PROCEDURE — 25000132 ZZH RX MED GY IP 250 OP 250 PS 637: Performed by: STUDENT IN AN ORGANIZED HEALTH CARE EDUCATION/TRAINING PROGRAM

## 2019-11-19 PROCEDURE — H2032 ACTIVITY THERAPY, PER 15 MIN: HCPCS

## 2019-11-19 PROCEDURE — 12400001 ZZH R&B MH UMMC

## 2019-11-19 PROCEDURE — 99232 SBSQ HOSP IP/OBS MODERATE 35: CPT | Mod: GC | Performed by: PSYCHIATRY & NEUROLOGY

## 2019-11-19 RX ORDER — RISPERIDONE 2 MG/1
2 TABLET, ORALLY DISINTEGRATING ORAL AT BEDTIME
Status: DISCONTINUED | OUTPATIENT
Start: 2019-11-19 | End: 2019-11-26

## 2019-11-19 RX ORDER — OLANZAPINE 10 MG/2ML
10 INJECTION, POWDER, FOR SOLUTION INTRAMUSCULAR AT BEDTIME
Status: DISCONTINUED | OUTPATIENT
Start: 2019-11-19 | End: 2019-11-26

## 2019-11-19 RX ADMIN — B-COMPLEX W/ C & FOLIC ACID TAB 1 TABLET: TAB at 08:49

## 2019-11-19 RX ADMIN — LORAZEPAM 2 MG: 1 TABLET ORAL at 09:37

## 2019-11-19 RX ADMIN — RISPERIDONE 2 MG: 2 TABLET, ORALLY DISINTEGRATING ORAL at 19:39

## 2019-11-19 RX ADMIN — MELATONIN 25 MCG: at 08:49

## 2019-11-19 RX ADMIN — THERA TABS 1 TABLET: TAB at 08:49

## 2019-11-19 ASSESSMENT — ACTIVITIES OF DAILY LIVING (ADL)
HYGIENE/GROOMING: INDEPENDENT
DRESS: SCRUBS (BEHAVIORAL HEALTH)
HYGIENE/GROOMING: INDEPENDENT
ORAL_HYGIENE: INDEPENDENT
ORAL_HYGIENE: INDEPENDENT
DRESS: INDEPENDENT;SCRUBS (BEHAVIORAL HEALTH)

## 2019-11-19 NOTE — PROGRESS NOTES
Court faxed Court order for pt.  He is now committed as a person who is MI and CD.  He also has a rodarte in place dated 2019.  THe commitment and rodarte will  on 2020.    Rodarte court order includes the following medications: Risperdal, Zyprexa, Seroquel and Clozaril.

## 2019-11-19 NOTE — PROGRESS NOTES
"[facility], [place]   Psychiatric Progress Note  Hospital Day: 14        Interim History:     The patient's care was discussed with the treatment team during the daily team meeting and/or staff's chart notes were reviewed. He has slept 6.75 hrs and denied any PRN. He does not want the imodium even though he has loose stools, did not show stools to staff and says he is okay and does not need anything. He had been intrusive with peer and needed re-direction for escalating agitation. He was also agitated after phone call with mother, needing redirection. He attends groups and shares inappropriate stories and delusional and grandiose ideas.     Patient was seen in [facility]. He was sitting watching TV. Interaction started okay but then escalated after following patient's comments on how he was not delusional or psychotic or manic. He also said that he did not believe the court would do anything other than what the psychiatrists wanted to happen. We tried to discuss medications with patient. He stated that he only needed ativan for panic attacks which he says were better. He said he also needed tretament for ADHD. He told us that he had only been admitted for panic attacks in the past. He spoke about the DSM 5 again as being written to harm people and how psychiatrists had been taught by pharmaceutical companies to prescribed these medications. He said he does not want to have a discussion on medications as he knows more than us, challenging for a discussion on the psychopharmacology of Risperidone.  He repeatedly called the treating psychiatrists \"psycho\", made gestures with middle finger and cursed \"fuck you\" multiple time with increasing agitation.  Interaction was ended as was considered non-therapeutic with patient needing re-direction by staff.     No acute concerns reported and no additional questions.            Medications:       multivitamin, therapeutic  1 tablet Oral Daily     " vitamin B complex with vitamin C  1 tablet Oral Daily     cholecalciferol  25 mcg Oral Daily          Allergies:     Allergies   Allergen Reactions     Benztropine      Other reaction(s): Seizures  Patient reported  Patient reported       Abilify Discmelt      Suicidal thoughts       Aripiprazole Other (See Comments)     Other reaction(s): Unknown  Suicidal thoughts  Suicidal thoughts       Benzyl Alcohol      Other reaction(s): Unknown     Compazine Other (See Comments)     Seizure      Haloperidol Other (See Comments)     Other reaction(s): Dystonia  Patient had EPS with haldol  Patient had EPS with haldol  Patient had EPS with haldol       Phenothiazines Other (See Comments)     Other reaction(s): Dystonia, Muscle Aches/Weakness, Muscle Cramps, Seizures  Seizures  Patient report  Seizures            Labs:   No results found for this or any previous visit (from the past 24 hour(s)).       Psychiatric Examination:     /81   Pulse 89   Temp 97.8  F (36.6  C) (Tympanic)   Resp 16   Wt 78 kg (172 lb)   SpO2 95%   BMI 27.76 kg/m    Weight is 172 lbs 0 oz  Body mass index is 27.76 kg/m .    Weight over time:  Vitals:    11/07/19 0700 11/09/19 0845   Weight: 78.2 kg (172 lb 8 oz) 78 kg (172 lb)       Orthostatic Vitals       Most Recent      Sitting Orthostatic /84 11/06 0700    Sitting Orthostatic Pulse (bpm) 103 11/06 0700    Standing Orthostatic /81 11/06 0700    Standing Orthostatic Pulse (bpm) 107 11/06 0700            Cardiometabolic risk assessment. 11/06/19      Reviewed patient profile for cardiometabolic risk factors    Date taken /Value  REFERENCE RANGE   Abdominal Obesity  (Waist Circumference)   See nursing flowsheet Women ?35 in (88 cm)   Men ?40 in (102 cm)      Triglycerides  Triglycerides   Date Value Ref Range Status   09/26/2017 284 (H) <150 mg/dL Final     Comment:     Borderline high:  150-199 mg/dl  High:             200-499 mg/dl  Very high:       >499 mg/dl         ?150  mg/dL (1.7 mmol/L) or current treatment for elevated triglycerides   HDL cholesterol  HDL Cholesterol   Date Value Ref Range Status   09/26/2017 38 (L) >39 mg/dL Final   ]   Women <50 mg/dL (1.3 mmol/L) in women or current treatment for low HDL cholesterol  Men <40 mg/dL (1 mmol/L) in men or current treatment for low HDL cholesterol     Fasting plasma glucose (FPG) Lab Results   Component Value Date     11/02/2019      FPG ?100 mg/dL (5.6 mmol/L) or treatment for elevated blood glucose   Blood pressure  BP Readings from Last 3 Encounters:   11/17/19 122/81   11/04/19 121/74   12/06/17 105/72    Blood pressure ?130/85 mmHg or treatment for elevated blood pressure   Family History  See family history     Appearance: awake, alert and poorly groomed  Attitude:  Co-operative initially then hostile  Eye Contact:  intense  Mood:  Okay - gets angry later  Affect: increased reactivity, heightened intensity.   Speech:  pressured speech, tangential  Language: fluent and intact in English  Psychomotor, Gait, Musculoskeletal:  no evidence of tardive dyskinesia, dystonia, or tics  Throught Process:  disorganized and illogical  Associations:  no loose associations  Thought Content:  Grandiose and persecutory delusions noted  Insight:  limited  Judgement:  limited  Oriented to:  time, person, and place  Attention Span and Concentration:  limited  Recent and Remote Memory:  limited  Fund of Knowledge:  appropriate    Clinical Global Impressions  First:  Considering your total clinical experience with this particular patient population, how severe are the patient's symptoms at this time?: 7 (11/05/19 1325)  Compared to the patient's condition at the START of treatment, this patient's condition is:: 4 (11/05/19 1325)  Most recent:  Considering your total clinical experience with this particular patient population, how severe are the patient's symptoms at this time?: 7 (11/05/19 1325)  Compared to the patient's condition at the  START of treatment, this patient's condition is:: 4 (11/05/19 5355)           Precautions:     Behavioral Orders   Procedures     Assault precautions     Code 1 - Restrict to Unit     Elopement precautions     Routine Programming     As clinically indicated     Status 15     Every 15 minutes.          Diagnoses:   # Bipolar I Disorder, Manic with psychotic features  # Methamphetamine Use Disorder  # Cannabis Use Disorder          Assessment & Plan:     Assessment and hospital summary:  This patient is a 44 year old male with previous diagnoses of Bipolar I Disorder, anxiety disorder-unspecified, PTSD, ADHD, Methamphetamine use disorder, and cannabis use disorder who presented to ED after calling EMS on himself because his feet hurt after walking around the mall barefoot all day.  Patient has had numerous previous hospitalizations for similar symptoms and has prior MICD commitments.  MSE on admission was notable for delusional thought content, suicidal ideation, and homicidal ideation with no insight.  Patient's presentation is most consistent with diagnoses of Bipolar Disorder, manic with psychosis, methamphetamine use disorder, and cannabis use disorder.  Given patient's lack of insight and capacity to consent for treatment 72 hour hold was initiated and MICD petition was filled with [facility].  Though the admission, he was observed to exhibit beliefs that he was a  medicine man, had healed people from multiple illnesses, had persecutory delusions about FDA and psychiatry, with other grandiose and persecutory delusions, was hyperverbal and tangential at times, with affect being irritable at times, though major behavioral outbursts. He was also concerned about sexual side-effects from antipsychotics and did not want to start any medications other than discussing treatment for anxiety and therapy.      Target psychiatric symptoms and interventions:  #Psychosis, delusions: Seroquel discontinued as  patient consistently declined to take it.  Awaiting [place], went to court 11/13/19.  PRN Olanzapine available.     # Anxiety: Ativan 1-2mg TID PRN for anxiety     Medical Problems and Treatments:  # Multiple wounds in various stage of healing over body, patient declined Bacitracin, will monitor for signs of infection     Legal:  Court hold    Safety:  - Continue precautions as noted above  - Status 15 minute checks    Disposition:  Pending further court assessment. Pt currently declining all treatment interventions despite clear ongoing evidence of severe psychotic symptoms.    Patient was seen and discussed with the attending.    Leslee Waters MD  PGY2 resident psychiatry      ---------------------------------------------------------------------------------------

## 2019-11-19 NOTE — PROGRESS NOTES
"Patient reports loose stools.  Patient was asked to show RN if they occurred, but patient stated \"No, they aren't bothering me, they are no big deal\".  Educated to continue to drink fluids.  Declined PRN imodium when offered.  "

## 2019-11-19 NOTE — PROGRESS NOTES
Patient appeared irritable at the start of the shift. While on the phone talking with his mom, pt yelled and curse loudly. Pt became disruptive to the milieu and was redirected; he followed redirection without incident.  No seclusions or restraints. No visitors this evening.        11/18/19 2220   Behavioral Health   Hallucinations denies / not responding to hallucinations   Thinking delusional;paranoid;poor concentration   Orientation person, disoriented   Memory baseline memory   Insight poor   Judgement impaired   Eye Contact at examiner   Affect irritable   Mood irritable   Physical Appearance/Attire attire appropriate to age and situation   Hygiene neglected grooming - unclean body, hair, teeth   Self Injury other (see comment)  (none reported or observed )   Elopement   (none)   Activity other (see comment)  (visible in the milieu at the start of the shift)   Speech rambling   Medication Sensitivity no stated side effects;no observed side effects   Psychomotor / Gait balanced;steady   Activities of Daily Living   Hygiene/Grooming independent   Oral Hygiene independent   Dress scrubs (behavioral health)   Laundry unable to complete   Room Organization independent

## 2019-11-19 NOTE — PROGRESS NOTES
11/19/19 1147   Behavioral Health   Hallucinations denies / not responding to hallucinations   Thinking distractable;delusional;poor concentration;paranoid   Orientation person: oriented   Insight poor;denial of illness   Judgement impaired   Eye Contact at examiner   Affect irritable;tense   Mood irritable;labile;grandiose   Physical Appearance/Attire appears stated age   Suicidality other (see comments)  (ELMO)   Elopement   (No signs.)   Activity isolative   Speech clear;coherent   Medication Sensitivity no observed side effects;no stated side effects   Psychomotor / Gait balanced;steady   Activities of Daily Living   Hygiene/Grooming independent   Oral Hygiene independent   Dress scrubs (behavioral health)   Room Organization independent   Pt was irritated during check in with doctor. Pt cursed them out and required redirection into his room.   Writer redirect pt to room, writer did not feel it was safe nor productive to engage with pt because of his   delusions.

## 2019-11-20 PROCEDURE — 25000132 ZZH RX MED GY IP 250 OP 250 PS 637: Performed by: STUDENT IN AN ORGANIZED HEALTH CARE EDUCATION/TRAINING PROGRAM

## 2019-11-20 PROCEDURE — 12400001 ZZH R&B MH UMMC

## 2019-11-20 RX ADMIN — MELATONIN 25 MCG: at 08:43

## 2019-11-20 RX ADMIN — THERA TABS 1 TABLET: TAB at 08:43

## 2019-11-20 RX ADMIN — B-COMPLEX W/ C & FOLIC ACID TAB 1 TABLET: TAB at 08:43

## 2019-11-20 RX ADMIN — RISPERIDONE 2 MG: 2 TABLET, ORALLY DISINTEGRATING ORAL at 20:53

## 2019-11-20 ASSESSMENT — ACTIVITIES OF DAILY LIVING (ADL)
ORAL_HYGIENE: INDEPENDENT
DRESS: INDEPENDENT
ORAL_HYGIENE: INDEPENDENT
DRESS: SCRUBS (BEHAVIORAL HEALTH)
HYGIENE/GROOMING: INDEPENDENT
HYGIENE/GROOMING: INDEPENDENT
LAUNDRY: UNABLE TO COMPLETE
LAUNDRY: WITH SUPERVISION

## 2019-11-20 NOTE — PROGRESS NOTES
"[facility], [place]   Psychiatric Progress Note  Hospital Day: 15        Interim History:     The patient's care was discussed with the treatment team during the daily team meeting and/or staff's chart notes were reviewed. He has slept 6- 7hrs and denied any PRN. Overall he had been appearing to exhibit less rambling of content perceived to be delusional. Of note, Frausto medication started last evening with interaction with staff at that time- \"Patient agreed, yelled at writer during administration that, \"you're all evil!\" and \"Fuck you!\" refused to allow writer to check for patient cheeking medication and then went immediately to his room.\"      Patient was seen in [facility]. He was watching TV and did not appear to want to engage in conversation. He did respond to questions and said we know how he feels about his treatment. He said he had taken his night medication but did not have any adverse effects at this time. He also said that it was too soon for any side-effects. He was limited in his conversation, not tangential or hyperverbal today. He agreed to let us know if he was in any discomfort or had side-effects. We discussed plan to continue with medication as scheduled with Risperidone and titration up slowly.     No acute concerns reported and no additional questions.            Medications:       multivitamin, therapeutic  1 tablet Oral Daily     risperiDONE  2 mg Sublingual At Bedtime    Or     OLANZapine  10 mg Intramuscular At Bedtime     vitamin B complex with vitamin C  1 tablet Oral Daily     cholecalciferol  25 mcg Oral Daily          Allergies:     Allergies   Allergen Reactions     Benztropine      Other reaction(s): Seizures  Patient reported  Patient reported       Abilify Discmelt      Suicidal thoughts       Aripiprazole Other (See Comments)     Other reaction(s): Unknown  Suicidal thoughts  Suicidal thoughts       Benzyl Alcohol      Other reaction(s): Unknown     " Compazine Other (See Comments)     Seizure      Haloperidol Other (See Comments)     Other reaction(s): Dystonia  Patient had EPS with haldol  Patient had EPS with haldol  Patient had EPS with haldol       Phenothiazines Other (See Comments)     Other reaction(s): Dystonia, Muscle Aches/Weakness, Muscle Cramps, Seizures  Seizures  Patient report  Seizures            Labs:   No results found for this or any previous visit (from the past 24 hour(s)).       Psychiatric Examination:     /68 (BP Location: Left arm)   Pulse 95   Temp 99  F (37.2  C) (Oral)   Resp 16   Wt 77.6 kg (171 lb)   SpO2 99%   BMI 27.60 kg/m    Weight is 171 lbs 0 oz  Body mass index is 27.6 kg/m .    Weight over time:  Vitals:    11/07/19 0700 11/09/19 0845 11/19/19 0830   Weight: 78.2 kg (172 lb 8 oz) 78 kg (172 lb) 77.6 kg (171 lb)       Orthostatic Vitals       Most Recent      Sitting Orthostatic /84 11/06 0700    Sitting Orthostatic Pulse (bpm) 103 11/06 0700    Standing Orthostatic /81 11/06 0700    Standing Orthostatic Pulse (bpm) 107 11/06 0700            Cardiometabolic risk assessment. 11/06/19      Reviewed patient profile for cardiometabolic risk factors    Date taken /Value  REFERENCE RANGE   Abdominal Obesity  (Waist Circumference)   See nursing flowsheet Women ?35 in (88 cm)   Men ?40 in (102 cm)      Triglycerides  Triglycerides   Date Value Ref Range Status   09/26/2017 284 (H) <150 mg/dL Final     Comment:     Borderline high:  150-199 mg/dl  High:             200-499 mg/dl  Very high:       >499 mg/dl         ?150 mg/dL (1.7 mmol/L) or current treatment for elevated triglycerides   HDL cholesterol  HDL Cholesterol   Date Value Ref Range Status   09/26/2017 38 (L) >39 mg/dL Final   ]   Women <50 mg/dL (1.3 mmol/L) in women or current treatment for low HDL cholesterol  Men <40 mg/dL (1 mmol/L) in men or current treatment for low HDL cholesterol     Fasting plasma glucose (FPG) Lab Results   Component Value  Date     11/02/2019      FPG ?100 mg/dL (5.6 mmol/L) or treatment for elevated blood glucose   Blood pressure  BP Readings from Last 3 Encounters:   11/19/19 106/68   11/04/19 121/74   12/06/17 105/72    Blood pressure ?130/85 mmHg or treatment for elevated blood pressure   Family History  See family history     Appearance: awake, alert and poorly groomed  Attitude:  Appears mildly hostile, guarded  Eye Contact:  fair  Mood:  More dull today  Affect: appears irritable though less heightened intensity.   Speech:  Less pressured and tangential today  Language: fluent and intact in English  Psychomotor, Gait, Musculoskeletal:  no evidence of tardive dyskinesia, dystonia, or tics  Throught Process:  disorganized and illogical  Associations:  no loose associations  Thought Content: could not assess though not spontaneously elaborating   Insight:  limited  Judgement:  limited  Oriented to:  time, person, and place  Attention Span and Concentration:  limited  Recent and Remote Memory:  limited  Fund of Knowledge:  appropriate    Clinical Global Impressions  First:  Considering your total clinical experience with this particular patient population, how severe are the patient's symptoms at this time?: 7 (11/05/19 1325)  Compared to the patient's condition at the START of treatment, this patient's condition is:: 4 (11/05/19 1325)  Most recent:  Considering your total clinical experience with this particular patient population, how severe are the patient's symptoms at this time?: 7 (11/05/19 1325)  Compared to the patient's condition at the START of treatment, this patient's condition is:: 4 (11/05/19 1325)           Precautions:     Behavioral Orders   Procedures     Assault precautions     Code 1 - Restrict to Unit     Elopement precautions     Routine Programming     As clinically indicated     Status 15     Every 15 minutes.          Diagnoses:   # Bipolar I Disorder, Manic with psychotic features  # Methamphetamine  Use Disorder  # Cannabis Use Disorder          Assessment & Plan:     Assessment and hospital summary:  This patient is a 44 year old male with previous diagnoses of Bipolar I Disorder, anxiety disorder-unspecified, PTSD, ADHD, Methamphetamine use disorder, and cannabis use disorder who presented to ED after calling EMS on himself because his feet hurt after walking around the mall barefoot all day.  Patient has had numerous previous hospitalizations for similar symptoms and has prior MICD commitments.  MSE on admission was notable for delusional thought content, suicidal ideation, and homicidal ideation with no insight.  Patient's presentation is most consistent with diagnoses of Bipolar Disorder, manic with psychosis, methamphetamine use disorder, and cannabis use disorder.  Given patient's lack of insight and capacity to consent for treatment 72 hour hold was initiated and MICD petition was filled with [facility].  Though the admission, he was observed to exhibit beliefs that he was a  medicine man, had healed people from multiple illnesses, had persecutory delusions about FDA and psychiatry, with other grandiose and persecutory delusions, was hyperverbal and tangential at times, with affect being irritable at times, though major behavioral outbursts. He was also concerned about sexual side-effects from antipsychotics and did not want to start any medications other than discussing treatment for anxiety and therapy. After court proceedings (patient had declined meeting with , sent a letter instead with delusional themes and content such as  having to do the right thing if he valued his life), he was committed with Frausto. We started Risperidone based on past good response, with Olanzapine IM as Frausto back up. Patient took scheduled medication as he did not want IM. He did not initially report any side-effects with Risperidone. Mood appeared to be less irritable, with decrease in  pressured speech, tangentiality and delusional content.        Target psychiatric symptoms and interventions:  #Psychosis, delusions: Seroquel discontinued as patient consistently declined to take it.  Committed with Frausto.  Started Risperidone (11/19/19) at 2 mg HS  IM Olanzapine 10 mg Backup    # Anxiety: Ativan 1-2mg TID PRN for anxiety     Medical Problems and Treatments:  # Multiple wounds in various stage of healing over body, patient declined Bacitracin, will monitor for signs of infection     Legal:  Committed with Frausto    Safety:  - Continue precautions as noted above  - Status 15 minute checks    Disposition:  Pending further court assessment. Pt currently declining all treatment interventions despite clear ongoing evidence of severe psychotic symptoms.    Patient was seen by me today and I discussed plan of care with him and team.    Leslee Waters MD  PGY2 resident psychiatry      ---------------------------------------------------------------------------------------

## 2019-11-20 NOTE — PROGRESS NOTES
"Patient initially refused scheduled HS ODT medication, writer explained he was Jarvised and that it was important he took the PO medication so that he did not have to get the shot. Patient agreed, yelled at writer during administration that, \"you're all evil!\" and \"Fuck you!\" refused to allow writer to check for patient cheeking medication and then went immediately to his room. Will continue to monitor for safety.   "

## 2019-11-20 NOTE — PROGRESS NOTES
Participated in Music Therapy group with focus on mood elevation, validation and decreasing anxiety and improved group cohesiveness. Engaged and cooperative in music listening interventions.   Showed progress in session goals. Solemn affect.

## 2019-11-20 NOTE — PLAN OF CARE
RN Assessment: Writer met with patient early in the shift and he calm and cooperative. He appeared untidy and disheveled. Patient reported having good sleep last night, reported good appetite. Patient has been taking his medications and he denied any concerns or side effects to medications. He Denied having SI/SIB/HI and hallucinations. Reported mild depression and anxiety secondary to long stay in the hospital. Patient was mildly irritable at times but overall he had a good shift without agitation or yelling or calling staff names. Patient did not require, Prns or R&S to manage his behavior or maintain safety in the unit. His morning vitals : HA=356/76, P=77, T=99. Staff will continue to monitor.

## 2019-11-20 NOTE — PROGRESS NOTES
Visible in milieu at times. Less delusional rambling, more calm. watched tv on the lounge last portion of evening. Ate meals       11/19/19 2100   Behavioral Health   Hallucinations denies / not responding to hallucinations   Thinking delusional   Orientation person: oriented;place: oriented   Memory confabulation;baseline memory   Insight poor;denial of illness   Judgement impaired   Eye Contact at examiner   Affect full range affect   Mood mood is calm   Physical Appearance/Attire attire appropriate to age and situation   Hygiene other (see comment)  (adequate)   1. Wish to be Dead (Recent) No   2. Non-Specific Active Suicidal Thoughts (Recent) No   Activity   (Visible in the milieu)   Speech clear;coherent   Psychomotor / Gait balanced;steady   Activities of Daily Living   Hygiene/Grooming independent   Oral Hygiene independent   Dress independent;scrubs (behavioral health)   Room Organization independent

## 2019-11-21 PROCEDURE — 12400001 ZZH R&B MH UMMC

## 2019-11-21 PROCEDURE — 25000132 ZZH RX MED GY IP 250 OP 250 PS 637: Performed by: STUDENT IN AN ORGANIZED HEALTH CARE EDUCATION/TRAINING PROGRAM

## 2019-11-21 PROCEDURE — 25000132 ZZH RX MED GY IP 250 OP 250 PS 637: Performed by: PSYCHIATRY & NEUROLOGY

## 2019-11-21 PROCEDURE — 99232 SBSQ HOSP IP/OBS MODERATE 35: CPT | Mod: GC | Performed by: PSYCHIATRY & NEUROLOGY

## 2019-11-21 RX ADMIN — RISPERIDONE 2 MG: 2 TABLET, ORALLY DISINTEGRATING ORAL at 19:51

## 2019-11-21 RX ADMIN — B-COMPLEX W/ C & FOLIC ACID TAB 1 TABLET: TAB at 08:55

## 2019-11-21 RX ADMIN — MELATONIN 25 MCG: at 08:55

## 2019-11-21 RX ADMIN — THERA TABS 1 TABLET: TAB at 08:55

## 2019-11-21 RX ADMIN — ACETAMINOPHEN 650 MG: 325 TABLET, FILM COATED ORAL at 09:19

## 2019-11-21 RX ADMIN — LORAZEPAM 2 MG: 1 TABLET ORAL at 11:57

## 2019-11-21 ASSESSMENT — ACTIVITIES OF DAILY LIVING (ADL)
HYGIENE/GROOMING: INDEPENDENT
ORAL_HYGIENE: INDEPENDENT
DRESS: SCRUBS (BEHAVIORAL HEALTH)
LAUNDRY: WITH SUPERVISION

## 2019-11-21 NOTE — PLAN OF CARE
BEHAVIORAL TEAM DISCUSSION    Participants: Dr. Jennifer Varner, Leslee Waters MD (resident), Jacques Ivey RN, Alexandro Dunbar RN, FLORIAN- Bettie Benson LM, [facility]   Progress: pt continues to exhibit paranoid delusions and symptoms of schizoaffective disorder   Anticipated length of stay: 1-2 weeks   Continued Stay Criteria/Rationale: pt suffering from decompensation of his mental illness   Medical/Physical: none  Precautions:   Behavioral Orders   Procedures    Assault precautions    Code 1 - Restrict to Unit    Elopement precautions    Routine Programming     As clinically indicated    Status 15     Every 15 minutes.     Plan: pt is committed MI/CD w/ [facility] through Jackie Jarquin with [facility].  We will likely refer for IRTS programming once he is more stable.   Rationale for change in precautions or plan: no change

## 2019-11-21 NOTE — PROGRESS NOTES
"[facility], [place]   Psychiatric Progress Note  Hospital Day: 16        Interim History:     The patient's care was discussed with the treatment team during the daily team meeting and/or staff's chart notes were reviewed. He has slept 6- 7hrs and denied any PRN. He appears to have had an okay day. He has mildly irritable but much less than previous days. He has reported mild anxiety and depression, also relating it to hospital stay. He took his medications and did not report any side-effects. He denied HI/SI or psychotic symptoms and has not been tangential or reporting delusions actively.     Patient was seen in [facility]. He was sitting by the TV with the remote. He avoided eye contact and did not want to engage much in conversation. He said he was doing okay. When asked about his symptoms, he appeared to be frustrated but kept self calm. He says all he has been doing is sitting and watching TV. He reported that he felt his mind was slower and \"retarded\" after taking medication. He did not report  Any physical concerns though. He nodded when we said we would like to follow up with him as the treatments continued. We also explained how patients describe a slowing down of though to normal levels when denisha improves.     No acute concerns reported and no additional questions.            Medications:       multivitamin, therapeutic  1 tablet Oral Daily     risperiDONE  2 mg Sublingual At Bedtime    Or     OLANZapine  10 mg Intramuscular At Bedtime     vitamin B complex with vitamin C  1 tablet Oral Daily     cholecalciferol  25 mcg Oral Daily          Allergies:     Allergies   Allergen Reactions     Benztropine      Other reaction(s): Seizures  Patient reported  Patient reported       Abilify Discmelt      Suicidal thoughts       Aripiprazole Other (See Comments)     Other reaction(s): Unknown  Suicidal thoughts  Suicidal thoughts       Benzyl Alcohol      Other reaction(s): Unknown     " Compazine Other (See Comments)     Seizure      Haloperidol Other (See Comments)     Other reaction(s): Dystonia  Patient had EPS with haldol  Patient had EPS with haldol  Patient had EPS with haldol       Phenothiazines Other (See Comments)     Other reaction(s): Dystonia, Muscle Aches/Weakness, Muscle Cramps, Seizures  Seizures  Patient report  Seizures            Labs:   No results found for this or any previous visit (from the past 24 hour(s)).       Psychiatric Examination:     /69 (BP Location: Right arm)   Pulse 91   Temp 97.8  F (36.6  C) (Oral)   Resp 16   Wt 77.6 kg (171 lb)   SpO2 95%   BMI 27.60 kg/m    Weight is 171 lbs 0 oz  Body mass index is 27.6 kg/m .    Weight over time:  Vitals:    11/07/19 0700 11/09/19 0845 11/19/19 0830   Weight: 78.2 kg (172 lb 8 oz) 78 kg (172 lb) 77.6 kg (171 lb)       Orthostatic Vitals       Most Recent      Sitting Orthostatic /84 11/06 0700    Sitting Orthostatic Pulse (bpm) 103 11/06 0700    Standing Orthostatic /81 11/06 0700    Standing Orthostatic Pulse (bpm) 107 11/06 0700            Cardiometabolic risk assessment. 11/06/19      Reviewed patient profile for cardiometabolic risk factors    Date taken /Value  REFERENCE RANGE   Abdominal Obesity  (Waist Circumference)   See nursing flowsheet Women ?35 in (88 cm)   Men ?40 in (102 cm)      Triglycerides  Triglycerides   Date Value Ref Range Status   09/26/2017 284 (H) <150 mg/dL Final     Comment:     Borderline high:  150-199 mg/dl  High:             200-499 mg/dl  Very high:       >499 mg/dl         ?150 mg/dL (1.7 mmol/L) or current treatment for elevated triglycerides   HDL cholesterol  HDL Cholesterol   Date Value Ref Range Status   09/26/2017 38 (L) >39 mg/dL Final   ]   Women <50 mg/dL (1.3 mmol/L) in women or current treatment for low HDL cholesterol  Men <40 mg/dL (1 mmol/L) in men or current treatment for low HDL cholesterol     Fasting plasma glucose (FPG) Lab Results   Component  Value Date     11/02/2019      FPG ?100 mg/dL (5.6 mmol/L) or treatment for elevated blood glucose   Blood pressure  BP Readings from Last 3 Encounters:   11/20/19 107/69   11/04/19 121/74   12/06/17 105/72    Blood pressure ?130/85 mmHg or treatment for elevated blood pressure   Family History  See family history     Appearance: awake, alert and poorly groomed  Attitude:  Appears mildly hostile, still guarded  Eye Contact:  poor  Mood:  dull  Affect: appears irritable though less heightened intensity.   Speech:  Less pressured and tangential today  Language: fluent and intact in English  Psychomotor, Gait, Musculoskeletal:  no evidence of tardive dyskinesia, dystonia, or tics  Throught Process:  disorganized and illogical  Associations:  no loose associations  Thought Content: could not assess though not spontaneously elaborating   Insight:  limited  Judgement:  limited  Oriented to:  time, person, and place  Attention Span and Concentration:  limited  Recent and Remote Memory:  limited  Fund of Knowledge:  appropriate    Clinical Global Impressions  First:  Considering your total clinical experience with this particular patient population, how severe are the patient's symptoms at this time?: 7 (11/05/19 1325)  Compared to the patient's condition at the START of treatment, this patient's condition is:: 4 (11/05/19 1325)  Most recent:  Considering your total clinical experience with this particular patient population, how severe are the patient's symptoms at this time?: 7 (11/05/19 1325)  Compared to the patient's condition at the START of treatment, this patient's condition is:: 4 (11/05/19 1325)           Precautions:     Behavioral Orders   Procedures     Assault precautions     Code 1 - Restrict to Unit     Elopement precautions     Routine Programming     As clinically indicated     Status 15     Every 15 minutes.          Diagnoses:   # Bipolar I Disorder, Manic with psychotic features  #  Methamphetamine Use Disorder  # Cannabis Use Disorder          Assessment & Plan:     Assessment and hospital summary:  This patient is a 44 year old male with previous diagnoses of Bipolar I Disorder, anxiety disorder-unspecified, PTSD, ADHD, Methamphetamine use disorder, and cannabis use disorder who presented to ED after calling EMS on himself because his feet hurt after walking around the mall barefoot all day.  Patient has had numerous previous hospitalizations for similar symptoms and has prior MICD commitments.  MSE on admission was notable for delusional thought content, suicidal ideation, and homicidal ideation with no insight.  Patient's presentation is most consistent with diagnoses of Bipolar Disorder, manic with psychosis, methamphetamine use disorder, and cannabis use disorder.  Given patient's lack of insight and capacity to consent for treatment 72 hour hold was initiated and MICD petition was filled with [facility].  Though the admission, he was observed to exhibit beliefs that he was a  medicine man, had healed people from multiple illnesses, had persecutory delusions about FDA and psychiatry, with other grandiose and persecutory delusions, was hyperverbal and tangential at times, with affect being irritable at times, though major behavioral outbursts. He was also concerned about sexual side-effects from antipsychotics and did not want to start any medications other than discussing treatment for anxiety and therapy. After court proceedings (patient had declined meeting with , sent a letter instead with delusional themes and content such as  having to do the right thing if he valued his life), he was committed with Fra[facility]o. We started Risperidone based on past good response, with Olanzapine IM as Frausto back up. Patient took scheduled medication as he did not want IM. He did not initially report any side-effects with Risperidone. Mood appeared to be less irritable, with  decrease in pressured speech, tangentiality and delusional content.        Target psychiatric symptoms and interventions:  #Psychosis, delusions: Seroquel discontinued as patient consistently declined to take it.  Committed with Frausto.  Started Risperidone (11/19/19) at 2 mg HS  IM Olanzapine 10 mg Backup    # Anxiety: Ativan 1-2mg TID PRN for anxiety     Medical Problems and Treatments:  # Multiple wounds in various stage of healing over body, patient declined Bacitracin, will monitor for signs of infection     Legal:  Committed with [facility]    Safety:  - Continue precautions as noted above  - Status 15 minute checks    Disposition:  Pending further court assessment. Pt currently declining all treatment interventions despite clear ongoing evidence of severe psychotic symptoms.    Patient was seen by and discussed with the attending.    Leslee Waters MD  PGY2 resident psychiatry      ---------------------------------------------------------------------------------------
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
## 2019-11-22 PROCEDURE — 25000132 ZZH RX MED GY IP 250 OP 250 PS 637: Performed by: STUDENT IN AN ORGANIZED HEALTH CARE EDUCATION/TRAINING PROGRAM

## 2019-11-22 PROCEDURE — 99232 SBSQ HOSP IP/OBS MODERATE 35: CPT | Performed by: PSYCHIATRY & NEUROLOGY

## 2019-11-22 PROCEDURE — 25000132 ZZH RX MED GY IP 250 OP 250 PS 637: Performed by: PSYCHIATRY & NEUROLOGY

## 2019-11-22 PROCEDURE — 12400001 ZZH R&B MH UMMC

## 2019-11-22 PROCEDURE — G0177 OPPS/PHP; TRAIN & EDUC SERV: HCPCS

## 2019-11-22 RX ORDER — LIDOCAINE 4 G/G
1 PATCH TOPICAL DAILY PRN
Status: DISCONTINUED | OUTPATIENT
Start: 2019-11-22 | End: 2019-11-24

## 2019-11-22 RX ORDER — LIDOCAINE 4 G/G
1 PATCH TOPICAL
Status: DISCONTINUED | OUTPATIENT
Start: 2019-11-22 | End: 2019-11-22

## 2019-11-22 RX ADMIN — RISPERIDONE 2 MG: 2 TABLET, ORALLY DISINTEGRATING ORAL at 20:03

## 2019-11-22 RX ADMIN — THERA TABS 1 TABLET: TAB at 09:36

## 2019-11-22 RX ADMIN — MELATONIN 25 MCG: at 09:36

## 2019-11-22 RX ADMIN — ACETAMINOPHEN 650 MG: 325 TABLET, FILM COATED ORAL at 20:03

## 2019-11-22 RX ADMIN — ACETAMINOPHEN 650 MG: 325 TABLET, FILM COATED ORAL at 11:24

## 2019-11-22 RX ADMIN — LORAZEPAM 2 MG: 1 TABLET ORAL at 11:25

## 2019-11-22 RX ADMIN — B-COMPLEX W/ C & FOLIC ACID TAB 1 TABLET: TAB at 09:36

## 2019-11-22 ASSESSMENT — ACTIVITIES OF DAILY LIVING (ADL)
HYGIENE/GROOMING: INDEPENDENT
ORAL_HYGIENE: INDEPENDENT
LAUNDRY: WITH SUPERVISION
DRESS: INDEPENDENT

## 2019-11-22 NOTE — PROGRESS NOTES
Patient is calm and cooperative. He said that he has no mental health symptoms and he should not be here. He denies depression and anxiety and SI/SIB. He is somewhat irritable and does not want to be here. He is very tangential. He had a conversation with a younger patient that went on for about an hour about Voodoo and heritage. The conversation turned into talk about dark magic and then went on to discuss aliens and it was all harmless until they started talking about drug use and the Yazdanism Taoism being a cover up for satan. Staff asked the patients to keep the conversation appropriate. He seems to be endorsing paranoid thoughts and talking about his inner concerns to others on long conversation. He has been eating and sleeping well and has no pain to mention. Other than not wanting to be here he does not have any concerns.

## 2019-11-22 NOTE — PLAN OF CARE
Problem: OT General Care Plan  Goal: OT Goal 1  Pt will engage in group activities to increase socially appropriate behaviors and improve concentration. Pt will participate in goal directed tasks to enhance planning and problem solving skills.     Pt attended 1 out of 2 OT groups offered. Pt actively participated in occupational therapy clinic. Pt was able to ask for assistance as needed, and independently initiated a familiar creative expression task. Pt demonstrated good focus, planning, problem solving, and attention to detail. Social with peers throughout group, and he appeared hyperverbal, tangential, and grandiose in conversation. 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 He needed redirection for poor verbal boundaries, such as asking peers details about why they were in the hospital. He was redirectable regarding this conversation topic. He also needed redirection for speaking about medications negatively and bringing up drug-related topics, though was less redirectable for these topics. His verbal content appeared distracting to peers throughout group. Will continue to assess appropriateness for group attendance.

## 2019-11-22 NOTE — PROGRESS NOTES
Patient arrive to the unit at 1715. He was calm and cooperative. Responds appropriate to staff. He had a flat affect, speech was clear and was oriented X4. He's been sitting in the lounge with peers, socializing with other male patients. He denied wishing to be dead,SI,SIB, hallucinations and othe psychotic symptoms. He doesn't appear to be responding to internal stimuli. No concerns noted this shift.

## 2019-11-22 NOTE — PROGRESS NOTES
"[facility], [place]   Psychiatric Progress Note  Hospital Day: 17        Interim History:     The patient's care was discussed with the treatment team during the daily team meeting and/or staff's chart notes were reviewed. Staff report that he transferred [facility] without incident. Has been visible in the milieu, taking medications as prescribed. No acute events overnight.     Patient asked to check in with writer while he was sitting in the milieu, he is adjusting being on [facility] but would like to return to [facility] as he had a TV in his room there. He reports that he is having some L hip pain, he has had this before and comes and goes. He believes it is due to being more immobile when he is \"being held against my will and institutionalized\". He declined wanting to be evaluated by IM, he was agreeable to having lidocaine patches ordered as these helped in the past. He states his mood is \"pissed off for being here\" and denies having any safety concerns including thoughts of harming self or others. He denies having any psychosis symptoms. He did state that he is having erectile dysfunction from being on Risperdal as \"70% of people who take it have erectile dysfunction\". He had nothing further he wished to discuss with writer.     He then later approached writer and asked to speak again, he asked about the difference between Ativan and Klonopin and is requesting to switch to Klonopin as this has been most helpful in the past for his panic attacks and anxiety from his PTSD flashbacks. Writer stated they would look into this switch. He then started to talk about his son who is addicted to Xanax and uses methamphetamine and how scary his son can become when using. 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 He then started to discuss how the practice of psychiatry and inpatient psychiatric care is not helpful and that all the medications such as risperidone do is to \"shut down the frontal lobe and cause brain damage\". He states he has not met a single person who has been helped by inpatient psychiatric care. Writer stated that they will have to agree to disagree on this.          Medications:       multivitamin, therapeutic  1 tablet Oral Daily     risperiDONE  2 mg Sublingual At Bedtime    Or     OLANZapine  10 mg Intramuscular At Bedtime     vitamin B complex with vitamin C  1 tablet Oral Daily     cholecalciferol  25 mcg Oral Daily          Allergies:     Allergies   Allergen Reactions     Benztropine      Other reaction(s): Seizures  Patient reported  Patient reported       Abilify Discmelt      Suicidal thoughts       Aripiprazole Other (See Comments)     Other reaction(s): Unknown  Suicidal thoughts  Suicidal thoughts       Benzyl Alcohol      Other reaction(s): Unknown     Compazine Other (See Comments)     Seizure      Haloperidol Other (See Comments)     Other reaction(s): Dystonia  Patient had EPS with haldol  Patient had EPS with haldol  Patient had EPS with haldol       Phenothiazines Other (See Comments)     Other reaction(s): Dystonia, Muscle Aches/Weakness, Muscle Cramps, Seizures  Seizures  Patient report  Seizures            Labs:   No results found for this or any previous visit (from the past 24 hour(s)).       Psychiatric Examination:     /69 (BP Location: Right arm)   Pulse 91   Temp 97.8  F (36.6  C) (Oral)   Resp 16   Wt 77.6 kg (171 lb)   SpO2 95%   BMI 27.60 kg/m    Weight is 171 lbs 0 oz  Body mass index is 27.6 kg/m .    Weight over time:  Vitals:    11/07/19 0700 11/09/19 0845 11/19/19 0830   Weight: 78.2 kg (172 lb 8 oz) 78 kg (172 lb) 77.6 kg (171 " "lb)       Orthostatic Vitals       Most Recent      Sitting Orthostatic BP 91/64 11/22 0750    Sitting Orthostatic Pulse (bpm) 82 11/22 0750    Standing Orthostatic /75 11/22 0750    Standing Orthostatic Pulse (bpm) 95 11/22 0750          Cardiometabolic risk assessment. 11/06/19      Reviewed patient profile for cardiometabolic risk factors    Date taken /Value  REFERENCE RANGE   Abdominal Obesity  (Waist Circumference)   See nursing flowsheet Women ?35 in (88 cm)   Men ?40 in (102 cm)      Triglycerides  Triglycerides   Date Value Ref Range Status   09/26/2017 284 (H) <150 mg/dL Final     Comment:     Borderline high:  150-199 mg/dl  High:             200-499 mg/dl  Very high:       >499 mg/dl         ?150 mg/dL (1.7 mmol/L) or current treatment for elevated triglycerides   HDL cholesterol  HDL Cholesterol   Date Value Ref Range Status   09/26/2017 38 (L) >39 mg/dL Final   ]   Women <50 mg/dL (1.3 mmol/L) in women or current treatment for low HDL cholesterol  Men <40 mg/dL (1 mmol/L) in men or current treatment for low HDL cholesterol     Fasting plasma glucose (FPG) Lab Results   Component Value Date     11/02/2019      FPG ?100 mg/dL (5.6 mmol/L) or treatment for elevated blood glucose   Blood pressure  BP Readings from Last 3 Encounters:   11/20/19 107/69   11/04/19 121/74   12/06/17 105/72    Blood pressure ?130/85 mmHg or treatment for elevated blood pressure   Family History  See family history     Appearance: awake, alert, adequately groomed and dressed in hospital scrubs  Attitude:  Continues to be somewhat hostile and remains guarded  Eye Contact:  Fair, intense at times  Mood:  \"pissed off because I'm here\"  Affect: irritable and heightened at times during interview.   Speech:  Fluctuating, minimal responses and then will become hyperverbal   Language: fluent and intact in English  Psychomotor, Gait, Musculoskeletal:  no evidence of tardive dyskinesia, dystonia, or tics  Thought Process:  " more linear, still tangential and illogical  Associations:  no loose associations  Thought Content: denies SI/HI, denies AVH  Insight:  limited  Judgement:  limited  Oriented to:  time, person, and place  Attention Span and Concentration:  limited  Recent and Remote Memory:  limited  Fund of Knowledge:  appropriate    Clinical Global Impressions  First:  Considering your total clinical experience with this particular patient population, how severe are the patient's symptoms at this time?: 7 (11/05/19 1325)  Compared to the patient's condition at the START of treatment, this patient's condition is:: 4 (11/05/19 1325)  Most recent:  Considering your total clinical experience with this particular patient population, how severe are the patient's symptoms at this time?: 7 (11/05/19 1325)  Compared to the patient's condition at the START of treatment, this patient's condition is:: 4 (11/05/19 1325)           Precautions:     Behavioral Orders   Procedures     Assault precautions     Code 1 - Restrict to Unit     Elopement precautions     Routine Programming     As clinically indicated     Status 15     Every 15 minutes.          Diagnoses:   # Bipolar I Disorder, Manic with psychotic features  # Methamphetamine Use Disorder  # Cannabis Use Disorder          Assessment & Plan:     Assessment and hospital summary:  This patient is a 44 year old male with previous diagnoses of Bipolar I Disorder, anxiety disorder-unspecified, PTSD, ADHD, Methamphetamine use disorder, and cannabis use disorder who presented to ED after calling EMS on himself because his feet hurt after walking around the mall barefoot all day.  Patient has had numerous previous hospitalizations for similar symptoms and has prior MICD commitments.  MSE on admission was notable for delusional thought content, suicidal ideation, and homicidal ideation with no insight.  Patient's presentation is most consistent with diagnoses of Bipolar Disorder, manic with  psychosis, methamphetamine use disorder, and cannabis use disorder.  Given patient's lack of insight and capacity to consent for treatment 72 hour hold was initiated and MICD petition was filed with [facility].  Though the admission, he was observed to exhibit beliefs that he was a  medicine man, had healed people from multiple illnesses, had persecutory delusions about FDA and psychiatry, with other grandiose and persecutory delusions, was hyperverbal and tangential at times, with affect being irritable at times, though major behavioral outbursts. He was also concerned about sexual side-effects from antipsychotics and did not want to start any medications other than discussing treatment for anxiety and therapy. After court proceedings (patient had declined meeting with , sent a letter instead with delusional themes and content such as  having to do the right thing if he valued his life), he was committed with [facility]. Risperidone was started based on past good response, with Olanzapine IM as Frausto back up. Patient has been taking scheduled medication due to not wanting IM. He did not initially report any side-effects with Risperidone, however recently stating he is experiencing sexual side effects. Mood appeared to be less irritable, with decrease in pressured speech, tangentiality and delusional content. Patient transferred to [facility] 11/21/19 to be on a less restrictive unit, he requested for lorazepam to be switched to clonazepam. Per chart review patient has had history of clonazepam abuse and plan would be to taper patient off of benzodiazepines prior to discharge, given this, will continue to lorazepam at this time. Will plan to titrate up Risperdal slowly and then initiated Consta prior to discharge.       Target psychiatric symptoms and interventions:  #Psychosis, delusions: Committed with Frausto.  Continue Risperidone 2mg at bedtime (started 11/19/19) with IM Olanzapine 10 mg  as backup    # Anxiety: Ativan 1-2mg TID PRN for anxiety     Medical Problems and Treatments:  # Multiple wounds in various stage of healing over body, patient declined Bacitracin, will monitor for signs of infection     #Pt reported L hip pain, will order Lidocaine Patch daily PRN    Legal:  Committed with [facility]    Safety:  - Continue precautions as noted above  - Status 15 minute checks    Disposition:  Pending further stabilization. Likely to IRTS or other structured treatment setting.         Patient has been seen and evaluated by Milagro narvaez DO.

## 2019-11-23 PROCEDURE — 25000132 ZZH RX MED GY IP 250 OP 250 PS 637: Performed by: PSYCHIATRY & NEUROLOGY

## 2019-11-23 PROCEDURE — 25000132 ZZH RX MED GY IP 250 OP 250 PS 637: Performed by: STUDENT IN AN ORGANIZED HEALTH CARE EDUCATION/TRAINING PROGRAM

## 2019-11-23 PROCEDURE — 12400001 ZZH R&B MH UMMC

## 2019-11-23 RX ADMIN — RISPERIDONE 2 MG: 2 TABLET, ORALLY DISINTEGRATING ORAL at 20:37

## 2019-11-23 RX ADMIN — LORAZEPAM 2 MG: 1 TABLET ORAL at 18:04

## 2019-11-23 RX ADMIN — B-COMPLEX W/ C & FOLIC ACID TAB 1 TABLET: TAB at 08:44

## 2019-11-23 RX ADMIN — LIDOCAINE 1 PATCH: 560 PATCH PERCUTANEOUS; TOPICAL; TRANSDERMAL at 08:47

## 2019-11-23 RX ADMIN — THERA TABS 1 TABLET: TAB at 08:44

## 2019-11-23 RX ADMIN — MELATONIN 25 MCG: at 08:44

## 2019-11-23 ASSESSMENT — ACTIVITIES OF DAILY LIVING (ADL)
LAUNDRY: WITH SUPERVISION
ORAL_HYGIENE: INDEPENDENT
HYGIENE/GROOMING: INDEPENDENT
DRESS: INDEPENDENT

## 2019-11-23 NOTE — PROGRESS NOTES
"Patient was irritable today and rambling to another younger patient all day about conspiracy theories. He Has been talking about aliens, Spiritism, the devil, robots and the government all working against him. He has been talking in a low voice so he is not directly causing disruption to other patients care but the content of his discussion is irritating other patients. He is actively making a younger patient paranoid and telling the patient series of actions they should take against \"the system\". He has been delusional and rude to staff and other patients if they try to intervene. Approaching him only aggravates him more and cesar another rant. His behavior is causing stress to the other patients and he is creating paranoia for some patients on a large scale. Physically he is doing okay eating meals and getting sleep just fine. This staff has not witnessed him take any personal cares and his ADLs could use attention. He is intrusive and causing issues with other patients but he is not aggressive physically.   "

## 2019-11-23 NOTE — PROGRESS NOTES
"Pt has been mostly sitting in lounge area reading a book, being semi-social with peers during meal and movies. Is calm and cooperative, but says that he is in a   \"pissed mood\" because he doesn't want to be here, which he rates 10/10. Pt did say he didn't have any thoughts of hurting anyone. Pt denies SI, SIB, and hallucinations. Good appetite and sleep. Pt rates depression 5/10, and anxiety 0/10.      11/22/19 2100   Behavioral Health   Hallucinations denies / not responding to hallucinations   Orientation person: oriented;place: oriented;date: oriented;time: oriented   Memory confabulation   Insight poor   Judgement impaired   Eye Contact at examiner   Affect irritable;full range affect   Mood mood is calm;irritable   Physical Appearance/Attire attire appropriate to age and situation   Hygiene well groomed   Suicidality other (see comments)  (Denies)   1. Wish to be Dead (Recent) No   2. Non-Specific Active Suicidal Thoughts (Recent) No   3. Active Sucidal Ideation with any Methods (Not Plan) Without Intent to Act (Recent) No   Elopement Statements about wanting to leave   Activity restless   Speech clear;coherent   Medication Sensitivity no stated side effects;no observed side effects   Psychomotor / Gait balanced;steady     "

## 2019-11-24 PROCEDURE — 25000132 ZZH RX MED GY IP 250 OP 250 PS 637: Performed by: PSYCHIATRY & NEUROLOGY

## 2019-11-24 PROCEDURE — 25000132 ZZH RX MED GY IP 250 OP 250 PS 637: Performed by: STUDENT IN AN ORGANIZED HEALTH CARE EDUCATION/TRAINING PROGRAM

## 2019-11-24 PROCEDURE — 12400001 ZZH R&B MH UMMC

## 2019-11-24 RX ADMIN — B-COMPLEX W/ C & FOLIC ACID TAB 1 TABLET: TAB at 09:23

## 2019-11-24 RX ADMIN — THERA TABS 1 TABLET: TAB at 09:23

## 2019-11-24 RX ADMIN — RISPERIDONE 2 MG: 2 TABLET, ORALLY DISINTEGRATING ORAL at 20:30

## 2019-11-24 RX ADMIN — LORAZEPAM 2 MG: 1 TABLET ORAL at 09:24

## 2019-11-24 RX ADMIN — MELATONIN 25 MCG: at 09:24

## 2019-11-24 RX ADMIN — ACETAMINOPHEN 650 MG: 325 TABLET, FILM COATED ORAL at 09:26

## 2019-11-24 ASSESSMENT — ACTIVITIES OF DAILY LIVING (ADL)
ORAL_HYGIENE: INDEPENDENT
HYGIENE/GROOMING: PROMPTS
LAUNDRY: WITH SUPERVISION
DRESS: SCRUBS (BEHAVIORAL HEALTH);INDEPENDENT

## 2019-11-24 NOTE — PROGRESS NOTES
"   11/23/19 2100   Behavioral Health   Hallucinations denies / not responding to hallucinations   Thinking distractable;delusional;paranoid;poor concentration   Orientation person: oriented;place: oriented   Memory confabulation   Insight denial of illness;poor   Judgement impaired   Eye Contact into space   Affect angry;blunted, flat;incongruent;tense;irritable   Mood anxious   Physical Appearance/Attire disheveled;attire appropriate to age and situation   Hygiene neglected grooming - unclean body, hair, teeth   Suicidality other (see comments)  (denies)   1. Wish to be Dead (Recent) No   Self Injury other (see comment)  (denies)   Elopement Statements about wanting to leave   Activity hyperactive (agitated, impulsive)   Speech pressured;rambling;tangential   Medication Sensitivity other (see comment)  (states \"meds make my brain slow\")   Psychomotor / Gait balanced;steady     Pt was impulsive, agitated and rude towards another patients that was on the unit. Patient was irritable and made rude comments towards staff and when staff attempted to redirect him he yelled \"go away bitch. You are not the boss of me. Why can't you have him go to his room. He is annoying!! (referring to another patient). Just let me out of here! Open those damn doors let me get the fuck out of here.\" staff continued to redirect him. Patient finally walked to his room. Patient came out and was calm briefly. He continued to verbally attack another patient. When this writer attempted to check in with him, he stated that he was pissed off and did not want to speak with any of the staff on the unit this evening. He stated that \"you guys let someone so sick ruin everyone else's day and you guys let it happen!.\" staff told him that we were working on deesclating the situation and solving the problem. Patient continued to refer to and make statements about the other patient who wasn't on the unit anymore. When asked if he was having suicidal " "thoughts,Pt. Said \"No and i am done talking to you. All you guys want to do is poison us. You are not here to help us.\" patient appeared, delusional and paranoid. Approaching the patient only aggravated his anger more. He was intrusive towards other patients.  Patient ate 100% of his dinner. Patient neglected his ADLs this evening.   "

## 2019-11-24 NOTE — PROGRESS NOTES
11/24/19 1008   Visit Information   Visit Made By Staff    Type of Visit Follow-up;On-call;Staff consultation/triage;Distress   Interventions   Plan of Care Review   With interdisciplinary team   SPIRITUAL HEALTH SERVICES  [facility] 10N  ON-CALL VISIT     REFERRAL SOURCE: Epic consult.     In consultation with Tyler's nurse, she agreed with  Gina Garcias's sticky note to [facility],  that Tyler should wait to see Gina on Tuesday.     PLAN: Will let unit  and  Gina know of request.       David Rajan  Staff   Spiritual Health Services  Pgr: [phone]    * [facility] remains available 24/7 for emergent requests/referrals, either by having the switchboard page the on-call  or by entering an ASAP/STAT consult in Epic (this will also page the on-call ).*
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
## 2019-11-25 PROCEDURE — 12400001 ZZH R&B MH UMMC

## 2019-11-25 PROCEDURE — 99232 SBSQ HOSP IP/OBS MODERATE 35: CPT | Performed by: PSYCHIATRY & NEUROLOGY

## 2019-11-25 PROCEDURE — 25000132 ZZH RX MED GY IP 250 OP 250 PS 637: Performed by: STUDENT IN AN ORGANIZED HEALTH CARE EDUCATION/TRAINING PROGRAM

## 2019-11-25 PROCEDURE — 25000132 ZZH RX MED GY IP 250 OP 250 PS 637: Performed by: PSYCHIATRY & NEUROLOGY

## 2019-11-25 RX ORDER — VITAMIN B COMPLEX
50 TABLET ORAL DAILY
Status: DISCONTINUED | OUTPATIENT
Start: 2019-11-26 | End: 2019-11-27

## 2019-11-25 RX ORDER — RISPERIDONE 1 MG/1
1 TABLET, ORALLY DISINTEGRATING ORAL DAILY
Status: DISCONTINUED | OUTPATIENT
Start: 2019-11-26 | End: 2019-11-26

## 2019-11-25 RX ORDER — OLANZAPINE 10 MG/2ML
10 INJECTION, POWDER, FOR SOLUTION INTRAMUSCULAR DAILY
Status: DISCONTINUED | OUTPATIENT
Start: 2019-11-26 | End: 2019-11-26

## 2019-11-25 RX ADMIN — RISPERIDONE 2 MG: 2 TABLET, ORALLY DISINTEGRATING ORAL at 19:55

## 2019-11-25 RX ADMIN — ACETAMINOPHEN 650 MG: 325 TABLET, FILM COATED ORAL at 19:57

## 2019-11-25 RX ADMIN — MELATONIN 25 MCG: at 08:58

## 2019-11-25 RX ADMIN — B-COMPLEX W/ C & FOLIC ACID TAB 1 TABLET: TAB at 08:58

## 2019-11-25 RX ADMIN — THERA TABS 1 TABLET: TAB at 08:58

## 2019-11-25 RX ADMIN — LORAZEPAM 2 MG: 1 TABLET ORAL at 17:18

## 2019-11-25 ASSESSMENT — ACTIVITIES OF DAILY LIVING (ADL)
HYGIENE/GROOMING: INDEPENDENT
DRESS: INDEPENDENT
LAUNDRY: WITH SUPERVISION
HYGIENE/GROOMING: INDEPENDENT
DRESS: INDEPENDENT
LAUNDRY: WITH SUPERVISION
ORAL_HYGIENE: INDEPENDENT
ORAL_HYGIENE: INDEPENDENT
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
## 2019-11-25 NOTE — PROGRESS NOTES
"[facility], Onset   Psychiatric Progress Note  Hospital Day: 20        Interim History:     The patient's care was discussed with the treatment team during the daily team meeting and/or staff's chart notes were reviewed. Staff report that since transferring to [facility] he has continued to be intrusive with other patients and their cares and verbally aggressive towards staff. Is minimally redirected at times, targeting vulnerable patients and leading to other patients declining medications and interfering in their treatment plans. Continues to make paranoid and grandiose statements. Has been taking PO Risperdal and not requiring IM back up.     Upon interview patient reports that his weekend went fine and he did not have any concerns.  Discussed events over the weekend that required staff attempting to redirect him in conversations with other patients on multiple occasions and he denied that he was talking about medications or treatment plans with other patients.  Writer then shared that she heard him discussing how Risperdal and Haldol made him lay on the couch for 20 hours for several years with other patients in the milieu and he denied that he had this conversation.  He then stated that writer was \"a liar\" and was making things up.  He states that he is only had \"pleasant conversations\" with other patients.  Writer expressed concern about him giving medical advice and interfering in other patients treatment plans.  He denied that he was doing this.  He then stated \"I hate all of you people, I hate psychiatrists you need to study the brain so that you know what these medications do to the brain\".  Writer shared that they have completed education and training regarding the brain and the medications that are being prescribed and informed patient that Risperdal will be increased tomorrow with a milligram being added in the morning.  He is not in agreement with this as he does not feel that " "he has any symptoms to be targeted by medications, but writer stated that they disagree and a morning dose will be added tomorrow.  He then asked again about changing his Ativan to Klonopin and stated \"you must forgot last week\" and writer shared that they did not forget but they had decided not to make this change due to patient's history of misuse and possible abuse of Klonopin.  He states that this is the only medication that he needs and writer shared that he would not be discharging on Ativan or Klonopin and he became increasingly agitated regarding this.  He reports that he would not be here in the hospital if the pharmacy had only filled his Klonopin prescription that he was given by his nurse practitioner because that is the only thing that is helpful for his anxiety and PTSD flashbacks.  Writer shared that there would be no plans to change the Klonopin and that Ativan would eventually be tapered due to patient's history of substance use.  He then declined that he ever uses any substances and writer challenged him on the fact that he shared with writer last week that he tries multiple substances in order to \"understand what the kids are on\" and disclose several substances that he is tried illicitly in the past.  He then reported that he has a marijuana card and thus uses substances legally.  Writer again shared that they would not prescribe long-term benzodiazepines to patient given his history and he continued to be agitated by this and talk about psychiatrist receiving kick backs and getting paid and keeping people in the hospital and make money.  Interview was terminated at this point given patient's increasing agitation and limited productive conversation.      Writer discussed patient's ongoing behavior and intrusiveness with several patients on the unit, including the need to shut down the lounge due to other patients becoming  dysregulated by the things that patient has been saying in the milieu with " nursing staff.  Also discussed patient's case with Dr. Varner on station 12.  Given his ongoing targeting behavior will transfer patient back to station 12 later today.         Medications:       multivitamin, therapeutic  1 tablet Oral Daily     risperiDONE  2 mg Sublingual At Bedtime    Or     OLANZapine  10 mg Intramuscular At Bedtime     vitamin B complex with vitamin C  1 tablet Oral Daily     cholecalciferol  25 mcg Oral Daily          Allergies:     Allergies   Allergen Reactions     Benztropine      Other reaction(s): Seizures  Patient reported  Patient reported       Abilify Discmelt      Suicidal thoughts       Aripiprazole Other (See Comments)     Other reaction(s): Unknown  Suicidal thoughts  Suicidal thoughts       Benzyl Alcohol      Other reaction(s): Unknown     Compazine Other (See Comments)     Seizure      Haloperidol Other (See Comments)     Other reaction(s): Dystonia  Patient had EPS with haldol  Patient had EPS with haldol  Patient had EPS with haldol       Phenothiazines Other (See Comments)     Other reaction(s): Dystonia, Muscle Aches/Weakness, Muscle Cramps, Seizures  Seizures  Patient report  Seizures            Labs:   No results found for this or any previous visit (from the past 24 hour(s)).       Psychiatric Examination:     /77   Pulse 84   Temp 98.5  F (36.9  C) (Tympanic)   Resp 16   Wt 77.6 kg (171 lb)   SpO2 94%   BMI 27.60 kg/m    Weight is 171 lbs 0 oz  Body mass index is 27.6 kg/m .    Weight over time:  Vitals:    11/07/19 0700 11/09/19 0845 11/19/19 0830   Weight: 78.2 kg (172 lb 8 oz) 78 kg (172 lb) 77.6 kg (171 lb)       Orthostatic Vitals     None          Cardiometabolic risk assessment. 11/06/19      Reviewed patient profile for cardiometabolic risk factors    Date taken /Value  REFERENCE RANGE   Abdominal Obesity  (Waist Circumference)   See nursing flowsheet Women ?35 in (88 cm)   Men ?40 in (102 cm)      Triglycerides  Triglycerides   Date Value Ref  "Range Status   09/26/2017 284 (H) <150 mg/dL Final     Comment:     Borderline high:  150-199 mg/dl  High:             200-499 mg/dl  Very high:       >499 mg/dl         ?150 mg/dL (1.7 mmol/L) or current treatment for elevated triglycerides   HDL cholesterol  HDL Cholesterol   Date Value Ref Range Status   09/26/2017 38 (L) >39 mg/dL Final   ]   Women <50 mg/dL (1.3 mmol/L) in women or current treatment for low HDL cholesterol  Men <40 mg/dL (1 mmol/L) in men or current treatment for low HDL cholesterol     Fasting plasma glucose (FPG) Lab Results   Component Value Date     11/02/2019      FPG ?100 mg/dL (5.6 mmol/L) or treatment for elevated blood glucose   Blood pressure  BP Readings from Last 3 Encounters:   11/24/19 112/77   11/04/19 121/74   12/06/17 105/72    Blood pressure ?130/85 mmHg or treatment for elevated blood pressure   Family History  See family history     Appearance: awake, alert, adequately groomed and dressed in hospital scrubs  Attitude:  Continues to be hostile and guarded  Eye Contact:  Fair, intense at times  Mood:  \"fine\"  Affect: irritable and heightened at times during interview  Speech:  Fluctuating, minimal responses and then will become hyperverbal   Language: fluent and intact in English  Psychomotor, Gait, Musculoskeletal:  no evidence of tardive dyskinesia, dystonia, or tics  Thought Process:  more linear, still tangential and illogical  Associations:  no loose associations  Thought Content: denies SI/HI, denies AVH  Insight:  limited  Judgement:  limited  Oriented to:  time, person, and place  Attention Span and Concentration:  limited  Recent and Remote Memory:  limited  Fund of Knowledge:  appropriate    Clinical Global Impressions  First:  Considering your total clinical experience with this particular patient population, how severe are the patient's symptoms at this time?: 7 (11/05/19 6293)  Compared to the patient's condition at the START of treatment, this patient's " condition is:: 4 (11/05/19 1325)  Most recent:  Considering your total clinical experience with this particular patient population, how severe are the patient's symptoms at this time?: 7 (11/05/19 1325)  Compared to the patient's condition at the START of treatment, this patient's condition is:: 4 (11/05/19 1325)           Precautions:     Behavioral Orders   Procedures     Assault precautions     Code 1 - Restrict to Unit     Elopement precautions     Routine Programming     As clinically indicated     Status 15     Every 15 minutes.          Diagnoses:   # Bipolar I Disorder, Manic with psychotic features  # Methamphetamine Use Disorder  # Cannabis Use Disorder          Assessment & Plan:     Assessment and hospital summary:  This patient is a 44 year old male with previous diagnoses of Bipolar I Disorder, anxiety disorder-unspecified, PTSD, ADHD, Methamphetamine use disorder, and cannabis use disorder who presented to ED after calling EMS on himself because his feet hurt after walking around the mall barefoot all day.  Patient has had numerous previous hospitalizations for similar symptoms and has prior MICD commitments.  MSE on admission was notable for delusional thought content, suicidal ideation, and homicidal ideation with no insight.  Patient's presentation is most consistent with diagnoses of Bipolar Disorder, manic with psychosis, methamphetamine use disorder, and cannabis use disorder.  Given patient's lack of insight and capacity to consent for treatment 72 hour hold was initiated and MICD petition was filed with [facility].  Though the admission, he was observed to exhibit beliefs that he was a  medicine man, had healed people from multiple illnesses, had persecutory delusions about FDA and psychiatry, with other grandiose and persecutory delusions, was hyperverbal and tangential at times, with affect being irritable at times, though major behavioral outbursts. He was also concerned  about sexual side-effects from antipsychotics and did not want to start any medications other than discussing treatment for anxiety and therapy. After court proceedings (patient had declined meeting with , sent a letter instead with delusional themes and content such as  having to do the right thing if he valued his life), he was committed with [facility]. Risperidone was started based on past good response, with Olanzapine IM as [facility] back up.  Medication started 11/19/19. Patient has been taking scheduled medication due to not wanting IM. He did not initially report any side-effects with Risperidone, however recently stating he is experiencing sexual side effects. Mood appeared to be less irritable, with decrease in pressured speech, tangentiality and delusional content. Patient transferred to [facility] 11/21/19 to be on a less restrictive unit, he requested for lorazepam to be switched to clonazepam. Per chart review patient has had history of clonazepam abuse and plan would be to taper patient off of benzodiazepines prior to discharge, given this, will continue to lorazepam at this time. Planned to titrate up Risperdal slowly and then initiated Consta prior to discharge.  Given patient's intrusiveness and targeting behaviors with other patients in the milieu, patient will be transferred back to station 12 on 11/25.      Target psychiatric symptoms and interventions:  #Psychosis, delusions: Committed with [facility].   -Increase risperidone to 1 mg in the morning and 2mg at bedtime with IM Olanzapine 10 mg as backup    # Anxiety: Ativan 1-2mg TID PRN for anxiety     Medical Problems and Treatments:  # Multiple wounds in various stage of healing over body, patient declined Bacitracin, will monitor for signs of infection     #Pt reported L hip pain on 11/22, ordered lidocaine Patch daily PRN    Legal:  Committed with [facility]    Safety:  - Continue precautions as noted above  - Status 15 minute  checks    Disposition:  Transfer to [facility]. Discharge pending further stabilization. Likely to IRTS or other structured treatment setting.         Patient has been seen and evaluated by Milagro narvaez DO.

## 2019-11-25 NOTE — PROGRESS NOTES
Had voicemail from patient's [facility] ACT Team  Jett asking for an update.  Left him a voicemail that patient just transferred to Station 10 late last week and is not at all stable, telling other patients not to take their medications and very intrusive with patient's telling them many falsehoods about psychiatric care. Provided him with my contact information and the unit number if he wanted to call the patient.
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
## 2019-11-25 NOTE — PROGRESS NOTES
"   11/24/19 2010   Behavioral Health   Hallucinations denies / not responding to hallucinations   Thinking delusional;paranoid  (i)   Orientation person: oriented;place: oriented   Memory confabulation   Insight denial of illness   Judgement impaired   Eye Contact at examiner   Affect full range affect   Mood irritable;depressed  (depression = 4/10)   Physical Appearance/Attire attire appropriate to age and situation   Hygiene neglected grooming - unclean body, hair, teeth   Suicidality   (denies)   Self Injury   (denies)     Pt's chief complaint is that he believes the staff is out to get him. \"Psychiatric medications pay your salary and that's why you want to keep us here for these BS diagnoses\". Pt firmly does not believe that we are here to help. Pt denies psychosis, SI, SIB, anxiety, and racing thoughts. Pt was social with other patients but was peddling his thoughts about the medical system. Attempts to redirect are met with anger. Pt believes that he knows how to cure these mental illnesses in addition to stage 4 cancer and diabetes. Pt is clearly delusional and grandiose. Pt is not hopeful stating that the system is rigged against him. Pt is also highly irritated at the fact that tobacco use is not permitted. He states that his [employer] is being ignored as tobacco use is essential for his prayer. He has been causing other patients to have issue with our care.   "

## 2019-11-26 PROCEDURE — 99232 SBSQ HOSP IP/OBS MODERATE 35: CPT | Mod: GC | Performed by: PSYCHIATRY & NEUROLOGY

## 2019-11-26 PROCEDURE — 25000132 ZZH RX MED GY IP 250 OP 250 PS 637: Performed by: STUDENT IN AN ORGANIZED HEALTH CARE EDUCATION/TRAINING PROGRAM

## 2019-11-26 PROCEDURE — 12400001 ZZH R&B MH UMMC

## 2019-11-26 PROCEDURE — 25000132 ZZH RX MED GY IP 250 OP 250 PS 637: Performed by: PSYCHIATRY & NEUROLOGY

## 2019-11-26 RX ORDER — RISPERIDONE 2 MG/1
2 TABLET, ORALLY DISINTEGRATING ORAL 2 TIMES DAILY
Status: DISCONTINUED | OUTPATIENT
Start: 2019-11-26 | End: 2019-12-16 | Stop reason: HOSPADM

## 2019-11-26 RX ORDER — OLANZAPINE 10 MG/2ML
10 INJECTION, POWDER, FOR SOLUTION INTRAMUSCULAR 2 TIMES DAILY
Status: DISCONTINUED | OUTPATIENT
Start: 2019-11-26 | End: 2019-12-16 | Stop reason: HOSPADM

## 2019-11-26 RX ORDER — OLANZAPINE 10 MG/2ML
10 INJECTION, POWDER, FOR SOLUTION INTRAMUSCULAR DAILY
Status: COMPLETED | OUTPATIENT
Start: 2019-11-26 | End: 2019-11-26

## 2019-11-26 RX ORDER — RISPERIDONE 1 MG/1
1 TABLET, ORALLY DISINTEGRATING ORAL DAILY
Status: COMPLETED | OUTPATIENT
Start: 2019-11-26 | End: 2019-11-26

## 2019-11-26 RX ADMIN — B-COMPLEX W/ C & FOLIC ACID TAB 1 TABLET: TAB at 09:32

## 2019-11-26 RX ADMIN — RISPERIDONE 1 MG: 1 TABLET, ORALLY DISINTEGRATING ORAL at 09:25

## 2019-11-26 RX ADMIN — RISPERIDONE 2 MG: 2 TABLET, ORALLY DISINTEGRATING ORAL at 20:02

## 2019-11-26 RX ADMIN — MELATONIN 50 MCG: at 09:26

## 2019-11-26 RX ADMIN — THERA TABS 1 TABLET: TAB at 09:26

## 2019-11-26 ASSESSMENT — ACTIVITIES OF DAILY LIVING (ADL)
ORAL_HYGIENE: INDEPENDENT
HYGIENE/GROOMING: HANDWASHING;INDEPENDENT
DRESS: SCRUBS (BEHAVIORAL HEALTH);INDEPENDENT
LAUNDRY: WITH SUPERVISION
ORAL_HYGIENE: INDEPENDENT
DRESS: SCRUBS (BEHAVIORAL HEALTH)
HYGIENE/GROOMING: INDEPENDENT

## 2019-11-26 NOTE — PROGRESS NOTES
Upon arrival to this unit, Pt is manic needs staff redirection. Pt was observed talking to another patient about finding a placement and trying making phone calling to treatment facilities. Pt redirected by staff. He took scheduled medication without any issue.

## 2019-11-26 NOTE — PLAN OF CARE
"Patient transfer  to station 12 was initiated at 1830. He was calm and cooperative with transfer \"I like better on station 12 and they treat me better over there\". Upon transfer out of the station, he stopped and had a pump fist some of his peers, saying \"bye\" to them. He was walked to station 12 by station 10 staff and security on site. He was handed to the charge nurse on station 12 by writer. Transfer paper work complete.  "

## 2019-11-26 NOTE — PROGRESS NOTES
[facility], [place]   Psychiatric Progress Note  Hospital Day: 21        Interim History:     The patient's care was discussed with the treatment team during the daily team meeting and/or staff's chart notes were reviewed. Staff report that since transferring to [facility] he has had been appearing manic still. He was also interacting with peers, talking to them about their treatment and placements intrusively and inappropriately needing re-direction. He has bee taking his scheduled medications.  Met with Tyler in the lounge. He was reading newspapers and was willing to speak to us. He said he did not like it at previous unit. He says he does not have any problems here yet.  He says he is taking his medications. We discussed dose increase in Risperidone. He reported no physical complaints. He became more non-verbally intense and did not want to continue with the rest of the interview,    We discussed with him the benefits/risks/alternatives and side-effects with medication regimen.         Medications:       multivitamin, therapeutic  1 tablet Oral Daily     risperiDONE  1 mg Sublingual Daily    Or     OLANZapine  10 mg Intramuscular Daily     risperiDONE  2 mg Sublingual At Bedtime    Or     OLANZapine  10 mg Intramuscular At Bedtime     vitamin B complex with vitamin C  1 tablet Oral Daily     cholecalciferol  50 mcg Oral Daily          Allergies:     Allergies   Allergen Reactions     Benztropine      Other reaction(s): Seizures  Patient reported  Patient reported       Abilify Discmelt      Suicidal thoughts       Aripiprazole Other (See Comments)     Other reaction(s): Unknown  Suicidal thoughts  Suicidal thoughts       Benzyl Alcohol      Other reaction(s): Unknown     Compazine Other (See Comments)     Seizure      Haloperidol Other (See Comments)     Other reaction(s): Dystonia  Patient had EPS with haldol  Patient had EPS with haldol  Patient had EPS with haldol       Phenothiazines  Other (See Comments)     Other reaction(s): Dystonia, Muscle Aches/Weakness, Muscle Cramps, Seizures  Seizures  Patient report  Seizures            Labs:   No results found for this or any previous visit (from the past 24 hour(s)).       Psychiatric Examination:     BP (!) 141/83 (BP Location: Left arm)   Pulse 74   Temp 98.6  F (37  C) (Oral)   Resp 16   Wt 77.6 kg (171 lb)   SpO2 94%   BMI 27.60 kg/m    Weight is 171 lbs 0 oz  Body mass index is 27.6 kg/m .    Weight over time:  Vitals:    11/07/19 0700 11/09/19 0845 11/19/19 0830   Weight: 78.2 kg (172 lb 8 oz) 78 kg (172 lb) 77.6 kg (171 lb)       Orthostatic Vitals     None          Cardiometabolic risk assessment. 11/06/19      Reviewed patient profile for cardiometabolic risk factors    Date taken /Value  REFERENCE RANGE   Abdominal Obesity  (Waist Circumference)   See nursing flowsheet Women ?35 in (88 cm)   Men ?40 in (102 cm)      Triglycerides  Triglycerides   Date Value Ref Range Status   09/26/2017 284 (H) <150 mg/dL Final     Comment:     Borderline high:  150-199 mg/dl  High:             200-499 mg/dl  Very high:       >499 mg/dl         ?150 mg/dL (1.7 mmol/L) or current treatment for elevated triglycerides   HDL cholesterol  HDL Cholesterol   Date Value Ref Range Status   09/26/2017 38 (L) >39 mg/dL Final   ]   Women <50 mg/dL (1.3 mmol/L) in women or current treatment for low HDL cholesterol  Men <40 mg/dL (1 mmol/L) in men or current treatment for low HDL cholesterol     Fasting plasma glucose (FPG) Lab Results   Component Value Date     11/02/2019      FPG ?100 mg/dL (5.6 mmol/L) or treatment for elevated blood glucose   Blood pressure  BP Readings from Last 3 Encounters:   11/25/19 (!) 141/83   11/04/19 121/74   12/06/17 105/72    Blood pressure ?130/85 mmHg or treatment for elevated blood pressure   Family History  See family history     Appearance: awake, alert, adequately groomed and dressed in hospital scrubs  Attitude:  Mildly  "co-operative and gets hostile later  Eye Contact:  Intense  Mood:  \"fine\"  Affect: irritable and heightened at times  Speech:  Clear   Language: fluent and intact in English  Psychomotor, Gait, Musculoskeletal:  no evidence of tardive dyskinesia, dystonia, or tics  Thought Process:  could not assess in detail  Associations:  no loose associations  Thought Content: denies SI/HI, denies AVH  Insight:  limited  Judgement:  limited  Oriented to:  time, person, and place  Attention Span and Concentration:  limited  Recent and Remote Memory:  limited  Fund of Knowledge:  appropriate    Clinical Global Impressions  First:  Considering your total clinical experience with this particular patient population, how severe are the patient's symptoms at this time?: 7 (11/05/19 1325)  Compared to the patient's condition at the START of treatment, this patient's condition is:: 4 (11/05/19 1325)  Most recent:  Considering your total clinical experience with this particular patient population, how severe are the patient's symptoms at this time?: 7 (11/05/19 1325)  Compared to the patient's condition at the START of treatment, this patient's condition is:: 4 (11/05/19 1325)           Precautions:     Behavioral Orders   Procedures     Assault precautions     Code 1 - Restrict to Unit     Elopement precautions     Routine Programming     As clinically indicated     Status 15     Every 15 minutes.          Diagnoses:   # Bipolar I Disorder, Manic with psychotic features  # Methamphetamine Use Disorder  # Cannabis Use Disorder          Assessment & Plan:     Assessment and hospital summary:  This patient is a 44 year old male with previous diagnoses of Bipolar I Disorder, anxiety disorder-unspecified, PTSD, ADHD, Methamphetamine use disorder, and cannabis use disorder who presented to ED after calling EMS on himself because his feet hurt after walking around the mall barefoot all day.  Patient has had numerous previous hospitalizations " for similar symptoms and has prior MICD commitments.  MSE on admission was notable for delusional thought content, suicidal ideation, and homicidal ideation with no insight.  Patient's presentation is most consistent with diagnoses of Bipolar Disorder, manic with psychosis, methamphetamine use disorder, and cannabis use disorder.  Given patient's lack of insight and capacity to consent for treatment 72 hour hold was initiated and MICD petition was filed with [facility].  Though the admission, he was observed to exhibit beliefs that he was a  medicine man, had healed people from multiple illnesses, had persecutory delusions about FDA and psychiatry, with other grandiose and persecutory delusions, was hyperverbal and tangential at times, with affect being irritable at times, though major behavioral outbursts. He was also concerned about sexual side-effects from antipsychotics and did not want to start any medications other than discussing treatment for anxiety and therapy. After court proceedings (patient had declined meeting with , sent a letter instead with delusional themes and content such as  having to do the right thing if he valued his life), he was committed with Frausto. Risperidone was started based on past good response, with Olanzapine IM as Frausto back up.  Medication started 11/19/19. Patient has been taking scheduled medication due to not wanting IM. He did not initially report any side-effects with Risperidone, however recently stating he is experiencing sexual side effects. Mood appeared to be less irritable, with decrease in pressured speech, tangentiality and delusional content. Patient transferred to [facility] 11/21/19 to be on a less restrictive unit, he requested for lorazepam to be switched to clonazepam. Per chart review patient has had history of clonazepam abuse and plan would be to taper patient off of benzodiazepines prior to discharge, given this, will continue to  lorazepam at this time. Planned to titrate up Risperdal slowly and then initiated Consta prior to discharge.  Given patient's intrusiveness and targeting behaviors with other patients in the milieu, patient was transferred back to station 12 on 11/25. After transfer to station 12, he was noted to be compliant with scheduled medications. He required redirection several times for talking to other peers about their treatment and placements. Manic behavior was observed in unit.      Target psychiatric symptoms and interventions:  #Psychosis, delusions: Committed with Frausto.   -Increase risperidone (11/26/19) to 1 mg in the morning and 2mg at bedtime with IM Olanzapine 10 mg as backup and increase to 2 mg BID from 11/27/19    # Anxiety: Ativan 1-2mg TID PRN for anxiety     Medical Problems and Treatments:  # Multiple wounds in various stage of healing over body, patient declined Bacitracin, will monitor for signs of infection     #Pt reported L hip pain on 11/22, ordered lidocaine Patch daily PRN    Legal:  Committed with Frausto    Safety:  - Continue precautions as noted above  - Status 15 minute checks    Disposition:  Discharge pending further stabilization. Likely to IRTS or other structured treatment setting.         Patient has been seen and discussed with the attending.    Leslee Waters MD  PGY2 resident

## 2019-11-27 PROCEDURE — 12400001 ZZH R&B MH UMMC

## 2019-11-27 PROCEDURE — 25000132 ZZH RX MED GY IP 250 OP 250 PS 637: Performed by: PSYCHIATRY & NEUROLOGY

## 2019-11-27 PROCEDURE — 25000132 ZZH RX MED GY IP 250 OP 250 PS 637: Performed by: STUDENT IN AN ORGANIZED HEALTH CARE EDUCATION/TRAINING PROGRAM

## 2019-11-27 RX ORDER — VITAMIN B COMPLEX
50 TABLET ORAL DAILY
Status: DISCONTINUED | OUTPATIENT
Start: 2019-11-27 | End: 2019-12-16 | Stop reason: HOSPADM

## 2019-11-27 RX ADMIN — RISPERIDONE 2 MG: 2 TABLET, ORALLY DISINTEGRATING ORAL at 08:40

## 2019-11-27 RX ADMIN — MELATONIN 50 MCG: at 08:36

## 2019-11-27 RX ADMIN — RISPERIDONE 2 MG: 2 TABLET, ORALLY DISINTEGRATING ORAL at 20:50

## 2019-11-27 RX ADMIN — B-COMPLEX W/ C & FOLIC ACID TAB 1 TABLET: TAB at 08:36

## 2019-11-27 RX ADMIN — LORAZEPAM 2 MG: 1 TABLET ORAL at 17:45

## 2019-11-27 RX ADMIN — THERA TABS 1 TABLET: TAB at 08:40

## 2019-11-27 ASSESSMENT — ACTIVITIES OF DAILY LIVING (ADL)
HYGIENE/GROOMING: INDEPENDENT
LAUNDRY: UNABLE TO COMPLETE
ORAL_HYGIENE: INDEPENDENT
HYGIENE/GROOMING: INDEPENDENT
DRESS: SCRUBS (BEHAVIORAL HEALTH);INDEPENDENT
LAUNDRY: UNABLE TO COMPLETE
ORAL_HYGIENE: INDEPENDENT
DRESS: SCRUBS (BEHAVIORAL HEALTH)

## 2019-11-27 NOTE — PROGRESS NOTES
"SPIRITUAL HEALTH SERVICES    [facility] ([facility]) [address]      REFERRAL SOURCE: pt request for follow-up/Epic consult    Offered visit to pt Tyler who was lying watching TV in his darkened room. \"I'm not feeling very social today\" he shared, and we agreed that he would prefer to have me follow up with him on Friday when I am next in the hospital.    PLAN: Will follow up with Tyler on Friday.                                                                                                                            Gina Garcias MDiv, [facility]  Lead , Adult Mental Health and Addiction  Pager [phone]    "

## 2019-11-27 NOTE — PLAN OF CARE
"SBAR    S = Situation:     Isolative and withdrawn most of the shift. He agreed to check in with this writer. Affect noted to be blunted and angry on assessment. Mood, \" Pissed off\" and \" Angry\" When asked additional questions about his mood yelled, \" Fuck this shit, fuck this unit, fuck the meds, fuck the psychiatrist, and fuck you! Fuck all of you for fucking up my life!\" He stated that medications had made his \" suicidal\" and verbalized passive thoughts of dying. He however denied any active suicidal ideations,having a plan or intent to commit suicide. Denied SIB urges. He ended the conversation shortly after, and declined to answer any additional questions. Patient was medication compliant, but agitated during the administration, drinking his mixed crystal light drink so fast that it spilled all over the floor and his clothing. Sleep remains adequate, in between 6-7 hours in the last 48 hours.   B  = Background:   Patient is committed and jarvised     Vital Signs: BP (!) 141/83 (BP Location: Left arm)   Pulse 74   Temp 97  F (36.1  C) (Tympanic)   Resp 16   Wt 77.6 kg (171 lb)   SpO2 95%   BMI 27.60 kg/m     Assault precautions   Elopement precautions  A  =  Assessment:   Angry, irritable, passive SI.  R =   Request or Recommendation:   Continue with current level of observation. Refer to case manger notes for discharge planing and recommendations. Continue with current treatment plan and recommendations. Continue to monitor and reassess symptoms. Monitor adherence and response to medications. Monitor progress towards treatment goals. Encourage groups and participation.  "

## 2019-11-27 NOTE — PROGRESS NOTES
[facility], [place]   Psychiatric Progress Note  Hospital Day: 22        Interim History:     The patient's care was discussed with the treatment team during the daily team meeting and/or staff's chart notes were reviewed. Sleep has been around 6-7 hours. He has been declining attending groups. He is mostly by himself, watching TV or sitting in the lounge. He does not actively elaborate or report delusions. When staff have attempted more, he is angry and as per RN notes from last night much of this is directed to treatment and psychiatry.   Tyler had walked away in the lounge when called. Went to his room, he was okay with briefly speaking. He responded with fine and okay to all questions on how he was doing, symptoms and medication side-effects. He did not want to talk further.   We attempted to discuss with him the benefits/risks/alternatives and side-effects with medication regimen. He stopped responding to questions and we discontinued the interview.          Medications:       multivitamin, therapeutic  1 tablet Oral Daily     risperiDONE  2 mg Sublingual BID    Or     OLANZapine  10 mg Intramuscular BID     vitamin B complex with vitamin C  1 tablet Oral Daily     cholecalciferol  50 mcg Oral Daily          Allergies:     Allergies   Allergen Reactions     Benztropine      Other reaction(s): Seizures  Patient reported  Patient reported       Abilify Discmelt      Suicidal thoughts       Aripiprazole Other (See Comments)     Other reaction(s): Unknown  Suicidal thoughts  Suicidal thoughts       Benzyl Alcohol      Other reaction(s): Unknown     Compazine Other (See Comments)     Seizure      Haloperidol Other (See Comments)     Other reaction(s): Dystonia  Patient had EPS with haldol  Patient had EPS with haldol  Patient had EPS with haldol       Phenothiazines Other (See Comments)     Other reaction(s): Dystonia, Muscle Aches/Weakness, Muscle Cramps, Seizures  Seizures  Patient  "report  Seizures            Labs:   No results found for this or any previous visit (from the past 24 hour(s)).       Psychiatric Examination:     BP (!) 141/83 (BP Location: Left arm)   Pulse 74   Temp 97  F (36.1  C) (Tympanic)   Resp 16   Wt 77.6 kg (171 lb)   SpO2 95%   BMI 27.60 kg/m    Weight is 171 lbs 0 oz  Body mass index is 27.6 kg/m .    Weight over time:  Vitals:    11/07/19 0700 11/09/19 0845 11/19/19 0830   Weight: 78.2 kg (172 lb 8 oz) 78 kg (172 lb) 77.6 kg (171 lb)       Orthostatic Vitals     None          Cardiometabolic risk assessment. 11/06/19      Reviewed patient profile for cardiometabolic risk factors    Date taken /Value  REFERENCE RANGE   Abdominal Obesity  (Waist Circumference)   See nursing flowsheet Women ?35 in (88 cm)   Men ?40 in (102 cm)      Triglycerides  Triglycerides   Date Value Ref Range Status   09/26/2017 284 (H) <150 mg/dL Final     Comment:     Borderline high:  150-199 mg/dl  High:             200-499 mg/dl  Very high:       >499 mg/dl         ?150 mg/dL (1.7 mmol/L) or current treatment for elevated triglycerides   HDL cholesterol  HDL Cholesterol   Date Value Ref Range Status   09/26/2017 38 (L) >39 mg/dL Final   ]   Women <50 mg/dL (1.3 mmol/L) in women or current treatment for low HDL cholesterol  Men <40 mg/dL (1 mmol/L) in men or current treatment for low HDL cholesterol     Fasting plasma glucose (FPG) Lab Results   Component Value Date     11/02/2019      FPG ?100 mg/dL (5.6 mmol/L) or treatment for elevated blood glucose   Blood pressure  BP Readings from Last 3 Encounters:   11/25/19 (!) 141/83   11/04/19 121/74   12/06/17 105/72    Blood pressure ?130/85 mmHg or treatment for elevated blood pressure   Family History  See family history     Appearance: awake, alert, adequately groomed and dressed in hospital scrubs  Attitude:  Not co-operative  Eye Contact:  Intense  Mood:  \"fine\"  Affect: irritable  Speech:  limited   Language: fluent and intact " in English  Psychomotor, Gait, Musculoskeletal:  no evidence of tardive dyskinesia, dystonia, or tics  Thought Process:  could not assess in detail  Associations:  no loose associations  Thought Content: could not assess in detail. Appears paranoid.  Insight:  limited  Judgement:  limited  Oriented to:  time, person, and place  Attention Span and Concentration:  limited  Recent and Remote Memory:  limited  Fund of Knowledge:  appropriate    Clinical Global Impressions  First:  Considering your total clinical experience with this particular patient population, how severe are the patient's symptoms at this time?: 7 (11/05/19 1325)  Compared to the patient's condition at the START of treatment, this patient's condition is:: 4 (11/05/19 1325)  Most recent:  Considering your total clinical experience with this particular patient population, how severe are the patient's symptoms at this time?: 7 (11/05/19 1325)  Compared to the patient's condition at the START of treatment, this patient's condition is:: 4 (11/05/19 1325)           Precautions:     Behavioral Orders   Procedures     Assault precautions     Code 1 - Restrict to Unit     Elopement precautions     Routine Programming     As clinically indicated     Status 15     Every 15 minutes.          Diagnoses:   # Bipolar I Disorder, Manic with psychotic features  # Methamphetamine Use Disorder  # Cannabis Use Disorder          Assessment & Plan:     Assessment and hospital summary:  This patient is a 44 year old male with previous diagnoses of Bipolar I Disorder, anxiety disorder-unspecified, PTSD, ADHD, Methamphetamine use disorder, and cannabis use disorder who presented to ED after calling EMS on himself because his feet hurt after walking around the mall barefoot all day.  Patient has had numerous previous hospitalizations for similar symptoms and has prior MICD commitments.  MSE on admission was notable for delusional thought content, suicidal ideation, and  homicidal ideation with no insight.  Patient's presentation is most consistent with diagnoses of Bipolar Disorder, manic with psychosis, methamphetamine use disorder, and cannabis use disorder.  Given patient's lack of insight and capacity to consent for treatment 72 hour hold was initiated and MICD petition was filed with [facility].  Though the admission, he was observed to exhibit beliefs that he was a  medicine man, had healed people from multiple illnesses, had persecutory delusions about FDA and psychiatry, with other grandiose and persecutory delusions, was hyperverbal and tangential at times, with affect being irritable at times, though major behavioral outbursts. He was also concerned about sexual side-effects from antipsychotics and did not want to start any medications other than discussing treatment for anxiety and therapy. After court proceedings (patient had declined meeting with , sent a letter instead with delusional themes and content such as  having to do the right thing if he valued his life), he was committed with Frausto. Risperidone was started based on past good response, with Olanzapine IM as Frausto back up.  Medication started 11/19/19. Patient has been taking scheduled medication due to not wanting IM. He did not initially report any side-effects with Risperidone, however recently stating he is experiencing sexual side effects. Mood appeared to be less irritable, with decrease in pressured speech, tangentiality and delusional content. Patient transferred to [facility] 11/21/19 to be on a less restrictive unit, he requested for lorazepam to be switched to clonazepam. Per chart review patient has had history of clonazepam abuse and plan would be to taper patient off of benzodiazepines prior to discharge, given this, will continue to lorazepam at this time. Planned to titrate up Risperdal slowly and then initiated Consta prior to discharge.  Given patient's  intrusiveness and targeting behaviors with other patients in the milieu, patient was transferred back to station 12 on 11/25. After transfer to station 12, he was noted to be compliant with scheduled medications. He required redirection several times for talking to other peers about their treatment and placements. Manic behavior was observed in unit.      Target psychiatric symptoms and interventions:  #Psychosis, delusions: Committed with Frausto.   -Increase risperidone (11/26/19) to 1 mg in the morning and 2mg at bedtime with IM Olanzapine 10 mg as backup and increase to 2 mg BID from 11/27/19    # Anxiety: Ativan 1-2mg TID PRN for anxiety     Medical Problems and Treatments:  # Multiple wounds in various stage of healing over body, patient declined Bacitracin, will monitor for signs of infection     #Pt reported L hip pain on 11/22, ordered lidocaine Patch daily PRN    Legal:  Committed with Frausto    Safety:  - Continue precautions as noted above  - Status 15 minute checks    Disposition:  Discharge pending further stabilization. Likely to IRTS or other structured treatment setting.         Patient has been seen and case notes and results reviewed.    eLslee Waters MD  PGY2 resident

## 2019-11-27 NOTE — PLAN OF CARE
Problem: Behavioral Disturbance  Goal: Behavioral Disturbance  Outcome: No Change    Patient AOx4 and presents as tense and guarded. He replied with short answers to questions.  He denied all mental health  and physical concerns.  Patient was isolative and withdrawn to his room for much of the shift, only entering the milieu for meals.      Patient was compliant with medications as prescribed.

## 2019-11-28 PROCEDURE — 25000132 ZZH RX MED GY IP 250 OP 250 PS 637: Performed by: STUDENT IN AN ORGANIZED HEALTH CARE EDUCATION/TRAINING PROGRAM

## 2019-11-28 PROCEDURE — 12400001 ZZH R&B MH UMMC

## 2019-11-28 RX ORDER — CALCIUM CARBONATE 500 MG/1
1000 TABLET, CHEWABLE ORAL 3 TIMES DAILY PRN
Status: DISCONTINUED | OUTPATIENT
Start: 2019-11-28 | End: 2019-12-16 | Stop reason: HOSPADM

## 2019-11-28 RX ADMIN — B-COMPLEX W/ C & FOLIC ACID TAB 1 TABLET: TAB at 08:02

## 2019-11-28 RX ADMIN — MELATONIN 50 MCG: at 07:58

## 2019-11-28 RX ADMIN — THERA TABS 1 TABLET: TAB at 08:02

## 2019-11-28 RX ADMIN — RISPERIDONE 2 MG: 2 TABLET, ORALLY DISINTEGRATING ORAL at 19:55

## 2019-11-28 RX ADMIN — RISPERIDONE 2 MG: 2 TABLET, ORALLY DISINTEGRATING ORAL at 08:02

## 2019-11-28 ASSESSMENT — ACTIVITIES OF DAILY LIVING (ADL)
HYGIENE/GROOMING: INDEPENDENT
LAUNDRY: UNABLE TO COMPLETE
DRESS: INDEPENDENT
DRESS: SCRUBS (BEHAVIORAL HEALTH)
ORAL_HYGIENE: INDEPENDENT
ORAL_HYGIENE: INDEPENDENT
HYGIENE/GROOMING: HANDWASHING;SHOWER;INDEPENDENT

## 2019-11-28 NOTE — PROGRESS NOTES
"   11/28/19 1249   Behavioral Health   Hallucinations denies / not responding to hallucinations   Insight denial of illness   Affect blunted, flat;irritable   Mood mood is calm   1. Wish to be Dead (Recent) No   2. Non-Specific Active Suicidal Thoughts (Recent) No   Elopement Statements about wanting to leave   Speech coherent;clear   Psychomotor / Gait balanced;steady   Psycho Education   Type of Intervention 1:1 intervention   Response participates with cues/redirection   Hours 0.5   Treatment Detail check in   Activities of Daily Living   Hygiene/Grooming independent   Oral Hygiene independent   Dress independent   Room Organization independent   Pt spent time watching TV in the common area. He had some socialization with peers. He exhibits blunted to tense affect. On approach pt is irritable. He stated that he is upset about being in the hospital for \"over a month\" and that he felt, \"I was ready to leave a couple days after I got here.\" He denies current symptoms. Pt remained calm throughout the shift.   "

## 2019-11-28 NOTE — PROGRESS NOTES
Pt has shown improved positive behavior since being admitted. However pt has been isolative in room most of evening shift watching television and appearing calm. Pt was seen in lounge watching television with peers for roughly 10 minutes, otherwise pt has asked staff to use the restroom when not in room. No new concerns.

## 2019-11-28 NOTE — PROGRESS NOTES
"Patient requested PRN ativan 2mg at 1745 for increasing anxiety 2/2 hospitalization.      On reassessment patient reported relief. He stated \"I just feel terrible being here so long\".  "

## 2019-11-29 PROCEDURE — 25000132 ZZH RX MED GY IP 250 OP 250 PS 637: Performed by: STUDENT IN AN ORGANIZED HEALTH CARE EDUCATION/TRAINING PROGRAM

## 2019-11-29 PROCEDURE — 25000132 ZZH RX MED GY IP 250 OP 250 PS 637: Performed by: PSYCHIATRY & NEUROLOGY

## 2019-11-29 PROCEDURE — 99232 SBSQ HOSP IP/OBS MODERATE 35: CPT | Performed by: PSYCHIATRY & NEUROLOGY

## 2019-11-29 PROCEDURE — G0177 OPPS/PHP; TRAIN & EDUC SERV: HCPCS

## 2019-11-29 PROCEDURE — 12400001 ZZH R&B MH UMMC

## 2019-11-29 RX ADMIN — B-COMPLEX W/ C & FOLIC ACID TAB 1 TABLET: TAB at 08:55

## 2019-11-29 RX ADMIN — MELATONIN 50 MCG: at 08:55

## 2019-11-29 RX ADMIN — RISPERIDONE 2 MG: 2 TABLET, ORALLY DISINTEGRATING ORAL at 08:55

## 2019-11-29 RX ADMIN — RISPERIDONE 2 MG: 2 TABLET, ORALLY DISINTEGRATING ORAL at 20:52

## 2019-11-29 RX ADMIN — THERA TABS 1 TABLET: TAB at 08:57

## 2019-11-29 RX ADMIN — CALCIUM CARBONATE (ANTACID) CHEW TAB 500 MG 1000 MG: 500 CHEW TAB at 21:39

## 2019-11-29 ASSESSMENT — ACTIVITIES OF DAILY LIVING (ADL)
HYGIENE/GROOMING: INDEPENDENT
DRESS: SCRUBS (BEHAVIORAL HEALTH)
LAUNDRY: UNABLE TO COMPLETE
ORAL_HYGIENE: INDEPENDENT
ORAL_HYGIENE: INDEPENDENT
DRESS: SCRUBS (BEHAVIORAL HEALTH);INDEPENDENT
LAUNDRY: UNABLE TO COMPLETE
HYGIENE/GROOMING: INDEPENDENT

## 2019-11-29 NOTE — PLAN OF CARE
"  Problem: OT General Care Plan  Goal: OT Goal 1  Description  Pt will engage in group activities to increase socially appropriate behaviors and improve concentration. Pt will participate in goal directed tasks to enhance planning and problem solving skills.     Pt attended the morning OT group, chose to work on a beading project.  Pt had been declining group for a few weeks.  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  Overall friendly and cooperative, made good use of time, did not complain about hospitalization.  Outcome: Improving     "

## 2019-11-29 NOTE — PROGRESS NOTES
Patient was present in the milieu throughout today's shift (November 29, 2019). Pt became frustrated and displayed physical agitation while speaking to a family member on the phone. Pt conversed in a loud, harsh tone throughout the lengthy phone call. Pt did not report any SI, SIB, or hallucinations, nor were any observed by staff. Pt attended OT group and participated appropriately. Pt is concerned about his aftercare planning and getting his phone sent to the unit so he has it when he discharges. No further questions or concerns at this time.     11/29/19 1411   Behavioral Health   Hallucinations denies / not responding to hallucinations   Thinking poor concentration   Orientation person: oriented;place: oriented;date: oriented;time: oriented   Memory baseline memory   Insight poor   Judgement impaired   Eye Contact   (not applicable)   Affect irritable;blunted, flat   Mood irritable   Physical Appearance/Attire attire appropriate to age and situation   Hygiene well groomed   Suicidality   (none observed)   1. Wish to be Dead (Recent) No   Wish to be Dead Description (Recent)   (no value)   2. Non-Specific Active Suicidal Thoughts (Recent) No   Non-Specific Active Suicidal Thought Description (Recent) no value   Activities of Daily Living   Hygiene/Grooming independent   Oral Hygiene independent   Dress scrubs (behavioral health);independent   Laundry unable to complete   Room Organization independent

## 2019-11-29 NOTE — PLAN OF CARE
Patient isolative and withdrawn to room all evening only visible in the milieu during meals. Patient upon approach appears irritable and blunted making very little eye contact during verbal assessment. Patient reports depression 8/10 related to continued hospitalization and length of stay. Patient denies SI/SIB, AH/VH, anxiety, or paranoid thoughts. Patient accepting of HS medications with no stated or observed side effects. Patient independent with ADL's and expressing needs. Patient late in the evening reporting heartburn. PRN Tums ordered and administered.

## 2019-11-29 NOTE — PROGRESS NOTES
"[facility], [place]   Psychiatric Progress Note  Hospital Day: 24        Interim History:     The patient's care was discussed with the treatment team during the daily team meeting and/or staff's chart notes were reviewed.  Staff noted that patient has shown improved positive behavior since initially admitted.  He remains isolative to his room.  Not attending or participating in groups.  No evidence of agitation or aggressive behavior.  Patient denies SI/SIB/HI.  Patient remained calm throughout the shift, though remains irritable.  Patient independent with ADLs and expressing needs.    Tyler states that he is at his baseline, perhaps worse now due to extended hospitalization. He said that he is experiencing \"depression and suicidal ideations\" with no reported plan or intent. He said that he \"really loves life,\" and would never act on SI thoughts. He said that he has felt this way on various neuroleptics in the past. He does not feel risperidone has been helpful whatsoever. He denies other side effects at this time. He said that he does not want to go to an IRTS. He instead would like to stay with his friends, Padmini and Jefes. He said that they were homeless for the past 5 years, though were recently approved for housing, and told him that he could stay with them. He said that he does not want to go to an IRTS because \"they take all my money. 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 Pt had no additional requests or concerns. Denies acute medical concerns.          " Medications:       multivitamin, therapeutic  1 tablet Oral Daily     risperiDONE  2 mg Sublingual BID    Or     OLANZapine  10 mg Intramuscular BID     vitamin B complex with vitamin C  1 tablet Oral Daily     cholecalciferol  50 mcg Oral Daily          Allergies:     Allergies   Allergen Reactions     Benztropine      Other reaction(s): Seizures  Patient reported  Patient reported       Abilify Discmelt      Suicidal thoughts       Aripiprazole Other (See Comments)     Other reaction(s): Unknown  Suicidal thoughts  Suicidal thoughts       Benzyl Alcohol      Other reaction(s): Unknown     Compazine Other (See Comments)     Seizure      Haloperidol Other (See Comments)     Other reaction(s): Dystonia  Patient had EPS with haldol  Patient had EPS with haldol  Patient had EPS with haldol       Phenothiazines Other (See Comments)     Other reaction(s): Dystonia, Muscle Aches/Weakness, Muscle Cramps, Seizures  Seizures  Patient report  Seizures            Labs:   No results found for this or any previous visit (from the past 24 hour(s)).       Psychiatric Examination:     /74 (BP Location: Right arm)   Pulse 108   Temp 98.2  F (36.8  C) (Tympanic)   Resp 16   Wt 77.6 kg (171 lb)   SpO2 97%   BMI 27.60 kg/m    Weight is 171 lbs 0 oz  Body mass index is 27.6 kg/m .    Weight over time:  Vitals:    11/07/19 0700 11/09/19 0845 11/19/19 0830   Weight: 78.2 kg (172 lb 8 oz) 78 kg (172 lb) 77.6 kg (171 lb)       Orthostatic Vitals     None          Cardiometabolic risk assessment. 11/06/19      Reviewed patient profile for cardiometabolic risk factors    Date taken /Value  REFERENCE RANGE   Abdominal Obesity  (Waist Circumference)   See nursing flowsheet Women ?35 in (88 cm)   Men ?40 in (102 cm)      Triglycerides  Triglycerides   Date Value Ref Range Status   09/26/2017 284 (H) <150 mg/dL Final     Comment:     Borderline high:  150-199 mg/dl  High:             200-499 mg/dl  Very high:       >499 mg/dl          "?150 mg/dL (1.7 mmol/L) or current treatment for elevated triglycerides   HDL cholesterol  HDL Cholesterol   Date Value Ref Range Status   09/26/2017 38 (L) >39 mg/dL Final   ]   Women <50 mg/dL (1.3 mmol/L) in women or current treatment for low HDL cholesterol  Men <40 mg/dL (1 mmol/L) in men or current treatment for low HDL cholesterol     Fasting plasma glucose (FPG) Lab Results   Component Value Date     11/02/2019      FPG ?100 mg/dL (5.6 mmol/L) or treatment for elevated blood glucose   Blood pressure  BP Readings from Last 3 Encounters:   11/27/19 113/74   11/04/19 121/74   12/06/17 105/72    Blood pressure ?130/85 mmHg or treatment for elevated blood pressure   Family History  See family history     Appearance: awake, alert, adequately groomed and dressed in hospital scrubs  Attitude:  Slightly more cooperative today  Eye Contact:  Intense  Mood:  \"fine\"  Affect: irritable  Speech:  limited   Language: fluent and intact in English  Psychomotor, Gait, Musculoskeletal:  no evidence of tardive dyskinesia, dystonia, or tics  Thought Process:  Jerome, linear, illogical  Associations:  no loose associations  Thought Content: Endorses passive SI. Denies active SI, HI. No overt symptoms of psychosis noted.   Insight:  limited  Judgement:  limited  Oriented to:  time, person, and place  Attention Span and Concentration:  limited  Recent and Remote Memory:  limited  Fund of Knowledge:  appropriate    Clinical Global Impressions  First:  Considering your total clinical experience with this particular patient population, how severe are the patient's symptoms at this time?: 7 (11/05/19 1325)  Compared to the patient's condition at the START of treatment, this patient's condition is:: 4 (11/05/19 1325)  Most recent:  Considering your total clinical experience with this particular patient population, how severe are the patient's symptoms at this time?: 7 (11/05/19 1325)  Compared to the patient's condition at the " START of treatment, this patient's condition is:: 4 (11/05/19 7465)           Precautions:     Behavioral Orders   Procedures     Assault precautions     Code 1 - Restrict to Unit     Elopement precautions     Routine Programming     As clinically indicated     Status 15     Every 15 minutes.          Diagnoses:   # Bipolar I Disorder, Manic with psychotic features  # Methamphetamine Use Disorder  # Cannabis Use Disorder          Assessment & Plan:     Assessment and hospital summary:  This patient is a 44 year old male with previous diagnoses of Bipolar I Disorder, anxiety disorder-unspecified, PTSD, ADHD, Methamphetamine use disorder, and cannabis use disorder who presented to ED after calling EMS on himself because his feet hurt after walking around the mall barefoot all day.  Patient has had numerous previous hospitalizations for similar symptoms and has prior MICD commitments.  MSE on admission was notable for delusional thought content, suicidal ideation, and homicidal ideation with no insight.  Patient's presentation is most consistent with diagnoses of Bipolar Disorder, manic with psychosis, methamphetamine use disorder, and cannabis use disorder.  Given patient's lack of insight and capacity to consent for treatment 72 hour hold was initiated and MICD petition was filed with [facility].  Though the admission, he was observed to exhibit beliefs that he was a  medicine man, had healed people from multiple illnesses, had persecutory delusions about FDA and psychiatry, with other grandiose and persecutory delusions, was hyperverbal and tangential at times, with affect being irritable at times, though major behavioral outbursts. He was also concerned about sexual side-effects from antipsychotics and did not want to start any medications other than discussing treatment for anxiety and therapy. After court proceedings (patient had declined meeting with , sent a letter instead with  delusional themes and content such as  having to do the right thing if he valued his life), he was committed with Frausto. Risperidone was started based on past good response, with Olanzapine IM as Frausto back up.  Medication started 11/19/19. Patient has been taking scheduled medication due to not wanting IM. He did not initially report any side-effects with Risperidone, however recently stating he is experiencing sexual side effects. Mood appeared to be less irritable, with decrease in pressured speech, tangentiality and delusional content. Patient transferred to [facility] 11/21/19 to be on a less restrictive unit, he requested for lorazepam to be switched to clonazepam. Per chart review patient has had history of clonazepam abuse and plan would be to taper patient off of benzodiazepines prior to discharge, given this, will continue to lorazepam at this time. Planned to titrate up Risperdal slowly and then initiated Consta prior to discharge.  Given patient's intrusiveness and targeting behaviors with other patients in the milieu, patient was transferred back to station 12 on 11/25. After transfer to station 12, he was noted to be compliant with scheduled medications. He required redirection several times for talking to other peers about their treatment and placements. Manic behavior was observed in unit.      Target psychiatric symptoms and interventions:  No medication changes today  #Psychosis, delusions: Committed with Frausto.   -Increased risperidone on 11/27/19 to 2 mg BID with noted improvement.    # Anxiety: Ativan 1-2mg TID PRN for anxiety     Medical Problems and Treatments:  # Multiple wounds in various stage of healing over body, patient declined Bacitracin, will monitor for signs of infection     #Pt reported L hip pain on 11/22, ordered lidocaine Patch daily PRN    Legal:  Committed with Frausto    Safety:  - Continue precautions as noted above  - Status 15 minute checks    Disposition:  Discharge  pending further stabilization. Likely to IRTS or other structured treatment setting.     Patient has been seen and case notes and results reviewed.    Maggi Varner MD  [facility]

## 2019-11-30 PROCEDURE — 12400001 ZZH R&B MH UMMC

## 2019-11-30 PROCEDURE — 25000132 ZZH RX MED GY IP 250 OP 250 PS 637: Performed by: STUDENT IN AN ORGANIZED HEALTH CARE EDUCATION/TRAINING PROGRAM

## 2019-11-30 RX ADMIN — RISPERIDONE 2 MG: 2 TABLET, ORALLY DISINTEGRATING ORAL at 21:00

## 2019-11-30 RX ADMIN — RISPERIDONE 2 MG: 2 TABLET, ORALLY DISINTEGRATING ORAL at 09:12

## 2019-11-30 RX ADMIN — B-COMPLEX W/ C & FOLIC ACID TAB 1 TABLET: TAB at 09:12

## 2019-11-30 RX ADMIN — MELATONIN 50 MCG: at 09:12

## 2019-11-30 RX ADMIN — THERA TABS 1 TABLET: TAB at 09:12

## 2019-11-30 ASSESSMENT — ACTIVITIES OF DAILY LIVING (ADL)
DRESS: SCRUBS (BEHAVIORAL HEALTH);INDEPENDENT
ORAL_HYGIENE: INDEPENDENT
DRESS: SCRUBS (BEHAVIORAL HEALTH);INDEPENDENT
ORAL_HYGIENE: INDEPENDENT
LAUNDRY: UNABLE TO COMPLETE
LAUNDRY: WITH SUPERVISION
HYGIENE/GROOMING: HANDWASHING;INDEPENDENT
HYGIENE/GROOMING: INDEPENDENT

## 2019-11-30 NOTE — PROGRESS NOTES
"SPIRITUAL HEALTH SERVICES    [facility] ([facility] [address]      REFERRAL SOURCE: patient request/Epic consult for follow up    Visited with pt Tyler who was more calm and able to redirect his own thinking than in my last substantial visit with him about a week and a half ago.     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 I also encouraged re-centering on the topic of spirituality, but Tyler had a hard time staying on that topic, and had no specific spiritual requests. He did note that he feels that the Great Spirit prayer I gave him helps \"I read it every day, and there's a certain line that really helps me.\" Conversation ended when nurse came back.    PLAN: [facility]S remains available to pt for the duration of hospitalization.                                                                                                                            Gina Garcias MDiv, [facility]  Lead , Adult Mental Health and Addiction  Pager [phone]    "

## 2019-11-30 NOTE — PLAN OF CARE
"  Problem: Behavioral Disturbance  Goal: Behavioral Disturbance  Outcome: Improving     Patient was calm and cooperative with staff.  Patient was visible in the milieu, watching a movie with peers.   Patient denied all mental health concerns, \"I was feeling down earlier, but I watched a comedy and now I feel better\".  Denied depression/anxiety/SI/SIB.   Paranoid/delusional statements overheard, but are greatly reduced since admission.  Patient continues to voice his displeasure with Frausto medications.  Reports he feels \"different\" with Risperdal, but \"not in a good way\".      Patient was compliant with Frausto Risperdal as prescribed.  No observed medication side effects.  Patient declined to shower.  Patient met with the hospital .  "

## 2019-11-30 NOTE — PLAN OF CARE
"SBAR    S = Situation:   Patient isolative and withdrawn most of the shift, however mostly present in the milieu. Minimal social participation. He continues to express frustration with his length of stay and need to remain medication compliant. Mood irritable, although reports feeling \"ok, I guess\"  Affect tense and of restricted range. Continues to report situational passive thoughts of not being alive, mostly related to prolonged hospitalization. Denies active suicidal thinking, plan, or intent.  Denies other MH symptoms. He did not express any delusional or overtly grandiose thoughts this shift. He was compliant with his scheduled medication. Reports that medication slow down his thinking and cause \"foggy thoughts\" and expressed strong dislike for these sensations. Patient states that his family ordered him a new phone which is in route to the hospital, that he was planning to use to arrange post discharge housing. He disagreed with planned IRTS referral and stated that his experience with this type of setting was only negative. He did state that he was planning to continue his medication after the discharge since he was under commitment.   B  = Background:   Patient is committed and jarvised     Vital Signs: /75 (BP Location: Left arm)   Pulse 74   Temp 97  F (36.1  C) (Tympanic)   Resp 16   Wt 78 kg (172 lb)   SpO2 97%   BMI 27.76 kg/m     Assault precautions   Elopement precautions  A  =  Assessment:   Irritable, passive SI, disagrees with discharge planning. Reported side effects.   R =   Request or Recommendation:   Continue with current level of observation. Refer to case manger notes for discharge planing and recommendations. Continue with current treatment plan and recommendations. Continue to monitor and reassess symptoms. Monitor adherence and response to medications. Monitor progress towards treatment goals. Encourage groups and participation.  "

## 2019-12-01 PROCEDURE — 25000132 ZZH RX MED GY IP 250 OP 250 PS 637: Performed by: STUDENT IN AN ORGANIZED HEALTH CARE EDUCATION/TRAINING PROGRAM

## 2019-12-01 PROCEDURE — 12400001 ZZH R&B MH UMMC

## 2019-12-01 RX ADMIN — RISPERIDONE 2 MG: 2 TABLET, ORALLY DISINTEGRATING ORAL at 20:44

## 2019-12-01 RX ADMIN — MELATONIN 50 MCG: at 10:39

## 2019-12-01 RX ADMIN — B-COMPLEX W/ C & FOLIC ACID TAB 1 TABLET: TAB at 10:39

## 2019-12-01 RX ADMIN — RISPERIDONE 2 MG: 2 TABLET, ORALLY DISINTEGRATING ORAL at 10:39

## 2019-12-01 RX ADMIN — THERA TABS 1 TABLET: TAB at 10:39

## 2019-12-01 NOTE — PROGRESS NOTES
12/01/19 1530   Behavioral Health   Hallucinations denies / not responding to hallucinations   Thinking delusional;distractable   Orientation date: oriented;person: oriented;place: oriented;time: oriented   Memory baseline memory   Insight poor   Judgement impaired   Eye Contact at examiner   Affect blunted, flat   Mood mood is calm   Physical Appearance/Attire appears stated age   Hygiene neglected grooming - unclean body, hair, teeth   Suicidality other (see comments)  (denies currently)   1. Wish to be Dead (Recent) No   2. Non-Specific Active Suicidal Thoughts (Recent) No   Self Injury other (see comment)  (denies currently)   Elopement Statements about wanting to leave;Distress about legal situation (holds for mental health or criminal)   Activity withdrawn;isolative   Speech clear;coherent   Medication Sensitivity no stated side effects   Psychomotor / Gait balanced;steady     Pt was mostly isolative to his room today. Mood was calm with blunted affect. Denies SI/SIB. Denies any psychotic symptoms. No behavioral issues. Hygiene is somewhat neglected. Sleep and appetite are intact.

## 2019-12-01 NOTE — PROGRESS NOTES
Pt was brief/guarded when checked in 1:1 with this writer, pt calm/affect tense/controlled behavior with no outburst, pt denies of SI/SIB, pt was visible in the milieu early part of the shift/went to bed early, pt encourage for hygiene/groups, pt needs are offered/known.     11/30/19 2200   Behavioral Health   Thoughts/Cognition (WDL) ex   Hallucinations denies / not responding to hallucinations   Thinking distractable;poor concentration   Orientation person: oriented;place: oriented;date: oriented;time: oriented   Memory baseline memory   Insight poor   Judgement impaired   Eye Contact at examiner   Affect/Mood (WDL) ex   Affect full range affect;tense   Mood mood is calm   ADL Assessment (WDL) WDL   Physical Appearance/Attire attire appropriate to age and situation   Hygiene well groomed   Suicidality (WDL) WDL   Suicidality other (see comments)  (none)   1. Wish to be Dead (Recent) No   2. Non-Specific Active Suicidal Thoughts (Recent) No   Enviromental Risk Factors None   Self Injury other (see comment)  (none)   Elopement (WDL) WDL   Activity (WDL) WDL   Activity withdrawn   Speech (WDL) WDL   Medication Sensitivity (WDL) WDL   Psychomotor Gait (WDL) WDL   Overt Agression (WDL) WDL   Substance Withdrawal   Substance Withdrawal None   Psycho Education   Type of Intervention 1:1 intervention   Response participates with encouragement   Hours 0.5   Treatment Detail check in   Activities of Daily Living   Hygiene/Grooming independent   Oral Hygiene independent   Dress scrubs (behavioral health);independent   Laundry unable to complete   Room Organization independent   Activity   Activity Assistance Provided independent   Positioning   Body Position independently positioning

## 2019-12-02 PROCEDURE — H2032 ACTIVITY THERAPY, PER 15 MIN: HCPCS

## 2019-12-02 PROCEDURE — 12400001 ZZH R&B MH UMMC

## 2019-12-02 PROCEDURE — 99232 SBSQ HOSP IP/OBS MODERATE 35: CPT | Mod: GC | Performed by: PSYCHIATRY & NEUROLOGY

## 2019-12-02 PROCEDURE — 25000132 ZZH RX MED GY IP 250 OP 250 PS 637: Performed by: STUDENT IN AN ORGANIZED HEALTH CARE EDUCATION/TRAINING PROGRAM

## 2019-12-02 PROCEDURE — G0177 OPPS/PHP; TRAIN & EDUC SERV: HCPCS

## 2019-12-02 RX ADMIN — B-COMPLEX W/ C & FOLIC ACID TAB 1 TABLET: TAB at 08:48

## 2019-12-02 RX ADMIN — MELATONIN 50 MCG: at 08:48

## 2019-12-02 RX ADMIN — THERA TABS 1 TABLET: TAB at 08:48

## 2019-12-02 RX ADMIN — RISPERIDONE 2 MG: 2 TABLET, ORALLY DISINTEGRATING ORAL at 08:48

## 2019-12-02 RX ADMIN — RISPERIDONE 2 MG: 2 TABLET, ORALLY DISINTEGRATING ORAL at 21:05

## 2019-12-02 ASSESSMENT — ACTIVITIES OF DAILY LIVING (ADL)
HYGIENE/GROOMING: INDEPENDENT
ORAL_HYGIENE: INDEPENDENT
DRESS: INDEPENDENT
LAUNDRY: UNABLE TO COMPLETE

## 2019-12-02 NOTE — PLAN OF CARE
Problem: OT General Care Plan  Goal: OT Goal 1  Description  Pt will engage in group activities to increase socially appropriate behaviors and improve concentration. Pt will participate in goal directed tasks to enhance planning and problem solving skills.     Pt attended the morning OT clinic group, asked to work on beading project, and did so while listening to music and socializing with writer.  Appeared to be in a bright mood talking about subjects that interested him.  Outcome: Improving

## 2019-12-02 NOTE — PROGRESS NOTES
Pt was in the milieu all shift. He talked about various topics with another patient for hours in a roll. Sleep and appetite are all good. Pt feels like he's been held in the hospital against his will and would like to leave but have not heard from the doctor yet. He denies SI/SIB/AV/VH. Pt is very delusional about the situation surrounding his admission. He endorsed depression and anxiety both at 8/10.       12/02/19 1505   Behavioral Health   Hallucinations denies / not responding to hallucinations   Thinking paranoid;delusional;confused;poor concentration   Orientation situation, disoriented;time, disoriented;date, disoriented;place, disoriented;person, disoriented   Memory baseline memory   Insight poor;denial of illness   Judgement impaired   Eye Contact at examiner   Affect irritable;blunted, flat   Mood depressed;anxious;irritable   Physical Appearance/Attire attire appropriate to age and situation;appears stated age;neat   Hygiene well groomed   1. Wish to be Dead (Recent) No   2. Non-Specific Active Suicidal Thoughts (Recent) No   Speech clear;word salad   Medication Sensitivity   (Pt stated that the meds is messing with his brain.)

## 2019-12-02 NOTE — PLAN OF CARE
Problem: Behavioral Disturbance  Goal: Behavioral Disturbance  Outcome: Improving    Patient presented as calm and cooperative.  AOx4.  He mostly isolated to his room  I just want to watch TV .  He denied all mental health concerns.   He contracted for safety.  No overtly delusional, or paranoid statements overheard.      Compliant with medications as prescribed.  Reports dislike of medications, but no medication side effects.  No physical concerns.  Neglected hygiene.

## 2019-12-02 NOTE — PLAN OF CARE
BEHAVIORAL TEAM DISCUSSION    Participants: Dr. Jennifer Varner, Leslee Reina MD, Yi Lind RN, FLORIAN- Bettie Benson LM, [facility]   Progress: pt continues to have psychosis, delusional thought content  Anticipated length of stay: 2 weeks  Continued Stay Criteria/Rationale: pt is under commitment and needs further stabilization of his MH symptoms   Medical/Physical: none   Precautions:   Behavioral Orders   Procedures    Assault precautions    Code 1 - Restrict to Unit    Elopement precautions    Routine Programming     As clinically indicated    Status 15     Every 15 minutes.     Plan: plan is to refer to IRTS   Rationale for change in precautions or plan: no change
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
## 2019-12-03 PROCEDURE — G0177 OPPS/PHP; TRAIN & EDUC SERV: HCPCS

## 2019-12-03 PROCEDURE — 25000132 ZZH RX MED GY IP 250 OP 250 PS 637: Performed by: STUDENT IN AN ORGANIZED HEALTH CARE EDUCATION/TRAINING PROGRAM

## 2019-12-03 PROCEDURE — 90837 PSYTX W PT 60 MINUTES: CPT

## 2019-12-03 PROCEDURE — 99232 SBSQ HOSP IP/OBS MODERATE 35: CPT | Mod: GC | Performed by: PSYCHIATRY & NEUROLOGY

## 2019-12-03 PROCEDURE — 12400001 ZZH R&B MH UMMC

## 2019-12-03 PROCEDURE — H2032 ACTIVITY THERAPY, PER 15 MIN: HCPCS

## 2019-12-03 RX ADMIN — THERA TABS 1 TABLET: TAB at 08:16

## 2019-12-03 RX ADMIN — MELATONIN 50 MCG: at 08:18

## 2019-12-03 RX ADMIN — B-COMPLEX W/ C & FOLIC ACID TAB 1 TABLET: TAB at 08:16

## 2019-12-03 RX ADMIN — RISPERIDONE 2 MG: 2 TABLET, ORALLY DISINTEGRATING ORAL at 21:29

## 2019-12-03 RX ADMIN — RISPERIDONE 2 MG: 2 TABLET, ORALLY DISINTEGRATING ORAL at 08:32

## 2019-12-03 ASSESSMENT — ACTIVITIES OF DAILY LIVING (ADL)
ORAL_HYGIENE: INDEPENDENT
LAUNDRY: WITH SUPERVISION
DRESS: SCRUBS (BEHAVIORAL HEALTH)
HYGIENE/GROOMING: INDEPENDENT

## 2019-12-03 NOTE — PROGRESS NOTES
"This writer followed up on pt complaint of \"loose, watery stool\" from last evening. Pt again states \"i've had them since I've been here, it's because of the food here. At home I eat organic foods and lots of natural supplements and protein powder\". Pt denies any physical pain associated with this. Pt denies wanting medication to relieve this. Pt states \"I am not concerned about it\". This writer asked pt to let nurse know if anything changes such as seeing blood in stool or constipation. Pt agrees with this.   "

## 2019-12-03 NOTE — PLAN OF CARE
"Patient is visible on the unit, social to the point of being intrusive with staff and peers. Patient presents with delusional, paranoid thinking and poor concentration. Tense, irritable affect and grandiose and anxious mood. Patient presents as hyper-verbal, with pressured, tangential, and rambling speech and stares intensely at writer when speaking. Patient appears to be in denial of his mental illness and appears to have impaired judgement. Patient reports he is \"being held in the hospital against my will,\" reports medications have given him \"erectile dysfunction, liver failure and neurological problems,\" and was overheard telling another patient not to take his medications. 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 During medication administration patient attempts to take medication away from medication window to go get water, writer explains the medication is orally disintegrating and water is not needed, he does take the medication but refuses to take it without drinking fluid with it. Will continue to monitor for safety.   "

## 2019-12-03 NOTE — PROGRESS NOTES
Pt attended and participated in a structured mental health skills group session with a focus on IMR: Coping with stress and identifying coping skills. Reviewed IMR Handout re: what is stress, stress inventory, signs of stress, and strategies for preventing stress. Pt shared his own ways of applying coping skills and  increased supports for using skills to manage stress.  Patients indicated a number of goals/strategies he would like to try for managing stress including increasing support system (drop in center), take walks, and journal.        Pt participated in group and demonstrated  task focus, although needed to be redirected several times throughout the group re: topic.

## 2019-12-03 NOTE — PROGRESS NOTES
"[facility], [place]   Psychiatric Progress Note  Hospital Day: 28        Interim History:     The patient's care was discussed with the treatment team during the daily team meeting and/or staff's chart notes were reviewed.Refer to staff notes from last evening. Patient appears to have been intrusive, hyper verbal and elaborating \"delusiona and manic behavior\" as per those reports. He took his medications as scheduled but had wanted water with mouth dissolving Risperidone.     Tyler was met in the unge with writer carrying a note pad. Through interview today Tyler was more friendly and pleasant. We acknowledged his frustration over medications and differences he had with the medical care plan. He said that he was in a difficult situation but that he had made stupid decisions. He says that he needed to take more responsibility for his actions and was getting better at it. There are times during the interview where he does get circumstantial but is re-directed and does not show flight of ideas or loosening of association now, with more apparent underlying themes and context. He described his [employer] channel [employer][email or web address]. he says he has not gotten viral yet but has been working on it for 12 years now. He says he has 100K views and is happy with that. He used to share clips of daily life events but over time has more videos of him now talking and discussing philosophy or comedy. 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 He describes comedy as a way to deal with his trauma and being therapeutic. He says his brand of comedy is sardonic, cynical and people do not appreciate it. 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 He is looking into a [employer] foundation that is supporting the development of upcoming comedians through self-study courses. 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 He says this is his first book and he has written a first draft on his phone, saying that it felt therapeutic to write. He says his introductory chapter describes events in Spring 2018, when his son a drug addict with whom he had not connected in years, brought a pipe up to him and they smoked meth together which he described as \"wonderful experience\". He says after that he had the first real conversation with his son in years after that. He also describes that for months before that he was not talking to people, was speaking just one or two words and was apathetic. He says he had thought many times \"please take me in my sleep\".  He says now he wants to lighten up and have a better therapist more attuned to his trauma. We discussed Complex PTSD and follow up diagnoses which included mood and psychotic disorders. We agreed that people with complex trauma histories may not present with typical manifestations but can present with a wide spectrum of affective and psychotic illness, anxiety disorders, personality disorders and substance use amongst others. We also agreed that disorders can be severe and poorly responsive to treatments. Writer brought up that with what we know at this time, treatment for the mood and psychotic disorders should however be standard as in patients without the trauma history while acknowledging the need for addressing trauma. We also discussed how it was most reasonable approach to avoid polypharmacy to build good therapeutic relationship with provider and balance medication use and therapy, rather than just stop medications on his own. He agreed and said that he had stopped his Lithium in September Cold turkey and he should not have done that. Intermittently he does bring up magic and his current book on chaos magic which he says is also something he is trying to understand better. He says he understands the need for re-direction as he does not " want to trigger other patients. He was thankful for the time and said he believed he a good relationship with staff on station 12 and would leave positive patient feedback. He says he is ready to go to an IRTS and is looking forward to the referrals.            Medications:       multivitamin, therapeutic  1 tablet Oral Daily     risperiDONE  2 mg Sublingual BID    Or     OLANZapine  10 mg Intramuscular BID     vitamin B complex with vitamin C  1 tablet Oral Daily     cholecalciferol  50 mcg Oral Daily          Allergies:     Allergies   Allergen Reactions     Benztropine      Other reaction(s): Seizures  Patient reported  Patient reported       Abilify Discmelt      Suicidal thoughts       Aripiprazole Other (See Comments)     Other reaction(s): Unknown  Suicidal thoughts  Suicidal thoughts       Benzyl Alcohol      Other reaction(s): Unknown     Compazine Other (See Comments)     Seizure      Haloperidol Other (See Comments)     Other reaction(s): Dystonia  Patient had EPS with haldol  Patient had EPS with haldol  Patient had EPS with haldol       Phenothiazines Other (See Comments)     Other reaction(s): Dystonia, Muscle Aches/Weakness, Muscle Cramps, Seizures  Seizures  Patient report  Seizures            Labs:   No results found for this or any previous visit (from the past 24 hour(s)).       Psychiatric Examination:     /73 (BP Location: Right arm)   Pulse 89   Temp 99.1  F (37.3  C) (Oral)   Resp 16   Wt 76.7 kg (169 lb)   SpO2 96%   BMI 27.28 kg/m    Weight is 169 lbs 0 oz  Body mass index is 27.28 kg/m .    Weight over time:  Vitals:    11/07/19 0700 11/09/19 0845 11/19/19 0830 11/30/19 0700   Weight: 78.2 kg (172 lb 8 oz) 78 kg (172 lb) 77.6 kg (171 lb) 78 kg (172 lb)    12/01/19 0800   Weight: 76.7 kg (169 lb)       Orthostatic Vitals     None          Cardiometabolic risk assessment. 11/06/19      Reviewed patient profile for cardiometabolic risk factors    Date taken /Value  REFERENCE RANGE  "  Abdominal Obesity  (Waist Circumference)   See nursing flowsheet Women ?35 in (88 cm)   Men ?40 in (102 cm)      Triglycerides  Triglycerides   Date Value Ref Range Status   09/26/2017 284 (H) <150 mg/dL Final     Comment:     Borderline high:  150-199 mg/dl  High:             200-499 mg/dl  Very high:       >499 mg/dl         ?150 mg/dL (1.7 mmol/L) or current treatment for elevated triglycerides   HDL cholesterol  HDL Cholesterol   Date Value Ref Range Status   09/26/2017 38 (L) >39 mg/dL Final   ]   Women <50 mg/dL (1.3 mmol/L) in women or current treatment for low HDL cholesterol  Men <40 mg/dL (1 mmol/L) in men or current treatment for low HDL cholesterol     Fasting plasma glucose (FPG) Lab Results   Component Value Date     11/02/2019      FPG ?100 mg/dL (5.6 mmol/L) or treatment for elevated blood glucose   Blood pressure  BP Readings from Last 3 Encounters:   12/03/19 123/73   11/04/19 121/74   12/06/17 105/72    Blood pressure ?130/85 mmHg or treatment for elevated blood pressure   Family History  See family history     Appearance: awake, alert, adequately groomed and dressed in hospital scrubs  Attitude:  Pleasant, friendly and co-operative today  Eye Contact:  fair  Mood:  \"okay\"  Affect: Normal range and reactivity mostly and exaggerated at times when describing certain topics  Speech:  limited   Language: fluent and intact in English  Psychomotor, Gait, Musculoskeletal:  no evidence of tardive dyskinesia, dystonia, or tics  Thought Process: Some circumstantiality  Associations:  no loose associations  Thought Content:  Denies active SI, HI. No overt symptoms of psychosis noted.   Insight:  partial  Judgement:  limited  Oriented to:  time, person, and place  Attention Span and Concentration:  fair  Recent and Remote Memory:  intact  Fund of Knowledge:  appropriate    Clinical Global Impressions  First:  Considering your total clinical experience with this particular patient population, how " severe are the patient's symptoms at this time?: 7 (11/05/19 1325)  Compared to the patient's condition at the START of treatment, this patient's condition is:: 4 (11/05/19 1325)  Most recent:  Considering your total clinical experience with this particular patient population, how severe are the patient's symptoms at this time?: 6 (12/02/19)  Compared to the patient's condition at the START of treatment, this patient's condition is:: 3 (12/02/19)           Precautions:     Behavioral Orders   Procedures     Assault precautions     Code 1 - Restrict to Unit     Elopement precautions     Routine Programming     As clinically indicated     Status 15     Every 15 minutes.          Diagnoses:   # Bipolar I Disorder, Manic with psychotic features  # Methamphetamine Use Disorder  # Cannabis Use Disorder          Assessment & Plan:     Assessment and hospital summary:  This patient is a 44 year old male with previous diagnoses of Bipolar I Disorder, anxiety disorder-unspecified, PTSD, ADHD, Methamphetamine use disorder, and cannabis use disorder who presented to ED after calling EMS on himself because his feet hurt after walking around the mall barefoot all day.  Patient has had numerous previous hospitalizations for similar symptoms and has prior MICD commitments.  MSE on admission was notable for delusional thought content, suicidal ideation, and homicidal ideation with no insight.  Patient's presentation is most consistent with diagnoses of Bipolar Disorder, manic with psychosis, methamphetamine use disorder, and cannabis use disorder.  Given patient's lack of insight and capacity to consent for treatment 72 hour hold was initiated and MICD petition was filed with [facility].  Though the admission, he was observed to exhibit beliefs that he was a  medicine man, had healed people from multiple illnesses, had persecutory delusions about FDA and psychiatry, with other grandiose and persecutory delusions,  was hyperverbal and tangential at times, with affect being irritable at times, though major behavioral outbursts. He was also concerned about sexual side-effects from antipsychotics and did not want to start any medications other than discussing treatment for anxiety and therapy. After court proceedings (patient had declined meeting with , sent a letter instead with delusional themes and content such as  having to do the right thing if he valued his life), he was committed with [facility]. Risperidone was started based on past good response, with Olanzapine IM as [facility] back up.  Medication started 11/19/19. Patient has been taking scheduled medication due to not wanting IM. He did not initially report any side-effects with Risperidone, however recently stating he is experiencing sexual side effects. Mood appeared to be less irritable, with decrease in pressured speech, tangentiality and delusional content. Patient transferred to [facility] 11/21/19 to be on a less restrictive unit, he requested for lorazepam to be switched to clonazepam. Per chart review patient has had history of clonazepam abuse and plan would be to taper patient off of benzodiazepines prior to discharge, given this, will continue to lorazepam at this time. Planned to titrate up Risperdal slowly and then initiated Consta prior to discharge.  Given patient's intrusiveness and targeting behaviors with other patients in the milieu, patient was transferred back to station 12 on 11/25. After transfer to station 12, he was noted to be compliant with scheduled medications. He required redirection several times for talking to other peers about their treatment and placements. Manic behavior was observed in unit.  Risperidone was increased to 4 mg/day with no observed adverse effects and reduction in manic behavior.      Target psychiatric symptoms and interventions:  No medication changes today  #Psychosis, delusions: Committed with [facility].   -Increased  risperidone on 11/27/19 to 2 mg BID with noted improvement.    # Anxiety: Ativan 1-2mg TID PRN for anxiety     Medical Problems and Treatments:  # Multiple wounds in various stage of healing over body, patient declined Bacitracin, will monitor for signs of infection     #Pt reported L hip pain on 11/22, ordered lidocaine Patch daily PRN    Legal:  Committed with [facility]    Safety:  - Continue precautions as noted above  - Status 15 minute checks    Disposition:  Discharge pending further stabilization. Likely to IRTS or other structured treatment setting.     Patient has been seen and discussed with attending.    DENISE BALDERRAMA MD  PGY2 resident

## 2019-12-03 NOTE — PLAN OF CARE
Problem: OT General Care Plan  Goal: OT Goal 1  Description  Pt will engage in group activities to increase socially appropriate behaviors and improve concentration. Pt will participate in goal directed tasks to enhance planning and problem solving skills.     Pt attended 2 hours of OT groups.  Friendly interactions, was in a positive mood all morning.  Talkative, though remained on topic.  Demonstrated good focus on activity, took time to plan out his work, and was very pleased with the results.  Complimented his doctor - feels he's being heard, and is very appreciative of that.  Outcome: Improving

## 2019-12-03 NOTE — PROGRESS NOTES
"Pt reports mood as \"very good\". Pt displays denisha; high energy and excessive activity, making rapid fire calls on after the other. [employer] good mood, some irritability and impatience. Fast, erratic speech; racing thoughts. Pt reports, \"I don't believe in my bipolar disorder diagnosis, I have multiple instances of complex PTSD which can exhibit symptoms along the whole spectrum of mental illness: depression, schizophrenia, anxiety, bipolar, all of it. I've been misdiagnosed\". During interaction pt exhibited pressured speech, difficult to redirect or consult. Observed calling a number from the [employer] multiple times, fixated on buying silver dollar coins.  Pt denied any AH/VH, denied SI/SIB/HI.      12/03/19 1118   Behavioral Health   Hallucinations denies / not responding to hallucinations   Thinking delusional;poor concentration;paranoid   Orientation person: oriented;place: oriented;date: oriented;time: oriented   Memory baseline memory   Insight denial of illness   Judgement impaired   Eye Contact at examiner;staring   Affect full range affect;irritable;tense   Mood anxious;grandiose;irritable   Physical Appearance/Attire untidy  (Encouraged to shower; bad body odor)   Hygiene body odor   Suicidality   (denies)   1. Wish to be Dead (Recent) No   Wish to be Dead Description (Recent) no   2. Non-Specific Active Suicidal Thoughts (Recent) No   Non-Specific Active Suicidal Thought Description (Recent) Denies   3. Active Sucidal Ideation with any Methods (Not Plan) Without Intent to Act (Recent) No   Active Suicidal Ideation with any Methods (Not Plan) Description (Recent) denies   4. Active Suicidal Ideation with Some Intent to Act, Without Specific Plan (Recent) No   Active Suicidal Ideation with Some Intent to Act, Without Specific Plan Description (Recent) denies   5. Active Suicidal Ideation with Specific Plan and Intent (Recent) No   Active Suicidal Ideation with Specific Plan and Intent Description (Recent) " denies   Duration (Lifetime) NA

## 2019-12-04 PROCEDURE — 25000132 ZZH RX MED GY IP 250 OP 250 PS 637: Performed by: STUDENT IN AN ORGANIZED HEALTH CARE EDUCATION/TRAINING PROGRAM

## 2019-12-04 PROCEDURE — G0177 OPPS/PHP; TRAIN & EDUC SERV: HCPCS

## 2019-12-04 PROCEDURE — H2032 ACTIVITY THERAPY, PER 15 MIN: HCPCS

## 2019-12-04 PROCEDURE — 12400001 ZZH R&B MH UMMC

## 2019-12-04 RX ADMIN — MELATONIN 50 MCG: at 08:21

## 2019-12-04 RX ADMIN — RISPERIDONE 2 MG: 2 TABLET, ORALLY DISINTEGRATING ORAL at 20:57

## 2019-12-04 RX ADMIN — B-COMPLEX W/ C & FOLIC ACID TAB 1 TABLET: TAB at 08:21

## 2019-12-04 RX ADMIN — THERA TABS 1 TABLET: TAB at 08:21

## 2019-12-04 RX ADMIN — RISPERIDONE 2 MG: 2 TABLET, ORALLY DISINTEGRATING ORAL at 08:21

## 2019-12-04 ASSESSMENT — ACTIVITIES OF DAILY LIVING (ADL)
ORAL_HYGIENE: INDEPENDENT
DRESS: SCRUBS (BEHAVIORAL HEALTH)
ORAL_HYGIENE: INDEPENDENT
HYGIENE/GROOMING: INDEPENDENT
LAUNDRY: UNABLE TO COMPLETE
DRESS: INDEPENDENT
HYGIENE/GROOMING: INDEPENDENT
LAUNDRY: UNABLE TO COMPLETE

## 2019-12-04 NOTE — PROGRESS NOTES
12/04/19 1418   Behavioral Health   Hallucinations denies / not responding to hallucinations   Thinking distractable;delusional;poor concentration   Orientation person: oriented;place: oriented;date: oriented;time: oriented   Memory baseline memory   Insight denial of illness;poor   Judgement   (intact as far as his behavior; impaired thoughts)   Eye Contact at examiner   Affect tense;full range affect   Mood grandiose;anxious   Physical Appearance/Attire appears stated age;attire appropriate to age and situation   Hygiene well groomed   Suicidality other (see comments)  (none stated)   Wish to be Dead Description (Recent)   (none stated)   Non-Specific Active Suicidal Thought Description (Recent)   (none stated)   Self Injury other (see comment)  (none stated)   Elopement   (Pt didn't exhibit these behaviors this shift.)   Activity restless;hyperactive (agitated, impulsive);other (see comment)  (active and social in milieu)   Speech rambling;flight of ideas;pressured;coherent;clear   Psychomotor / Gait balanced;steady   Activities of Daily Living   Hygiene/Grooming independent   Oral Hygiene independent   Dress independent   Laundry unable to complete   Room Organization independent   Significant Event   Significant Event Other (see comments)  (Shift Summary)   Pt appears very grandiose as he talks about how intelligent he is and how much he knows. During a phone conversation he was having this shift, the pt read the following things, to the person on the phone, he had written in a notebook. He said that he's received e-mails from Armando Perez, Dereje Deng and Nik as well as how he could write a coffee table book from all the texts he's sent to Armando Perez. Pt made some reference to how technology is bugged. Pt also made some comments about the 2nd Amendment and his right to life, liberty and the pursuit of happiness and his right to seek revenge utilizing the 2nd Amendment against the people/organization who  are/is trying to force him to take psych medications which, according to the pt, are toxic chemicals. Pt talking about many, many other things this shift including many delusional items. However, pt never appeared to be a threat to anyone and was fairly respectful to other pts and staff.

## 2019-12-04 NOTE — PLAN OF CARE
Problem: OT General Care Plan  Goal: OT Goal 1  Description  Pt will engage in group activities to increase socially appropriate behaviors and improve concentration. Pt will participate in goal directed tasks to enhance planning and problem solving skills.     Pt continues to attend all OT groups, remains friendly, polite, and engaged.  Continues to express concerns strong beliefs about the field of psychiatry, though also expresses much gratitude for the doctors he has been working with this admission, especially Dr. Waters.    Discussed long hx of mental health issues, the importance of finding the right aftercare, pts concerns about his 22y/o son currently in correction, and pts views on chemical use.  Outcome: Improving

## 2019-12-04 NOTE — PROGRESS NOTES
Participated in Music Therapy group with focus on mood elevation, validation and decreasing anxiety and improved group cohesiveness. Engaged and cooperative in music listening interventions.   Showed progress in session goals.     Hyperverbal but redirectable.  Engaged well in group activities making a beat on the table while peer did lyrics and MT offered piano accompaniment.      Positive participation.

## 2019-12-04 NOTE — PROGRESS NOTES
"[facility], [place]   Psychiatric Progress Note  Hospital Day: 29        Interim History:     The patient's care was discussed with the treatment team during the daily team meeting and/or staff's chart notes were reviewed. He has been taking his scheduled medication, no side-effects noted. Sleep and appetite have been good. Has not reported any major concerns with loose stools though says diet has changed here. Has been more pleasant and co-operative. Staff notes him as being hyperverbal but less intense. Has been in contact with peers even on phone, but has been appropriate in conversations.    Met with Tyler while he was in OT. He had been engaged in working on sticking some pictures to a box. He was pleasant and friendly through the interview. We discussed his book again and that he was working on finishing the preface and have more people write introductions. He was hoping to have his son who introduced him 2 years ago to methamphetamines to write as well. Describing how that experience had helped get him out of his catatonic state, again describing period of apathy, and being down for a long time before that, and that being a horrible experience being able to do nothing but stare at a TV for 20 hrs a day. He described watching and reading Dr Inocencio Back's work and referenced 4 youtube videos which explain medications and healing. He described \"medication spellbiding\" as in the videos where long term overuse causing frontal lobe shut down was described and chronic brain impairment. He says he has learnt from Inocencio Back and the need for compassion and empathy in healing. He also talked about Brayden Penaloza and \"How to build high self esteem\" which had been huge in his life. He says he continues to use positive re-affirmation and is really appreciative of staff who encourage him positively. He says he feels he has been interrupted to just discuss medication plans in the past and " that has not been helpful. He does note that his conversations with other patients has been more positive and says he will be mindful of that with peers mental health vulnerabilities.  He says he has looked at long term use of ritalin and stimulants and damage to dopamine pathways. He says he has seen those effects in his friends.  He states that psychoactive substances will always be a part of his life though he keeps referencing to use and not abuse. He says he would use as his research would lead him and speaks about historical and even Caodaism history references to psychoactive substances. He however states that he believes in Harm reduction principles. Writer continued to use MI principles through this conversation.   He describes how he has to give up on hate and truly learn to move forward. We discussed his medications, says he is tolerating Risperidone fine but that is also because he is focussed on positive self care at this time. We discussed IRTS plan as well and he says he will go if he has to. He has been in touch with Arianna his  as well. Linsey and Feliz are his friends he wants to stay with but has no way of getting in touch with. He says he has a phone coming in and that he could get on FB if permitted to message them.             Medications:       multivitamin, therapeutic  1 tablet Oral Daily     risperiDONE  2 mg Sublingual BID    Or     OLANZapine  10 mg Intramuscular BID     vitamin B complex with vitamin C  1 tablet Oral Daily     cholecalciferol  50 mcg Oral Daily          Allergies:     Allergies   Allergen Reactions     Benztropine      Other reaction(s): Seizures  Patient reported  Patient reported       Abilify Discmelt      Suicidal thoughts       Aripiprazole Other (See Comments)     Other reaction(s): Unknown  Suicidal thoughts  Suicidal thoughts       Benzyl Alcohol      Other reaction(s): Unknown     Compazine Other (See Comments)     Seizure      Haloperidol Other  (See Comments)     Other reaction(s): Dystonia  Patient had EPS with haldol  Patient had EPS with haldol  Patient had EPS with haldol       Phenothiazines Other (See Comments)     Other reaction(s): Dystonia, Muscle Aches/Weakness, Muscle Cramps, Seizures  Seizures  Patient report  Seizures            Labs:   No results found for this or any previous visit (from the past 24 hour(s)).       Psychiatric Examination:     /73 (BP Location: Right arm)   Pulse 89   Temp 99.1  F (37.3  C) (Oral)   Resp 16   Wt 76.7 kg (169 lb)   SpO2 96%   BMI 27.28 kg/m    Weight is 169 lbs 0 oz  Body mass index is 27.28 kg/m .    Weight over time:  Vitals:    11/07/19 0700 11/09/19 0845 11/19/19 0830 11/30/19 0700   Weight: 78.2 kg (172 lb 8 oz) 78 kg (172 lb) 77.6 kg (171 lb) 78 kg (172 lb)    12/01/19 0800   Weight: 76.7 kg (169 lb)       Orthostatic Vitals     None          Cardiometabolic risk assessment. 11/06/19      Reviewed patient profile for cardiometabolic risk factors    Date taken /Value  REFERENCE RANGE   Abdominal Obesity  (Waist Circumference)   See nursing flowsheet Women ?35 in (88 cm)   Men ?40 in (102 cm)      Triglycerides  Triglycerides   Date Value Ref Range Status   09/26/2017 284 (H) <150 mg/dL Final     Comment:     Borderline high:  150-199 mg/dl  High:             200-499 mg/dl  Very high:       >499 mg/dl         ?150 mg/dL (1.7 mmol/L) or current treatment for elevated triglycerides   HDL cholesterol  HDL Cholesterol   Date Value Ref Range Status   09/26/2017 38 (L) >39 mg/dL Final   ]   Women <50 mg/dL (1.3 mmol/L) in women or current treatment for low HDL cholesterol  Men <40 mg/dL (1 mmol/L) in men or current treatment for low HDL cholesterol     Fasting plasma glucose (FPG) Lab Results   Component Value Date     11/02/2019      FPG ?100 mg/dL (5.6 mmol/L) or treatment for elevated blood glucose   Blood pressure  BP Readings from Last 3 Encounters:   12/03/19 123/73   11/04/19 121/74  "  12/06/17 105/72    Blood pressure ?130/85 mmHg or treatment for elevated blood pressure   Family History  See family history     Appearance: awake, alert, adequately groomed and dressed in hospital scrubs  Attitude:  Pleasant, friendly and co-operative today  Eye Contact:  Fair, does appear intense at time but this appears his baseline now  Mood:  \"better\"  Affect: Normal range and reactivity mostly with being exaggerated at times when describing certain topics  Speech:  Clear and coherent  Language: fluent and intact in English  Psychomotor, Gait, Musculoskeletal:  no evidence of tardive dyskinesia, dystonia, or tics  Thought Process: Some circumstantiality  Associations:  no loose associations  Thought Content:  Denies active SI, HI. No overt symptoms of psychosis noted.   Insight:  partial  Judgement:  limited  Oriented to:  time, person, and place  Attention Span and Concentration:  fair  Recent and Remote Memory:  intact  Fund of Knowledge:  appropriate    Clinical Global Impressions  First:  Considering your total clinical experience with this particular patient population, how severe are the patient's symptoms at this time?: 7 (11/05/19 1325)  Compared to the patient's condition at the START of treatment, this patient's condition is:: 4 (11/05/19 1325)  Most recent:  Considering your total clinical experience with this particular patient population, how severe are the patient's symptoms at this time?: 6 (12/02/19)  Compared to the patient's condition at the START of treatment, this patient's condition is:: 3 (12/02/19)           Precautions:     Behavioral Orders   Procedures     Assault precautions     Code 1 - Restrict to Unit     Elopement precautions     Routine Programming     As clinically indicated     Status 15     Every 15 minutes.          Diagnoses:   # Bipolar I Disorder, Manic with psychotic features  # Methamphetamine Use Disorder  # Cannabis Use Disorder          Assessment & Plan: "     Assessment and hospital summary:  This patient is a 44 year old male with previous diagnoses of Bipolar I Disorder, anxiety disorder-unspecified, PTSD, ADHD, Methamphetamine use disorder, and cannabis use disorder who presented to ED after calling EMS on himself because his feet hurt after walking around the mall barefoot all day.  Patient has had numerous previous hospitalizations for similar symptoms and has prior MICD commitments.  MSE on admission was notable for delusional thought content, suicidal ideation, and homicidal ideation with no insight.  Patient's presentation is most consistent with diagnoses of Bipolar Disorder, manic with psychosis, methamphetamine use disorder, and cannabis use disorder.  Given patient's lack of insight and capacity to consent for treatment 72 hour hold was initiated and MICD petition was filed with [facility].  Though the admission, he was observed to exhibit beliefs that he was a  medicine man, had healed people from multiple illnesses, had persecutory delusions about FDA and psychiatry, with other grandiose and persecutory delusions, was hyperverbal and tangential at times, with affect being irritable at times, though major behavioral outbursts. He was also concerned about sexual side-effects from antipsychotics and did not want to start any medications other than discussing treatment for anxiety and therapy. After court proceedings (patient had declined meeting with , sent a letter instead with delusional themes and content such as  having to do the right thing if he valued his life), he was committed with Fra[facility]o. Risperidone was started based on past good response, with Olanzapine IM as [facility] back up.  Medication started 11/19/19. Patient has been taking scheduled medication due to not wanting IM. He did not initially report any side-effects with Risperidone, however recently stating he is experiencing sexual side effects. Mood appeared to  be less irritable, with decrease in pressured speech, tangentiality and delusional content. Patient transferred to [facility] 11/21/19 to be on a less restrictive unit, he requested for lorazepam to be switched to clonazepam. Per chart review patient has had history of clonazepam abuse and plan would be to taper patient off of benzodiazepines prior to discharge, given this, will continue to lorazepam at this time. Planned to titrate up Risperdal slowly and then initiated Consta prior to discharge.  Given patient's intrusiveness and targeting behaviors with other patients in the milieu, patient was transferred back to station 12 on 11/25. After transfer to station 12, he was noted to be compliant with scheduled medications. He required redirection several times for talking to other peers about their treatment and placements. Manic behavior was observed in unit.  Risperidone was increased to 4 mg/day with no observed adverse effects and reduction in manic behavior.      Target psychiatric symptoms and interventions:  No medication changes today  #Psychosis, delusions: Committed with [facility].   -Increased risperidone on 11/27/19 to 2 mg BID with noted improvement.    # Anxiety: Ativan 1-2mg TID PRN for anxiety     Medical Problems and Treatments:  # Multiple wounds in various stage of healing over body, patient declined Bacitracin, will monitor for signs of infection     #Pt reported L hip pain on 11/22, ordered lidocaine Patch daily PRN    Legal:  Committed with [facility]    Safety:  - Continue precautions as noted above  - Status 15 minute checks    Disposition:  Discharge pending further stabilization. Likely to IRTS or other structured treatment setting.       Patient has been seen, case notes and results reviewed by me.     DENISE BALDERRAMA MD  PGY2 resident

## 2019-12-04 NOTE — PROGRESS NOTES
Late Entry Note: Referrals sent yesterday for the following IRTS:  [facility]  [facility]  Re-[facility]  [facility]  Trans[facility] on [place]

## 2019-12-04 NOTE — PROGRESS NOTES
"Tyler was visible in the milieu and participated actively in group programming. He was cooperative with unit rules and staff requests. Tyler made several positive and supportive statements to other patients and spent time on the phone talking to patients he had previously met in the hospital. These conversations appeared to be appropriate. He presents as intense, though not angry, with unbroken eye contact and rapid, mildly pressured speech. Tyler states that his mental status is improved. He reports that he feels the doctor and treatment team are treating him with dignity and listening to his concerns, though he believes that psychiatry as a whole is \"chemical warfare\" and \"genocide\". Tyler states that he believes methamphetamine brought him out of a zombie-like state induced by psychiatric medicines. 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 Tyler acknowledges that meth is a potentially dangerous street drug and states that he believes pharmaceutical amphetamines would help him with his ADHD. He stated \"Sure, some of the stuff I say is delusional, but it is not hurting me or anyone else.\" He reports feeling that his placement in the hospital is wrong and that his use of street drugs does not constitute an emergency.       12/03/19 2115   Behavioral Health   Hallucinations denies / not responding to hallucinations   Thinking delusional   Orientation person: oriented;place: oriented;date: oriented;time: oriented   Memory baseline memory   Insight admits / accepts   Judgement impaired   Eye Contact staring   Affect other (see comments)  (intense though not angry)   Mood elated;mood is calm   Physical Appearance/Attire attire appropriate to age and situation   Hygiene well groomed   1. Wish to be Dead (Recent) No   2. Non-Specific Active Suicidal Thoughts (Recent) No   Activity other (see comment)  (visible in the milieu)   Speech pressured   Activities of " Daily Living   Hygiene/Grooming independent   Oral Hygiene independent   Dress scrubs (behavioral health)   Laundry with supervision   Room Organization independent

## 2019-12-05 PROCEDURE — 99232 SBSQ HOSP IP/OBS MODERATE 35: CPT | Performed by: PSYCHIATRY & NEUROLOGY

## 2019-12-05 PROCEDURE — 12400001 ZZH R&B MH UMMC

## 2019-12-05 PROCEDURE — 25000132 ZZH RX MED GY IP 250 OP 250 PS 637: Performed by: STUDENT IN AN ORGANIZED HEALTH CARE EDUCATION/TRAINING PROGRAM

## 2019-12-05 PROCEDURE — H2032 ACTIVITY THERAPY, PER 15 MIN: HCPCS

## 2019-12-05 PROCEDURE — G0177 OPPS/PHP; TRAIN & EDUC SERV: HCPCS

## 2019-12-05 RX ADMIN — LORAZEPAM 2 MG: 1 TABLET ORAL at 17:05

## 2019-12-05 RX ADMIN — THERA TABS 1 TABLET: TAB at 07:43

## 2019-12-05 RX ADMIN — MELATONIN 50 MCG: at 07:43

## 2019-12-05 RX ADMIN — RISPERIDONE 2 MG: 2 TABLET, ORALLY DISINTEGRATING ORAL at 07:43

## 2019-12-05 RX ADMIN — B-COMPLEX W/ C & FOLIC ACID TAB 1 TABLET: TAB at 07:43

## 2019-12-05 RX ADMIN — RISPERIDONE 2 MG: 2 TABLET, ORALLY DISINTEGRATING ORAL at 22:05

## 2019-12-05 ASSESSMENT — ACTIVITIES OF DAILY LIVING (ADL)
DRESS: SCRUBS (BEHAVIORAL HEALTH);INDEPENDENT
LAUNDRY: UNABLE TO COMPLETE
HYGIENE/GROOMING: INDEPENDENT
ORAL_HYGIENE: INDEPENDENT
ORAL_HYGIENE: INDEPENDENT
LAUNDRY: UNABLE TO COMPLETE
DRESS: SCRUBS (BEHAVIORAL HEALTH);INDEPENDENT
HYGIENE/GROOMING: INDEPENDENT

## 2019-12-05 NOTE — PLAN OF CARE
Problem: OT General Care Plan  Goal: OT Goal 1  Description  Pt will engage in group activities to increase socially appropriate behaviors and improve concentration. Pt will participate in goal directed tasks to enhance planning and problem solving skills.     Pt attended OT clinic group, was able to initiate task and ask for help as needed. Pt demonstrated good planning, task focus, and problem solving. Appeared comfortable interacting with peers.   Outcome: Improving

## 2019-12-05 NOTE — PROGRESS NOTES
"[facility], [place]   Psychiatric Progress Note  Hospital Day: 30        Interim History:     The patient's care was discussed with the treatment team during the daily team meeting and/or staff's chart notes were reviewed. Patient denied SI/SIB, AH/VH, anxiety, or paranoia but did identify low level depression 3/10 reporting due to trauma in his life this level is common and manageable. Patient accepting of HS medications with no stated or observed side effects. Patient independent with ADL's and expressing needs. Friendly and talkative with staff.     Upon interview, patient reports what a positive experience he has had on station 12 with regards to providers, staff, and groups. He said that he has complex PTSD, not schizophrenia, though did not report any MH symptoms. He said that his mood is \"great.\" He would like to be discharged ASAP, and was receptive of recommendation to pursue IRTS after lengthy discussion as to why this is the recommendation. Denies side effects. Denies AH, VH, paranoia. Denies SI/HI. Denies acute medical concerns.          Medications:       multivitamin, therapeutic  1 tablet Oral Daily     risperiDONE  2 mg Sublingual BID    Or     OLANZapine  10 mg Intramuscular BID     vitamin B complex with vitamin C  1 tablet Oral Daily     cholecalciferol  50 mcg Oral Daily          Allergies:     Allergies   Allergen Reactions     Benztropine      Other reaction(s): Seizures  Patient reported  Patient reported       Abilify Discmelt      Suicidal thoughts       Aripiprazole Other (See Comments)     Other reaction(s): Unknown  Suicidal thoughts  Suicidal thoughts       Benzyl Alcohol      Other reaction(s): Unknown     Compazine Other (See Comments)     Seizure      Haloperidol Other (See Comments)     Other reaction(s): Dystonia  Patient had EPS with haldol  Patient had EPS with haldol  Patient had EPS with haldol       Phenothiazines Other (See Comments)     Other " reaction(s): Dystonia, Muscle Aches/Weakness, Muscle Cramps, Seizures  Seizures  Patient report  Seizures            Labs:   No results found for this or any previous visit (from the past 24 hour(s)).       Psychiatric Examination:     /82   Pulse 90   Temp 97.5  F (36.4  C) (Oral)   Resp 16   Wt 76.7 kg (169 lb)   SpO2 97%   BMI 27.28 kg/m    Weight is 169 lbs 0 oz  Body mass index is 27.28 kg/m .    Weight over time:  Vitals:    11/07/19 0700 11/09/19 0845 11/19/19 0830 11/30/19 0700   Weight: 78.2 kg (172 lb 8 oz) 78 kg (172 lb) 77.6 kg (171 lb) 78 kg (172 lb)    12/01/19 0800   Weight: 76.7 kg (169 lb)       Orthostatic Vitals     None          Cardiometabolic risk assessment. 11/06/19      Reviewed patient profile for cardiometabolic risk factors    Date taken /Value  REFERENCE RANGE   Abdominal Obesity  (Waist Circumference)   See nursing flowsheet Women ?35 in (88 cm)   Men ?40 in (102 cm)      Triglycerides  Triglycerides   Date Value Ref Range Status   09/26/2017 284 (H) <150 mg/dL Final     Comment:     Borderline high:  150-199 mg/dl  High:             200-499 mg/dl  Very high:       >499 mg/dl         ?150 mg/dL (1.7 mmol/L) or current treatment for elevated triglycerides   HDL cholesterol  HDL Cholesterol   Date Value Ref Range Status   09/26/2017 38 (L) >39 mg/dL Final   ]   Women <50 mg/dL (1.3 mmol/L) in women or current treatment for low HDL cholesterol  Men <40 mg/dL (1 mmol/L) in men or current treatment for low HDL cholesterol     Fasting plasma glucose (FPG) Lab Results   Component Value Date     11/02/2019      FPG ?100 mg/dL (5.6 mmol/L) or treatment for elevated blood glucose   Blood pressure  BP Readings from Last 3 Encounters:   12/04/19 124/82   11/04/19 121/74   12/06/17 105/72    Blood pressure ?130/85 mmHg or treatment for elevated blood pressure   Family History  See family history     Appearance: awake, alert, adequately groomed and dressed in hospital  "scrubs  Attitude:  Pleasant, friendly and co-operative today  Eye Contact:  Fair, does appear intense at time but this appears his baseline now  Mood:  \"better\"  Affect: Normal range and reactivity mostly with being exaggerated at times when describing certain topics  Speech:  Clear and coherent  Language: fluent and intact in English  Psychomotor, Gait, Musculoskeletal:  no evidence of tardive dyskinesia, dystonia, or tics  Thought Process: Some circumstantiality  Associations:  no loose associations  Thought Content:  Denies active SI, HI. No overt symptoms of psychosis noted.   Insight:  partial  Judgement:  limited  Oriented to:  time, person, and place  Attention Span and Concentration:  fair  Recent and Remote Memory:  intact  Fund of Knowledge:  appropriate    Clinical Global Impressions  First:  Considering your total clinical experience with this particular patient population, how severe are the patient's symptoms at this time?: 7 (11/05/19 1325)  Compared to the patient's condition at the START of treatment, this patient's condition is:: 4 (11/05/19 1325)  Most recent:  Considering your total clinical experience with this particular patient population, how severe are the patient's symptoms at this time?: 6 (12/02/19)  Compared to the patient's condition at the START of treatment, this patient's condition is:: 3 (12/02/19)           Precautions:     Behavioral Orders   Procedures     Assault precautions     Code 1 - Restrict to Unit     Elopement precautions     Routine Programming     As clinically indicated     Status 15     Every 15 minutes.          Diagnoses:     # Bipolar I Disorder, Manic with psychotic features  # Methamphetamine Use Disorder  # Cannabis Use Disorder          Assessment & Plan:     Assessment and hospital summary:  This patient is a 44 year old male with previous diagnoses of Bipolar I Disorder, anxiety disorder-unspecified, PTSD, ADHD, Methamphetamine use disorder, and cannabis use " disorder who presented to ED after calling EMS on himself because his feet hurt after walking around the mall barefoot all day.  Patient has had numerous previous hospitalizations for similar symptoms and has prior MICD commitments.  MSE on admission was notable for delusional thought content, suicidal ideation, and homicidal ideation with no insight.  Patient's presentation is most consistent with diagnoses of Bipolar Disorder, manic with psychosis, methamphetamine use disorder, and cannabis use disorder.  Given patient's lack of insight and capacity to consent for treatment 72 hour hold was initiated and MICD petition was filed with [facility].  Though the admission, he was observed to exhibit beliefs that he was a  medicine man, had healed people from multiple illnesses, had persecutory delusions about FDA and psychiatry, with other grandiose and persecutory delusions, was hyperverbal and tangential at times, with affect being irritable at times, though major behavioral outbursts. He was also concerned about sexual side-effects from antipsychotics and did not want to start any medications other than discussing treatment for anxiety and therapy. After court proceedings (patient had declined meeting with , sent a letter instead with delusional themes and content such as  having to do the right thing if he valued his life), he was committed with [facility]. Risperidone was started based on past good response, with Olanzapine IM as [facility] back up.  Medication started 11/19/19. Patient has been taking scheduled medication due to not wanting IM. He did not initially report any side-effects with Risperidone, however recently stating he is experiencing sexual side effects. Mood appeared to be less irritable, with decrease in pressured speech, tangentiality and delusional content. Patient transferred to [facility] 11/21/19 to be on a less restrictive unit, he requested for lorazepam to be switched to  clonazepam. Per chart review patient has had history of clonazepam abuse and plan would be to taper patient off of benzodiazepines prior to discharge, given this, will continue to lorazepam at this time. Planned to titrate up Risperdal slowly and then initiated Consta prior to discharge.  Given patient's intrusiveness and targeting behaviors with other patients in the milieu, patient was transferred back to station 12 on 11/25. After transfer to station 12, he was noted to be compliant with scheduled medications. He required redirection several times for talking to other peers about their treatment and placements. Manic behavior was observed in unit.  Risperidone was increased to 4 mg/day with no observed adverse effects and reduction in manic behavior.      Target psychiatric symptoms and interventions:  No medication changes today  #Psychosis, delusions: Committed with [facility]usto.   -Increased risperidone on 11/27/19 to 2 mg BID with noted improvement.    # Anxiety: Ativan 1-2mg TID PRN for anxiety     Medical Problems and Treatments:  # Multiple wounds in various stage of healing over body, patient declined Bacitracin, will monitor for signs of infection     #Pt reported L hip pain on 11/22, ordered lidocaine Patch daily PRN    Legal:  Committed with Frausto    Safety:  - Continue precautions as noted above  - Status 15 minute checks    Disposition:  Discharge pending further stabilization. Likely to IRTS or other structured treatment setting.       Patient has been seen, case notes and results reviewed by me.     Maggi Varner MD  [facility] Psychiatry

## 2019-12-05 NOTE — PROGRESS NOTES
"Pt required strong direction and had strong self-advocacy.  He enjoyed personal cultural explorations that included body-based interventions.  This did help contain the verbal interruptions and fast-paced, but long verbalizations.  He responded well to respectful/gentle, but direct prompts.  He actually stated this is helpful for him due to \"adult-onset ADHD.\"  Pt was able to join synchronous movements with prompts, otherwise, closed his eyes and became more internally-focused.         12/05/19 1330   Dance Movement Therapy   Type of Intervention structured groups   Response participates with cues/redirection   Hours 1         "

## 2019-12-05 NOTE — PROGRESS NOTES
12/05/19 1325   Behavioral Health   Hallucinations denies / not responding to hallucinations   Thinking poor concentration;distractable   Orientation person: oriented;place: oriented   Memory baseline memory   Insight poor   Judgement impaired   Eye Contact at examiner   Affect full range affect   Mood grandiose;anxious   Physical Appearance/Attire appears stated age;attire appropriate to age and situation;neat   Hygiene well groomed   Suicidality other (see comments)  (none noted)   1. Wish to be Dead (Recent)   (none noted)   2. Non-Specific Active Suicidal Thoughts (Recent)   (none noted)   Self Injury other (see comment)  (none noted)   Elopement   (none noted)   Activity restless   Speech clear;coherent   Medication Sensitivity no stated side effects;no observed side effects   Psychomotor / Gait balanced;steady   Overt Aggression Scale   Verbal Aggression 0   Aggression against Property 0   Auto-Aggression 0   Physical Aggression 0   Overt Aggression Total Score 0   Activities of Daily Living   Hygiene/Grooming independent   Oral Hygiene independent   Dress scrubs (behavioral health);independent   Laundry unable to complete   Room Organization independent     Pt was present in the milieu throughout the day, watching television and spending time on the phone. Pt was friendly and talkative with staff and attended group. No behavioral issues.

## 2019-12-05 NOTE — PROGRESS NOTES
12/4/19 1400    Art Therapy   Type of Intervention structured groups   Response participates with encouragement   Hours 1   Treatment Detail    (Art Therapy)- marco/ brush pens   Goal- cope, express, regulate and reflect through Art Therapy directives     Outcome- Pt participated and made art using many spiritual symbols he likes from a book he is reading about Shamanism and magic. He really enjoyed the brush pens, which were a new medium to him. He liked selecting music and being social with small group.

## 2019-12-05 NOTE — PROGRESS NOTES
This writer spoke with Stephy from [facility] [phone].  Provided updated info and faxed additional court paperwork to them for their chart.  They have him on the list and will continue to update us as he moves up their list.

## 2019-12-05 NOTE — PROGRESS NOTES
"Behavioral Health  Note    Behavioral Health  Spirituality Group Note    [address]    Name: Tyler Case YOB: 1975   MRN: [id number] Age: 44 year old      Patient attended -led group, which included discussion of spirituality, coping with illness and building resilience.    Patient attended group for 1.0 hrs.    The patient actively participated in group discussion and patient demonstrated an appreciation of topic's application for their personal circumstances. Sha was the only participant in group, but did participate in the activity of \"cognitive-restructuring\" via associating symbolic objects with healthy memories of gratitude and peace. He picked a feather from the group of objects and spent 45 minutes associating this object with his spiritual journey, and accepting redirection when his thoughts turned to the traumatic/negative/angry/painful. He agreed that it was a good goal to (at times) be able to practice focusing on healthy things without letting our thoughts wander back to more negative territory. \"This [focusing practice] is hard for me because of my ADHD, but I think it's good.\"      Topic/Focus of today's spirituality group: Spiritual Practices for Recovery: Noticing Peace  IMR tie-in: Wellness Strategies    Of note in our conversation:     Sha shared that he was having issues with the fact that the hospital system does not understand that \"I am a grandmother spirit. So while I identify as male and like male pronouns, my actual gender is female. I did not want males helping me undress when I got here. It made me uncomfortable.\" I encouraged Sha to note in how many ways he identifies as multiple identities (race, spirituality, gender), and that a hospital/health care system like this is still not designed to carry all that nuance. So he will want to consider how to stay spiritually centered in himself (versus getting triggered into anger/reactivity) and " "focus on what he needs to 'translate' from his self-understanding so that he can request specifics about the type of care that will help him. I clarified that this does not mean that the system can always accommodate the requests, and so he will need to make key choices about which issues to ask about, and where to advocate, and in what healthy communication patterns. He agreed that while he believes he is \"very good\" at translating his experience and communicating, he does see \"that I get angry, and they people come at me and restrain me, and that just re-traumatizes me, and causes more anger for next time...\" So he would like to break that cycle by \"gaining even more skills in translation and staying calm and reaching out to the Spirits for help.\" He also took to heart the encouragement to look into specific counseling/support for his trauma so that it doesn't \"just go at everyone all this time and wreck all my relationships. I've lost tons of friends and spiritual teachers like that.\"    Sha also noted that the Great Spirit prayer I gave him talks about boundaries (care around empathy) which has been a helpful reminder of a key learning for him in the last year, and that he has \"reached out\" to Zena Joshua (attributed author) on the spiritual plane for spiritual guidance in the last few days, and last night Zena Joshua \"visited me in a dream and helped me mediate a conversation I had been needing to have for 10 years with a prior girlfriend.\" He noted that it was a helpful and cathartic dream to have a healthy and non-angry conversation he had pictured in his head many times, and that it was a \"milestone\" in his life.    Finally, he noted that about 8 days ago he gave himself a \"pep talk\" to remind himself to change his attitude and lean toward a more peaceful way of communication. He feels that this has been a turning point for him, and that it had improved his mental well-being as well as his experience on the " unit.      Gina Garcias MDiv, BCC  Lead , Adult Mental Health and Addiction  Pager [phone]

## 2019-12-05 NOTE — PLAN OF CARE
Patient visible in the milieu majority of the evening talking on the phone and attending group. Patient calm and social upon approach full range in affect. Patient reports overall good mood indicating he is working on his goal to improve overall self affirmation and acceptance with current hospitalization and events. Patient denied SI/SIB, AH/VH, anxiety, or paranoia but did identify low level depression 3/10 reporting due to trauma in his life this level is common and manageable. Patient accepting of HS medications with no stated or observed side effects. Patient independent with ADL's and expressing needs.

## 2019-12-06 PROCEDURE — 12400001 ZZH R&B MH UMMC

## 2019-12-06 PROCEDURE — 25000132 ZZH RX MED GY IP 250 OP 250 PS 637: Performed by: STUDENT IN AN ORGANIZED HEALTH CARE EDUCATION/TRAINING PROGRAM

## 2019-12-06 PROCEDURE — 99232 SBSQ HOSP IP/OBS MODERATE 35: CPT | Mod: GC | Performed by: PSYCHIATRY & NEUROLOGY

## 2019-12-06 PROCEDURE — 25000132 ZZH RX MED GY IP 250 OP 250 PS 637: Performed by: PSYCHIATRY & NEUROLOGY

## 2019-12-06 RX ADMIN — B-COMPLEX W/ C & FOLIC ACID TAB 1 TABLET: TAB at 07:52

## 2019-12-06 RX ADMIN — NICOTINE POLACRILEX 4 MG: 2 GUM, CHEWING BUCCAL at 19:55

## 2019-12-06 RX ADMIN — MELATONIN 50 MCG: at 07:53

## 2019-12-06 RX ADMIN — RISPERIDONE 2 MG: 2 TABLET, ORALLY DISINTEGRATING ORAL at 07:52

## 2019-12-06 RX ADMIN — THERA TABS 1 TABLET: TAB at 07:53

## 2019-12-06 RX ADMIN — RISPERIDONE 2 MG: 2 TABLET, ORALLY DISINTEGRATING ORAL at 21:57

## 2019-12-06 NOTE — PROGRESS NOTES
"[facility], [place]   Psychiatric Progress Note  Hospital Day: 31        Interim History:     The patient's care was discussed with the treatment team during the daily team meeting and/or staff's chart notes were reviewed. Patient denied SI/SIB, AH/VH, anxietyor psychotic symptoms. Has been attending groups as an active participant, is redirectable when talking about trauma and has found aspects of music therapy helpful for his \"adult adhd\". He has extensive discussions and found spiritual health meetings helpful. Of note has identified as a Grandmother spirit and being female gender in certain (?spiritual) domains. Patient accepting of HS medications with no stated or observed side effects. Patient independent with ADL's and expressing needs. Friendly and talkative with staff.     Upon interview, he reports he is doing much better. He says his symptoms are mild now. He says he has not had any bad anxiety attacks. He reports anxiety around meeting with  yesterday but that it was more positive especially when thinking about post hospital plans. He realizes he gets amped up at times and needs to balance that. He says he is tolerating medications and denies side effects. Denies AH, VH, paranoia. Denies SI/HI. Denies acute medical concerns.   He says he had a great meeting with  Jett Hernandez yesterday, who is also . Jett has been empathetic to his condition and Tyler really trusts him. Tyler talks, when asked, about writing in a journal (instead of sharing thoughts to people around who may not understand or get triggered), which writer has asked him to do. He says he had difficulty with writing with the current pencils on unit. He says he wants to write a letter to his son Giuseppe (son named after Giuseppe Babin who's music he loves). We also talk about [email or web address] where he has acoustic folk music tracks recorded with his brothers (Seferino " on rodriguez, Burton on Drjacey and friend Dajuan on Robyn) in [facility] in 2008. He says that was around the time he had broken up with ex-wife who he eventually would get back with for some time again.          Medications:       multivitamin, therapeutic  1 tablet Oral Daily     risperiDONE  2 mg Sublingual BID    Or     OLANZapine  10 mg Intramuscular BID     vitamin B complex with vitamin C  1 tablet Oral Daily     cholecalciferol  50 mcg Oral Daily          Allergies:     Allergies   Allergen Reactions     Benztropine      Other reaction(s): Seizures  Patient reported  Patient reported       Abilify Discmelt      Suicidal thoughts       Aripiprazole Other (See Comments)     Other reaction(s): Unknown  Suicidal thoughts  Suicidal thoughts       Benzyl Alcohol      Other reaction(s): Unknown     Compazine Other (See Comments)     Seizure      Haloperidol Other (See Comments)     Other reaction(s): Dystonia  Patient had EPS with haldol  Patient had EPS with haldol  Patient had EPS with haldol       Phenothiazines Other (See Comments)     Other reaction(s): Dystonia, Muscle Aches/Weakness, Muscle Cramps, Seizures  Seizures  Patient report  Seizures            Labs:   No results found for this or any previous visit (from the past 24 hour(s)).       Psychiatric Examination:     /56   Pulse 64   Temp 99.2  F (37.3  C) (Oral)   Resp 16   Wt 76.7 kg (169 lb)   SpO2 97%   BMI 27.28 kg/m    Weight is 169 lbs 0 oz  Body mass index is 27.28 kg/m .    Weight over time:  Vitals:    11/07/19 0700 11/09/19 0845 11/19/19 0830 11/30/19 0700   Weight: 78.2 kg (172 lb 8 oz) 78 kg (172 lb) 77.6 kg (171 lb) 78 kg (172 lb)    12/01/19 0800   Weight: 76.7 kg (169 lb)       Orthostatic Vitals     None          Cardiometabolic risk assessment. 11/06/19      Reviewed patient profile for cardiometabolic risk factors    Date taken /Value  REFERENCE RANGE   Abdominal Obesity  (Waist Circumference)   See nursing flowsheet Women ?35  "in (88 cm)   Men ?40 in (102 cm)      Triglycerides  Triglycerides   Date Value Ref Range Status   09/26/2017 284 (H) <150 mg/dL Final     Comment:     Borderline high:  150-199 mg/dl  High:             200-499 mg/dl  Very high:       >499 mg/dl         ?150 mg/dL (1.7 mmol/L) or current treatment for elevated triglycerides   HDL cholesterol  HDL Cholesterol   Date Value Ref Range Status   09/26/2017 38 (L) >39 mg/dL Final   ]   Women <50 mg/dL (1.3 mmol/L) in women or current treatment for low HDL cholesterol  Men <40 mg/dL (1 mmol/L) in men or current treatment for low HDL cholesterol     Fasting plasma glucose (FPG) Lab Results   Component Value Date     11/02/2019      FPG ?100 mg/dL (5.6 mmol/L) or treatment for elevated blood glucose   Blood pressure  BP Readings from Last 3 Encounters:   12/05/19 108/56   11/04/19 121/74   12/06/17 105/72    Blood pressure ?130/85 mmHg or treatment for elevated blood pressure   Family History  See family history     Appearance: awake, alert, adequately groomed and dressed in hospital scrubs  Attitude:  Pleasant, friendly and co-operative today  Eye Contact:  Fair, does appear intense at time but this appears his baseline now  Mood:  \"better\"  Affect: Normal range and reactivity mostly with being slightly exaggerated at times when describing certain topics, more controlled  Speech:  Clear and coherent  Language: fluent and intact in English  Psychomotor, Gait, Musculoskeletal:  no evidence of tardive dyskinesia, dystonia, or tics  Thought Process: Appears much less circumstantial and tangential  Associations:  no loose associations  Thought Content:  Denies active SI, HI. No overt symptoms of psychosis noted.   Insight:  partial  Judgement:  limited  Oriented to:  time, person, and place  Attention Span and Concentration:  fair  Recent and Remote Memory:  intact  Fund of Knowledge:  appropriate    Clinical Global Impressions  First:  Considering your total clinical " experience with this particular patient population, how severe are the patient's symptoms at this time?: 7 (11/05/19 1325)  Compared to the patient's condition at the START of treatment, this patient's condition is:: 4 (11/05/19 1325)  Most recent:  Considering your total clinical experience with this particular patient population, how severe are the patient's symptoms at this time?: 6 (12/02/19)  Compared to the patient's condition at the START of treatment, this patient's condition is:: 3 (12/02/19)           Precautions:     Behavioral Orders   Procedures     Assault precautions     Code 1 - Restrict to Unit     Elopement precautions     Routine Programming     As clinically indicated     Status 15     Every 15 minutes.          Diagnoses:     # Bipolar I Disorder, Manic with psychotic features  # Methamphetamine Use Disorder  # Cannabis Use Disorder          Assessment & Plan:     Assessment and hospital summary:  This patient is a 44 year old male with previous diagnoses of Bipolar I Disorder, anxiety disorder-unspecified, PTSD, ADHD, Methamphetamine use disorder, and cannabis use disorder who presented to ED after calling EMS on himself because his feet hurt after walking around the mall barefoot all day.  Patient has had numerous previous hospitalizations for similar symptoms and has prior MICD commitments.  MSE on admission was notable for delusional thought content, suicidal ideation, and homicidal ideation with no insight.  Patient's presentation is most consistent with diagnoses of Bipolar Disorder, manic with psychosis, methamphetamine use disorder, and cannabis use disorder.  Given patient's lack of insight and capacity to consent for treatment 72 hour hold was initiated and MICD petition was filed with [facility].  Though the admission, he was observed to exhibit beliefs that he was a  medicine man, had healed people from multiple illnesses, had persecutory delusions about FDA and  psychiatry, with other grandiose and persecutory delusions, was hyperverbal and tangential at times, with affect being irritable at times, though major behavioral outbursts. He was also concerned about sexual side-effects from antipsychotics and did not want to start any medications other than discussing treatment for anxiety and therapy. After court proceedings (patient had declined meeting with , sent a letter instead with delusional themes and content such as  having to do the right thing if he valued his life), he was committed with Frausto. Risperidone was started based on past good response, with Olanzapine IM as [facility] back up.  Medication started 11/19/19. Patient has been taking scheduled medication due to not wanting IM. He did not initially report any side-effects with Risperidone, however recently stating he is experiencing sexual side effects. Mood appeared to be less irritable, with decrease in pressured speech, tangentiality and delusional content. Patient transferred to [facility] 11/21/19 to be on a less restrictive unit, he requested for lorazepam to be switched to clonazepam. Per chart review patient has had history of clonazepam abuse and plan would be to taper patient off of benzodiazepines prior to discharge, given this, will continue to lorazepam at this time. Planned to titrate up Risperdal slowly and then initiated Consta prior to discharge.  Given patient's intrusiveness and targeting behaviors with other patients in the milieu, patient was transferred back to station 12 on 11/25. After transfer to station 12, he was noted to be compliant with scheduled medications. He required redirection several times for talking to other peers about their treatment and placements. Manic behavior was observed in unit.  Risperidone was increased to 4 mg/day with no observed adverse effects and reduction in denisha and psychosis.      Target psychiatric symptoms and interventions:  No medication changes  today  #Psychosis, delusions: Committed with [facility].   -Increased risperidone on 11/27/19 to 2 mg BID with noted improvement.    # Anxiety: Ativan 1-2mg TID PRN for anxiety     Medical Problems and Treatments:  # Multiple wounds in various stage of healing over body, patient declined Bacitracin, will monitor for signs of infection     #Pt reported L hip pain on 11/22, ordered lidocaine Patch daily PRN    Legal:  Committed with [facility]    Safety:  - Continue precautions as noted above  - Status 15 minute checks    Disposition:  Discharge pending further stabilization. Likely to IRTS or other structured treatment setting.       Patient has been seen and discussed with attending.    DENISE BALDERRAMA MD  PGY 2 resident

## 2019-12-06 NOTE — PROGRESS NOTES
"Pt was in the milieu.  At the beginning of the shift he met with his county .  Pt said, \"We had a very good talk + he understands me + where I'm coming from.\"  Pt also said, \"I have a new doctor in training following me with my Dr.  He is a real cool brittney.  He respects me a a fellow medical professional + respects me as a medicine man.\"  Pt expects to be d/carol \"in a week or so.  All they have to do now is find an ERT's or apartment for me,\"  Pt denies anx, dep, SI, SIB.     "

## 2019-12-06 NOTE — PLAN OF CARE
Patient spending majority of free time in room.  Out for breakfast and morning medications.  Took scheduled medications cooperatively. He came out again for lunch and stayed out for a brief period reading the newspaper. He continues to make statements that sound grandiose such as stating he is a medicine man and a doctor. He has limited insight into his mental health issues.   Pt had visit from a member of his ACT team which patient said went well.  Pt denies SI/HI and SIB.  He is hoping for discharge soon.

## 2019-12-07 PROCEDURE — 25000132 ZZH RX MED GY IP 250 OP 250 PS 637: Performed by: STUDENT IN AN ORGANIZED HEALTH CARE EDUCATION/TRAINING PROGRAM

## 2019-12-07 PROCEDURE — 12400001 ZZH R&B MH UMMC

## 2019-12-07 RX ADMIN — RISPERIDONE 2 MG: 2 TABLET, ORALLY DISINTEGRATING ORAL at 19:42

## 2019-12-07 RX ADMIN — B-COMPLEX W/ C & FOLIC ACID TAB 1 TABLET: TAB at 09:19

## 2019-12-07 RX ADMIN — MELATONIN 50 MCG: at 09:19

## 2019-12-07 RX ADMIN — RISPERIDONE 2 MG: 2 TABLET, ORALLY DISINTEGRATING ORAL at 09:19

## 2019-12-07 RX ADMIN — THERA TABS 1 TABLET: TAB at 09:19

## 2019-12-07 ASSESSMENT — ACTIVITIES OF DAILY LIVING (ADL)
DRESS: SCRUBS (BEHAVIORAL HEALTH);INDEPENDENT
HYGIENE/GROOMING: INDEPENDENT
HYGIENE/GROOMING: INDEPENDENT
DRESS: INDEPENDENT;SCRUBS (BEHAVIORAL HEALTH)
ORAL_HYGIENE: INDEPENDENT
ORAL_HYGIENE: INDEPENDENT
LAUNDRY: UNABLE TO COMPLETE
LAUNDRY: UNABLE TO COMPLETE

## 2019-12-07 NOTE — PROGRESS NOTES
Pt took all medications without issue. Discussed at length his frustrations with the current situation including hospitalization, rodarte'd medications and having to go to an IRTS at discharge. Pt's frustrations were generally reasonable but he continues to lack significant insight into the severity and nature of his illness. Pt calmed some following the discussion and appeared pleased that someone had listened. Continues to report that he really likes working with the resident MD despite his frustrations.

## 2019-12-07 NOTE — PROGRESS NOTES
"   12/07/19 1317   Behavioral Health   Hallucinations denies / not responding to hallucinations   Thinking delusional   Orientation person: oriented;date: oriented;place: oriented   Memory baseline memory   Insight poor   Judgement impaired   Eye Contact at examiner   Affect full range affect   Mood mood is calm   Physical Appearance/Attire attire appropriate to age and situation   Hygiene well groomed   Suicidality other (see comments)  (none stated or observed)   1. Wish to be Dead (Recent) No   2. Non-Specific Active Suicidal Thoughts (Recent) No   Self Injury other (see comment)  (none stated or observed)   Elopement Statements about wanting to leave   Activity other (see comment);isolative  (visible in milieu, very talkative on phone)   Speech clear;coherent;rambling;flight of ideas   Medication Sensitivity no stated side effects;no observed side effects   Psychomotor / Gait balanced;steady   Activities of Daily Living   Hygiene/Grooming independent   Oral Hygiene independent   Dress independent;scrubs (behavioral health)   Laundry unable to complete   Room Organization independent     Pt was visible in milieu. Pt was very animate when speaking on the phone. Pt spent about an hour on the phone in pod 1. Pt's speech seemed to cycle frequently between various topics. Pt could be heard saying \"in the emergency room I told them that my name was Quid, so they said I was delusional.\" Pt complained that his old nurse practitioner would not give him medication for his panic attacks. Pt's insight into his mental illness was poor. Pt spent most of the shift watching TV, Pt did not really engage with peers or staff that much during the shift.   "

## 2019-12-07 NOTE — PROGRESS NOTES
Pt was visible in the milieu throughout the evening, social with peers. Pt reported frustration about being unable to smoke while in the hospital, and the lack of activities and groups available. Pt spent an extended about of time on the phone rambling about various random subjects. Pt was otherwise calm and cooperative, no issues this evening.        12/06/19 8687   Behavioral Health   Hallucinations denies / not responding to hallucinations   Thinking delusional   Orientation person: oriented;date: oriented;place: oriented   Memory baseline memory   Insight poor   Judgement impaired   Eye Contact at examiner   Affect full range affect   Mood mood is calm   Physical Appearance/Attire attire appropriate to age and situation   Hygiene well groomed   Suicidality other (see comments)  (denies)   1. Wish to be Dead (Recent) No   2. Non-Specific Active Suicidal Thoughts (Recent) No   Self Injury other (see comment)  (denies)   Elopement Statements about wanting to leave   Activity other (see comment)  (visible in milieu)   Speech coherent;clear;rambling   Medication Sensitivity no stated side effects;no observed side effects   Psychomotor / Gait balanced;steady   Activities of Daily Living   Hygiene/Grooming independent   Oral Hygiene independent   Dress independent;scrubs (behavioral health)   Room Organization independent

## 2019-12-08 PROCEDURE — H2032 ACTIVITY THERAPY, PER 15 MIN: HCPCS

## 2019-12-08 PROCEDURE — 25000132 ZZH RX MED GY IP 250 OP 250 PS 637: Performed by: STUDENT IN AN ORGANIZED HEALTH CARE EDUCATION/TRAINING PROGRAM

## 2019-12-08 PROCEDURE — 25000132 ZZH RX MED GY IP 250 OP 250 PS 637: Performed by: PSYCHIATRY & NEUROLOGY

## 2019-12-08 PROCEDURE — 12400001 ZZH R&B MH UMMC

## 2019-12-08 RX ADMIN — ACETAMINOPHEN 650 MG: 325 TABLET, FILM COATED ORAL at 19:14

## 2019-12-08 RX ADMIN — B-COMPLEX W/ C & FOLIC ACID TAB 1 TABLET: TAB at 08:45

## 2019-12-08 RX ADMIN — RISPERIDONE 2 MG: 2 TABLET, ORALLY DISINTEGRATING ORAL at 08:44

## 2019-12-08 RX ADMIN — MELATONIN 50 MCG: at 08:44

## 2019-12-08 RX ADMIN — THERA TABS 1 TABLET: TAB at 08:44

## 2019-12-08 RX ADMIN — RISPERIDONE 2 MG: 2 TABLET, ORALLY DISINTEGRATING ORAL at 19:14

## 2019-12-08 ASSESSMENT — ACTIVITIES OF DAILY LIVING (ADL)
DRESS: SCRUBS (BEHAVIORAL HEALTH)
LAUNDRY: UNABLE TO COMPLETE
ORAL_HYGIENE: INDEPENDENT
HYGIENE/GROOMING: INDEPENDENT

## 2019-12-08 NOTE — PROGRESS NOTES
Pt kept mostly to himself today, withdrawn from peers and staff. Pt spent some time writing in his journal in the lounge, but otherwise isolative to his room. Pt is tense and guarded on approach, denies all mental health symptoms, stating the only thing that could help him is 'getting out of this place and smoking a cigarette'.        12/08/19 1526   Behavioral Health   Hallucinations denies / not responding to hallucinations   Thinking delusional;distractable   Orientation person: oriented;place: oriented;date: oriented;time: oriented   Memory baseline memory   Insight poor   Judgement impaired   Eye Contact at examiner   Affect tense   Mood irritable   Physical Appearance/Attire disheveled   Hygiene neglected grooming - unclean body, hair, teeth   Suicidality other (see comments)  (denies)   2. Non-Specific Active Suicidal Thoughts (Recent) No   3. Active Sucidal Ideation with any Methods (Not Plan) Without Intent to Act (Recent) No   Self Injury other (see comment)  (denies)   Elopement   (none)   Activity isolative;withdrawn   Speech clear;coherent   Psychomotor / Gait balanced;steady   Psycho Education   Type of Intervention 1:1 intervention   Response participates with encouragement   Hours 0.5   Treatment Detail check in   Activities of Daily Living   Hygiene/Grooming independent   Oral Hygiene independent   Dress scrubs (behavioral health)   Laundry unable to complete   Room Organization independent

## 2019-12-08 NOTE — PLAN OF CARE
Pt was calm and pleasant this evening. He was visible in the milieu social with staff and peers. Pt was agitated while talking with the resident doctor this evening. Pt appeared to be improving with his psychotic symptoms as he is less manic compared to when admitted but still makes a delusional statement. [facility] with peers. Pt took scheduled medication without any issue and denied SE's. Pt denied any pain or discomfort. He denies SI/SIB. Hygiene maintenance remains poor.

## 2019-12-09 PROCEDURE — 12400001 ZZH R&B MH UMMC

## 2019-12-09 PROCEDURE — 25000132 ZZH RX MED GY IP 250 OP 250 PS 637: Performed by: PSYCHIATRY & NEUROLOGY

## 2019-12-09 PROCEDURE — 25000132 ZZH RX MED GY IP 250 OP 250 PS 637: Performed by: STUDENT IN AN ORGANIZED HEALTH CARE EDUCATION/TRAINING PROGRAM

## 2019-12-09 PROCEDURE — 99231 SBSQ HOSP IP/OBS SF/LOW 25: CPT | Mod: GC | Performed by: PSYCHIATRY & NEUROLOGY

## 2019-12-09 RX ADMIN — ACETAMINOPHEN 650 MG: 325 TABLET, FILM COATED ORAL at 10:30

## 2019-12-09 RX ADMIN — THERA TABS 1 TABLET: TAB at 08:30

## 2019-12-09 RX ADMIN — LORAZEPAM 2 MG: 1 TABLET ORAL at 20:39

## 2019-12-09 RX ADMIN — MELATONIN 50 MCG: at 08:30

## 2019-12-09 RX ADMIN — B-COMPLEX W/ C & FOLIC ACID TAB 1 TABLET: TAB at 08:30

## 2019-12-09 RX ADMIN — RISPERIDONE 2 MG: 2 TABLET, ORALLY DISINTEGRATING ORAL at 08:30

## 2019-12-09 RX ADMIN — RISPERIDONE 2 MG: 2 TABLET, ORALLY DISINTEGRATING ORAL at 19:20

## 2019-12-09 ASSESSMENT — ACTIVITIES OF DAILY LIVING (ADL)
ORAL_HYGIENE: INDEPENDENT
DRESS: SCRUBS (BEHAVIORAL HEALTH);INDEPENDENT
HYGIENE/GROOMING: HANDWASHING
LAUNDRY: WITH SUPERVISION
HYGIENE/GROOMING: INDEPENDENT
DRESS: SCRUBS (BEHAVIORAL HEALTH)
ORAL_HYGIENE: INDEPENDENT
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
## 2019-12-09 NOTE — PROGRESS NOTES
Participated in Music Therapy group with focus on mood elevation, validation and decreasing anxiety and improved group cohesiveness. Engaged and cooperative in music listening interventions.   Showed progress in session goals.     Sang along with some of the songs, was moved to do some freestyle after peer started the groove (which Sha stated he has been feeling like he has not been able to access in awhile).    His affect brightened significantly throughout group and he was highly engaged and respectful of the group process.

## 2019-12-09 NOTE — PLAN OF CARE
Pt remains delusional, paranoid, and irritable. He spent most of this evening in the lounge watching TV with peers and participated in music therapy. Affects was tense, guarded, and angry. Pt reported frustration about his stay in the hospital and the inability to go off the unit to smoke. Pt became paranoid and agitated about his HS Risperidone.  I told the doctor to take me off this medication. I only take cannabis, meth, ativan, and Adderall, which work for me   Pt took Risperidone with encouragement. It appears pt maybe cheeking his medication as he refused mouth check and wanted some privacy after taking his HS medication. Please, discuss in team meeting to change Risperidone to liquid form.

## 2019-12-09 NOTE — PLAN OF CARE
"SBAR    S = Situation:   Mostly isolative to self, but was observed watching television in the milieu. Patient agreed to have a one on one conversation with this writer. He laid in bed with his eyes closed and expressed his ongoing frustration and anger with his situation. Reported feeling, \"More irritable, angry, and pissed off! \" He stated that he felt that his needs were not met during this hospitalization and that his rights were violated through forced medications and inability to have a say in his discharge planning. Stated that he needed more holistic approach to his treatment.   He disagreed with his current treatment plan and discharge plan for IR facility. Stated that he would rather go live with his friends and that he was not impressed with the IRTS facility where he lived earlier this year. Stated, \"They control everything you do and take all of your money.\" Denies MH symptoms. No overt delusional statements. Stated, \" Well according to your facility here and everyone that works here all my thoughts are delusional. \"  He added,\" You only come in here to collect more information that you can put in my chart so you can give me more meds.\" Reported cognitive slowing as a side effect of his medication. Stated, \"I can't think or concentrate on these meds. I feel angry. These meds are giving me brain damage and retinal problems! \"  He added, \" I need therapy for my PTSD, not the meds. All I get here for therapy are coloringgroups with colored crayons! What kind of therapy it that? \" Affect blunted, tense,  and of restricted range. Patient denied thoughts of suicide, self -harm, or passive thoughts of dying or not being alive. Denies homicidal thinking. He was compliant with his scheduled medication. This writer did not see any evidence that he was attempting to cheek his medication. He also returned to [facility] after administration. Sleep remains variable and most likely inadequate with 5-4 hours recorded in " the past 48 hours.   B  = Background:   Patient is committed and jarvised     Vital Signs: /72   Pulse 106   Temp 97.8  F (36.6  C) (Tympanic)   Resp 16   Wt 76.7 kg (169 lb)   SpO2 98%   BMI 27.28 kg/m     Assault precautions   Elopement precautions  A  =  Assessment:   Irritable, denies SI, Poor sleep, medication compliant, disagrees with discharge planning.   R =   Request or Recommendation:   Continue with current level of observation. Refer to case manger notes for discharge planing and recommendations. Continue with current treatment plan and recommendations. Continue to monitor and reassess symptoms. Monitor adherence and response to medications. Monitor progress towards treatment goals. Encourage groups and participation.

## 2019-12-10 PROCEDURE — 99231 SBSQ HOSP IP/OBS SF/LOW 25: CPT | Mod: GC | Performed by: PSYCHIATRY & NEUROLOGY

## 2019-12-10 PROCEDURE — 25000132 ZZH RX MED GY IP 250 OP 250 PS 637: Performed by: STUDENT IN AN ORGANIZED HEALTH CARE EDUCATION/TRAINING PROGRAM

## 2019-12-10 PROCEDURE — 12400001 ZZH R&B MH UMMC

## 2019-12-10 RX ADMIN — B-COMPLEX W/ C & FOLIC ACID TAB 1 TABLET: TAB at 08:46

## 2019-12-10 RX ADMIN — RISPERIDONE 2 MG: 2 TABLET, ORALLY DISINTEGRATING ORAL at 21:55

## 2019-12-10 RX ADMIN — RISPERIDONE 2 MG: 2 TABLET, ORALLY DISINTEGRATING ORAL at 08:46

## 2019-12-10 RX ADMIN — MELATONIN 50 MCG: at 08:46

## 2019-12-10 RX ADMIN — THERA TABS 1 TABLET: TAB at 08:46

## 2019-12-10 ASSESSMENT — ACTIVITIES OF DAILY LIVING (ADL)
HYGIENE/GROOMING: INDEPENDENT
DRESS: SCRUBS (BEHAVIORAL HEALTH);INDEPENDENT
ORAL_HYGIENE: INDEPENDENT

## 2019-12-10 NOTE — PROGRESS NOTES
12/10/19 1400   Behavioral Health   Hallucinations denies / not responding to hallucinations   Thinking intact   Orientation person: oriented;place: oriented;date: oriented   Memory baseline memory   Insight insight appropriate to situation;insight appropriate to events   Judgement impaired   Affect blunted, flat   Mood mood is calm   Hygiene well groomed   Suicidality   (denies)     Pt had had a good shift,had all his meals and was visible in milieu. Pt was very slow and had a flat mood most of the time,pt was seen sleeping in the lounge during shift and also goes off balance most times if he wants to stand up from a chair. No si,sib concerns observed by writer.

## 2019-12-10 NOTE — PROGRESS NOTES
This writer made an additional IRTS referral for this pt.  Was told [facility] may have openings.  Faxed referral.

## 2019-12-10 NOTE — PROGRESS NOTES
Pt was withdrawn today. Watched tv out in the lounge for part of the evening, but kept to himself. Denies all mental health symptoms. Took a shower. Denies SI/SIB/HI. Denies AVH.          12/09/19 2100   Behavioral Health   Hallucinations denies / not responding to hallucinations   Thinking delusional;paranoid   Orientation person: oriented;place: oriented   Memory baseline memory   Insight poor   Judgement impaired   Eye Contact at examiner   Affect blunted, flat   Mood mood is calm   Physical Appearance/Attire appears stated age   Hygiene well groomed   Suicidality other (see comments)  (None)   1. Wish to be Dead (Recent) No   2. Non-Specific Active Suicidal Thoughts (Recent) No   Activity withdrawn   Speech clear;coherent   Medication Sensitivity no stated side effects;no observed side effects   Psychomotor / Gait balanced;steady   Activities of Daily Living   Hygiene/Grooming independent   Oral Hygiene independent   Dress scrubs (behavioral health)   Room Organization independent

## 2019-12-10 NOTE — PROGRESS NOTES
[facility], [place]   Psychiatric Progress Note  Hospital Day: 35        Interim History:     The patient's care was discussed with the treatment team during the daily team meeting and/or staff's chart notes were reviewed. Patient denied SI/SIB, AH/VH, anxietyor psychotic symptoms. He has been sleeping around 5 hrs and has been independent in ADLs. He has been less present in milieu, keeps to himself and appears more withdrawn. He has expressed frustration to staff about treatment plan and hospitalization. No evidence of cheeking when staff administer medications.      Upon interview, he appeared more withdrawn than previous days. He says he had a panic attack last night. He reported that it came out of nowhere and had increasing anxiety and felt shaky and had palpitations. We discussed how he had brought up family and several distressing past events over the last few days. He reported that he lorazepam helped with the panic attack. He says that's the only thing that has helped. We discussed benzodiazepine use and he stated again that he knows of the risks and that he is aware of how often he uses the medication and does not use it frequently. He also spoke about his frustration with being in the hospital and not being able to go to a house. We discussed again the need for a safe disposition plan, working with [facility] and ACT team and his current lack of definite residential options. Brought up also the transitional subsidy that ACT team had mentioned, but he stated that he had some subsidy now too. He had reported how Arianna his  had been trying but knows it takes a long time (with felony charge and unpaid rental dues). He explained about how he gets frustrated and is trying to control his behavior. He says again that things he says are true. 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 He describes his years of cannabis use, research  and experience with the substance and also reading up on and experience with psychology and counseling and spirituality and using that with the weed he supplies to people to help them. He says he is struggling still with his family and had tried re-unification but always gets into arguments with parents. He says he has some communication with Mom but not as much with Dad.          Medications:       multivitamin, therapeutic  1 tablet Oral Daily     risperiDONE  2 mg Sublingual BID    Or     OLANZapine  10 mg Intramuscular BID     vitamin B complex with vitamin C  1 tablet Oral Daily     cholecalciferol  50 mcg Oral Daily          Allergies:     Allergies   Allergen Reactions     Benztropine      Other reaction(s): Seizures  Patient reported  Patient reported       Abilify Discmelt      Suicidal thoughts       Aripiprazole Other (See Comments)     Other reaction(s): Unknown  Suicidal thoughts  Suicidal thoughts       Benzyl Alcohol      Other reaction(s): Unknown     Compazine Other (See Comments)     Seizure      Haloperidol Other (See Comments)     Other reaction(s): Dystonia  Patient had EPS with haldol  Patient had EPS with haldol  Patient had EPS with haldol       Phenothiazines Other (See Comments)     Other reaction(s): Dystonia, Muscle Aches/Weakness, Muscle Cramps, Seizures  Seizures  Patient report  Seizures            Labs:   No results found for this or any previous visit (from the past 24 hour(s)).       Psychiatric Examination:     BP 96/67   Pulse 80   Temp 98.5  F (36.9  C) (Oral)   Resp 16   Wt 76.7 kg (169 lb)   SpO2 98%   BMI 27.28 kg/m    Weight is 169 lbs 0 oz  Body mass index is 27.28 kg/m .    Weight over time:  Vitals:    11/07/19 0700 11/09/19 0845 11/19/19 0830 11/30/19 0700   Weight: 78.2 kg (172 lb 8 oz) 78 kg (172 lb) 77.6 kg (171 lb) 78 kg (172 lb)    12/01/19 0800   Weight: 76.7 kg (169 lb)       Orthostatic Vitals     None          Cardiometabolic risk assessment.  "11/06/19      Reviewed patient profile for cardiometabolic risk factors    Date taken /Value  REFERENCE RANGE   Abdominal Obesity  (Waist Circumference)   See nursing flowsheet Women ?35 in (88 cm)   Men ?40 in (102 cm)      Triglycerides  Triglycerides   Date Value Ref Range Status   09/26/2017 284 (H) <150 mg/dL Final     Comment:     Borderline high:  150-199 mg/dl  High:             200-499 mg/dl  Very high:       >499 mg/dl         ?150 mg/dL (1.7 mmol/L) or current treatment for elevated triglycerides   HDL cholesterol  HDL Cholesterol   Date Value Ref Range Status   09/26/2017 38 (L) >39 mg/dL Final   ]   Women <50 mg/dL (1.3 mmol/L) in women or current treatment for low HDL cholesterol  Men <40 mg/dL (1 mmol/L) in men or current treatment for low HDL cholesterol     Fasting plasma glucose (FPG) Lab Results   Component Value Date     11/02/2019      FPG ?100 mg/dL (5.6 mmol/L) or treatment for elevated blood glucose   Blood pressure  BP Readings from Last 3 Encounters:   12/09/19 96/67   11/04/19 121/74   12/06/17 105/72    Blood pressure ?130/85 mmHg or treatment for elevated blood pressure   Family History  See family history     Appearance: awake, alert, adequately groomed and dressed in hospital scrubs  Attitude:  Calm and co-operative today  Eye Contact:  Fair, does appear intense at time but this appears his baseline now  Mood:  \"better\" - frustrated  Affect: Normal range and reactivity - appears dysphoric at times but appropriate  Speech:  Clear and coherent  Language: fluent and intact in English  Psychomotor, Gait, Musculoskeletal:  no evidence of tardive dyskinesia, dystonia, or tics  Thought Process: Linear and logical  Associations:  no loose associations  Thought Content:  Denies active SI, HI. No overt symptoms of psychosis noted.   Insight:  limited  Judgement:  limited  Oriented to:  time, person, and place  Attention Span and Concentration:  fair  Recent and Remote Memory:  " intact  Fund of Knowledge:  appropriate    Clinical Global Impressions  First:  Considering your total clinical experience with this particular patient population, how severe are the patient's symptoms at this time?: 7 (11/05/19 1325)  Compared to the patient's condition at the START of treatment, this patient's condition is:: 4 (11/05/19 1325)  Most recent:  Considering your total clinical experience with this particular patient population, how severe are the patient's symptoms at this time?: 6 (12/02/19)  Compared to the patient's condition at the START of treatment, this patient's condition is:: 3 (12/02/19)           Precautions:     Behavioral Orders   Procedures     Assault precautions     Code 1 - Restrict to Unit     Elopement precautions     Routine Programming     As clinically indicated     Status 15     Every 15 minutes.          Diagnoses:     # Bipolar Disorder, Gwen with psychosis  # Complex PTSD  # Mixed personality traits  # Methamphetamine Use Disorder  # Cannabis Use Disorder          Assessment & Plan:     Assessment and hospital summary:  This patient is a 44 year old male with previous diagnoses of Bipolar I Disorder, anxiety disorder-unspecified, PTSD, ADHD, Methamphetamine use disorder, and cannabis use disorder who presented to ED after calling EMS on himself because his feet hurt after walking around the mall barefoot all day.  Patient has had numerous previous hospitalizations for similar symptoms and has prior MICD commitments.  MSE on admission was notable for delusional thought content, suicidal ideation, and homicidal ideation with no insight.  Patient's presentation is most consistent with diagnoses of Bipolar Disorder, manic with psychosis, methamphetamine use disorder, and cannabis use disorder.  Given patient's lack of insight and capacity to consent for treatment 72 hour hold was initiated and MICD petition was filed with [facility].  Though the admission, he was observed to  exhibit beliefs that he was a  medicine man, had healed people from multiple illnesses, had persecutory delusions about FDA and psychiatry, with other grandiose and persecutory delusions, was hyperverbal and tangential at times, with affect being irritable at times, though major behavioral outbursts. He was also concerned about sexual side-effects from antipsychotics and did not want to start any medications other than discussing treatment for anxiety and therapy. After court proceedings (patient had declined meeting with , sent a letter instead with delusional themes and content such as  having to do the right thing if he valued his life), he was committed with [facility]. Risperidone was started based on past good response, with Olanzapine IM as [facility] back up.  Medication started 11/19/19. Patient has been taking scheduled medication due to not wanting IM. He did not initially report any side-effects with Risperidone, however recently stating he is experiencing sexual side effects. Mood appeared to be less irritable, with decrease in pressured speech, tangentiality and delusional content. Patient transferred to [facility] 11/21/19 to be on a less restrictive unit, he requested for lorazepam to be switched to clonazepam. Per chart review patient has had history of clonazepam abuse and plan would be to taper patient off of benzodiazepines prior to discharge, given this, will continue to lorazepam at this time. Planned to titrate up Risperdal slowly and then initiated Consta prior to discharge.  Given patient's intrusiveness and targeting behaviors with other patients in the milieu, patient was transferred back to station 12 on 11/25. After transfer to station 12, he was noted to be compliant with scheduled medications. He required redirection several times for talking to other peers about their treatment and placements. Manic behavior was observed in unit.  Risperidone was increased to 4 mg/day  with no observed adverse effects and reduction in denisha and psychosis. Through this period of patient being more co-operative and building rapport with team, he continued to elaborate on perceived experiences of past trauma and also extensive substance use. Behavioral observation, while on 4 mg Risperidone did not show behavior consistent with denisha and no sleep disturbances. Even at that time, he continued to elaborate on several beliefs which appeared odd, bizarre and tangential though on further enquiry had context and some of his statements were based in reality and cumulative sum of his experiences. Discussions with outpatient team also suggested a baseline state similar to current presentation on the unit, suggesting a more ingrained impact of underlying trauma, life experiences, developed personality traits and significant psychoactive substance use on patient's condition.    Personality traits that were more evident with additional collateral information, chart review, serial interviews with patient and both self-report and observational data suggested prominent narcissistic traits with pervasive patterns of grandiosity beginning in early adulthood, grandiose sense of self-importance with what could be perceived as exaggerated achievements and talents and being superior, preoccupation with success and brilliance, belief of being special and needing special care, responsiveness to admiration, behaviors that have been haughty and disdainful, sensitive to criticism, interpersonal relationships affected by problems related to entitlement (mom rep payee, doesn't help out with more money, calls him a drug addict), impaired vocational functioning which is very low (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 Sensitivity to perceived trauma does also appear aligned to this  pattern seen with narcissistic PDs. He has identified his father as being Narcissistic and when discussing some of these features agrees to and identifies some features in himself while reporting that he is an empath. Empathy in a multidimensional spectrum with presentation in a hyper sensitive and perceptive individual does make fixing lack of empathy criteria more complex. In addition, features of anankism (need to right wrongs, seeking global peace, standing up for rights and helping people who have been wronged) also contribute to mixed personality traits. Persisting grandiosity in the absence of pervasive mood symptoms, biological disturbances or functional impairments suggested to us narcissistic traits rather than ongoing denisha or hypomania at this time when on medication, though initial presentation does suggest an elevation above baseline suggestive of mood disorder and psychosis.     There was unclear indication that patient would benefit from further medication changes and he continued to be be keen on minimizing use of psychotropics. Outpatient team agreed on this plan for now, with follow up in community to monitor for psychopathology requiring medication adjustments. Chemical dependence interventions continue to be important part of his recovery and Motivational Enhancement Techniques were continued with patient mostly being pre-contemplation and at the best receptive for harm reduction techniques. He agrees to discuss this further with outpatient providers as well.      Target psychiatric symptoms and interventions:  No medication changes today  #Psychosis, delusions: Committed with [facility].   -Increased risperidone on 11/27/19 to 2 mg BID with noted improvement.    # Anxiety: Ativan 1-2mg TID PRN for anxiety     Medical Problems and Treatments:  # Multiple wounds in various stage of healing over body, patient declined Bacitracin, will monitor for signs of infection     #Pt reported L hip pain on  11/22, ordered lidocaine Patch daily PRN    Legal:  Committed with [facility]    Safety:  - Continue precautions as noted above  - Status 15 minute checks    Disposition:  Discharge pending further stabilization. Likely to IRTS or other structured treatment setting.       Patient has been seen and discussed with attending.    DENISE BALDERRAMA MD  PGY 2 resident

## 2019-12-11 PROCEDURE — H2032 ACTIVITY THERAPY, PER 15 MIN: HCPCS

## 2019-12-11 PROCEDURE — 12400001 ZZH R&B MH UMMC

## 2019-12-11 PROCEDURE — 25000132 ZZH RX MED GY IP 250 OP 250 PS 637: Performed by: STUDENT IN AN ORGANIZED HEALTH CARE EDUCATION/TRAINING PROGRAM

## 2019-12-11 RX ADMIN — RISPERIDONE 2 MG: 2 TABLET, ORALLY DISINTEGRATING ORAL at 08:50

## 2019-12-11 RX ADMIN — RISPERIDONE 2 MG: 2 TABLET, ORALLY DISINTEGRATING ORAL at 19:29

## 2019-12-11 RX ADMIN — MELATONIN 50 MCG: at 08:50

## 2019-12-11 RX ADMIN — THERA TABS 1 TABLET: TAB at 08:50

## 2019-12-11 RX ADMIN — B-COMPLEX W/ C & FOLIC ACID TAB 1 TABLET: TAB at 08:50

## 2019-12-11 ASSESSMENT — ACTIVITIES OF DAILY LIVING (ADL)
HYGIENE/GROOMING: INDEPENDENT
DRESS: SCRUBS (BEHAVIORAL HEALTH)
LAUNDRY: UNABLE TO COMPLETE
ORAL_HYGIENE: INDEPENDENT

## 2019-12-11 NOTE — PLAN OF CARE
Patient presents as calm, pleasant, and cooperative on approach.  He did have some brief episodes where he appeared extremely tense and anxious while reporting that he feels  extremely stressed out! .  These episodes occurred when patient was focusing on past traumatic events, family dynamics, and his overall mistrust of the mental health system.  He remains intrusive at times and can be easily overheard loudly brandishing his grievances in the lounge, appearing oblivious to the impact his negative statements were making on his peers.  His speech is less pressured and tangential when compared to his presentation at admission, but he still tends to lead the discussion.  He has been c/o erectile dysfunction, which he attributes to the Risperdal.  No other adverse side effects have been observed by RN writer or reported by the patient himself.  He was medication compliant this evening with no evidence of  cheeking  medication.  He denies any acute physical concerns.  VSS.

## 2019-12-11 NOTE — PROGRESS NOTES
"Pt believes his mental illness(denisha, delusions, psychosis is completely symptomatic of \"complex ptsd\". He believes that his prescribed psychotropic medications are a \"complete scam to make money\". Pt endorsed frustration and anger in regards to being hospitalized. Pt labile today. At some points calm, at some points excessively verbose and tangential.  "
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
## 2019-12-11 NOTE — PROGRESS NOTES
This writer spoke to Madhu Scott from [facility].  He is planning to come to the unit to interview Tyler tomorrow for their opening at their IRTS.

## 2019-12-12 PROCEDURE — 25000132 ZZH RX MED GY IP 250 OP 250 PS 637: Performed by: STUDENT IN AN ORGANIZED HEALTH CARE EDUCATION/TRAINING PROGRAM

## 2019-12-12 PROCEDURE — 99231 SBSQ HOSP IP/OBS SF/LOW 25: CPT | Mod: GC | Performed by: PSYCHIATRY & NEUROLOGY

## 2019-12-12 PROCEDURE — 12400001 ZZH R&B MH UMMC

## 2019-12-12 RX ADMIN — RISPERIDONE 2 MG: 2 TABLET, ORALLY DISINTEGRATING ORAL at 20:49

## 2019-12-12 RX ADMIN — RISPERIDONE 2 MG: 2 TABLET, ORALLY DISINTEGRATING ORAL at 07:59

## 2019-12-12 RX ADMIN — MELATONIN 50 MCG: at 07:59

## 2019-12-12 RX ADMIN — B-COMPLEX W/ C & FOLIC ACID TAB 1 TABLET: TAB at 07:59

## 2019-12-12 RX ADMIN — THERA TABS 1 TABLET: TAB at 07:59

## 2019-12-12 ASSESSMENT — ACTIVITIES OF DAILY LIVING (ADL)
HYGIENE/GROOMING: INDEPENDENT
LAUNDRY: UNABLE TO COMPLETE
DRESS: SCRUBS (BEHAVIORAL HEALTH)
ORAL_HYGIENE: INDEPENDENT
DRESS: SCRUBS (BEHAVIORAL HEALTH)
ORAL_HYGIENE: INDEPENDENT
HYGIENE/GROOMING: INDEPENDENT

## 2019-12-12 NOTE — PROGRESS NOTES
"[facility], [place]   Psychiatric Progress Note  Hospital Day: 37        Interim History:     The patient's care was discussed with the treatment team during the daily team meeting and/or staff's chart notes were reviewed. Tyler has been overall calm and cooperative, with occasional moments of increased emotional intensity. Has been adherent with medications. He denied SI/SIB, AH/VH, anxiety or psychotic symptoms. Slept 5.75 hrs overnight and has been independent in ADLs. He has been less present in milieu, keeps to himself and appears more withdrawn. He has expressed frustration to staff about treatment plan and hospitalization, including the fact that risperdal gives him erectile dysfunction. No evidence of cheeking when staff administer medications.      Upon interview, he was sitting on his bed, watching TV. He appeared less bright with a more irritable demeanor today. He finished his letter to his son and is still upset about being in the hospital. He knows he will most likely be discharging to an IRTS, and he feels he does not need this. 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 Attempted to provide some insight that meth may have negative effects on his mental health, but he insisted that he only uses it at \"medicinal doses\" so it has only helped with his ability to concentrate on tasks. He reported his UDS was positive for meth on admission as he had \"two hits a couple days before, but that's not enough to do anything to me.\" He stated there is no way that meth had any effect on his behavior which resulted in his hospitalization.    He was also informed that he has an " "interview with the IRTS, and he reported that being \"good news\" because he no longer wants to be hospitalized.         Medications:       multivitamin, therapeutic  1 tablet Oral Daily     risperiDONE  2 mg Sublingual BID    Or     OLANZapine  10 mg Intramuscular BID     vitamin B complex with vitamin C  1 tablet Oral Daily     cholecalciferol  50 mcg Oral Daily          Allergies:     Allergies   Allergen Reactions     Benztropine      Other reaction(s): Seizures  Patient reported  Patient reported       Abilify Discmelt      Suicidal thoughts       Aripiprazole Other (See Comments)     Other reaction(s): Unknown  Suicidal thoughts  Suicidal thoughts       Benzyl Alcohol      Other reaction(s): Unknown     Compazine Other (See Comments)     Seizure      Haloperidol Other (See Comments)     Other reaction(s): Dystonia  Patient had EPS with haldol  Patient had EPS with haldol  Patient had EPS with haldol       Phenothiazines Other (See Comments)     Other reaction(s): Dystonia, Muscle Aches/Weakness, Muscle Cramps, Seizures  Seizures  Patient report  Seizures            Labs:   No results found for this or any previous visit (from the past 24 hour(s)).       Psychiatric Examination:     /66 (BP Location: Left arm)   Pulse 71   Temp 98  F (36.7  C) (Oral)   Resp 16   Wt 80.3 kg (177 lb)   SpO2 94%   BMI 28.57 kg/m    Weight is 177 lbs 0 oz  Body mass index is 28.57 kg/m .    Weight over time:  Vitals:    11/07/19 0700 11/09/19 0845 11/19/19 0830 11/30/19 0700   Weight: 78.2 kg (172 lb 8 oz) 78 kg (172 lb) 77.6 kg (171 lb) 78 kg (172 lb)    12/01/19 0800 12/12/19 0800   Weight: 76.7 kg (169 lb) 80.3 kg (177 lb)       Orthostatic Vitals     None          Cardiometabolic risk assessment. 11/06/19      Reviewed patient profile for cardiometabolic risk factors    Date taken /Value  REFERENCE RANGE   Abdominal Obesity  (Waist Circumference)   See nursing flowsheet Women ?35 in (88 cm)   Men ?40 in (102 cm)    " "  Triglycerides  Triglycerides   Date Value Ref Range Status   09/26/2017 284 (H) <150 mg/dL Final     Comment:     Borderline high:  150-199 mg/dl  High:             200-499 mg/dl  Very high:       >499 mg/dl         ?150 mg/dL (1.7 mmol/L) or current treatment for elevated triglycerides   HDL cholesterol  HDL Cholesterol   Date Value Ref Range Status   09/26/2017 38 (L) >39 mg/dL Final   ]   Women <50 mg/dL (1.3 mmol/L) in women or current treatment for low HDL cholesterol  Men <40 mg/dL (1 mmol/L) in men or current treatment for low HDL cholesterol     Fasting plasma glucose (FPG) Lab Results   Component Value Date     11/02/2019      FPG ?100 mg/dL (5.6 mmol/L) or treatment for elevated blood glucose   Blood pressure  BP Readings from Last 3 Encounters:   12/12/19 104/66   11/04/19 121/74   12/06/17 105/72    Blood pressure ?130/85 mmHg or treatment for elevated blood pressure   Family History  See family history     Appearance: awake, alert, adequately groomed and dressed in hospital scrubs  Attitude:  Calm and co-operative today  Eye Contact: Intense, does appear intense at time but this appears his baseline now  Mood:  \"fine\"  Affect: Irritable. Not fullymood congruent  Speech: Slightly pressured. Clear and coherent  Language: fluent and intact in English  Psychomotor, Gait, Musculoskeletal:  no evidence of tardive dyskinesia, dystonia, or tics  Thought Process: Linear and generally logical. Did discuss how methamphetamine has no negative effects on his mental state  Associations:  no loose associations  Thought Content:  Denies active SI, HI. No overt symptoms of psychosis noted.   Insight:  limited  Judgement:  limited  Oriented to:  time, person, and place  Attention Span and Concentration:  fair  Recent and Remote Memory:  intact  Fund of Knowledge:  Appropriate to above-average, per his lexicon    Clinical Global Impressions  First:  Considering your total clinical experience with this particular " patient population, how severe are the patient's symptoms at this time?: 7 (11/05/19 1325)  Compared to the patient's condition at the START of treatment, this patient's condition is:: 4 (11/05/19 1325)  Most recent:  Considering your total clinical experience with this particular patient population, how severe are the patient's symptoms at this time?: 6 (12/02/19)  Compared to the patient's condition at the START of treatment, this patient's condition is:: 3 (12/02/19)           Precautions:     Behavioral Orders   Procedures     Assault precautions     Code 1 - Restrict to Unit     Elopement precautions     Routine Programming     As clinically indicated     Status 15     Every 15 minutes.          Diagnoses:     # Bipolar Disorder, Gwen with psychosis  # Complex PTSD  # Mixed personality traits  # Methamphetamine Use Disorder  # Cannabis Use Disorder          Assessment & Plan:     Assessment and hospital summary:  This patient is a 44 year old male with previous diagnoses of Bipolar I Disorder, anxiety disorder-unspecified, PTSD, ADHD, Methamphetamine use disorder, and cannabis use disorder who presented to ED after calling EMS on himself because his feet hurt after walking around the mall barefoot all day. Patient has had numerous previous hospitalizations for similar symptoms and has prior MICD commitments. MSE on admission was notable for delusional thought content, suicidal ideation, and homicidal ideation with no insight. Patient's presentation is most consistent with diagnoses of Bipolar Disorder, manic with psychosis, methamphetamine use disorder, and cannabis use disorder. Given patient's lack of insight and capacity to consent for treatment 72 hour hold was initiated and MICD petition was filed with [facility].  Though the admission, he was observed to exhibit beliefs that he was a  medicine man, had healed people from multiple illnesses, had persecutory delusions about FDA and  psychiatry, with other grandiose and persecutory delusions, was hyperverbal and tangential at times, with affect being irritable at times, though major behavioral outbursts. He was also concerned about sexual side-effects from antipsychotics and did not want to start any medications other than discussing treatment for anxiety and therapy. After court proceedings (patient had declined meeting with , sent a letter instead with delusional themes and content such as  having to do the right thing if he valued his life), he was committed with Fra[facility]. Risperidone was started based on past good response, with Olanzapine IM as [facility] back up. Medication started 11/19/19. Patient has been taking scheduled medication due to not wanting IM. He did not initially report any side-effects with Risperidone, however recently stating he is experiencing sexual side effects. Mood appeared to be less irritable, with decrease in pressured speech, tangentiality and delusional content. Patient transferred to [facility] 11/21/19 to be on a less restrictive unit, he requested for lorazepam to be switched to clonazepam. Per chart review patient has had history of clonazepam abuse and plan would be to taper patient off of benzodiazepines prior to discharge, given this, will continue to lorazepam at this time. Planned to titrate up Risperdal slowly and then initiated Consta prior to discharge. Given patient's intrusiveness and targeting behaviors with other patients in the milieu, patient was transferred back to station 12 on 11/25. After transfer to station 12, he was noted to be compliant with scheduled medications. He required redirection several times for talking to other peers about their treatment and placements. Manic behavior was observed in unit.  Risperidone was increased to 4 mg/day with no observed adverse effects and reduction in denisha and psychosis. Through this period of patient being more co-operative and building rapport  with team, he continued to elaborate on perceived experiences of past trauma and also extensive substance use. Behavioral observation, while on 4 mg Risperidone did not show behavior consistent with denisha and no sleep disturbances. Even at that time, he continued to elaborate on several beliefs which appeared odd, bizarre and tangential though on further enquiry had context and some of his statements were based in reality and cumulative sum of his experiences. Discussions with outpatient team also suggested a baseline state similar to current presentation on the unit, suggesting a more ingrained impact of underlying trauma, life experiences, developed personality traits and significant psychoactive substance use on patient's condition.      Personality traits that were more evident with additional collateral information, chart review, serial interviews with patient and both self-report and observational data suggested prominent narcissistic traits with pervasive patterns of grandiosity beginning in early adulthood, grandiose sense of self-importance with what could be perceived as exaggerated achievements and talents and being superior, preoccupation with success and brilliance, belief of being special and needing special care, responsiveness to admiration, behaviors that have been haughty and disdainful, sensitive to criticism, interpersonal relationships affected by problems related to entitlement (mom rep payee, doesn't help out with more money, calls him a drug addict), impaired vocational functioning which is very low (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 Sensitivity to perceived trauma does also appear aligned to this pattern seen with narcissistic PDs. He has identified his father as being Narcissistic and when discussing some of these features agrees to and  identifies some features in himself while reporting that he is an empath. Empathy in a multidimensional spectrum with presentation in a hyper sensitive and perceptive individual does make fixing lack of empathy criteria more complex. In addition, features of anankism (need to right wrongs, seeking global peace, standing up for rights and helping people who have been wronged) also contribute to mixed personality traits. Persisting grandiosity in the absence of pervasive mood symptoms, biological disturbances or functional impairments suggested to us narcissistic traits rather than ongoing denisha or hypomania at this time when on medication, though initial presentation does suggest an elevation above baseline suggestive of mood disorder and psychosis.     There was unclear indication that patient would benefit from further medication changes and he continued to be be keen on minimizing use of psychotropics. Outpatient team agreed on this plan for now, with follow up in community to monitor for psychopathology requiring medication adjustments. Chemical dependence interventions continue to be important part of his recovery and Motivational Enhancement Techniques were continued with patient mostly being pre-contemplation and at the best receptive for harm reduction techniques. He agrees to discuss this further with outpatient providers as well. He continues to demonstrate poor insight into his cannabis and amphetamine use being an issue.      Target psychiatric symptoms and interventions:  No medication changes today  #Psychosis, delusions: Committed with [facility].   - Increased risperidone on 11/27/19 to 2 mg BID with noted improvement.    # Anxiety: Ativan 1-2mg TID PRN for anxiety     Medical Problems and Treatments:  # Multiple wounds in various stage of healing over body, patient declined Bacitracin, will monitor for signs of infection     #Pt reported L hip pain on 11/22, ordered lidocaine Patch daily  PRN    Legal:  Committed with [facility]    Safety:  - Continue precautions as noted above  - Status 15 minute checks    Disposition:  Discharge pending further stabilization. Likely to IRTS or other structured treatment setting.     Jacques Santana MD  Psychiatry Resident PGY-2

## 2019-12-12 NOTE — PROGRESS NOTES
Pt had a pleasant evening. He remains paranoid about his medication and mental health symptoms. No agitation/aggression this evening. He took scheduled medication without any issue.

## 2019-12-12 NOTE — PLAN OF CARE
"Pt has been visible in the milieu throughout the day, attending some of the groups. Affect tense. Appeared less anxious after speaking with the . Continues to state that he is \"not Bipolar,\" and that his primary problem is not being addressed or treated, which is complex PTSD. Reports that the only medications that truly help him are Cannabis oil, Ativan, and an amphetamine for ADHD. Reports frustration about his situation, stating that Psychiatry is pseudoscience and a fraud. He had no behavioral outbursts today, though he appeared more tense and agitated when talking about his situation/mental health court process. Compliant with scheduled medications. Reports that Risperdal has caused erectile dysfunction, but denies any other adverse effects. Will continue to monitor closely.  "

## 2019-12-12 NOTE — PROGRESS NOTES
12/11/19 2100   Art Therapy   Type of Intervention structured groups   Response participates with encouragement/ redirection   Hours .5   Treatment Detail   (Art Therapy- holiday decor)   Goal- cope, express, regulate and reflect through Art Therapy directives    Outcome- pt observed only. He needed redirection for some commentary abut being forced to take medications.  He also said his son is a five time felon but is now getting help.

## 2019-12-13 PROCEDURE — 12400001 ZZH R&B MH UMMC

## 2019-12-13 PROCEDURE — 25000132 ZZH RX MED GY IP 250 OP 250 PS 637: Performed by: STUDENT IN AN ORGANIZED HEALTH CARE EDUCATION/TRAINING PROGRAM

## 2019-12-13 PROCEDURE — 99231 SBSQ HOSP IP/OBS SF/LOW 25: CPT | Mod: GC | Performed by: PSYCHIATRY & NEUROLOGY

## 2019-12-13 RX ORDER — MULTIVITAMIN,THERAPEUTIC
1 TABLET ORAL DAILY
Qty: 30 TABLET | Refills: 1 | Status: SHIPPED | OUTPATIENT
Start: 2019-12-14 | End: 2020-04-20

## 2019-12-13 RX ORDER — LORAZEPAM 1 MG/1
1-2 TABLET ORAL 3 TIMES DAILY PRN
Qty: 30 TABLET | Refills: 0 | Status: SHIPPED | OUTPATIENT
Start: 2019-12-13 | End: 2019-12-13

## 2019-12-13 RX ORDER — RISPERIDONE 2 MG/1
2 TABLET, ORALLY DISINTEGRATING ORAL 2 TIMES DAILY
Qty: 60 TABLET | Refills: 0 | Status: SHIPPED | OUTPATIENT
Start: 2019-12-13 | End: 2020-04-20

## 2019-12-13 RX ORDER — CHOLECALCIFEROL (VITAMIN D3) 50 MCG
50 TABLET ORAL DAILY
Qty: 30 TABLET | Refills: 0 | Status: SHIPPED | OUTPATIENT
Start: 2019-12-14 | End: 2020-04-20

## 2019-12-13 RX ADMIN — RISPERIDONE 2 MG: 2 TABLET, ORALLY DISINTEGRATING ORAL at 08:44

## 2019-12-13 RX ADMIN — B-COMPLEX W/ C & FOLIC ACID TAB 1 TABLET: TAB at 08:44

## 2019-12-13 RX ADMIN — THERA TABS 1 TABLET: TAB at 08:44

## 2019-12-13 RX ADMIN — MELATONIN 50 MCG: at 08:43

## 2019-12-13 RX ADMIN — RISPERIDONE 2 MG: 2 TABLET, ORALLY DISINTEGRATING ORAL at 21:49

## 2019-12-13 ASSESSMENT — ACTIVITIES OF DAILY LIVING (ADL)
DRESS: SCRUBS (BEHAVIORAL HEALTH)
ORAL_HYGIENE: INDEPENDENT
ORAL_HYGIENE: INDEPENDENT
LAUNDRY: UNABLE TO COMPLETE
HYGIENE/GROOMING: INDEPENDENT
HYGIENE/GROOMING: HANDWASHING;INDEPENDENT
DRESS: SCRUBS (BEHAVIORAL HEALTH);INDEPENDENT

## 2019-12-13 NOTE — PROGRESS NOTES
Spoke to Madhu Gaines from [facility].  He is going to have Tyler admit into their program on Monday.  They would like him there at 11am.  He needs signed med orders and a 30 day supply of medication.  We will cab him there.

## 2019-12-13 NOTE — PROGRESS NOTES
"Pt appeared subdued today and remained isolative to room for much of shift. During check in, pt expressed he is feeling \"irritable\" today and is extremely frustrated to still be in the hospital. Pt ate dinner and does not appear to need assistance with ADLs. Denies SI/AH/VH.     12/12/19 2200   Behavioral Health   Hallucinations denies / not responding to hallucinations   Thinking delusional;paranoid   Orientation person: oriented;place: oriented;time: oriented;date: oriented   Memory baseline memory   Insight poor   Judgement impaired   Eye Contact at examiner   Affect blunted, flat;tense   Mood depressed;labile   Physical Appearance/Attire attire appropriate to age and situation   Hygiene well groomed   1. Wish to be Dead (Recent) No   2. Non-Specific Active Suicidal Thoughts (Recent) No   Activities of Daily Living   Hygiene/Grooming independent   Oral Hygiene independent   Dress scrubs (behavioral health)   Room Organization independent     "

## 2019-12-13 NOTE — PROGRESS NOTES
12/12/19 1400   Dance Movement Therapy   Type of Intervention structured groups   Response refuses

## 2019-12-13 NOTE — PROGRESS NOTES
Faxed signed provisional discharge to pt's CM Jett Hernandez.  Asked him to review, sign and fax back to me.  Provided update on when and where the pt will be going on Monday.

## 2019-12-13 NOTE — PROGRESS NOTES
No change with Tyler's presentation. He was visible in the milieu social with peers and participated in group. Pt was initially calm and pleasant and became agitated when this writer asked about his mental health symptoms. He denied all mental health issues and believed he did not belong here. When asked about the side effect of his medication, he stated,  I am forced to take that poison they call medication; you know it Huy that is a poison. They tried poisoning my body . Pt made lot of delusional comments during the conversation, and the interview was terminated due to agitation. He states frustration regarding his hospitalization and the inability to use cigarettes. He also complained of the lack of medical marijuana on a psych unit.

## 2019-12-13 NOTE — PROGRESS NOTES
"SPIRITUAL HEALTH SERVICES    [facility] ([facility] [address]      REFERRAL SOURCE: -initiated follow up/group invitation    Sha missed spirituality group this morning but was around on the unit and I offered to touch base briefly.    The main topic of our conversation was that Sha is feeling a lot of anger right now, and old wounds \"that I thought I had forgiven\" are coming back to his conciousness and making him angry as well. His coping skills for dealing with anger when not in the hospital are \"smoking tobacco and medicinal marijuana.\" He agreed that the uses of these substances were primarily blunting the edge of his anger rather than making it better. \"I can only stuff it for so long before it comes out.\" He was receptive to my feedback about how I experience him here at the hospital related to his anger, and he agreed that it was \"poisoning\" him to feel this way so often. He related his anger struggles back to his narration of his hospitalization which was that \"I was going to such a negative place after some days here that I had to talk myself out of it and start practicing my spirituality again. It helped a lot.\" (see my note from 12/5). While he is feeling more balanced now, the anger feelings are strong. I suggested that one spiritual practice could be that instead of trying to stuff his anger, asking his anger \"what are you trying to tell or teach me?\" and allowing himself space to pause and think versus react. I differentiated 'listening' to the stories we tell ourselves about why we are angry, but rather listening to the emotion itself and where it it starting from. He agreed this could be a good practice to slow himself down, and to bring his reflections to therapy.    We also briefly touched on the topic of boundaries and forgiveness, noting that some of his anger is toward people who have taken advantage of him in the past. I encouraged further reflection on healthy boundaries as a part of " "living a healthy spiritual life, and the importance of paying attention to not giving away all our care and help, as it breeds resentment. Sha agreed that he had a hard time with the topic of forgiveness, \"since that was huge in my grandma's [employer], and I think Church leans so heavily on that because of all the colonizing damage they did to indigenous people.\" Upon reflection, however, he agreed that his understanding of his indigenous tradition also heavily encourages forgiveness. We agreed that a first step is finding healthy ways to handle painful or negative emotions and release some of that energy first.    PLAN: [facility] remains available to Sha for the duration of hospitalization.                                                                                                                            Gina Garcias MDiv, [facility]  Lead , Adult Mental Health and Addiction  Pager [phone]    "

## 2019-12-13 NOTE — PROGRESS NOTES
"[facility], [place]   Psychiatric Progress Note  Hospital Day: 38        Interim History:     The patient's care was discussed with the treatment team during the daily team meeting and/or staff's chart notes were reviewed. Behaviorally, Tyler has been calm overall and cooperative, with an underlying persistent irritability. Has been adherent with medications, though he reports they make him \"retarded\". He denied SI/SIB, AH/VH, anxiety or psychotic symptoms. Slept without issue overnight and has been independent in ADLs. He has been less present in milieu, keeps to himself and appears more withdrawn. He has expressed frustration to staff about treatment plan and hospitalization, including the fact that risperdal gives him erectile dysfunction. No evidence of cheeking when staff administer medications.      Upon interview, he was sitting on his bed, watching TV. He appeared similar to yesterday, with a more irritable demeanor. He had his interview with an IRTS associate yesterday, and he still feels upset that he is deemed \"unable to care for myself by the courts and my doctors\". He acknowledged that this would be his plan for leaving the hospital, but he is not happy about it. He wants to go back to living on his own. When told the plan that he would be able to demonstrate an ability to care for himself for 6 months, he said \"I already know that. You keep reiterating the same things to me.\" He does not want to have anyone having authority over his life, and he wants to focus on the other aspects of his healing, in particular the cultural, spiritual and humanistic aspects, which he cannot address here in the hospital. He disagrees with any diagnosis of psychosis or denisha, believing all of his issues stem from complex PTSD. When told that neuroleptics do have some efficacy with CPTSD symptoms, he stated he disagreed with the use of any neuroleptics. He knows he will likely discharge to the " IRTS on Monday.         Medications:       multivitamin, therapeutic  1 tablet Oral Daily     risperiDONE  2 mg Sublingual BID    Or     OLANZapine  10 mg Intramuscular BID     vitamin B complex with vitamin C  1 tablet Oral Daily     cholecalciferol  50 mcg Oral Daily          Allergies:     Allergies   Allergen Reactions     Benztropine      Other reaction(s): Seizures  Patient reported  Patient reported       Abilify Discmelt      Suicidal thoughts       Aripiprazole Other (See Comments)     Other reaction(s): Unknown  Suicidal thoughts  Suicidal thoughts       Benzyl Alcohol      Other reaction(s): Unknown     Compazine Other (See Comments)     Seizure      Haloperidol Other (See Comments)     Other reaction(s): Dystonia  Patient had EPS with haldol  Patient had EPS with haldol  Patient had EPS with haldol       Phenothiazines Other (See Comments)     Other reaction(s): Dystonia, Muscle Aches/Weakness, Muscle Cramps, Seizures  Seizures  Patient report  Seizures            Labs:   No results found for this or any previous visit (from the past 24 hour(s)).       Psychiatric Examination:     /73   Pulse 73   Temp 98.3  F (36.8  C) (Tympanic)   Resp 16   Wt 80.3 kg (177 lb)   SpO2 96%   BMI 28.57 kg/m    Weight is 177 lbs 0 oz  Body mass index is 28.57 kg/m .    Weight over time:  Vitals:    11/07/19 0700 11/09/19 0845 11/19/19 0830 11/30/19 0700   Weight: 78.2 kg (172 lb 8 oz) 78 kg (172 lb) 77.6 kg (171 lb) 78 kg (172 lb)    12/01/19 0800 12/12/19 0800   Weight: 76.7 kg (169 lb) 80.3 kg (177 lb)       Orthostatic Vitals     None          Cardiometabolic risk assessment. 11/06/19      Reviewed patient profile for cardiometabolic risk factors    Date taken /Value  REFERENCE RANGE   Abdominal Obesity  (Waist Circumference)   See nursing flowsheet Women ?35 in (88 cm)   Men ?40 in (102 cm)      Triglycerides  Triglycerides   Date Value Ref Range Status   09/26/2017 284 (H) <150 mg/dL Final     Comment:      "Borderline high:  150-199 mg/dl  High:             200-499 mg/dl  Very high:       >499 mg/dl         ?150 mg/dL (1.7 mmol/L) or current treatment for elevated triglycerides   HDL cholesterol  HDL Cholesterol   Date Value Ref Range Status   09/26/2017 38 (L) >39 mg/dL Final   ]   Women <50 mg/dL (1.3 mmol/L) in women or current treatment for low HDL cholesterol  Men <40 mg/dL (1 mmol/L) in men or current treatment for low HDL cholesterol     Fasting plasma glucose (FPG) Lab Results   Component Value Date     11/02/2019      FPG ?100 mg/dL (5.6 mmol/L) or treatment for elevated blood glucose   Blood pressure  BP Readings from Last 3 Encounters:   12/12/19 121/73   11/04/19 121/74   12/06/17 105/72    Blood pressure ?130/85 mmHg or treatment for elevated blood pressure   Family History  See family history     Appearance: awake, alert, adequately groomed and dressed in hospital scrubs  Attitude: Irritable and slightly co-operative  Eye Contact: Intense, does appear intense at time but this appears his baseline now  Mood:  \"I'm irritated and frustrated\"  Affect: Irritable. Mood congruent  Speech: Slightly pressured. Clear and coherent  Language: fluent and intact in English  Psychomotor:  no evidence of tardive dyskinesia, dystonia, or tics  Thought Process: Linear and goal oriented  Associations:  no loose associations  Thought Content: Denies active SI, HI. No overt symptoms of psychosis noted.   Insight:  poor  Judgement:  limited  Oriented to:  time, person, and place  Attention Span and Concentration:  fair  Recent and Remote Memory:  intact  Fund of Knowledge:  Appropriate to above-average, per his lexicon    Clinical Global Impressions  First:  Considering your total clinical experience with this particular patient population, how severe are the patient's symptoms at this time?: 7 (11/05/19 1325)  Compared to the patient's condition at the START of treatment, this patient's condition is:: 4 (11/05/19 " 1475)  Most recent:  Considering your total clinical experience with this particular patient population, how severe are the patient's symptoms at this time?: 6 (12/02/19)  Compared to the patient's condition at the START of treatment, this patient's condition is:: 3 (12/02/19)           Precautions:     Behavioral Orders   Procedures     Assault precautions     Code 1 - Restrict to Unit     Elopement precautions     Routine Programming     As clinically indicated     Status 15     Every 15 minutes.          Diagnoses:     # Bipolar Disorder, Gwen with psychosis  # Complex PTSD  # Mixed personality traits  # Methamphetamine Use Disorder  # Cannabis Use Disorder          Assessment & Plan:     Assessment and hospital summary:  This patient is a 44 year old male with previous diagnoses of Bipolar I Disorder, anxiety disorder-unspecified, PTSD, ADHD, Methamphetamine use disorder, and cannabis use disorder who presented to ED after calling EMS on himself because his feet hurt after walking around the mall barefoot all day. Patient has had numerous previous hospitalizations for similar symptoms and has prior MICD commitments. MSE on admission was notable for delusional thought content, suicidal ideation, and homicidal ideation with no insight. Patient's presentation is most consistent with diagnoses of Bipolar Disorder, manic with psychosis, methamphetamine use disorder, and cannabis use disorder. Given patient's lack of insight and capacity to consent for treatment 72 hour hold was initiated and MICD petition was filed with [facility].  Though the admission, he was observed to exhibit beliefs that he was a  medicine man, had healed people from multiple illnesses, had persecutory delusions about FDA and psychiatry, with other grandiose and persecutory delusions, was hyperverbal and tangential at times, with affect being irritable at times, though major behavioral outbursts. He was also concerned about  sexual side-effects from antipsychotics and did not want to start any medications other than discussing treatment for anxiety and therapy. After court proceedings (patient had declined meeting with , sent a letter instead with delusional themes and content such as  having to do the right thing if he valued his life), he was committed with Frausto. Risperidone was started based on past good response, with Olanzapine IM as [facility] back up. Medication started 11/19/19. Patient has been taking scheduled medication due to not wanting IM. He did not initially report any side-effects with Risperidone, however recently stating he is experiencing sexual side effects. Mood appeared to be less irritable, with decrease in pressured speech, tangentiality and delusional content. Patient transferred to [facility] 11/21/19 to be on a less restrictive unit, he requested for lorazepam to be switched to clonazepam. Per chart review patient has had history of clonazepam abuse and plan would be to taper patient off of benzodiazepines prior to discharge, given this, will continue to lorazepam at this time. Planned to titrate up Risperdal slowly and then initiated Consta prior to discharge. Given patient's intrusiveness and targeting behaviors with other patients in the milieu, patient was transferred back to station 12 on 11/25. After transfer to station 12, he was noted to be compliant with scheduled medications. He required redirection several times for talking to other peers about their treatment and placements. Manic behavior was observed in unit.  Risperidone was increased to 4 mg/day with no observed adverse effects and reduction in denisha and psychosis. Through this period of patient being more co-operative and building rapport with team, he continued to elaborate on perceived experiences of past trauma and also extensive substance use. Behavioral observation, while on 4 mg Risperidone did not show behavior consistent with  denisha and no sleep disturbances. Even at that time, he continued to elaborate on several beliefs which appeared odd, bizarre and tangential though on further enquiry had context and some of his statements were based in reality and cumulative sum of his experiences. Discussions with outpatient team also suggested a baseline state similar to current presentation on the unit, suggesting a more ingrained impact of underlying trauma, life experiences, developed personality traits and significant psychoactive substance use on patient's condition.      Personality traits that were more evident with additional collateral information, chart review, serial interviews with patient and both self-report and observational data suggested prominent narcissistic traits with pervasive patterns of grandiosity beginning in early adulthood, grandiose sense of self-importance with what could be perceived as exaggerated achievements and talents and being superior, preoccupation with success and brilliance, belief of being special and needing special care, responsiveness to admiration, behaviors that have been haughty and disdainful, sensitive to criticism, interpersonal relationships affected by problems related to entitlement (mom rep payee, doesn't help out with more money, calls him a drug addict), impaired vocational functioning which is very low (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 Sensitivity to perceived trauma does also appear aligned to this pattern seen with narcissistic PDs. He has identified his father as being Narcissistic and when discussing some of these features agrees to and identifies some features in himself while reporting that he is an empath. Empathy in a multidimensional spectrum with presentation in a hyper sensitive and perceptive individual does make fixing lack  of empathy criteria more complex. In addition, features of anankism (need to right wrongs, seeking global peace, standing up for rights and helping people who have been wronged) also contribute to mixed personality traits. Persisting grandiosity in the absence of pervasive mood symptoms, biological disturbances or functional impairments suggested to us narcissistic traits rather than ongoing denisha or hypomania at this time when on medication, though initial presentation does suggest an elevation above baseline suggestive of mood disorder and psychosis.     There was unclear indication that patient would benefit from further medication changes and he continued to be be keen on minimizing use of psychotropics. Outpatient team agreed on this plan for now, with follow up in community to monitor for psychopathology requiring medication adjustments. Chemical dependence interventions continue to be important part of his recovery and Motivational Enhancement Techniques were continued with patient mostly being pre-contemplation and at the best receptive for harm reduction techniques. He agrees to discuss this further with outpatient providers as well. He continues to demonstrate poor insight into his cannabis and amphetamine use being an issue.     He has been accepted to [facility] facility and will discharge there with his current medication regimen on 12/16/19.     Target psychiatric symptoms and interventions:  No medication changes today  # Psychosis, delusions: Committed with [facility].   - Increased risperidone on 11/27/19 to 2 mg BID with noted improvement.    # Anxiety: Ativan 1-2mg TID PRN for anxiety     Medical Problems and Treatments:  # Multiple wounds in various stage of healing over body, patient declined Bacitracin, will monitor for signs of infection     # Pt reported L hip pain on 11/22, ordered lidocaine Patch daily PRN    Legal:  Committed with [facility]    Safety:  - Continue precautions as noted above  - Status 15  minute checks    Disposition:  Appears he would unlikely be further stabilized with psychotropic medication changes. Appears to be at baseline with other issues stemming from personality traits. Plan to discharge to IR on Monday, 12/16/19.    Jacques Santana MD  Psychiatry Resident PGY-2

## 2019-12-13 NOTE — PLAN OF CARE
"Pt has a rather monotone voice, answers ?'s with a bit of an irritable tone. He states his mood is \"pissed off,\" and he is not hopeful. He denies s/sx psychosis or denisha.\" Pt also denies si or anxiety; rates his depression 5. He states side effects of his meds include \"retarded thinking.\" He also goes on re \"my Dr doesn't care about me.\" He is able to be redirected. Pt does not chose to make a goal for his day. He said he \"usually\" attends any groups, held. He reports he is \"both\" social, as well as keeps to self. He is in his room, when approached. Pt said he plans to go to an a IRTS. He took his am meds willingly.    1405) Watching tv in milieu, in early afternoon; no grps. Mainly in room, this shift.  "

## 2019-12-14 PROCEDURE — 25000132 ZZH RX MED GY IP 250 OP 250 PS 637: Performed by: PSYCHIATRY & NEUROLOGY

## 2019-12-14 PROCEDURE — 12400001 ZZH R&B MH UMMC

## 2019-12-14 PROCEDURE — 25000132 ZZH RX MED GY IP 250 OP 250 PS 637: Performed by: STUDENT IN AN ORGANIZED HEALTH CARE EDUCATION/TRAINING PROGRAM

## 2019-12-14 RX ADMIN — RISPERIDONE 2 MG: 2 TABLET, ORALLY DISINTEGRATING ORAL at 21:21

## 2019-12-14 RX ADMIN — MELATONIN 50 MCG: at 07:47

## 2019-12-14 RX ADMIN — RISPERIDONE 2 MG: 2 TABLET, ORALLY DISINTEGRATING ORAL at 08:00

## 2019-12-14 RX ADMIN — ACETAMINOPHEN 650 MG: 325 TABLET, FILM COATED ORAL at 07:47

## 2019-12-14 RX ADMIN — THERA TABS 1 TABLET: TAB at 07:47

## 2019-12-14 RX ADMIN — B-COMPLEX W/ C & FOLIC ACID TAB 1 TABLET: TAB at 07:47

## 2019-12-14 ASSESSMENT — ACTIVITIES OF DAILY LIVING (ADL)
HYGIENE/GROOMING: INDEPENDENT
LAUNDRY: WITH SUPERVISION
ORAL_HYGIENE: INDEPENDENT
DRESS: SCRUBS (BEHAVIORAL HEALTH)
LAUNDRY: UNABLE TO COMPLETE
DRESS: INDEPENDENT
ORAL_HYGIENE: INDEPENDENT
HYGIENE/GROOMING: INDEPENDENT

## 2019-12-14 NOTE — PROGRESS NOTES
Pt was visible in the lounge and was compliant with staff and able to follow staff directions and expectations. Pt sat in the lounge and watched TV with peers. Pt was less hyperverbal than previous. Pt refused to talk to the writer when the writer asked pt to check in with. Pt ate dinner and had a snack. Nothing else to add.       12/13/19 2200   Behavioral Health   Hallucinations denies / not responding to hallucinations   Thinking distractable;poor concentration   Orientation person: oriented;place: oriented;date: oriented;time: oriented;situation, disoriented   Memory baseline memory   Insight poor   Judgement impaired   Affect full range affect   Mood mood is calm   Physical Appearance/Attire neat;attire appropriate to age and situation   Hygiene other (see comment)  (adequate)   Suicidality other (see comments)  (No indications)   1. Wish to be Dead (Recent) No   2. Non-Specific Active Suicidal Thoughts (Recent) No   Self Injury other (see comment)  (No indications)   Activity other (see comment)  (neutral)   Speech clear;coherent   Medication Sensitivity no stated side effects;no observed side effects   Psychomotor / Gait balanced;steady   Substance Withdrawal Interventions   Social and Therapeutic Interventions (Substance Withdrawal) encourage socialization with peers;encourage effective boundaries with peers;encourage participation in therapeutic groups and milieu activities   Activities of Daily Living   Hygiene/Grooming independent   Oral Hygiene independent   Dress scrubs (behavioral health)   Laundry unable to complete   Room Organization independent

## 2019-12-14 NOTE — PROGRESS NOTES
"   12/14/19 1519   Behavioral Health   Hallucinations denies / not responding to hallucinations   Thinking other (see comment)  (appears intact this shift)   Orientation person: oriented;place: oriented;date: oriented;time: oriented   Memory baseline memory   Insight poor   Judgement intact   Eye Contact at examiner   Affect blunted, flat   Mood mood is calm   Physical Appearance/Attire attire appropriate to age and situation;appears stated age   Hygiene well groomed   Suicidality other (see comments)  (Pt denies.)   Wish to be Dead Description (Recent) no   Non-Specific Active Suicidal Thought Description (Recent) no   Self Injury other (see comment)  (Pt denies.)   Elopement   (Pt didn't exhibit these behaviors this shift.)   Activity other (see comment)  (active and social in milieu)   Speech coherent;clear   Psychomotor / Gait balanced;steady   Coping/Psychosocial   Verbalized Emotional State anger;depression;other (see comments)  (\"pissed off, irritated and angry he's here\")   Activities of Daily Living   Hygiene/Grooming independent   Oral Hygiene independent   Dress independent   Laundry with supervision   Room Organization independent   Significant Event   Significant Event Other (see comments)  (Shift Summary)   Pt denies SI and SIB thoughts. Pt states he has mild depression but no anxiety. Pt states he's \"angry, pissed off and irritated that I'm still here.\" Pt was out in milieu watching TV most of the shift and was calm, cooperative and pleasant.  "

## 2019-12-15 PROCEDURE — 25000132 ZZH RX MED GY IP 250 OP 250 PS 637: Performed by: STUDENT IN AN ORGANIZED HEALTH CARE EDUCATION/TRAINING PROGRAM

## 2019-12-15 PROCEDURE — 12400001 ZZH R&B MH UMMC

## 2019-12-15 PROCEDURE — 25000132 ZZH RX MED GY IP 250 OP 250 PS 637: Performed by: PSYCHIATRY & NEUROLOGY

## 2019-12-15 RX ADMIN — MELATONIN 50 MCG: at 08:48

## 2019-12-15 RX ADMIN — RISPERIDONE 2 MG: 2 TABLET, ORALLY DISINTEGRATING ORAL at 08:48

## 2019-12-15 RX ADMIN — RISPERIDONE 2 MG: 2 TABLET, ORALLY DISINTEGRATING ORAL at 21:40

## 2019-12-15 RX ADMIN — THERA TABS 1 TABLET: TAB at 08:48

## 2019-12-15 RX ADMIN — B-COMPLEX W/ C & FOLIC ACID TAB 1 TABLET: TAB at 08:48

## 2019-12-15 RX ADMIN — ACETAMINOPHEN 650 MG: 325 TABLET, FILM COATED ORAL at 06:27

## 2019-12-15 ASSESSMENT — ACTIVITIES OF DAILY LIVING (ADL)
DRESS: SCRUBS (BEHAVIORAL HEALTH)
LAUNDRY: UNABLE TO COMPLETE
ORAL_HYGIENE: INDEPENDENT
HYGIENE/GROOMING: INDEPENDENT
ORAL_HYGIENE: INDEPENDENT
HYGIENE/GROOMING: INDEPENDENT
LAUNDRY: UNABLE TO COMPLETE
DRESS: SCRUBS (BEHAVIORAL HEALTH);INDEPENDENT

## 2019-12-15 NOTE — PLAN OF CARE
"Pt has been isolative to his room for most of the evening. When in the milieu, he was withdrawn and quiet. Affect blunted. Reports his mood is \"fine I guess.\" During check in with writer, he expresses ongoing frustration with Psychiatry and the mental health court, denying that he has any mental illnesses that require anti-psychotic medications. He reports that he is supposed to discharge on Monday, which he is looking forward to. Agrees to take scheduled medications, but overall disagrees with his diagnosis. Compliant with HS Risperdal. Denies any adverse effects or concerns at this time. Will continue to monitor.  "

## 2019-12-15 NOTE — PROGRESS NOTES
12/15/19 1414   Behavioral Health   Hallucinations denies / not responding to hallucinations   Thinking intact;delusional   Orientation person: oriented;place: oriented   Memory baseline memory   Insight poor   Judgement impaired   Eye Contact at examiner   Affect blunted, flat   Mood mood is calm   Physical Appearance/Attire untidy;disheveled   Hygiene neglected grooming - unclean body, hair, teeth   Suicidality other (see comments)  (none noted)   1. Wish to be Dead (Recent)   (none noted)   2. Non-Specific Active Suicidal Thoughts (Recent)   (none noted)   Self Injury other (see comment)  (none noted)   Elopement   (none noted)   Activity other (see comment)  (normal activity)   Speech clear;coherent   Medication Sensitivity no stated side effects;no observed side effects   Psychomotor / Gait balanced;steady   Overt Aggression Scale   Verbal Aggression 0   Aggression against Property 0   Auto-Aggression 0   Physical Aggression 0   Overt Aggression Total Score 0   Activities of Daily Living   Hygiene/Grooming independent   Oral Hygiene independent   Dress scrubs (behavioral health);independent   Laundry unable to complete   Room Organization independent     Pt had a good day. Pt was active in the lounge, social with peers. No behavioral issues.

## 2019-12-16 VITALS
BODY MASS INDEX: 29.7 KG/M2 | HEART RATE: 74 BPM | SYSTOLIC BLOOD PRESSURE: 109 MMHG | DIASTOLIC BLOOD PRESSURE: 62 MMHG | WEIGHT: 184 LBS | RESPIRATION RATE: 16 BRPM | OXYGEN SATURATION: 97 % | TEMPERATURE: 98.3 F

## 2019-12-16 PROCEDURE — 25000132 ZZH RX MED GY IP 250 OP 250 PS 637: Performed by: STUDENT IN AN ORGANIZED HEALTH CARE EDUCATION/TRAINING PROGRAM

## 2019-12-16 RX ADMIN — THERA TABS 1 TABLET: TAB at 08:11

## 2019-12-16 RX ADMIN — B-COMPLEX W/ C & FOLIC ACID TAB 1 TABLET: TAB at 08:11

## 2019-12-16 RX ADMIN — MELATONIN 50 MCG: at 08:11

## 2019-12-16 RX ADMIN — RISPERIDONE 2 MG: 2 TABLET, ORALLY DISINTEGRATING ORAL at 08:11

## 2019-12-16 ASSESSMENT — ACTIVITIES OF DAILY LIVING (ADL)
DRESS: SCRUBS (BEHAVIORAL HEALTH)
HYGIENE/GROOMING: INDEPENDENT
ORAL_HYGIENE: INDEPENDENT
LAUNDRY: UNABLE TO COMPLETE

## 2019-12-16 NOTE — PLAN OF CARE
Problem: General Plan of Care (Inpatient Behavioral)  Goal: Individualization/Patient Specific Goal (IP Behavioral  Outcome: Adequate for Discharge     Patient presented as anxious, but cooperative.  He was looking forward to discharge.  He denied SI/SIB/HI and contracted for safety.  He took his AM medications.  He denied acute medical concerns and medication side effects.    Patient has a planned discharge to [facility].  Patient has been looking forward to discharging all weekend.  Writer reviewed AVS with patient.  He had no questions.  Patient contracted for safety.  He has an ACT Team in support.    Patient left approx. 1045.  Patient initially refused to sign the belongings sheet, as he believed his cell phone was missing.  He was given patient relations number.  The cell phone was found in his bag while waiting for the discharge cab and he signed a piece of paper with 2 staff witnesses (attached to belongings sheet).    Patient was walked to the [facility] entrance with staff.  Patient discharged via cab to [facility].

## 2019-12-16 NOTE — PROGRESS NOTES
"Pt was out in the milieu throughout the evening. He was mostly withdrawn and did not socialize much with peers or staff. Blunted affect. Mildly irritable on approach and provides minimal responses during check in. No SI/SIB or HI statements observed. Reports \"I'm finally getting out of [facility] tomorrow,\" regarding his planned discharge to an IRTS. Compliant with scheduled medication and denies any concerns.   "

## 2019-12-16 NOTE — PROGRESS NOTES
Completed Provisional Discharge faxed to [facility].  Pt was cabbed to [facility].  Left vm for his CM Jett as well.  Jett has not returned my phone calls regarding his client.  Also faxed him the PD.     - You are discharging to [facility] facility. (We will cab you there)   [facility]   [address],   [place] [address]  Treatment Director Contact is: Madhu Kidd MS  Phone: [phone]      - ACT Team - Psychiatrist comes to your home w/ the ACT team  Dr. Lydia Vasquez or America Devi NP.  Phone: [phone]  Fax: [phone]   [facility] TO FAX AVS     - [facility] ACT Team :   Jett Hernandez  Phone: [phone]  Fax: [phone]
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
## 2019-12-16 NOTE — DISCHARGE INSTRUCTIONS
Behavioral Discharge Planning and Instructions      Summary:  You were admitted on 11/5/2019  due to Bipolar I Disorder, Most Recent Episode Manic, Disorganized Thinking/Behaviors, Delusional Thoughts and Chemical Use IssuesYou were treated by Dr. Jennifer Varner MD and discharged on 12/16/2019 from [facility] to [facility]     You were dually committed to the [facility] the [facility] on 11/18/2019.  You are being discharged on a Provisional Discharge Agreement which shall remain in effect for the duration of the Commitment.  Your Commitment expires on 5/18/2020.  You were also court ordered to take the medications the doctor ordered for you.       Principal Diagnoses:   Bipolar I Disorder, Manic with psychotic features  Methamphetamine Use Disorder  Cannabis Use Disorder     Health Care Follow-up Appointments:   - You are discharging to [facility]. (We will cab you there)   [facility]   [address] ,   [place] [address]  Treatment Director Contact is: Madhu Kidd MS  Phone: [phone]      - ACT Team - Psychiatrist comes to your home w/ the ACT team  Dr. Lydia Vasquez or America Devi NP.  Phone: [phone]  Fax: [phone]   HUC TO FAX AVS     - [facility] ACT Team :   Jett Hernandez  Phone: [phone]  Fax: [phone]     Major Treatments, Procedures and Findings:  You were provided with: a psychiatric assessment, assessed for medical stability, medication evaluation and/or management, group therapy and milieu management    Symptoms to Report: feeling more aggressive, increased confusion, losing more sleep, mood getting worse or thoughts of suicide    Early warning signs can include: increased depression or anxiety sleep disturbances increased thoughts or behaviors of suicide or self-harm  increased unusual thinking, such as paranoia or hearing voices    Safety and Wellness:  Take all  "medicines as directed.  Make no changes unless your doctor suggests them.      Follow treatment recommendations.  Refrain from alcohol and non-prescribed drugs.  If there is a concern for safety, call 911.    Resources:   Crisis Intervention: 702.110.3953 or 396-027-0180 (TTY: 885.843.4742).  Call anytime for help.  National [place] on Mental Illness (www.[place].luis.org): 919.112.1350 or 995-997-9798.  [place] Association for Children's Mental Health (www.mac.org): 152.286.4382.  Alcoholics Anonymous (www.alcoholics-anonymous.org): Check your phone book for your local chapter.  Suicide Awareness Voices of Education (SAVE) (www.save.org): 523-579-BPWP (7504)  National Suicide Prevention Line (www.mentalhealthmn.org): 670-430-DDFP (5230)  Mental Health Consumer/Survivor Network of [place] (www.mhcsn.net): 153.867.5886 or 648-060-9365  Mental Health Association of [place] (www.mentalhealth.org): 660.241.6211 or 943-123-4537  Self- Management and Recovery Training., [employer]-- Toll free: 179.746.2374  www.[employer]  [facility] Crisis (COPE) Response - Adult 057 512-7065  [facility] Crisis Response - Adult 887 833-9143  Text 4 Life: txt \"LIFE\" to 11527 for immediate support and crisis intervention  Crisis text line: Text \"[place]\" to 702188. Free, confidential, 24/7.  Crisis Intervention: 928.917.3513 or 188-557-7452. Call anytime for help.   [facility] Crisis Team - Child: [phone]  [facility] Mental [facility] Crisis Response Team - Child: [phone]      The treatment team has appreciated the opportunity to work with you. If you have any questions or concerns our unit number is [phone].      "

## 2019-12-30 NOTE — PROGRESS NOTES
Nursing Manager Anthony Just passed on a phone number of  from [facility]. Rachelle @ [phone].    My understanding is they need to complete a local remote provisional discharge because he was hospitalized there following our recent discharge.  Returned her call and left .      Update:  Rachelle returned my call.  She stated that they are not planning an immediate discharge at this time.  They are planning to maybe discharge later this week.  She will contact me if they need to coordinate a local remote provisional discharge.

## 2020-01-02 NOTE — PROGRESS NOTES
This writer spoke with Rachelle from [facility].  Pt had been hospitalized with them recently.  They needed to do a Local Remote Provisional Discharge with this patient as he is committed to us and the commissioner.  She will fax a copy of the local remote pd for us to file in our charting. He was kicked out of the last IRTS we set him up with so he plans to stay with friends with his ACT team in place.  They will work to assist in getting him set up with additional resources.

## 2020-01-22 ENCOUNTER — TELEPHONE (OUTPATIENT)
Dept: BEHAVIORAL HEALTH | Facility: CLINIC | Age: 45
End: 2020-01-22

## 2020-01-22 ENCOUNTER — HOSPITAL ENCOUNTER (EMERGENCY)
Facility: CLINIC | Age: 45
Discharge: HOME | End: 2020-01-22
Attending: EMERGENCY MEDICINE | Admitting: EMERGENCY MEDICINE
Payer: COMMERCIAL

## 2020-01-22 DIAGNOSIS — F22 PARANOIA (H): ICD-10-CM

## 2020-01-22 DIAGNOSIS — F23 ACUTE PSYCHOSIS (H): ICD-10-CM

## 2020-01-22 DIAGNOSIS — Z91.148 NONCOMPLIANCE WITH MEDICATION REGIMEN: ICD-10-CM

## 2020-01-22 PROCEDURE — 99283 EMERGENCY DEPT VISIT LOW MDM: CPT

## 2020-01-22 PROCEDURE — 99283 EMERGENCY DEPT VISIT LOW MDM: CPT | Mod: Z6 | Performed by: EMERGENCY MEDICINE

## 2020-01-22 RX ORDER — OLANZAPINE 10 MG/1
10 TABLET, ORALLY DISINTEGRATING ORAL ONCE
Status: DISCONTINUED | OUTPATIENT
Start: 2020-01-22 | End: 2020-01-23 | Stop reason: HOSPADM

## 2020-01-22 NOTE — PHARMACY-ADMISSION MEDICATION HISTORY
Admission medication history for the January 22, 2020 admission is complete.     Interview sources:  patient, Discharge Summary 12/16/19    Reliability of source: good    Medication compliance: unknown - patient reported to this writer that he takes his medications and took his risperidone last night. However, patient also reported to nursing staff that he does not take any medications.    Changes made to PTA medication list (reason)  Added: N/A  Deleted: N/A  Changed: N/A    Additional medication history information:   - Patient denies any changes to his medication regimen since last hospitalization in December - unknown accuracy of this statement giving conflicting statements given to staff regarding medications.    Prior to Admission Medications   Prescriptions Last Dose Informant Patient Reported? Taking?   Vitamin D3 (CHOLECALCIFEROL) 2000 units (50 mcg) tablet Unknown  No Yes   Sig: Take 1 tablet (50 mcg) by mouth daily   multivitamin, therapeutic (THERA-VIT) TABS tablet Unknown  No Yes   Sig: Take 1 tablet by mouth daily   risperiDONE (RISPERDAL M-TABS) 2 MG ODT Unknown  No Yes   Sig: Place 1 tablet (2 mg) under the tongue 2 times daily   vitamin B complex with vitamin C (STRESS TAB) tablet Unknown  No Yes   Sig: Take 1 tablet by mouth daily      Facility-Administered Medications: None       Time spent: 10 minutes    Medication history completed by:   Pablo Hernandez, May  [facility]  Emergency Department: Ascom *[id number]
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
## 2020-01-22 NOTE — ED PROVIDER NOTES
"    [facility] EMERGENCY DEPARTMENT ([facility])     January 22, 2020    History     Chief Complaint   Patient presents with     Paranoid     BIBA, called 911 because he is under cover and everyone is in danger, hasn't been taking risperidal, 8 ball of meth found on him but hasn't taken any in days, was restrained but cooperative     HPI  Tyler Case is a 44 year old male with a past medical history of bipolar disorder, marijuana/nicotine/benzodiazepine/cocaine abuse, acute psychosis who presents to the emergency department via ambulance.  The patient had initially reported that he called 911 because he is \"undercover and everyone is in danger.\"  To me, he reports that he called because he was severely dehydrated and needed water and no one would give him any.  The patient has not been taking his Risperdal as prescribed.  It appears that he was discharged on Risperdal on a provisional discharge after his admission to inpatient psych last month.  An eightball of methamphetamine was found on him, but the patient states he has not taken any in days.  The patient was restrained by EMS prior to arrival.  He has continued to be loud and agitated while in the emergency department.  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  He repeatedly talks about genocide.  He states that hospitals and the entire practice of medicine amongst doctors are the 3rd leading causes of death and he is unhappy with being here because he states that he suffered \"irreparable harm\" from a previous psychiatric admission. He also reports that he is severely dehydrated and needed water, but no one would give him any water, so he came to the ED.     I have reviewed the Medications, Allergies, Past Medical and Surgical History, and Social History in the [facility] system.  Past Medical History:   Diagnosis Date     Bipolar 1 disorder (H) 8/5/2013     Uses marijuana 10/17/2013     Past " "Surgical History:   Procedure Laterality Date     leg fractures  2002     No current facility-administered medications for this encounter.      Current Outpatient Medications   Medication     multivitamin, therapeutic (THERA-VIT) TABS tablet     risperiDONE (RISPERDAL M-TABS) 2 MG ODT     vitamin B complex with vitamin C (STRESS TAB) tablet     Vitamin D3 (CHOLECALCIFEROL) 2000 units (50 mcg) tablet        Allergies   Allergen Reactions     Benztropine      Other reaction(s): Seizures  Patient reported  Patient reported       Abilify Discmelt      Suicidal thoughts       Aripiprazole Other (See Comments)     Other reaction(s): Unknown  Suicidal thoughts  Suicidal thoughts       Benzyl Alcohol      Other reaction(s): Unknown     Compazine Other (See Comments)     Seizure      Haloperidol Other (See Comments)     Other reaction(s): Dystonia  Patient had EPS with haldol  Patient had EPS with haldol  Patient had EPS with haldol       Phenothiazines Other (See Comments)     Other reaction(s): Dystonia, Muscle Aches/Weakness, Muscle Cramps, Seizures  Seizures  Patient report  Seizures       Social History     Socioeconomic History     Marital status: Single     Spouse name: Not on file     Number of children: Not on file     Years of education: Not on file     Highest education level: Not on file   Occupational History     Not on file   Social Needs     Financial resource strain: Not on file     Food insecurity:     Worry: Not on file     Inability: Not on file     Transportation needs:     Medical: Not on file     Non-medical: Not on file   Tobacco Use     Smoking status: Current Every Day Smoker     Packs/day: 0.00     Types: Cigarettes, Pipe     Smokeless tobacco: Current User     Types: Snuff, Chew   Substance and Sexual Activity     Alcohol use: No     Drug use: Yes     Types: Marijuana, Cocaine     Comment: \"any and all drugs I can get\"      Sexual activity: Yes     Partners: Female   Lifestyle     Physical " "activity:     Days per week: Not on file     Minutes per session: Not on file     Stress: Not on file   Relationships     Social connections:     Talks on phone: Not on file     Gets together: Not on file     Attends Sabianism service: Not on file     Active member of club or organization: Not on file     Attends meetings of clubs or organizations: Not on file     Relationship status: Not on file     Intimate partner violence:     Fear of current or ex partner: Not on file     Emotionally abused: Not on file     Physically abused: Not on file     Forced sexual activity: Not on file   Other Topics Concern     Not on file   Social History Narrative    Natural health practicioner       Review of Systems  General: No fevers , patient feels \"dehydrated\"  Skin: No rash or diaphoresis  Eyes: No eye redness or discharge  Psychiatric: Positive for flight of ideas, noncompliance with psychiatric medications    Review of systems is limited due to lack of patient cooperation/altered mental status      Physical Exam          Physical Exam  General: Disheveled in appearance, malodorous  HEENT: EOMI, anicteric. NCAT  Neck: no jugular venous distension, supple, nl ROM  Cardiac: Extremities appear well-perfused  Pulm: Airway patent, nonlabored breathing  Skin:  No discoloration or rash  Extremities: No LE edema, no cyanosis  Neuro: Alert, no gross focal deficits, disoriented to situation  Psychiatric: flight of ideas, grandiose thought content, uncooperative, agitated      ED Course        Procedures             Critical Care time:  none             Labs Ordered and Resulted from Time of ED Arrival Up to the Time of Departure from the ED - No data to display       No results found for this or any previous visit (from the past 24 hour(s)).    Labs, vital signs, and imaging studies were reviewed by me.    Assessments & Plan (with Medical Decision Making)   The patient is a 44-year-old male presenting with acute psychosis.  He is not " taking his medications as prescribed.  No physical complaints at this time other than that he feels dehydrated.  Patient was provided with glass of water.    I have reviewed the nursing notes.    I have reviewed the findings, diagnosis, plan and need for follow up with the patient.  Patient discussed with psychiatric intake, to be admitted to their service for further management. Plan was discussed with patient who understands and agrees with plan.     New Prescriptions    No medications on file       Final diagnoses:   Acute psychosis (H)   Noncompliance with medication regimen   Paranoia (H)     IMaddy, am serving as a trained medical scribe to document services personally performed by Radha Alcala MD, based on the provider's statements to me.      IRadha MD, was physically present and have reviewed and verified the accuracy of this note documented by Maddy West.       1/22/2020   [facility], [place], EMERGENCY DEPARTMENT     Radha Alcala MD  01/22/20 5148

## 2020-01-22 NOTE — ED NOTES
ED to Behavioral Floor Handoff    SITUATION  Tyler Case is a 44 year old male who speaks English and lives home status is unknown alone The patient arrived in the ED by ambulance from home with a complaint of Paranoid (BIBA, called 911 because he is under cover and everyone is in danger, hasn't been taking risperidal, 8 ball of meth found on him but hasn't taken any in days, was restrained but cooperative)  .The patient's current symptoms started/worsened unknown, patient unable to state, at this time the symptoms have increased.   In the ED, pt was diagnosed with   Final diagnoses:   Acute psychosis (H)   Noncompliance with medication regimen   Paranoia (H)        Initial vitals were:     --------  Is the patient diabetic? No   If yes, last blood glucose? --     If yes, was this treated in the ED? --  --------  Is the patient inebriated (ETOH) No or Impaired on other substances? Yes  MSSA done? N/A  Last MSSA score: --    Were withdrawal symptoms treated? N/A  Does the patient have a seizure history? No. If yes, date of most recent seizure--  --------  Is the patient patient experiencing suicidal ideation? denies current or recent suicidal ideation     Homicidal ideation? denies current or recent homicidal ideation or behaviors.    Self-injurious behavior/urges? denies current or recent self injurious behavior or ideation.  ------  Was pt aggressive in the ED No  Was a code called No  Is the pt now cooperative? Yes  -------  Meds given in ED:   Medications   OLANZapine zydis (zyPREXA) ODT tab 10 mg (has no administration in time range)      Family present during ED course? No  Family currently present? No    BACKGROUND  Does the patient have a cognitive impairment or developmental disability? No  Allergies:   Allergies   Allergen Reactions     Benztropine      Other reaction(s): Seizures  Patient reported  Patient reported       Abilify Discmelt      Suicidal thoughts       Aripiprazole Other (See Comments)      "Other reaction(s): Unknown  Suicidal thoughts  Suicidal thoughts       Benzyl Alcohol      Other reaction(s): Unknown     Compazine Other (See Comments)     Seizure      Haloperidol Other (See Comments)     Other reaction(s): Dystonia  Patient had EPS with haldol  Patient had EPS with haldol  Patient had EPS with haldol       Phenothiazines Other (See Comments)     Other reaction(s): Dystonia, Muscle Aches/Weakness, Muscle Cramps, Seizures  Seizures  Patient report  Seizures     .   Social demographics are   Social History     Socioeconomic History     Marital status: Single     Spouse name: None     Number of children: None     Years of education: None     Highest education level: None   Occupational History     None   Social Needs     Financial resource strain: None     Food insecurity:     Worry: None     Inability: None     Transportation needs:     Medical: None     Non-medical: None   Tobacco Use     Smoking status: Current Every Day Smoker     Packs/day: 0.00     Types: Cigarettes, Pipe     Smokeless tobacco: Current User     Types: Snuff, Chew   Substance and Sexual Activity     Alcohol use: No     Drug use: Yes     Types: Marijuana, Cocaine     Comment: \"any and all drugs I can get\"      Sexual activity: Yes     Partners: Female   Lifestyle     Physical activity:     Days per week: None     Minutes per session: None     Stress: None   Relationships     Social connections:     Talks on phone: None     Gets together: None     Attends Caodaism service: None     Active member of club or organization: None     Attends meetings of clubs or organizations: None     Relationship status: None     Intimate partner violence:     Fear of current or ex partner: None     Emotionally abused: None     Physically abused: None     Forced sexual activity: None   Other Topics Concern     None   Social History Narrative    Natural health practicioner        ASSESSMENT  Labs results Labs Ordered and Resulted from Time of ED " Arrival Up to the Time of Departure from the ED - No data to display   Imaging Studies: No results found for this or any previous visit (from the past 24 hour(s)).   Most recent vital signs There were no vitals taken for this visit.   Abnormal labs/tests/findings requiring intervention:---   Pain control: pt had none  Nausea control: pt had none    RECOMMENDATION  Are any infection precautions needed (MRSA, VRE, etc.)? No If yes, what infection? --  ---  Does the patient have mobility issues? independently. If yes, what device does the pt use? ---  ---  Is patient on 72 hour hold or commitment? No If on 72 hour hold, have hold and rights been given to patient? N/A  Are admitting orders written if after 10 p.m. ?N/A  Tasks needing to be completed:---     Candy Blunt, RN    [phone] [facility]

## 2020-01-22 NOTE — ED NOTES
Bed: ED11  Expected date: 1/22/20  Expected time: 1:11 PM  Means of arrival:   Comments:  [phone]  44 male hx schizo, restrained but cooperative

## 2020-01-22 NOTE — ED AVS SNAPSHOT
[facility], [place], Emergency Department  [address]  [facility] [address]  Phone:  [phone]  Fax:  [phone]                                    Tyler Case   MRN: [id number]    Department:  [facility], Emergency Department   Date of Visit:  1/22/2020           After Visit Summary Signature Page    I have received my discharge instructions, and my questions have been answered. I have discussed any challenges I see with this plan with the nurse or doctor.    ..........................................................................................................................................  Patient/Patient Representative Signature      ..........................................................................................................................................  Patient Representative Print Name and Relationship to Patient    ..................................................               ................................................  Date                                   Time    ..........................................................................................................................................  Reviewed by Signature/Title    ...................................................              ..............................................  Date                                               Time          22EPIC Rev 08/18

## 2020-01-23 NOTE — ED NOTES
Brief signout note  While waiting for inpatient psychiatric bed patient stated that he would rather go home and be admitted to psychiatric floor.  After a long discussion, patient confirmed that he does not have any suicidal ideations or homicidal ideations.  Patient does appear to be paranoid and have rapid flight of ideas, but does not appear to be a danger to himself at this time.  Based on patient's clinical exam, does not warrant placement of a hold at this time.  Per patient's wishes, he requested to go home at this time.  Patient was discharged home in stable condition.  MD Rober Reno Gregg A, MD  01/22/20 6054

## 2020-01-23 NOTE — ED NOTES
Patient refused VS check. Patient stated he has the right to refused. Intake was informed.  He also stated that he does not want to be admitted in behavioral unit. He said he would like to go home. Dr. YONAS Richter was informed.

## 2020-03-09 ENCOUNTER — CARE COORDINATION (OUTPATIENT)
Dept: BEHAVIORAL HEALTH | Facility: CLINIC | Age: 45
End: 2020-03-09

## 2020-03-09 NOTE — PROGRESS NOTES
This writer received call from Kaleb with [facility]- they have the pt hospitalized with them currently.  They are looking to provisionally discharge the pt.  He is committed to [facility] and [facility].  Therefore, they need to do a local remote PD.      Their plan is to have him step down from inpatient into an MI/CD program.  We are in agreement with this and their local remote PD.

## 2020-04-06 ENCOUNTER — MEDICAL CORRESPONDENCE (OUTPATIENT)
Dept: HEALTH INFORMATION MANAGEMENT | Facility: CLINIC | Age: 45
End: 2020-04-06

## 2020-04-20 ENCOUNTER — TELEPHONE (OUTPATIENT)
Dept: BEHAVIORAL HEALTH | Facility: CLINIC | Age: 45
End: 2020-04-20

## 2020-04-20 ENCOUNTER — HOSPITAL ENCOUNTER (INPATIENT)
Facility: CLINIC | Age: 45
LOS: 4 days | Discharge: LEFT BEFORE DISCHARGE | DRG: 885 | End: 2020-04-24
Attending: PSYCHIATRY & NEUROLOGY | Admitting: PSYCHIATRY & NEUROLOGY
Payer: COMMERCIAL

## 2020-04-20 DIAGNOSIS — F14.10 COCAINE ABUSE, UNCOMPLICATED (H): ICD-10-CM

## 2020-04-20 DIAGNOSIS — F43.12 POST-TRAUMATIC STRESS DISORDER, CHRONIC: ICD-10-CM

## 2020-04-20 DIAGNOSIS — F25.0 SCHIZOAFFECTIVE DISORDER, BIPOLAR TYPE (H): ICD-10-CM

## 2020-04-20 DIAGNOSIS — F31.64 SEVERE MIXED BIPOLAR I DISORDER WITH PSYCHOTIC FEATURES (H): ICD-10-CM

## 2020-04-20 DIAGNOSIS — L85.3 DRY SKIN: Primary | ICD-10-CM

## 2020-04-20 DIAGNOSIS — F41.1 GENERALIZED ANXIETY DISORDER: ICD-10-CM

## 2020-04-20 DIAGNOSIS — F90.2 ATTENTION DEFICIT HYPERACTIVITY DISORDER, COMBINED TYPE: ICD-10-CM

## 2020-04-20 DIAGNOSIS — F15.10 OTHER STIMULANT ABUSE, UNCOMPLICATED (H): ICD-10-CM

## 2020-04-20 LAB
AMPHETAMINES UR QL SCN: NEGATIVE
BARBITURATES UR QL: NEGATIVE
BENZODIAZ UR QL: NEGATIVE
CANNABINOIDS UR QL SCN: NEGATIVE
COCAINE UR QL: NEGATIVE
ETHANOL UR QL SCN: NEGATIVE
OPIATES UR QL SCN: NEGATIVE

## 2020-04-20 PROCEDURE — 25000132 ZZH RX MED GY IP 250 OP 250 PS 637: Performed by: PSYCHIATRY & NEUROLOGY

## 2020-04-20 PROCEDURE — 80320 DRUG SCREEN QUANTALCOHOLS: CPT | Performed by: FAMILY MEDICINE

## 2020-04-20 PROCEDURE — 99284 EMERGENCY DEPT VISIT MOD MDM: CPT | Mod: Z6 | Performed by: PSYCHIATRY & NEUROLOGY

## 2020-04-20 PROCEDURE — 80307 DRUG TEST PRSMV CHEM ANLYZR: CPT | Performed by: FAMILY MEDICINE

## 2020-04-20 PROCEDURE — 99285 EMERGENCY DEPT VISIT HI MDM: CPT | Mod: 25 | Performed by: PSYCHIATRY & NEUROLOGY

## 2020-04-20 PROCEDURE — 12400001 ZZH R&B MH UMMC

## 2020-04-20 PROCEDURE — 25000132 ZZH RX MED GY IP 250 OP 250 PS 637: Performed by: NURSE PRACTITIONER

## 2020-04-20 PROCEDURE — 90791 PSYCH DIAGNOSTIC EVALUATION: CPT

## 2020-04-20 RX ORDER — MIRTAZAPINE 7.5 MG/1
15 TABLET, FILM COATED ORAL
Status: DISCONTINUED | OUTPATIENT
Start: 2020-04-20 | End: 2020-04-24 | Stop reason: HOSPADM

## 2020-04-20 RX ORDER — HYDROXYZINE HYDROCHLORIDE 25 MG/1
25 TABLET, FILM COATED ORAL EVERY 4 HOURS PRN
Status: DISCONTINUED | OUTPATIENT
Start: 2020-04-20 | End: 2020-04-21

## 2020-04-20 RX ORDER — GABAPENTIN 300 MG/1
300 CAPSULE ORAL ONCE
Status: COMPLETED | OUTPATIENT
Start: 2020-04-20 | End: 2020-04-20

## 2020-04-20 RX ORDER — OLANZAPINE 15 MG/1
30 TABLET, ORALLY DISINTEGRATING ORAL AT BEDTIME
Status: ON HOLD | COMMUNITY
End: 2020-04-24

## 2020-04-20 RX ORDER — OLANZAPINE 20 MG/1
30 TABLET, ORALLY DISINTEGRATING ORAL AT BEDTIME
Status: ON HOLD | COMMUNITY
End: 2020-04-20

## 2020-04-20 RX ORDER — ZIPRASIDONE MESYLATE 20 MG/ML
20 INJECTION, POWDER, LYOPHILIZED, FOR SOLUTION INTRAMUSCULAR 3 TIMES DAILY PRN
Status: DISCONTINUED | OUTPATIENT
Start: 2020-04-20 | End: 2020-04-24 | Stop reason: HOSPADM

## 2020-04-20 RX ORDER — MIRTAZAPINE 15 MG/1
15 TABLET, FILM COATED ORAL
Status: ON HOLD | COMMUNITY
End: 2020-04-24

## 2020-04-20 RX ORDER — ZIPRASIDONE HYDROCHLORIDE 20 MG/1
20 CAPSULE ORAL 3 TIMES DAILY PRN
Status: DISCONTINUED | OUTPATIENT
Start: 2020-04-20 | End: 2020-04-24 | Stop reason: HOSPADM

## 2020-04-20 RX ORDER — DIPHENHYDRAMINE HCL 25 MG
25-50 CAPSULE ORAL 3 TIMES DAILY PRN
Status: DISCONTINUED | OUTPATIENT
Start: 2020-04-20 | End: 2020-04-24 | Stop reason: HOSPADM

## 2020-04-20 RX ORDER — DIPHENHYDRAMINE HYDROCHLORIDE 50 MG/ML
25-50 INJECTION INTRAMUSCULAR; INTRAVENOUS 3 TIMES DAILY PRN
Status: DISCONTINUED | OUTPATIENT
Start: 2020-04-20 | End: 2020-04-24 | Stop reason: HOSPADM

## 2020-04-20 RX ORDER — ACETAMINOPHEN 325 MG/1
650 TABLET ORAL EVERY 4 HOURS PRN
Status: DISCONTINUED | OUTPATIENT
Start: 2020-04-20 | End: 2020-04-24 | Stop reason: HOSPADM

## 2020-04-20 RX ORDER — BISACODYL 10 MG
10 SUPPOSITORY, RECTAL RECTAL DAILY PRN
Status: DISCONTINUED | OUTPATIENT
Start: 2020-04-20 | End: 2020-04-24 | Stop reason: HOSPADM

## 2020-04-20 RX ORDER — TRAZODONE HYDROCHLORIDE 50 MG/1
50 TABLET, FILM COATED ORAL
Status: DISCONTINUED | OUTPATIENT
Start: 2020-04-20 | End: 2020-04-24 | Stop reason: HOSPADM

## 2020-04-20 RX ORDER — OLANZAPINE 15 MG/1
30 TABLET, ORALLY DISINTEGRATING ORAL AT BEDTIME
Status: DISCONTINUED | OUTPATIENT
Start: 2020-04-20 | End: 2020-04-24 | Stop reason: HOSPADM

## 2020-04-20 RX ADMIN — GABAPENTIN 300 MG: 300 CAPSULE ORAL at 17:08

## 2020-04-20 RX ADMIN — DIPHENHYDRAMINE HYDROCHLORIDE 25 MG: 25 CAPSULE ORAL at 18:57

## 2020-04-20 RX ADMIN — ZIPRASIDONE HCL 20 MG: 20 CAPSULE ORAL at 18:57

## 2020-04-20 RX ADMIN — OLANZAPINE 30 MG: 15 TABLET, ORALLY DISINTEGRATING ORAL at 20:25

## 2020-04-20 RX ADMIN — MIRTAZAPINE 15 MG: 7.5 TABLET, FILM COATED ORAL at 20:25

## 2020-04-20 ASSESSMENT — ENCOUNTER SYMPTOMS
NERVOUS/ANXIOUS: 1
ABDOMINAL PAIN: 0
DYSPHORIC MOOD: 0
FEVER: 0
SHORTNESS OF BREATH: 0
SLEEP DISTURBANCE: 0
HALLUCINATIONS: 1

## 2020-04-20 NOTE — PROGRESS NOTES
04/20/20 1838   Valuables   Patient Belongings locker   Patient Belongings Put in Hospital Secure Location (Security or Locker, etc.) other (see comments)   Did you bring any home meds/supplements to the hospital?  No   LOCKER  (Clothing / backpack / paperwork / tobacco / Papers / key / boots / books)    A               Admission:  I am responsible for any personal items that are not sent to the safe or pharmacy.  [facility] is not responsible for loss, theft or damage of any property in my possession.    Signature:  _________________________________ Date: _______  Time: _____                                              Staff Signature:  ____________________________ Date: ________  Time: _____      2nd Staff person, if patient is unable/unwilling to sign:    Signature: ________________________________ Date: ________  Time: _____     Discharge:  [facility] has returned all of my personal belongings:    Signature: _________________________________ Date: ________  Time: _____                                          Staff Signature:  ____________________________ Date: ________  Time: _____

## 2020-04-20 NOTE — PHARMACY-ADMISSION MEDICATION HISTORY
Admission Medication History Completed by Pharmacy    Sources:   - Patient interview (via phone due to contact precautions)  - Care Everywhere - [facility] 1/29/2020 - 3/11/2020    Pertinent changes made to PTA medication list (reason):  Added: mirtazapine (per pt), olanzapine (per pt and Care Everywhere)    Removed:   - risperidone 2mg ODT PO BID (old Rx, Dec 2019)   - vitamin B/c compled (pt denied taking)  - vitamin D 2,000 units daily (pt denied taking)  - MVT daily (pt denied taking)     Changed: none    Additional Information:   - Patient appeared to be a reliable historian, reporting mirtazapine and olanzapine as his only medications; knew doses. Olanzapine consistent with Care Everywhere records. Mirtazapine was self-reported by patient, started while at treatment in [place] this past month.     Prior to Admission medications    Medication Sig Last Dose     mirtazapine (REMERON) 15 MG tablet Take 15 mg by mouth nightly as needed (sleep)   4/19/2020     OLANZapine zydis (ZYPREXA) 15 MG ODT   Take 30 mg by mouth At Bedtime 4/19/2020       Time spent: 20 minutes  -----------  Mary Man, Pharm.D., [facility]  Behavioral Health Inpatient Pharmacist  [facility] ([facility]) Emergency Department  Phone: *[phone] ([facility]) or [phone]       2.05

## 2020-04-20 NOTE — ED PROVIDER NOTES
"ED Provider Note  [facility]      History     Chief Complaint   Patient presents with     Mental Health Problem     The history is provided by the patient and medical records.     Tyler Case is a 44 year old male with a medical history significant for bipolar 1 disorder, anxiety, PTSD, ADHD, cannabis use disorder and methamphetamine use disorder.  Per review patient's chart, patient had an extensive ental health hospitalization here from 11/5/2019 to 12/16/2019.  Patient was started on Risperdal 2 mg twice daily during this hospitalization.  Patient was then hospitalized at [facility] from 12/20/2019 to 1/3/2020 for his mental health.  Patient then had another extensive mental health hospitalization at [facility] from 1/29/2020 to 3/11/2020.  Patient was admitted for acute denisha.  Patient was started on Zyprexa 30 mg at bedtime during this hospitalization.    Patient returns to the Emergency Department today for mental health evaluation and hallucinations.  The patient reports that he was down in [place], [place] for the last 40 days in a treatment center.  Patient reports that he has been sober for the entire 40 days that he was down there.  The patient left the treatment center this morning and was going to enter [facility] today.  The patient did arrive to this treatment center, but he reports that he is uncomfortable there.  He reports that he is having hallucinations and he does not feel comfortable.  The patient reports that he is seeing writing on the walls, the TV is telling him to \"go to hell\" and he is seeing faces that are telling him that he is going to die.  The patient denies any suicidal or homicidal ideations.  The patient reports that he is having panic attacks because of these hallucinations, he reports that he is shaking and his heart is racing at times.  He also states that he sees writing on the walls telling him to shoot himself or kill himself. He states he " does not want to do this.    The patient has not been in contact with his medication provider since being discharged from [facility].  The patient reports that he has been taking his medications as prescribed, but the patient does have a history of noncompliance with medications.  The patient is on a commitment, which has been on for 6 months now, he has a hearing tomorrow to extend the commitment.  He is also under a Frausto.    Please see the 's [facility] from today (4/20/20) for further details.    Past Medical History  Past Medical History:   Diagnosis Date     Bipolar 1 disorder (H) 8/5/2013     Uses marijuana 10/17/2013     Past Surgical History:   Procedure Laterality Date     leg fractures  2002     multivitamin, therapeutic (THERA-VIT) TABS tablet  risperiDONE (RISPERDAL M-TABS) 2 MG ODT  vitamin B complex with vitamin C (STRESS TAB) tablet  Vitamin D3 (CHOLECALCIFEROL) 2000 units (50 mcg) tablet      Allergies   Allergen Reactions     Benztropine      Other reaction(s): Seizures  Patient reported  Patient reported       Abilify Discmelt      Suicidal thoughts       Aripiprazole Other (See Comments)     Other reaction(s): Unknown  Suicidal thoughts  Suicidal thoughts       Benzyl Alcohol      Other reaction(s): Unknown     Compazine Other (See Comments)     Seizure      Haloperidol Other (See Comments)     Other reaction(s): Dystonia  Patient had EPS with haldol  Patient had EPS with haldol  Patient had EPS with haldol       Phenothiazines Other (See Comments)     Other reaction(s): Dystonia, Muscle Aches/Weakness, Muscle Cramps, Seizures  Seizures  Patient report  Seizures       Past medical history, past surgical history, medications, and allergies were reviewed with the patient. Additional pertinent items: None    Family History  Family History   Problem Relation Age of Onset     Diabetes Maternal Grandmother      Alcohol/Drug Maternal Grandmother      Alzheimer Disease Maternal  Grandmother      Arthritis Maternal Grandmother      Depression Maternal Grandmother      Eye Disorder Maternal Grandmother      Gastrointestinal Disease Maternal Grandmother      Neurologic Disorder Maternal Grandmother      Osteoporosis Maternal Grandmother      Psychotic Disorder Maternal Grandmother      Hypertension Paternal Grandfather      Alcohol/Drug Paternal Grandfather      Eye Disorder Paternal Grandfather      Gastrointestinal Disease Paternal Grandfather      Neurologic Disorder Paternal Grandfather      Psychotic Disorder Paternal Grandfather      Cerebrovascular Disease Paternal Grandmother      Alcohol/Drug Paternal Grandmother      Eye Disorder Paternal Grandmother      Gastrointestinal Disease Paternal Grandmother      Neurologic Disorder Paternal Grandmother      Lipids Paternal Grandmother      Osteoporosis Paternal Grandmother      Psychotic Disorder Paternal Grandmother      Alcohol/Drug Mother      Depression Mother      Eye Disorder Mother      Gastrointestinal Disease Mother      Neurologic Disorder Mother      Psychotic Disorder Mother      Alcohol/Drug Father      Depression Father      Eye Disorder Father      Gastrointestinal Disease Father      Neurologic Disorder Father      Psychotic Disorder Father      Alcohol/Drug Maternal Grandfather      Arthritis Maternal Grandfather      Depression Maternal Grandfather      Eye Disorder Maternal Grandfather      Gastrointestinal Disease Maternal Grandfather      Neurologic Disorder Maternal Grandfather      Lipids Maternal Grandfather      Psychotic Disorder Maternal Grandfather      Alcohol/Drug Sister      Alcohol/Drug Brother      Eye Disorder Sister      Eye Disorder Brother      Gastrointestinal Disease Sister      Gastrointestinal Disease Brother      Neurologic Disorder Sister      Neurologic Disorder Brother      Psychotic Disorder Sister      Psychotic Disorder Brother      Family history was reviewed with the patient. Additional  "pertinent items: None    Social History  Social History     Tobacco Use     Smoking status: Current Every Day Smoker     Packs/day: 0.00     Types: Cigarettes, Pipe     Smokeless tobacco: Current User     Types: Snuff, Chew   Substance Use Topics     Alcohol use: No     Drug use: Yes     Types: Marijuana, Cocaine     Comment: \"any and all drugs I can get\"       Social history was reviewed with the patient. Additional pertinent items: None    Review of Systems   Constitutional: Negative for fever.   Respiratory: Negative for shortness of breath.    Cardiovascular: Negative for chest pain.   Gastrointestinal: Negative for abdominal pain.   Psychiatric/Behavioral: Positive for hallucinations. Negative for dysphoric mood, self-injury, sleep disturbance and suicidal ideas. The patient is nervous/anxious.    All other systems reviewed and are negative.        Physical Exam   BP: (!) 120/90  Pulse: 131  Temp: 98.6  F (37  C)  Resp: 18  SpO2: 100 %  Physical Exam  Vitals signs and nursing note reviewed.   Constitutional:       Appearance: Normal appearance. He is well-developed.   Cardiovascular:      Rate and Rhythm: Normal rate and regular rhythm.      Heart sounds: Normal heart sounds.   Pulmonary:      Effort: Pulmonary effort is normal. No respiratory distress.      Breath sounds: Normal breath sounds.   Neurological:      Mental Status: He is alert and oriented to person, place, and time.   Psychiatric:         Attention and Perception: Attention normal. He perceives auditory and visual hallucinations.         Mood and Affect: Affect normal. Mood is anxious.         Speech: Speech normal.         Behavior: Behavior normal. Behavior is cooperative.         Thought Content: Thought content normal. Thought content is not paranoid or delusional. Thought content does not include homicidal or suicidal ideation. Thought content does not include homicidal or suicidal plan.         Cognition and Memory: Cognition and memory " normal.         Judgment: Judgment normal.      Comments: Tyler is a 45 y/o male who looks his age. He is well groomed with good eye contact.  He paces most of the time and struggles to sit still.         ED Course      Procedures             Results for orders placed or performed during the hospital encounter of 04/20/20   Drug abuse screen 6 urine (chem dep)     Status: None   Result Value Ref Range    Amphetamine Qual Urine Negative NEG^Negative    Barbiturates Qual Urine Negative NEG^Negative    Benzodiazepine Qual Urine Negative NEG^Negative    Cannabinoids Qual Urine Negative NEG^Negative    Cocaine Qual Urine Negative NEG^Negative    Ethanol Qual Urine Negative NEG^Negative    Opiates Qualitative Urine Negative NEG^Negative     Medications - No data to display     Assessments & Plan (with Medical Decision Making)   Tyler will be admitted to the hospital due to his worsening anxiety, hallucinations, commands to hurt himself and high risk of relapse/medication non-compliance.  He is under commitment and a Frausto. He will go to station 10 under Dr. Posada.      I have reviewed the nursing notes. I have reviewed the findings, diagnosis, plan and need for follow up with the patient.    New Prescriptions    No medications on file       Final diagnoses:   None       --  I, Josue May, am serving as a trained medical scribe to document services personally performed by Alexandro Bailey MD, based on the provider's statements to me.     Alexandro VAZ MD, was physically present and have reviewed and verified the accuracy of this note documented by Josue May.    [facility], EMERGENCY DEPARTMENT  4/20/2020     Alexandro Bailey MD  04/20/20 8985

## 2020-04-20 NOTE — ED NOTES
ED to Behavioral Floor Handoff    SITUATION  Tyler Case is a 44 year old male who speaks English and lives is homeless unknown The patient arrived in the ED by cab from [facility] center with a complaint of Mental Health Problem  .The patient's current symptoms started/worsened 2 month(s) ago and during this time the symptoms have increased.   In the ED, pt was diagnosed with   Final diagnoses:   Schizoaffective disorder, bipolar type (H)        Initial vitals were: BP: (!) 120/90  Pulse: 131  Temp: 98.6  F (37  C)  Resp: 18  SpO2: 100 %   --------  Is the patient diabetic? No   If yes, last blood glucose? --     If yes, was this treated in the ED? --  --------  Is the patient inebriated (ETOH) No or Impaired on other substances? No  MSSA done? N/A  Last MSSA score: --    Were withdrawal symptoms treated? N/A  Does the patient have a seizure history? No. If yes, date of most recent seizure--  --------  Is the patient patient experiencing suicidal ideation? suicidal if not admitted    Homicidal ideation? denies current or recent homicidal ideation or behaviors.    Self-injurious behavior/urges? denies current or recent self injurious behavior or ideation.  ------  Was pt aggressive in the ED No  Was a code called No  Is the pt now cooperative? Yes  -------  Meds given in ED:   Medications   gabapentin (NEURONTIN) capsule 300 mg (300 mg Oral Given 4/20/20 1708)      Family present during ED course? No  Family currently present? No    BACKGROUND  Does the patient have a cognitive impairment or developmental disability? No  Allergies:   Allergies   Allergen Reactions     Benztropine      Other reaction(s): Seizures  Patient reported  Patient reported       Abilify Discmelt      Suicidal thoughts       Aripiprazole Other (See Comments)     Other reaction(s): Unknown  Suicidal thoughts  Suicidal thoughts       Benzyl Alcohol      Other reaction(s): Unknown     Compazine Other (See Comments)     Seizure      Haloperidol  "Other (See Comments)     Other reaction(s): Dystonia  Patient had EPS with haldol  Patient had EPS with haldol  Patient had EPS with haldol       Phenothiazines Other (See Comments)     Other reaction(s): Dystonia, Muscle Aches/Weakness, Muscle Cramps, Seizures  Seizures  Patient report  Seizures     .   Social demographics are   Social History     Socioeconomic History     Marital status: Single     Spouse name: None     Number of children: None     Years of education: None     Highest education level: None   Occupational History     None   Social Needs     Financial resource strain: None     Food insecurity     Worry: None     Inability: None     Transportation needs     Medical: None     Non-medical: None   Tobacco Use     Smoking status: Current Every Day Smoker     Packs/day: 0.00     Types: Cigarettes, Pipe     Smokeless tobacco: Current User     Types: Snuff, Chew   Substance and Sexual Activity     Alcohol use: No     Drug use: Yes     Types: Marijuana, Cocaine     Comment: \"any and all drugs I can get\"      Sexual activity: Yes     Partners: Female   Lifestyle     Physical activity     Days per week: None     Minutes per session: None     Stress: None   Relationships     Social connections     Talks on phone: None     Gets together: None     Attends Rastafari service: None     Active member of club or organization: None     Attends meetings of clubs or organizations: None     Relationship status: None     Intimate partner violence     Fear of current or ex partner: None     Emotionally abused: None     Physically abused: None     Forced sexual activity: None   Other Topics Concern     None   Social History Narrative    Natural health practicioner        ASSESSMENT  Labs results   Labs Ordered and Resulted from Time of ED Arrival Up to the Time of Departure from the ED   DRUG ABUSE SCREEN 6 CHEM DEP URINE ([facility])      Imaging Studies: No results found for this or any previous visit (from the past 24 hour(s)). "   Most recent vital signs BP (!) 120/90   Pulse 131   Temp 98.6  F (37  C) (Oral)   Resp 18   SpO2 100%    Abnormal labs/tests/findings requiring intervention:---   Pain control: pt had none  Nausea control: pt had none    RECOMMENDATION  Are any infection precautions needed (MRSA, VRE, etc.)? No If yes, what infection? --  ---  Does the patient have mobility issues? independently. If yes, what device does the pt use? ---  ---  Is patient on 72 hour hold or commitment? No If on 72 hour hold, have hold and rights been given to patient? N/A  Are admitting orders written if after 10 p.m. ?N/A  Tasks needing to be completed:---     Abby Gilbert RN    [phone] [facility]   [phone] [facility]

## 2020-04-20 NOTE — TELEPHONE ENCOUNTER
S:  44 yr Male with active Visual & Auditory Hallucinations and debilitating panic attacks    B:  DEC  [facility] 44 [facility].  Patient reports visual and auditory hallucinations, seeing faces and voices telling h9im to kill self.  Pt reports debilitating panic attacks.  DEC  reports pt calm with flat affect.  Pt has hx ofIP  Nov-DEC 2019 at [place] and 2 mon in [facility] Feb-March.  Current med Alonzapine 30 mg po daily.    A:  Vol/  On commitment and has an active Frausto order      R:  .10/ [facility]    Patient cleared and ready for behavioral bed placement: Yes

## 2020-04-21 LAB
ALBUMIN SERPL-MCNC: 3.8 G/DL (ref 3.4–5)
ALP SERPL-CCNC: 101 U/L (ref 40–150)
ALT SERPL W P-5'-P-CCNC: 24 U/L (ref 0–70)
ANION GAP SERPL CALCULATED.3IONS-SCNC: 7 MMOL/L (ref 3–14)
AST SERPL W P-5'-P-CCNC: 15 U/L (ref 0–45)
BASOPHILS # BLD AUTO: 0 10E9/L (ref 0–0.2)
BASOPHILS NFR BLD AUTO: 0.3 %
BILIRUB SERPL-MCNC: 0.7 MG/DL (ref 0.2–1.3)
BUN SERPL-MCNC: 13 MG/DL (ref 7–30)
CALCIUM SERPL-MCNC: 8.8 MG/DL (ref 8.5–10.1)
CHLORIDE SERPL-SCNC: 109 MMOL/L (ref 94–109)
CHOLEST SERPL-MCNC: 227 MG/DL
CO2 SERPL-SCNC: 24 MMOL/L (ref 20–32)
CREAT SERPL-MCNC: 0.87 MG/DL (ref 0.66–1.25)
DIFFERENTIAL METHOD BLD: NORMAL
EOSINOPHIL # BLD AUTO: 0.2 10E9/L (ref 0–0.7)
EOSINOPHIL NFR BLD AUTO: 2.9 %
ERYTHROCYTE [DISTWIDTH] IN BLOOD BY AUTOMATED COUNT: 13 % (ref 10–15)
FOLATE SERPL-MCNC: 19.1 NG/ML
GFR SERPL CREATININE-BSD FRML MDRD: >90 ML/MIN/{1.73_M2}
GLUCOSE SERPL-MCNC: 141 MG/DL (ref 70–99)
HCT VFR BLD AUTO: 45.1 % (ref 40–53)
HDLC SERPL-MCNC: 35 MG/DL
HGB BLD-MCNC: 15.2 G/DL (ref 13.3–17.7)
IMM GRANULOCYTES # BLD: 0 10E9/L (ref 0–0.4)
IMM GRANULOCYTES NFR BLD: 0.3 %
LDLC SERPL CALC-MCNC: 135 MG/DL
LYMPHOCYTES # BLD AUTO: 2.3 10E9/L (ref 0.8–5.3)
LYMPHOCYTES NFR BLD AUTO: 37.8 %
MCH RBC QN AUTO: 29 PG (ref 26.5–33)
MCHC RBC AUTO-ENTMCNC: 33.7 G/DL (ref 31.5–36.5)
MCV RBC AUTO: 86 FL (ref 78–100)
MONOCYTES # BLD AUTO: 0.7 10E9/L (ref 0–1.3)
MONOCYTES NFR BLD AUTO: 10.5 %
NEUTROPHILS # BLD AUTO: 3 10E9/L (ref 1.6–8.3)
NEUTROPHILS NFR BLD AUTO: 48.2 %
NONHDLC SERPL-MCNC: 192 MG/DL
NRBC # BLD AUTO: 0 10*3/UL
NRBC BLD AUTO-RTO: 0 /100
PLATELET # BLD AUTO: 175 10E9/L (ref 150–450)
POTASSIUM SERPL-SCNC: 4.2 MMOL/L (ref 3.4–5.3)
PROLACTIN SERPL-MCNC: 41 UG/L (ref 2–18)
PROT SERPL-MCNC: 7.2 G/DL (ref 6.8–8.8)
RBC # BLD AUTO: 5.24 10E12/L (ref 4.4–5.9)
SODIUM SERPL-SCNC: 140 MMOL/L (ref 133–144)
T4 FREE SERPL-MCNC: 0.86 NG/DL (ref 0.76–1.46)
TRIGL SERPL-MCNC: 284 MG/DL
TSH SERPL DL<=0.005 MIU/L-ACNC: 5.1 MU/L (ref 0.4–4)
VIT B12 SERPL-MCNC: 269 PG/ML (ref 193–986)
WBC # BLD AUTO: 6.2 10E9/L (ref 4–11)

## 2020-04-21 PROCEDURE — 99223 1ST HOSP IP/OBS HIGH 75: CPT | Mod: 95 | Performed by: PSYCHIATRY & NEUROLOGY

## 2020-04-21 PROCEDURE — 12400001 ZZH R&B MH UMMC

## 2020-04-21 PROCEDURE — 84443 ASSAY THYROID STIM HORMONE: CPT | Performed by: NURSE PRACTITIONER

## 2020-04-21 PROCEDURE — 84146 ASSAY OF PROLACTIN: CPT | Performed by: NURSE PRACTITIONER

## 2020-04-21 PROCEDURE — 84439 ASSAY OF FREE THYROXINE: CPT | Performed by: NURSE PRACTITIONER

## 2020-04-21 PROCEDURE — 99232 SBSQ HOSP IP/OBS MODERATE 35: CPT | Performed by: PHYSICIAN ASSISTANT

## 2020-04-21 PROCEDURE — 85025 COMPLETE CBC W/AUTO DIFF WBC: CPT | Performed by: NURSE PRACTITIONER

## 2020-04-21 PROCEDURE — 99207 ZZC CDG-MDM COMPONENT: MEETS MODERATE - UP CODED: CPT | Performed by: PSYCHIATRY & NEUROLOGY

## 2020-04-21 PROCEDURE — 82607 VITAMIN B-12: CPT | Performed by: NURSE PRACTITIONER

## 2020-04-21 PROCEDURE — 80053 COMPREHEN METABOLIC PANEL: CPT | Performed by: NURSE PRACTITIONER

## 2020-04-21 PROCEDURE — 82746 ASSAY OF FOLIC ACID SERUM: CPT | Performed by: NURSE PRACTITIONER

## 2020-04-21 PROCEDURE — 80061 LIPID PANEL: CPT | Performed by: NURSE PRACTITIONER

## 2020-04-21 PROCEDURE — 36415 COLL VENOUS BLD VENIPUNCTURE: CPT | Performed by: NURSE PRACTITIONER

## 2020-04-21 PROCEDURE — 99207 ZZC CDG-HISTORY COMP: MEETS EXP. PROBLEM FOCUSED - DOWN CODED LACK OF HPI: CPT | Performed by: PHYSICIAN ASSISTANT

## 2020-04-21 PROCEDURE — 25000132 ZZH RX MED GY IP 250 OP 250 PS 637: Performed by: NURSE PRACTITIONER

## 2020-04-21 PROCEDURE — 25000132 ZZH RX MED GY IP 250 OP 250 PS 637: Performed by: PSYCHIATRY & NEUROLOGY

## 2020-04-21 RX ORDER — GABAPENTIN 300 MG/1
300 CAPSULE ORAL 3 TIMES DAILY PRN
Status: DISCONTINUED | OUTPATIENT
Start: 2020-04-21 | End: 2020-04-24 | Stop reason: HOSPADM

## 2020-04-21 RX ORDER — EMOLLIENT BASE
CREAM (GRAM) TOPICAL EVERY 6 HOURS PRN
Status: DISCONTINUED | OUTPATIENT
Start: 2020-04-21 | End: 2020-04-24 | Stop reason: HOSPADM

## 2020-04-21 RX ORDER — ZIPRASIDONE HYDROCHLORIDE 20 MG/1
20 CAPSULE ORAL 2 TIMES DAILY WITH MEALS
Status: DISCONTINUED | OUTPATIENT
Start: 2020-04-21 | End: 2020-04-24

## 2020-04-21 RX ORDER — HYDROXYZINE HYDROCHLORIDE 50 MG/1
50 TABLET, FILM COATED ORAL EVERY 4 HOURS PRN
Status: DISCONTINUED | OUTPATIENT
Start: 2020-04-21 | End: 2020-04-24 | Stop reason: HOSPADM

## 2020-04-21 RX ORDER — NICOTINE 21 MG/24HR
1 PATCH, TRANSDERMAL 24 HOURS TRANSDERMAL DAILY
Status: DISCONTINUED | OUTPATIENT
Start: 2020-04-21 | End: 2020-04-21

## 2020-04-21 RX ORDER — NICOTINE 21 MG/24HR
1 PATCH, TRANSDERMAL 24 HOURS TRANSDERMAL DAILY
Status: DISCONTINUED | OUTPATIENT
Start: 2020-04-21 | End: 2020-04-24 | Stop reason: HOSPADM

## 2020-04-21 RX ADMIN — HYDROXYZINE HYDROCHLORIDE 25 MG: 25 TABLET, FILM COATED ORAL at 09:51

## 2020-04-21 RX ADMIN — MIRTAZAPINE 15 MG: 7.5 TABLET, FILM COATED ORAL at 21:13

## 2020-04-21 RX ADMIN — OLANZAPINE 30 MG: 15 TABLET, ORALLY DISINTEGRATING ORAL at 21:12

## 2020-04-21 RX ADMIN — NICOTINE 1 PATCH: 14 PATCH, EXTENDED RELEASE TRANSDERMAL at 17:01

## 2020-04-21 RX ADMIN — NICOTINE POLACRILEX 4 MG: 2 GUM, CHEWING BUCCAL at 17:02

## 2020-04-21 RX ADMIN — ZIPRASIDONE HCL 20 MG: 20 CAPSULE ORAL at 17:58

## 2020-04-21 RX ADMIN — ZIPRASIDONE HCL 20 MG: 20 CAPSULE ORAL at 13:09

## 2020-04-21 ASSESSMENT — ACTIVITIES OF DAILY LIVING (ADL)
ORAL_HYGIENE: INDEPENDENT
HYGIENE/GROOMING: INDEPENDENT
HYGIENE/GROOMING: INDEPENDENT
LAUNDRY: WITH SUPERVISION
LAUNDRY: WITH SUPERVISION
DRESS: SCRUBS (BEHAVIORAL HEALTH)
DRESS: SCRUBS (BEHAVIORAL HEALTH)
ORAL_HYGIENE: INDEPENDENT
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
## 2020-04-21 NOTE — PROGRESS NOTES
"Pt was withdrawn but active in the milieu.  He stated that he is much better today then he was yesterday and stated \"I have been on edge with panic attacks for the past 2-3 weeks but today it isn't a panic attack.  It's just there.\"  He was not social with his peers but a little with staff.  Pt reported having paranoid thoughts \"a little but I don't want to tell you,\" AH \"not today,\" and VH \"on textured walls and stuff, I see faces.\"  He reported having some anxiety \"I have anxiety all the time but it's not a full blown panic attack.  I would rate it a 4.\"  Pt reported that his concentration is fine, appetite and sleep are good, and no pain or SE's, and no SI/SIB/or HI.  He is independent with his ADL's.    Staff will continue to monitor.      04/21/20 1852   Behavioral Health   Hallucinations visual   Thinking intact   Orientation time: oriented;date: oriented;place: oriented;person: oriented   Memory baseline memory   Insight other (see comment)  (improving)   Judgement   (improving)   Eye Contact at examiner   Affect blunted, flat   Mood mood is calm   Physical Appearance/Attire attire appropriate to age and situation;appears stated age   Hygiene well groomed   Suicidality other (see comments)  (pt denies)   1. Wish to be Dead (Recent) No   2. Non-Specific Active Suicidal Thoughts (Recent) No   3. Active Sucidal Ideation with any Methods (Not Plan) Without Intent to Act (Recent) No   4. Active Suicidal Ideation with Some Intent to Act, Without Specific Plan (Recent) No   5. Active Suicidal Ideation with Specific Plan and Intent (Recent) No   Self Injury other (see comment)  (pt denies)   Elopement   (shows no signs)   Activity withdrawn   Speech coherent;clear   Medication Sensitivity no observed side effects   Psychomotor / Gait wrings hands;steady;balanced   Activities of Daily Living   Hygiene/Grooming independent   Oral Hygiene independent   Dress scrubs (behavioral health)   Laundry with supervision   Room " Organization independent

## 2020-04-21 NOTE — PROGRESS NOTES
Diagnosis/Procedure:   Patient Active Problem List   Diagnosis     Bipolar I, recurrent manic episode, severe with psychotic behavior (H)     Cannabis use disorder, severe, dependence (H)     Cigarette nicotine dependence with withdrawal     Moderate benzodiazepine use disorder (H)     Cocaine use disorder, moderate, dependence (H)     Manic behavior (H)     Acute psychosis (H)     Suicide ideation               Work Completed: FLORIAN (writer) completed initial psychosocial assessment with pt. CTC spoke via VM with CM to get collateral on pt's case, updating IPA as necessary. CTC started discussing discharge plan with pt and reviewed current supports in place. CTC will need to discuss discharge plan with attending to start coming up with a more concrete plan. CTC completed team note.    Discharge plan or goal:  Pt currently is being considered for discharge to an IRTS facility, which is supported by his CM.                Barriers to discharge: Pt is currently endorsing acute psychotic symptoms and does not feel stable enough to discharge at this time. Pt is also homeless and his commitment status is not clear to trx team, both of which limit pt ability to be discharge from the hospital.
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
## 2020-04-21 NOTE — PLAN OF CARE
"Patient was awake for breakfast today. He didn't have morning medication but had a meeting with the psychiatrist. He spent time eitheer in the milieu or in his room but has been socially withdrawn. He denies SI, SIB and rates his anxiety at \"level 4\". He said his hallucinations are reduced. But he hears some voices but \"it is not like last night.\" per patient. He is currently in the lounge watching TV. RN will continue to monitor.  "

## 2020-04-21 NOTE — PROGRESS NOTES
"ADMISSION     Precaution:SI      Status: VOL     Reason for Admission:  He's 44 yr [facility].  Patient reports visual and auditory hallucinations, seeing faces and voices telling h9im to kill self.  Pt reports debilitating panic attacks.Pt has hx ofIP  Nov-DEC 2019 at [place] and 2 mon in [facility] Feb-March.         Upon arrival to the unit, pt was A/O x4. Speech was clear. Mood was anxious, affect was flat/tense.Patient endorses SI, racing thoughts,Hallucinations. He denied SIB and HI; he contracts for safety in the hospital. He stated, \" My voices have gotten really bad over the past couple week and they refused to send me to the hospital because of covid. I'm really anxious and feel panicky\". He looks preoccupied in his own thoughts. Patient endorses depression and anxiety but unable to rate. Skin is intact. Writer was unable to complete a full admission because he was eager to take a shower. He refused to wear his wrist bands so he currently doesn't have one on.  Plan is to monitor patient status q 15 mins, assess response to medications, and maintain the patients safety.  "

## 2020-04-21 NOTE — PLAN OF CARE
BEHAVIORAL TEAM DISCUSSION    Participants: Dr. Swetha ESTEBAN, Huy RN, Joey Hu LPC  Progress: New Admit  Anticipated length of stay: 7 days, to be reevaluated upon assessment  Continued Stay Criteria/Rationale: evaluation and assessment   Medical/Physical: None reported in team  Precautions:   Behavioral Orders   Procedures    Code 1 - Restrict to Unit    Routine Programming     As clinically indicated    Single Room    Status 15     Every 15 minutes.    Suicide precautions     Patients on Suicide Precautions should have a Combination Diet ordered that includes a Diet selection(s) AND a Behavioral Tray selection for Safe Tray - with utensils, or Safe Tray - NO utensils       Plan: Continue evaluation and assessment for stabilization and aftercare needs.  Rationale for change in precautions or plan: None

## 2020-04-22 PROCEDURE — 12400001 ZZH R&B MH UMMC

## 2020-04-22 PROCEDURE — 99232 SBSQ HOSP IP/OBS MODERATE 35: CPT | Mod: 95 | Performed by: PSYCHIATRY & NEUROLOGY

## 2020-04-22 PROCEDURE — 25000132 ZZH RX MED GY IP 250 OP 250 PS 637: Performed by: NURSE PRACTITIONER

## 2020-04-22 PROCEDURE — 25000132 ZZH RX MED GY IP 250 OP 250 PS 637: Performed by: PSYCHIATRY & NEUROLOGY

## 2020-04-22 RX ADMIN — OLANZAPINE 30 MG: 15 TABLET, ORALLY DISINTEGRATING ORAL at 21:10

## 2020-04-22 RX ADMIN — ZIPRASIDONE HCL 20 MG: 20 CAPSULE ORAL at 08:57

## 2020-04-22 RX ADMIN — NICOTINE 1 PATCH: 14 PATCH, EXTENDED RELEASE TRANSDERMAL at 08:57

## 2020-04-22 RX ADMIN — ZIPRASIDONE HCL 20 MG: 20 CAPSULE ORAL at 17:43

## 2020-04-22 ASSESSMENT — ACTIVITIES OF DAILY LIVING (ADL)
LAUNDRY: WITH SUPERVISION
LAUNDRY: WITH SUPERVISION
DRESS: INDEPENDENT
HYGIENE/GROOMING: INDEPENDENT
DRESS: SCRUBS (BEHAVIORAL HEALTH)
HYGIENE/GROOMING: INDEPENDENT
ORAL_HYGIENE: INDEPENDENT
ORAL_HYGIENE: INDEPENDENT

## 2020-04-22 NOTE — PROGRESS NOTES
The patient is a  who is being evaluated via a video billable telemedicine visit due to Covid-19 epidemy. The patient/guardian has consented to being seen via telemedicine. The provider was in front of a computer in a home office. The patient was on the inpatient unit at [facility].      Start time: 12:40  Stop time: 13:50     The patient/guardian has been notified of the following:   This telemedicine visit is conducted live between you and your clinician. We have found that certain health care needs can be provided without the need for a physical exam. This service lets us provide the care you need with a telemedicine conversation.

## 2020-04-22 NOTE — PROGRESS NOTES
04/21/20 2133   General Information   Date Initially Attended OT 04/21/20     OT Groups Attended: Mental Health Management     Affect/Hygiene/Presentation: Calm mood, engaged, blunted affect. No apparent hygiene concerns. Of note, writer observed Pt to have cold symptoms (cough, congestion, sneezing). Writer notified Pt's RN, will continue to assess.     Patient Performance/Response: Pt actively participated in a mental health management group with a focus on coping through movement to facilitate relaxation and stress management via chair yoga. Pt followed and engaged in the yoga poses, and verbalized feeling calmer at the end of group. Pt contributed at least one idea to a discussion at the end of group regarding the benefits of exercise, stretching, and deep breathing.     Plan: More information needed to complete OT Initial Assessment; OT staff will meet with pt to review the role of occupational therapy and explain the value of having them involved in their treatment plan including options to meet current needs/self-identified goals. As group attendance is established, Pt will be given self-assessment to inform OT initial assessment.
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
## 2020-04-22 NOTE — PROGRESS NOTES
Diagnosis/Procedure:   Patient Active Problem List   Diagnosis     Bipolar I, recurrent manic episode, severe with psychotic behavior (H)     Cannabis use disorder, severe, dependence (H)     Cigarette nicotine dependence with withdrawal     Moderate benzodiazepine use disorder (H)     Cocaine use disorder, moderate, dependence (H)     Manic behavior (H)     Acute psychosis (H)     Suicide ideation               Work Completed: FLORIAN (writer) spoke with pt's CM today and confirmed discharge plan for an IRTS placement. CTC was also informed that pt's Provisional Discharge would NOT be revoked at this time. CM also confirmed that they were in agreement with pt not returning to [facility] and agreed with pt's assessment that it wouldn't be a good fit. [facility] encouraged CM to also make referrals as well if they thought of any place that would be a good fit for pt's needs. [facility] also agreed to refer pt to both [facility] and [facility] IRTS. CM confirmed that pt was unable to return to transfer Home, but should be able to attend their other IRTS locations. CM reported they would send most recent Committment and Frausto paperwork to [facility] today.     CTC met with pt and discussed placement options. Pt sighned RAPHAEL's for both [facility] and [facility] made referrals to all [facility], except transfer house, and to both [facility] and [facility] IRTS.     CTC did not receive Commitment and Frausto paperwork by end of day. CTC will reach out to CM again if they are not on their desk when the return in the morning.    Discharge plan or goal: Pt will be discharge to an IRTS if possible.                Barriers to discharge: Pt has a history of being Banned for community programs due to behaviors. Pt is currently Homeless, which can make placement options for aftercare challenging. Pt reports experiencing acute psychotic symptoms at this time.

## 2020-04-22 NOTE — PROGRESS NOTES
"[facility]   Psychiatric Progress Note        Interim History:     The patient's care was discussed with the treatment team during the daily team meeting and/or staff's chart notes were reviewed.  Staff report patient spent a lot of time in his room, napping. Got out to eat and take meds, watched TV. Was, overall,, pleasant and cooperative. Denied presence of Suicidal ideation, Homicidal thoughts, Auditory hallucinations and Visual hallucinations. Per CTC's note patient's CM was contacted, informed CTC that patient's PD was not going to be revoked. CM was in an agreement that [facility] program was not a good fit for [place] and didn't insist that he would return there. Referrals were made by both CTC and CM to different IRTS facilities.    The patient is a  who is being evaluated via a video billable telemedicine visit due to Covid-19 epidemy. The patient/guardian has consented to being seen via telemedicine. The provider was in front of a computer in a home office. The patient was on the inpatient unit at [facility].      Start time: 12:30.  Stop time: 12:45    He was seen today in presence of Psych associate. Patient was pleasant and cooperative. Denied Auditory hallucinations. When asked about Visual hallucinations, reported having them: \"a little. I saw some faces yesterday\". Said that he didn't have any panic attacks since admission and was happy about that. Contracted for safety. He met with [facility], confirmed during his visit that he was interested to go to IRTS. He denied having any side effects and had no questions or concerns.        The patient/guardian has been notified of the following:   This telemedicine visit is conducted live between you and your clinician. We have found that certain health care needs can be provided without the need for a physical exam. This service lets us provide the care you need with a telemedicine conversation.                Medications: "       nicotine  1 patch Transdermal Daily     nicotine   Transdermal Q8H     OLANZapine zydis  30 mg Oral At Bedtime     ziprasidone  20 mg Oral BID w/meals          Allergies:     Allergies   Allergen Reactions     Benztropine      Other reaction(s): Seizures  Patient reported  Patient reported       Abilify Discmelt      Suicidal thoughts       Aripiprazole Other (See Comments)     Other reaction(s): Unknown  Suicidal thoughts  Suicidal thoughts       Benzyl Alcohol      Other reaction(s): Unknown     Compazine Other (See Comments)     Seizure      Haloperidol Other (See Comments)     Other reaction(s): Dystonia  Patient had EPS with haldol  Patient had EPS with haldol  Patient had EPS with haldol       Phenothiazines Other (See Comments)     Other reaction(s): Dystonia, Muscle Aches/Weakness, Muscle Cramps, Seizures  Seizures  Patient report  Seizures            Labs:   No results found for this or any previous visit (from the past 24 hour(s)).       Psychiatric Examination:     /82   Pulse 109   Temp 97.4  F (36.3  C) (Oral)   Resp 16   SpO2 100%   Weight is 0 lbs 0 oz  There is no height or weight on file to calculate BMI.  Orthostatic Vitals       Most Recent      Sitting Orthostatic /82 04/22 1618    Sitting Orthostatic Pulse (bpm) 109 04/22 1618    Standing Orthostatic /81 04/21 0804    Standing Orthostatic Pulse (bpm) 94 04/21 0804           Appearance: in hospital pants and sweatshirt, disheveled.  Attitude: cooperative  Eye Contact: fair  Mood: sad, anxious  Affect: restricted  Speech: slow    Throught Process: linear, organized  Associations:  sequential    Thought Content: reports Visual hallucinations   Insight:  partial  Judgement: fair  Oriented to: x 3  Attention Span and Concentration:  Moderately impaired  Recent and Remote Memory:  good    Clinical Global Impressions  First: 6/4 4/21/2020      Most recent:            Precautions:     Behavioral Orders   Procedures     Code 1  - Restrict to Unit     Routine Programming     As clinically indicated     Single Room     Status 15     Every 15 minutes.     Suicide precautions     Patients on Suicide Precautions should have a Combination Diet ordered that includes a Diet selection(s) AND a Behavioral Tray selection for Safe Tray - with utensils, or Safe Tray - NO utensils            DIagnoses:     1.  Bipolar affective disorder,   2.  Polysubstance dependence in early full remission in controlled environment  (stimulants, cannabis).     3.  Historical diagnosis of posttraumatic stress disorder.   4.  Attention deficit hyperactivity disorder.     5.  Unspecified anxiety disorder.          Plan:   Slowly improving on combination of Geodon and Zyprexa Zydis. Will titrate Geodon up based on symptoms. Was referred to number of IRTS. Due to still active symptoms, being homeless and high risk for readmission needs to continue hospitalization.

## 2020-04-22 NOTE — PROGRESS NOTES
Patient is calm and cooperative. He has been in his room sleeping most of the shift. He came out for meals and to watch tv for a little bit. He denies SI/SIB and rated anxiety and depression at 4. He said he is not hearing any voices today. He has a good appetite and has been sleeping well. No other concerns at this time.

## 2020-04-23 PROCEDURE — 25000132 ZZH RX MED GY IP 250 OP 250 PS 637: Performed by: PSYCHIATRY & NEUROLOGY

## 2020-04-23 PROCEDURE — 25000132 ZZH RX MED GY IP 250 OP 250 PS 637: Performed by: NURSE PRACTITIONER

## 2020-04-23 PROCEDURE — 99231 SBSQ HOSP IP/OBS SF/LOW 25: CPT | Mod: 95 | Performed by: PSYCHIATRY & NEUROLOGY

## 2020-04-23 PROCEDURE — 12400001 ZZH R&B MH UMMC

## 2020-04-23 RX ADMIN — OLANZAPINE 30 MG: 15 TABLET, ORALLY DISINTEGRATING ORAL at 21:30

## 2020-04-23 RX ADMIN — HYDROXYZINE HYDROCHLORIDE 50 MG: 50 TABLET, FILM COATED ORAL at 20:43

## 2020-04-23 RX ADMIN — OLANZAPINE 30 MG: 15 TABLET, ORALLY DISINTEGRATING ORAL at 20:43

## 2020-04-23 RX ADMIN — ZIPRASIDONE HCL 20 MG: 20 CAPSULE ORAL at 20:44

## 2020-04-23 RX ADMIN — GABAPENTIN 300 MG: 300 CAPSULE ORAL at 20:44

## 2020-04-23 RX ADMIN — DIPHENHYDRAMINE HYDROCHLORIDE 50 MG: 25 CAPSULE ORAL at 21:21

## 2020-04-23 RX ADMIN — NICOTINE POLACRILEX 4 MG: 2 GUM, CHEWING BUCCAL at 15:23

## 2020-04-23 RX ADMIN — NICOTINE 1 PATCH: 14 PATCH, EXTENDED RELEASE TRANSDERMAL at 08:51

## 2020-04-23 RX ADMIN — ZIPRASIDONE HCL 20 MG: 20 CAPSULE ORAL at 08:51

## 2020-04-23 RX ADMIN — ZIPRASIDONE HCL 20 MG: 20 CAPSULE ORAL at 18:08

## 2020-04-23 RX ADMIN — TRAZODONE HYDROCHLORIDE 50 MG: 50 TABLET ORAL at 21:21

## 2020-04-23 ASSESSMENT — ACTIVITIES OF DAILY LIVING (ADL)
ORAL_HYGIENE: INDEPENDENT
HYGIENE/GROOMING: INDEPENDENT
DRESS: SCRUBS (BEHAVIORAL HEALTH)
LAUNDRY: WITH SUPERVISION

## 2020-04-23 NOTE — PROGRESS NOTES
"Pt was was active in the milieu but not social with peers or staff but he did talk with staff checking in and did have a somewhat brighter affect then he has in the past.  His affect is flat and mood is calm.  Pt denied all mental health symptoms and SI/SIB but depression and anxiety.  He rated depression 3/4, \"I'm not ok with my current situation right now,\" and anxiety 5, and he did reported that he wasn't having VH and AH at that moment.  Pt reported that when he has panic attacks is when he experiences AH and VH.      Pt is independent with his ADL's and concentration is fine.  He reported appetite and sleep are fine, and no pain or SE's.      Staff will continue to monitor.       04/23/20 1824   Behavioral Health   Hallucinations auditory;visual  (not now; due to panic attacks)   Thinking poor concentration   Orientation time: oriented;date: oriented;place: oriented;person: oriented   Memory baseline memory   Insight denial of illness   Judgement impaired   Eye Contact at examiner   Affect blunted, flat   Mood mood is calm   Physical Appearance/Attire attire appropriate to age and situation;appears stated age   Hygiene well groomed   Suicidality other (see comments)  (pt denies)   1. Wish to be Dead (Recent) No   2. Non-Specific Active Suicidal Thoughts (Recent) No   3. Active Sucidal Ideation with any Methods (Not Plan) Without Intent to Act (Recent) No   4. Active Suicidal Ideation with Some Intent to Act, Without Specific Plan (Recent) No   5. Active Suicidal Ideation with Specific Plan and Intent (Recent) No   Self Injury   (pt denies)   Elopement   (shows no signs)   Activity withdrawn  (active in milieu; not social with peers/staff)   Speech coherent;clear   Medication Sensitivity no observed side effects   Psychomotor / Gait steady;balanced   Activities of Daily Living   Hygiene/Grooming independent   Oral Hygiene independent   Dress scrubs (behavioral health)   Laundry with supervision   Room " Organization independent

## 2020-04-23 NOTE — PROGRESS NOTES
Diagnosis/Procedure:   Patient Active Problem List   Diagnosis     Bipolar I, recurrent manic episode, severe with psychotic behavior (H)     Cannabis use disorder, severe, dependence (H)     Cigarette nicotine dependence with withdrawal     Moderate benzodiazepine use disorder (H)     Cocaine use disorder, moderate, dependence (H)     Manic behavior (H)     Acute psychosis (H)     Suicide ideation               Work Completed: [facility] (writer) received a call from pt's , April [phone], and discussed potential discharge placement. TL informed [facility] that there were available spots at [place] currently and coming up soon. TL also said she had reached out to their director, Jaret, to start the process. [facility] informed TL that they had already made a referral to [facility] through their central process, and would be happy to talk with Jaret as well. CTC and April discussed pt's insurance and confirmed that pt MA had lapsed at the end of January 2020. [facility] spoke with financial counseling that will look into it and [facility] will contact them tomorrow to discuss it further. [facility] spoke with clinical director, Jaret, at [facility] today and confirmed that their soonest opening would be 5/1/2020. Jaret will interview pt tomorrow at 10am, and will need MA reinstated before they can accept him. [facility] answered some preliminary questions and will check in with Jaret tomorrow after the interview. [facility] updated pt.     [facility] was told by outpatient team that pt will need a new provider for medication management. Outpatient team was fine with either [facility], or [facility].    Discharge plan or goal: Pt will be discharge to an IRTS if possible.                 Barriers to discharge: Pt has a history of being Banned for community programs due to behaviors. Pt is currently Homeless, which can make placement options for aftercare challenging. Pt reports experiencing acute psychotic symptoms at this  time.

## 2020-04-23 NOTE — PLAN OF CARE
Patient spent morning hours in his room. He came out for meals and medications. He sat in the milieu for a short period of time after lunch and watched television.  No interaction with peers. Patient's affect is blunted, flat. Mood is calm. He reports a little bit of depression and anxiety, rated both at 3 out of 10. He denies SI/SIB/AH/VH/HI. Concentration is poor. Sleep is adequate. Appetite is good. Staff will continue to monitor.

## 2020-04-23 NOTE — PROGRESS NOTES
Pt presents with blunted, flat affect, calm mood. Denies SI/SIB and hallucinations. Spent the beginning of the shift in his room, but did come out to the lounge for the majority of the evening to watch television, did not interact with peers. Med complaint, no PRNs administered, VSS. No other concerns or complaints.

## 2020-04-24 VITALS
RESPIRATION RATE: 16 BRPM | OXYGEN SATURATION: 100 % | DIASTOLIC BLOOD PRESSURE: 83 MMHG | TEMPERATURE: 98.3 F | HEART RATE: 112 BPM | WEIGHT: 188.4 LBS | SYSTOLIC BLOOD PRESSURE: 138 MMHG | BODY MASS INDEX: 30.41 KG/M2

## 2020-04-24 PROCEDURE — 99239 HOSP IP/OBS DSCHRG MGMT >30: CPT | Mod: 95 | Performed by: PSYCHIATRY & NEUROLOGY

## 2020-04-24 PROCEDURE — 25000132 ZZH RX MED GY IP 250 OP 250 PS 637: Performed by: PSYCHIATRY & NEUROLOGY

## 2020-04-24 RX ORDER — EMOLLIENT BASE
CREAM (GRAM) TOPICAL EVERY 6 HOURS PRN
Qty: 90 G | Refills: 0 | Status: SHIPPED | OUTPATIENT
Start: 2020-04-24 | End: 2021-04-05

## 2020-04-24 RX ORDER — HYDROXYZINE HYDROCHLORIDE 50 MG/1
50 TABLET, FILM COATED ORAL EVERY 4 HOURS PRN
Qty: 60 TABLET | Refills: 0 | Status: SHIPPED | OUTPATIENT
Start: 2020-04-24 | End: 2021-04-05

## 2020-04-24 RX ORDER — GABAPENTIN 300 MG/1
300 CAPSULE ORAL 3 TIMES DAILY PRN
Qty: 60 CAPSULE | Refills: 0 | Status: SHIPPED | OUTPATIENT
Start: 2020-04-24 | End: 2021-04-05

## 2020-04-24 RX ORDER — OLANZAPINE 15 MG/1
30 TABLET, ORALLY DISINTEGRATING ORAL AT BEDTIME
Qty: 30 TABLET | Refills: 0 | Status: SHIPPED | OUTPATIENT
Start: 2020-04-24 | End: 2021-04-05

## 2020-04-24 RX ORDER — ZIPRASIDONE HYDROCHLORIDE 40 MG/1
40 CAPSULE ORAL 2 TIMES DAILY WITH MEALS
Qty: 60 CAPSULE | Refills: 0 | Status: SHIPPED | OUTPATIENT
Start: 2020-04-24 | End: 2021-04-05

## 2020-04-24 RX ORDER — MIRTAZAPINE 15 MG/1
15 TABLET, FILM COATED ORAL
Qty: 30 TABLET | Refills: 0 | Status: SHIPPED | OUTPATIENT
Start: 2020-04-24 | End: 2021-04-05

## 2020-04-24 RX ORDER — ZIPRASIDONE HYDROCHLORIDE 40 MG/1
40 CAPSULE ORAL 2 TIMES DAILY WITH MEALS
Status: DISCONTINUED | OUTPATIENT
Start: 2020-04-24 | End: 2020-04-24 | Stop reason: HOSPADM

## 2020-04-24 RX ADMIN — NICOTINE 1 PATCH: 14 PATCH, EXTENDED RELEASE TRANSDERMAL at 08:18

## 2020-04-24 RX ADMIN — ZIPRASIDONE HYDROCHLORIDE 40 MG: 40 CAPSULE ORAL at 16:08

## 2020-04-24 RX ADMIN — ZIPRASIDONE HCL 20 MG: 20 CAPSULE ORAL at 08:18

## 2020-04-24 ASSESSMENT — ACTIVITIES OF DAILY LIVING (ADL)
HYGIENE/GROOMING: INDEPENDENT
DRESS: INDEPENDENT
ORAL_HYGIENE: INDEPENDENT
DRESS: INDEPENDENT
HYGIENE/GROOMING: INDEPENDENT
ORAL_HYGIENE: INDEPENDENT
LAUNDRY: WITH SUPERVISION
LAUNDRY: WITH SUPERVISION

## 2020-04-24 NOTE — PROGRESS NOTES
Patient is clam and cooperative. He has been in the lounge watching tv all morning. He said his goal was to complete an IRTS interview today. He has not been going to groups and is not social with others. He is fairly hopeful. He rated his anxiety and depression at 3-4. He denies SI/SIB. He has been eating and sleeping well. He denied morning vitals but did get his temp taken. He does not have any concerns at this time. Nothing else to mention at this time.

## 2020-04-24 NOTE — PROGRESS NOTES
[facility]   Psychiatric Progress Note        Interim History:     The patient's care was discussed with the treatment team during the daily team meeting and/or staff's chart notes were reviewed.  Staff report patient spent more time outside but had limited interactions with other patients and staff. Was, overall,, pleasant and cooperative. Denied presence of Suicidal ideation, Homicidal thoughts, Auditory hallucinations and Visual hallucinations. Cooperated with meds and denied having any side effects.     Per CTC's note patient's CM was contacted, informed CTC that patient's PD was not going to be revoked. CM was in an agreement that [facility] program was not a good fit for [place] and didn't insist that he would return there. Referrals were made by both CTC and CM to different IRTS facilities.    The patient is a  who is being evaluated via a video billable telemedicine visit due to Covid-19 epidemy. The patient/guardian has consented to being seen via telemedicine. The provider was in front of a computer in a home office. The patient was on the inpatient unit at [facility].      Start time: 12:40.  Stop time: 12:55    He was seen today in presence of RN. Patient was pleasant and cooperative. Denied both AH and VH, didn't voice any delusional ideas. Said that he didn't have any panic attacks since admission and was happy about that. Contracted for safety. He was also happy to hear that he was referred to multiple IRTS. He denied having any side effects and had no questions or concerns.        The patient/guardian has been notified of the following:   This telemedicine visit is conducted live between you and your clinician. We have found that certain health care needs can be provided without the need for a physical exam. This service lets us provide the care you need with a telemedicine conversation.                Medications:       nicotine  1 patch Transdermal Daily      nicotine   Transdermal Q8H     OLANZapine zydis  30 mg Oral At Bedtime     ziprasidone  20 mg Oral BID w/meals          Allergies:     Allergies   Allergen Reactions     Benztropine      Other reaction(s): Seizures  Patient reported  Patient reported       Abilify Discmelt      Suicidal thoughts       Aripiprazole Other (See Comments)     Other reaction(s): Unknown  Suicidal thoughts  Suicidal thoughts       Benzyl Alcohol      Other reaction(s): Unknown     Compazine Other (See Comments)     Seizure      Haloperidol Other (See Comments)     Other reaction(s): Dystonia  Patient had EPS with haldol  Patient had EPS with haldol  Patient had EPS with haldol       Phenothiazines Other (See Comments)     Other reaction(s): Dystonia, Muscle Aches/Weakness, Muscle Cramps, Seizures  Seizures  Patient report  Seizures            Labs:   No results found for this or any previous visit (from the past 24 hour(s)).       Psychiatric Examination:     /83   Pulse 101   Temp 98.4  F (36.9  C) (Oral)   Resp 16   Wt 85.5 kg (188 lb 6.4 oz)   SpO2 100%   BMI 30.41 kg/m    Weight is 188 lbs 6.4 oz  Body mass index is 30.41 kg/m .     Orthostatic Vitals       Most Recent      Sitting Orthostatic /83 04/23 1618    Sitting Orthostatic Pulse (bpm) 101 04/23 1618    Standing Orthostatic /82 04/23 0853    Standing Orthostatic Pulse (bpm) 116 04/23 0853         Appearance: in hospital pants and sweatshirt, disheveled.  Attitude: cooperative  Eye Contact: fair  Mood: less sad, anxious  Affect: restricted  Speech: slow    Throught Process: linear, organized  Associations:  sequential    Thought Content: reports Visual hallucinations   Insight:  partial  Judgement: fair  Oriented to: x 3  Attention Span and Concentration:  Moderately impaired  Recent and Remote Memory:  good    Clinical Global Impressions  First: 6/4 4/21/2020      Most recent:            Precautions:     Behavioral Orders   Procedures     Code 1 -  Restrict to Unit     Routine Programming     As clinically indicated     Single Room     Status 15     Every 15 minutes.     Suicide precautions     Patients on Suicide Precautions should have a Combination Diet ordered that includes a Diet selection(s) AND a Behavioral Tray selection for Safe Tray - with utensils, or Safe Tray - NO utensils            DIagnoses:     1.  Bipolar affective disorder,   2.  Polysubstance dependence in early full remission in controlled environment  (stimulants, cannabis).     3.  Historical diagnosis of posttraumatic stress disorder.   4.  Attention deficit hyperactivity disorder.     5.  Unspecified anxiety disorder.          Plan:   Slowly improving on combination of Geodon and Zyprexa Zydis. No medication changes today. Was referred to number of IRTS. Despite some improvement in symptoms, he is homeless, vulnerable and high risk for readmission needs to continue hospitalization.

## 2020-04-24 NOTE — PROVIDER NOTIFICATION
Discharge Note      Discharge instructions reviewed with patient and paper works signed. Pt left with all of his medications and belongings. He denied anxiety, depressive symptoms, suicidal and homocidal ideations.

## 2020-04-24 NOTE — PROGRESS NOTES
Diagnosis/Procedure:   Patient Active Problem List   Diagnosis     Bipolar I, recurrent manic episode, severe with psychotic behavior (H)     Cannabis use disorder, severe, dependence (H)     Cigarette nicotine dependence with withdrawal     Moderate benzodiazepine use disorder (H)     Cocaine use disorder, moderate, dependence (H)     Manic behavior (H)     Acute psychosis (H)     Suicide ideation               Work Completed: FLORIAN (writer) met with pt and discussed his discharge plan. Pt requested to discharge today, and was informed that trx team recommended he stay until a more secure discharge plan could be made. After discussing it at length throughout the day, pt determined he would still like to discharge. CTC discussed the pt's case with attending and it was determined that pt would be able to discharge if he wanted to, but it would be AMA. This was explained to patient as well. CTC did attempt to explain to pt why the trx team recommended that he remain in the hospital, and pt verbalized understanding. When asked where pt would be staying post discharge, pt reported he would stay with friends in [place] but was unable to provide details. Pt eventually admitted that if he was unable to stay with friends that he planned to stay at a shelter. Pt was provided community resources and follow-up options in his AVS, along with contact information he requested.     CTC informed outpatient care team about the situation and pt decision to discharge AMA. CM confirmed that pt had called them and informed them of this as well. CM agreed that there was no justification to force pt to remain in the hospital at this time and that if pt wanted to discharge then they would be amenable to that. [facility] did forward referral information for the [place] [facility] to CM and [facility] team as well, for them to follow-up with as per pt's request.     [facility] did coordinate with financial counselor to meet with pt today, via phone, to complete  paperwork to start process of having pt's MA reactivated; which was confirmed to be completed.     [facility] talked with [facility], and confirmed that pt had his interview today. It was reported that the interview went well and that pt would be accepted. They were hoping to admit pt sometime next week as soon as they have an open bed. [facility] was unable to follow-up on this due to pt discharging AMA.    Discharge plan or goal:  Pt was discharged AMA, pt states he plans to stay with friends or at a shelter while waiting to admit to an IRTS.                Barriers to discharge: None.

## 2020-04-24 NOTE — DISCHARGE INSTRUCTIONS
Behavioral Discharge Planning and Instructions      Summary:  You were admitted on 4/20/2020  due to Psychotic Symptomology and Panic Attacks.  You were treated by Dr. Wang Posada MD and discharged on 04/24/2020 from [facility] to Shelter pt is planning on going to a shelt if he is unable to stay with friends.      Principal Diagnosis:    Bipolar affective disorder,   Polysubstance dependence in early full remission in controlled environment  (stimulants, cannabis).     Historical diagnosis of posttraumatic stress disorder.   Attention deficit hyperactivity disorder.     Unspecified anxiety disorder.     Health Care Follow-up Appointments:   At the time of your discharge you left AMA and due to the short time period, additional aftercare appointments could not be made. You are strongly encouraged to work with your [facility] team to schedule additional follow-up appointments, their contact info is listed bellow. Two locations that can provide medication management services, and other mental health services/resources, are listed bellow for you and your teams convenience. A referral to [facility] was made prior to your discharge and their contact information is listed bellow as well. The Referral information was forwarded to your , Jett Hernandez, at your request prior to your discharge. Shelter information is also listed bellow in case you need it.     [facility]  Address: [address], [place] [address]  Phone: [phone]     [facility]  Address: [address] [place] [address]  Phone: [phone]   Fax: [phone]     [facility] Referral  Provider: Jaret  Address: [address] [place] [id number]  Phone: [phone]    [facility] Team Contacts  Jett Hernandez: [phone] or [phone] ext. [phone]   April: [phone]    Adult Shelter Connect  Individuals seeking shelter in [facility] need to  visit the Adult Shelter [facility] for an assessment and placement at one of the [facility].    Location:  [address]  Phone: [phone]  After hours: [phone]  Hours:  Monday-Friday 10:00am-5:00pm  Weekends and Holidays 1:00pm-5:00pm    Adult [facility]  For immediate shelter call the [facility] at [phone].  Individuals 25 years old and older need to complete a housing assessment for long-term housing options, contact a housing  at [phone] or [facility] to schedule a homeless assessment appointment by phone [phone]  Walk-in basis homeless assessment hours and locations are:    Mondays, 12:30 p.m. - 3 p.m. at [facility]  [place], [address] [address]    Tuesdays, 1 p.m. - 4:30 p.m. at [facility] [address]    Thursdays, 2 p.m. - 4:30 p.m. at [facility], [address] [address]    Attend all scheduled appointments with your outpatient providers. Call at least 24 hours in advance if you need to reschedule an appointment to ensure continued access to your outpatient providers.   Major Treatments, Procedures and Findings:  You were provided with: a psychiatric assessment, assessed for medical stability, medication evaluation and/or management, group therapy and milieu management    Symptoms to Report: feeling more aggressive, increased confusion, losing more sleep, mood getting worse, thoughts of suicide or increased urges to use    Early warning signs can include: increased depression or anxiety sleep disturbances increased thoughts or behaviors of suicide or self-harm  increased unusual thinking, such as paranoia or hearing voices or increased urges to use    Safety and Wellness:  Take all medicines as directed.  Make no changes unless your doctor suggests them.     "Follow treatment recommendations.  Refrain from alcohol and non-prescribed drugs.  If there is a concern for safety, call 911.    Resources:   Crisis Intervention: 974.152.2682 or 674-599-7872 (TTY: 665.128.4951).  Call anytime for help.  National [place] on Mental Illness (www.[place].luis.org): 553.547.9531 or 576-491-9689.  Alcoholics Anonymous (www.alcoholics-anonymous.org): Check your phone book for your local chapter.  Suicide Awareness Voices of Education (SAVE) (www.save.org): 453-982-UTCR (9273)  National Suicide Prevention Line (www.mentalhealthmn.org): 146-954-ZSLT (9279)  Mental Health Consumer/Survivor Network of [place] (www.mhcsn.net): 920.138.9440 or 417-497-0896  Mental Health Association of [place] (www.mentalhealth.org): 128.827.6188 or 718-369-1905  Self- Management and Recovery Training., SMART-- Toll free: 348.879.4901  www.[place].org  [facility] Crisis (COPE) Response - Adult 446 788-1067  [facility] Crisis Response - Adult [phone]  Text 4 Life: txt \"LIFE\" to 32232 for immediate support and crisis intervention      The treatment team has appreciated the opportunity to work with you.     If you have any questions or concerns our unit number is [phone]  "
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
## 2020-04-26 ENCOUNTER — COMMUNICATION - HEALTHEAST (OUTPATIENT)
Dept: BEHAVIORAL HEALTH | Facility: CLINIC | Age: 45
End: 2020-04-26

## 2020-05-19 NOTE — DISCHARGE INSTRUCTIONS
Please make an appointment to follow up with Your Primary Care Provider in 1 days.     - DVT ppx: HSQ  - Diet: DASH/TLC  - Prolonged QTc: QTc 491 ms. Monitor electrolytes and replete PRN. Minimize use of QT prolonging agents if possible. Has ICD but pending interrogation. Monitor QTc with serial EKGs.

## 2020-08-29 NOTE — PLAN OF CARE
"Problem: Psychotic Symptoms  Goal: Psychotic Symptoms  Signs and symptoms of listed problems will be absent or manageable.   Outcome: Adequate for Discharge Date Met:  10/10/17  \"I'm very angry about this.  I don't think I needed to be here and now I have to agree to let them in to watch me take the poison pill every day.\"  Continues to maintain was experiencing \"a panic attack\" and was brought to the hospital due to this.  States taking Risperdal is causing him to feel depressed.  Denies suicidal ideation, states the medication will most likely cause this as well.  Indicates is feeling anxious.  States concentration and focus are good and have been.  States Risperdal is causing nightmares which affect his ability to sleep as wakes frequently and has difficulty falling back to sleep again.  States is experiencing erectile dysfunction and this is \"affecting my entire life.  I didn't need to take the Risperdal, I was doing fine on the Lithium\".  Will continue to work with outpatient psychiatrist.  Has a meeting scheduled with ACT team at noon today.  States mother will also be present and that she is supportive of him.  Was up for breakfast then returned to room.  Has come out to lounge briefly then returns to room stating wants to stay in room as is angry about \"being forced\" to work with ACT team.      " unable to assess

## 2020-10-14 NOTE — PROGRESS NOTES
SUBJECTIVE:  Tyler Case, a 44 year old male, here today for an appointment to establish care and to discuss the following issues:    Genitourinary - Male  Onset: several years; has noticed erectile dysfunction since started Risperdal. He has discontinued the risperdal but is still experiencing symptoms. He has tried viagra with no relief. No changes to testes or penis. Has gained a lot of weight in the past several months, which he attributes to the zyprexa, but otherwise feeling well.     Description:   Dysuria (painful urination): no   Hematuria (blood in urine): no   Frequency: no   Are you urinating at night : no   Hesitancy (delay in urine): no   Retention (unable to empty): no   Decrease in urinary flow: no   Incontinence: no     Progression of Symptoms:  same    Accompanying Signs & Symptoms:  Fever: no   Back/Flank pain: no   Urethral discharge: no   Testicle lumps/masses/pain: no   Nausea and/or vomiting: no   Abdominal pain: no     History:   History of frequent UTI's: no   History of kidney stones: no   History of hernias: no   Personal or Family history of Prostate problems: no  Sexually active: no             HPI:    Where was your previous physician's office?: UNknown  Physician's Name: edgardo Marks.   Last physical: 2-3 years ago.   Pt permission to access these records? (Care Everywhere or [facility]): Yes     If patient has been seen at a [facility] before, please disregard the question below.      Past Medical History:   Diagnosis Date     Bipolar 1 disorder (H) 8/5/2013     Uses marijuana 10/17/2013       Past Surgical History:   Procedure Laterality Date     leg fractures  2002       Family History   Problem Relation Age of Onset     Diabetes Maternal Grandmother      Alcohol/Drug Maternal Grandmother      Alzheimer Disease Maternal Grandmother      Arthritis Maternal Grandmother      Depression Maternal Grandmother      Eye Disorder Maternal Grandmother      Gastrointestinal Disease Maternal  Grandmother      Neurologic Disorder Maternal Grandmother      Osteoporosis Maternal Grandmother      Psychotic Disorder Maternal Grandmother      Hypertension Paternal Grandfather      Alcohol/Drug Paternal Grandfather      Eye Disorder Paternal Grandfather      Gastrointestinal Disease Paternal Grandfather      Neurologic Disorder Paternal Grandfather      Psychotic Disorder Paternal Grandfather      Cerebrovascular Disease Paternal Grandmother      Alcohol/Drug Paternal Grandmother      Eye Disorder Paternal Grandmother      Gastrointestinal Disease Paternal Grandmother      Neurologic Disorder Paternal Grandmother      Lipids Paternal Grandmother      Osteoporosis Paternal Grandmother      Psychotic Disorder Paternal Grandmother      Alcohol/Drug Mother      Depression Mother      Eye Disorder Mother      Gastrointestinal Disease Mother      Neurologic Disorder Mother      Psychotic Disorder Mother      Alcohol/Drug Father      Depression Father      Eye Disorder Father      Gastrointestinal Disease Father      Neurologic Disorder Father      Psychotic Disorder Father      Alcohol/Drug Maternal Grandfather      Arthritis Maternal Grandfather      Depression Maternal Grandfather      Eye Disorder Maternal Grandfather      Gastrointestinal Disease Maternal Grandfather      Neurologic Disorder Maternal Grandfather      Lipids Maternal Grandfather      Psychotic Disorder Maternal Grandfather      Alcohol/Drug Sister      Alcohol/Drug Brother      Eye Disorder Sister      Eye Disorder Brother      Gastrointestinal Disease Sister      Gastrointestinal Disease Brother      Neurologic Disorder Sister      Neurologic Disorder Brother      Psychotic Disorder Sister      Psychotic Disorder Brother        Social History     Tobacco Use     Smoking status: Current Every Day Smoker     Packs/day: 0.00     Types: Cigarettes, Pipe     Smokeless tobacco: Current User     Types: Snuff, Chew   Substance Use Topics     Alcohol use:  "No       ROS:  RESP: NEGATIVE for significant cough or SOB  CV: NEGATIVE for chest pain, palpitations   GI: NEGATIVE for nausea, abdominal pain, heartburn, or change in bowel habits  : NEGATIVE for frequency, dysuria, or hematuria  MUSCULOSKELETAL: NEGATIVE for significant arthralgias or myalgia  NEURO: NEGATIVE for weakness, dizziness or paresthesias or headache  ENDOCRINE: NEGATIVE for temperature intolerance, skin/hair changes  C: NEGATIVE for fever, chills  E: NEGATIVE for vision changes   R: NEGATIVE for significant cough or SOB  M: NEGATIVE for significant arthralgias or myalgia  N: NEGATIVE for weakness, dizziness or paresthesias or headache    OBJECTIVE:    /78   Pulse 68   Temp 98  F (36.7  C) (Oral)   Ht 1.665 m (5' 5.55\")   Wt 90.5 kg (199 lb 8 oz)   SpO2 94%   BMI 32.64 kg/m      EXAM:  GENERAL APPEARANCE: healthy, alert and no distress  EYES: EOMI,  PERRL  HENT: ear canals and TM's normal and nose and mouth without ulcers or lesions  RESP: lungs clear to auscultation - no rales, rhonchi or wheezes  CV: regular rates and rhythm, normal S1 S2, no S3 or S4 and no murmur, click or rub -  ABDOMEN:  soft, nontender, no HSM or masses and bowel sounds normal    ASSESSMENT/PLAN:      ICD-10-CM    1. Class 1 obesity without serious comorbidity with body mass index (BMI) of 32.0 to 32.9 in adult, unspecified obesity type  E66.9     Z68.32    2. Screening cholesterol level  Z13.220 Lipid panel reflex to direct LDL Fasting   3. Screening for diabetes mellitus  Z13.1    4. Bipolar 1 disorder (H)  F31.9 **Comprehensive metabolic panel FUTURE anytime     CBC with platelets   5. Drug-induced erectile dysfunction  N52.2 UROLOGY ADULT REFERRAL         "

## 2020-10-15 ENCOUNTER — OFFICE VISIT (OUTPATIENT)
Dept: FAMILY MEDICINE | Facility: CLINIC | Age: 45
End: 2020-10-15
Payer: COMMERCIAL

## 2020-10-15 VITALS
HEIGHT: 66 IN | WEIGHT: 199.5 LBS | HEART RATE: 68 BPM | SYSTOLIC BLOOD PRESSURE: 106 MMHG | OXYGEN SATURATION: 94 % | DIASTOLIC BLOOD PRESSURE: 78 MMHG | BODY MASS INDEX: 32.06 KG/M2 | TEMPERATURE: 98 F

## 2020-10-15 DIAGNOSIS — E66.811 CLASS 1 OBESITY WITHOUT SERIOUS COMORBIDITY WITH BODY MASS INDEX (BMI) OF 32.0 TO 32.9 IN ADULT, UNSPECIFIED OBESITY TYPE: Primary | ICD-10-CM

## 2020-10-15 DIAGNOSIS — Z13.220 SCREENING CHOLESTEROL LEVEL: ICD-10-CM

## 2020-10-15 DIAGNOSIS — N52.2 DRUG-INDUCED ERECTILE DYSFUNCTION: ICD-10-CM

## 2020-10-15 DIAGNOSIS — F31.9 BIPOLAR 1 DISORDER (H): ICD-10-CM

## 2020-10-15 DIAGNOSIS — Z13.1 SCREENING FOR DIABETES MELLITUS: ICD-10-CM

## 2020-10-15 LAB
ALBUMIN SERPL-MCNC: 4 G/DL (ref 3.4–5)
ALP SERPL-CCNC: 121 U/L (ref 40–150)
ALT SERPL W P-5'-P-CCNC: 26 U/L (ref 0–70)
ANION GAP SERPL CALCULATED.3IONS-SCNC: 11 MMOL/L (ref 3–14)
AST SERPL W P-5'-P-CCNC: 19 U/L (ref 0–45)
BILIRUB SERPL-MCNC: 0.8 MG/DL (ref 0.2–1.3)
BUN SERPL-MCNC: 10 MG/DL (ref 7–30)
CALCIUM SERPL-MCNC: 9.7 MG/DL (ref 8.5–10.1)
CHLORIDE SERPL-SCNC: 108 MMOL/L (ref 94–109)
CHOLEST SERPL-MCNC: 258 MG/DL
CO2 SERPL-SCNC: 21 MMOL/L (ref 20–32)
CREAT SERPL-MCNC: 1.07 MG/DL (ref 0.66–1.25)
ERYTHROCYTE [DISTWIDTH] IN BLOOD BY AUTOMATED COUNT: 14.4 % (ref 10–15)
GFR SERPL CREATININE-BSD FRML MDRD: 84 ML/MIN/{1.73_M2}
GLUCOSE SERPL-MCNC: 98 MG/DL (ref 70–99)
HCT VFR BLD AUTO: 47.7 % (ref 40–53)
HDLC SERPL-MCNC: 32 MG/DL
HGB BLD-MCNC: 16.1 G/DL (ref 13.3–17.7)
LDLC SERPL CALC-MCNC: 167 MG/DL
MCH RBC QN AUTO: 28.6 PG (ref 26.5–33)
MCHC RBC AUTO-ENTMCNC: 33.8 G/DL (ref 31.5–36.5)
MCV RBC AUTO: 85 FL (ref 78–100)
NONHDLC SERPL-MCNC: 226 MG/DL
PLATELET # BLD AUTO: 186 10E9/L (ref 150–450)
POTASSIUM SERPL-SCNC: 4.3 MMOL/L (ref 3.4–5.3)
PROT SERPL-MCNC: 7.9 G/DL (ref 6.8–8.8)
RBC # BLD AUTO: 5.62 10E12/L (ref 4.4–5.9)
SODIUM SERPL-SCNC: 140 MMOL/L (ref 133–144)
TRIGL SERPL-MCNC: 293 MG/DL
WBC # BLD AUTO: 7.7 10E9/L (ref 4–11)

## 2020-10-15 PROCEDURE — 85027 COMPLETE CBC AUTOMATED: CPT | Performed by: NURSE PRACTITIONER

## 2020-10-15 PROCEDURE — 80053 COMPREHEN METABOLIC PANEL: CPT | Performed by: NURSE PRACTITIONER

## 2020-10-15 PROCEDURE — 36415 COLL VENOUS BLD VENIPUNCTURE: CPT | Performed by: NURSE PRACTITIONER

## 2020-10-15 PROCEDURE — 80061 LIPID PANEL: CPT | Performed by: NURSE PRACTITIONER

## 2020-10-15 PROCEDURE — 99203 OFFICE O/P NEW LOW 30 MIN: CPT | Performed by: NURSE PRACTITIONER

## 2020-10-15 RX ORDER — MULTIVITAMIN,THERAPEUTIC
1 TABLET ORAL DAILY
COMMUNITY
End: 2021-04-05

## 2020-10-15 ASSESSMENT — MIFFLIN-ST. JEOR: SCORE: 1735.55

## 2020-10-15 NOTE — PATIENT INSTRUCTIONS
- Consider adding body weight strength building, such as squats or pushups. Try to add in some everyday.  -Consider increasing walking to everyday to counter-act some of the weight gain.

## 2021-01-18 ENCOUNTER — VIRTUAL VISIT (OUTPATIENT)
Dept: UROLOGY | Facility: CLINIC | Age: 46
End: 2021-01-18
Payer: COMMERCIAL

## 2021-01-18 DIAGNOSIS — N52.9 ERECTILE DYSFUNCTION, UNSPECIFIED ERECTILE DYSFUNCTION TYPE: Primary | ICD-10-CM

## 2021-01-18 PROCEDURE — 99203 OFFICE O/P NEW LOW 30 MIN: CPT | Mod: 95 | Performed by: UROLOGY

## 2021-01-18 NOTE — PROGRESS NOTES
"Tyler is a 45 year old who is being evaluated via a billable video visit.      How would you like to obtain your AVS? MyChart  If the video visit is dropped, the invitation should be resent by: Text to cell phone:    Will anyone else be joining your video visit? No      Video Start Time: 1100  Assessment & Plan   Problem List Items Addressed This Visit     None      Visit Diagnoses     Erectile dysfunction, unspecified erectile dysfunction type    -  Primary                           15 minutes spent on the date of the encounter doing chart review, history and exam, documentation and further activities as noted above         Tobacco Cessation:   reports that he has been smoking cigarettes and pipe. He has been smoking about 0.00 packs per day. His smokeless tobacco use includes snuff and chew.  Tobacco Cessation Action Plan: Information offered: Patient not interested at this time    BMI:   Estimated body mass index is 32.64 kg/m  as calculated from the following:    Height as of 10/15/20: 1.665 m (5' 5.55\").    Weight as of 10/15/20: 90.5 kg (199 lb 8 oz).   Weight management plan: Patient was referred to their PCP to discuss a diet and exercise plan.           No follow-ups on file.    Hola Farley MD  [facility]    Subjective     Tyler is a 45 year old who presents to clinic today for the following health issues ED    HPI     Patient is a pleasant 45-year-old male who is request be seen by Charlene Raymond for a consultation with regard to patient's erectile dysfunction.  This problem has been going for several years.   His erection is only 2 out of 10 in rigidity.  His sex drive is good.  He has tried both Cialis and Viagra in the past without any success.  He is a smoker has been smoking for a number of years.  He is also on several psychiatric medications.  Chief Complaint   Patient presents with     Video Visit       Current Outpatient Medications   Medication Sig Dispense Refill     " emollient (VANICREAM) external cream Apply topically every 6 hours as needed for other (dry skin) 90 g 0     gabapentin (NEURONTIN) 300 MG capsule Take 1 capsule (300 mg) by mouth 3 times daily as needed (anxiety) 60 capsule 0     hydrOXYzine (ATARAX) 50 MG tablet Take 1 tablet (50 mg) by mouth every 4 hours as needed for anxiety 60 tablet 0     mirtazapine (REMERON) 15 MG tablet Take 1 tablet (15 mg) by mouth nightly as needed (sleep) 30 tablet 0     multivitamin, therapeutic (THERA-VIT) TABS tablet Take 1 tablet by mouth daily       OLANZapine zydis (ZYPREXA) 15 MG ODT Take 2 tablets (30 mg) by mouth At Bedtime 30 tablet 0     ziprasidone (GEODON) 40 MG capsule Take 1 capsule (40 mg) by mouth 2 times daily (with meals) 60 capsule 0     Allergies   Allergen Reactions     Benztropine      Other reaction(s): Seizures  Patient reported  Patient reported       Abilify Discmelt      Suicidal thoughts       Aripiprazole Other (See Comments)     Other reaction(s): Unknown  Suicidal thoughts  Suicidal thoughts       Benzyl Alcohol      Other reaction(s): Unknown     Compazine Other (See Comments)     Seizure      Haloperidol Other (See Comments)     Other reaction(s): Dystonia  Patient had EPS with haldol  Patient had EPS with haldol  Patient had EPS with haldol       Phenothiazines Other (See Comments)     Other reaction(s): Dystonia, Muscle Aches/Weakness, Muscle Cramps, Seizures  Seizures  Patient report  Seizures        Past Medical History:   Diagnosis Date     Bipolar 1 disorder (H) 8/5/2013     Uses marijuana 10/17/2013     Past Surgical History:   Procedure Laterality Date     leg fractures  2002     Family History   Problem Relation Age of Onset     Diabetes Maternal Grandmother      Alcohol/Drug Maternal Grandmother      Alzheimer Disease Maternal Grandmother      Arthritis Maternal Grandmother      Depression Maternal Grandmother      Eye Disorder Maternal Grandmother      Gastrointestinal Disease Maternal  Grandmother      Neurologic Disorder Maternal Grandmother      Osteoporosis Maternal Grandmother      Psychotic Disorder Maternal Grandmother      Hypertension Paternal Grandfather      Alcohol/Drug Paternal Grandfather      Eye Disorder Paternal Grandfather      Gastrointestinal Disease Paternal Grandfather      Neurologic Disorder Paternal Grandfather      Psychotic Disorder Paternal Grandfather      Cerebrovascular Disease Paternal Grandmother      Alcohol/Drug Paternal Grandmother      Eye Disorder Paternal Grandmother      Gastrointestinal Disease Paternal Grandmother      Neurologic Disorder Paternal Grandmother      Lipids Paternal Grandmother      Osteoporosis Paternal Grandmother      Psychotic Disorder Paternal Grandmother      Alcohol/Drug Mother      Depression Mother      Eye Disorder Mother      Gastrointestinal Disease Mother      Neurologic Disorder Mother      Psychotic Disorder Mother      Alcohol/Drug Father      Depression Father      Eye Disorder Father      Gastrointestinal Disease Father      Neurologic Disorder Father      Psychotic Disorder Father      Alcohol/Drug Maternal Grandfather      Arthritis Maternal Grandfather      Depression Maternal Grandfather      Eye Disorder Maternal Grandfather      Gastrointestinal Disease Maternal Grandfather      Neurologic Disorder Maternal Grandfather      Lipids Maternal Grandfather      Psychotic Disorder Maternal Grandfather      Alcohol/Drug Sister      Alcohol/Drug Brother      Eye Disorder Sister      Eye Disorder Brother      Gastrointestinal Disease Sister      Gastrointestinal Disease Brother      Neurologic Disorder Sister      Neurologic Disorder Brother      Psychotic Disorder Sister      Psychotic Disorder Brother      Social History     Socioeconomic History     Marital status: Single     Spouse name: Not on file     Number of children: Not on file     Years of education: Not on file     Highest education level: Not on file   Occupational  "History     Not on file   Social Needs     Financial resource strain: Not on file     Food insecurity     Worry: Not on file     Inability: Not on file     Transportation needs     Medical: Not on file     Non-medical: Not on file   Tobacco Use     Smoking status: Current Every Day Smoker     Packs/day: 0.00     Types: Cigarettes, Pipe     Smokeless tobacco: Current User     Types: Snuff, Chew   Substance and Sexual Activity     Alcohol use: No     Drug use: Yes     Types: Marijuana, Cocaine     Comment: \"any and all drugs I can get\"      Sexual activity: Yes     Partners: Female   Lifestyle     Physical activity     Days per week: Not on file     Minutes per session: Not on file     Stress: Not on file   Relationships     Social connections     Talks on phone: Not on file     Gets together: Not on file     Attends Restorationist service: Not on file     Active member of club or organization: Not on file     Attends meetings of clubs or organizations: Not on file     Relationship status: Not on file     Intimate partner violence     Fear of current or ex partner: Not on file     Emotionally abused: Not on file     Physically abused: Not on file     Forced sexual activity: Not on file   Other Topics Concern     Not on file   Social History Narrative    Natural health practicioner       REVIEW OF SYSTEMS  =================  C: NEGATIVE for fever, chills, change in weight  I: NEGATIVE for worrisome rashes, moles or lesions  E/M: NEGATIVE for ear, mouth and throat problems  R: NEGATIVE for significant cough or SHORTNESS OF BREATH  CV:  NEGATIVE for chest pain, palpitations or peripheral edema  GI: NEGATIVE for nausea, abdominal pain, heartburn, or change in bowel habits  NEURO: NEGATIVE numbness/weakness  : see HPI  PSYCH: NEGATIVE depression/anxiety  LYmph: no new enlarged lymph nodes  Ortho: no new trauma/movements    Physical Exam:     GENERAL: healthy, alert and no distress  EYES: Eyes grossly normal to inspection, " conjunctivae and sclerae normal  RESP: no audible wheeze, cough, or visible cyanosis.  No visible retractions or increased work of breathing.  Able to speak fully in complete sentences.  NEURO: Cranial nerves grossly intact, mentation intact and speech normal  PSYCH: mentation appears normal, affect normal/bright, judgement and insight intact, normal speech and appearance well-groomed    Assessment/Plan:   (N52.9) Erectile dysfunction, unspecified erectile dysfunction type  (primary encounter diagnosis)  Comment: Multifactorial however his smoking is definitely contributing to this problem.  Plan: Treatment option discussed with patient at length today.  We discussed medical management, vacuum pump, penile injection, and penile prosthesis.  Since patient has failed medical management, penile injection will be the next best option however he lacks the money resources to pay for it.  I did recommend using vacuum pump.  Surgery would be another option.    Smoking cessation: Follow-up with primary care MD    Obesity: Follow-up with primary care MD                    Video-Visit Details    Type of service:  Video Visit    Video End Time:1115    Originating Location (pt. Location): Home    Distant Location (provider location):  [facility]     Platform used for Video Visit: [employer]

## 2021-04-05 ENCOUNTER — TELEPHONE (OUTPATIENT)
Dept: BEHAVIORAL HEALTH | Facility: CLINIC | Age: 46
End: 2021-04-05

## 2021-04-05 ENCOUNTER — APPOINTMENT (OUTPATIENT)
Dept: GENERAL RADIOLOGY | Facility: CLINIC | Age: 46
DRG: 885 | End: 2021-04-05
Attending: EMERGENCY MEDICINE
Payer: COMMERCIAL

## 2021-04-05 ENCOUNTER — HOSPITAL ENCOUNTER (INPATIENT)
Facility: CLINIC | Age: 46
LOS: 17 days | Discharge: PSYCHIATRIC OR SUBSTANCE USE FACILITY | DRG: 885 | End: 2021-04-22
Attending: EMERGENCY MEDICINE | Admitting: PSYCHIATRY & NEUROLOGY
Payer: COMMERCIAL

## 2021-04-05 DIAGNOSIS — M79.5 FOREIGN BODY (FB) IN SOFT TISSUE: ICD-10-CM

## 2021-04-05 DIAGNOSIS — K13.79 MOUTH SORE: Primary | ICD-10-CM

## 2021-04-05 DIAGNOSIS — F31.9 BIPOLAR AFFECTIVE DISORDER, REMISSION STATUS UNSPECIFIED (H): ICD-10-CM

## 2021-04-05 DIAGNOSIS — S91.341A PUNCTURE WOUND OF RIGHT FOOT WITH FOREIGN BODY, INITIAL ENCOUNTER: ICD-10-CM

## 2021-04-05 DIAGNOSIS — F31.2 BIPOLAR I, RECURRENT MANIC EPISODE, SEVERE WITH PSYCHOTIC BEHAVIOR (H): ICD-10-CM

## 2021-04-05 DIAGNOSIS — Z11.52 ENCOUNTER FOR SCREENING LABORATORY TESTING FOR SEVERE ACUTE RESPIRATORY SYNDROME CORONAVIRUS 2 (SARS-COV-2): ICD-10-CM

## 2021-04-05 DIAGNOSIS — Z00.00 ROUTINE GENERAL MEDICAL EXAMINATION AT A HEALTH CARE FACILITY: ICD-10-CM

## 2021-04-05 DIAGNOSIS — S91.342A PUNCTURE WOUND OF LEFT FOOT WITH FOREIGN BODY, INITIAL ENCOUNTER: ICD-10-CM

## 2021-04-05 DIAGNOSIS — F12.10 CANNABIS ABUSE, CONTINUOUS: ICD-10-CM

## 2021-04-05 DIAGNOSIS — F12.10 MARIJUANA ABUSE: ICD-10-CM

## 2021-04-05 DIAGNOSIS — R68.2 DRY MOUTH: ICD-10-CM

## 2021-04-05 DIAGNOSIS — E03.9 HYPOTHYROIDISM, UNSPECIFIED TYPE: ICD-10-CM
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
## 2021-04-05 DIAGNOSIS — F17.200 TOBACCO USE DISORDER: ICD-10-CM

## 2021-04-05 DIAGNOSIS — K59.03 DRUG-INDUCED CONSTIPATION: ICD-10-CM

## 2021-04-05 DIAGNOSIS — F31.64 SEVERE MIXED BIPOLAR I DISORDER WITH PSYCHOTIC FEATURES (H): ICD-10-CM

## 2021-04-05 DIAGNOSIS — F29 PSYCHOSIS, UNSPECIFIED PSYCHOSIS TYPE (H): ICD-10-CM

## 2021-04-05 DIAGNOSIS — K21.9 GASTROESOPHAGEAL REFLUX DISEASE WITHOUT ESOPHAGITIS: ICD-10-CM

## 2021-04-05 LAB
AMPHETAMINES UR QL SCN: NEGATIVE
BARBITURATES UR QL: NEGATIVE
BENZODIAZ UR QL: NEGATIVE
CANNABINOIDS UR QL SCN: POSITIVE
COCAINE UR QL: NEGATIVE
ETHANOL UR QL SCN: NEGATIVE
LABORATORY COMMENT REPORT: NORMAL
OPIATES UR QL SCN: NEGATIVE
SARS-COV-2 RNA RESP QL NAA+PROBE: NEGATIVE
SPECIMEN SOURCE: NORMAL

## 2021-04-05 PROCEDURE — 99285 EMERGENCY DEPT VISIT HI MDM: CPT | Mod: 25 | Performed by: EMERGENCY MEDICINE

## 2021-04-05 PROCEDURE — 99285 EMERGENCY DEPT VISIT HI MDM: CPT | Performed by: EMERGENCY MEDICINE

## 2021-04-05 PROCEDURE — 250N000013 HC RX MED GY IP 250 OP 250 PS 637: Performed by: EMERGENCY MEDICINE

## 2021-04-05 PROCEDURE — C9803 HOPD COVID-19 SPEC COLLECT: HCPCS | Performed by: EMERGENCY MEDICINE

## 2021-04-05 PROCEDURE — 80307 DRUG TEST PRSMV CHEM ANLYZR: CPT | Performed by: EMERGENCY MEDICINE

## 2021-04-05 PROCEDURE — 80320 DRUG SCREEN QUANTALCOHOLS: CPT | Performed by: EMERGENCY MEDICINE

## 2021-04-05 PROCEDURE — 250N000013 HC RX MED GY IP 250 OP 250 PS 637: Performed by: PSYCHIATRY & NEUROLOGY

## 2021-04-05 PROCEDURE — 87635 SARS-COV-2 COVID-19 AMP PRB: CPT | Performed by: EMERGENCY MEDICINE

## 2021-04-05 PROCEDURE — 90791 PSYCH DIAGNOSTIC EVALUATION: CPT

## 2021-04-05 PROCEDURE — 0HCKXZZ EXTIRPATION OF MATTER FROM RIGHT LOWER LEG SKIN, EXTERNAL APPROACH: ICD-10-PCS | Performed by: EMERGENCY MEDICINE

## 2021-04-05 PROCEDURE — 124N000002 HC R&B MH UMMC

## 2021-04-05 PROCEDURE — 73630 X-RAY EXAM OF FOOT: CPT | Mod: RT

## 2021-04-05 RX ORDER — QUETIAPINE FUMARATE 100 MG/1
200 TABLET, FILM COATED ORAL 2 TIMES DAILY PRN
Status: DISCONTINUED | OUTPATIENT
Start: 2021-04-05 | End: 2021-04-22 | Stop reason: HOSPADM

## 2021-04-05 RX ORDER — LORAZEPAM 0.5 MG/1
0.5 TABLET ORAL ONCE
Status: COMPLETED | OUTPATIENT
Start: 2021-04-05 | End: 2021-04-05

## 2021-04-05 RX ORDER — TRAZODONE HYDROCHLORIDE 50 MG/1
50 TABLET, FILM COATED ORAL
Status: DISCONTINUED | OUTPATIENT
Start: 2021-04-05 | End: 2021-04-22 | Stop reason: HOSPADM

## 2021-04-05 RX ORDER — QUETIAPINE FUMARATE 100 MG/1
100 TABLET, FILM COATED ORAL 2 TIMES DAILY
Status: ON HOLD | COMMUNITY
End: 2021-04-21

## 2021-04-05 RX ORDER — QUETIAPINE FUMARATE 200 MG/1
200 TABLET, FILM COATED ORAL 2 TIMES DAILY PRN
Status: ON HOLD | COMMUNITY
End: 2021-04-21

## 2021-04-05 RX ORDER — CEPHALEXIN 500 MG/1
500 CAPSULE ORAL ONCE
Status: COMPLETED | OUTPATIENT
Start: 2021-04-05 | End: 2021-04-05

## 2021-04-05 RX ORDER — OLANZAPINE 10 MG/2ML
10 INJECTION, POWDER, FOR SOLUTION INTRAMUSCULAR 3 TIMES DAILY PRN
Status: DISCONTINUED | OUTPATIENT
Start: 2021-04-05 | End: 2021-04-22 | Stop reason: HOSPADM

## 2021-04-05 RX ORDER — AMOXICILLIN 250 MG
1 CAPSULE ORAL 2 TIMES DAILY PRN
Status: DISCONTINUED | OUTPATIENT
Start: 2021-04-05 | End: 2021-04-22 | Stop reason: HOSPADM

## 2021-04-05 RX ORDER — GABAPENTIN 400 MG/1
400 CAPSULE ORAL 3 TIMES DAILY
Status: DISCONTINUED | OUTPATIENT
Start: 2021-04-05 | End: 2021-04-22 | Stop reason: HOSPADM

## 2021-04-05 RX ORDER — MAGNESIUM HYDROXIDE/ALUMINUM HYDROXICE/SIMETHICONE 120; 1200; 1200 MG/30ML; MG/30ML; MG/30ML
30 SUSPENSION ORAL EVERY 4 HOURS PRN
Status: DISCONTINUED | OUTPATIENT
Start: 2021-04-05 | End: 2021-04-22 | Stop reason: HOSPADM

## 2021-04-05 RX ORDER — HYDROXYZINE HYDROCHLORIDE 25 MG/1
25 TABLET, FILM COATED ORAL EVERY 4 HOURS PRN
Status: DISCONTINUED | OUTPATIENT
Start: 2021-04-05 | End: 2021-04-22 | Stop reason: HOSPADM

## 2021-04-05 RX ORDER — LORAZEPAM 1 MG/1
1 TABLET ORAL ONCE
Status: DISCONTINUED | OUTPATIENT
Start: 2021-04-05 | End: 2021-04-05

## 2021-04-05 RX ORDER — ACETAMINOPHEN 325 MG/1
650 TABLET ORAL EVERY 4 HOURS PRN
Status: DISCONTINUED | OUTPATIENT
Start: 2021-04-05 | End: 2021-04-22 | Stop reason: HOSPADM

## 2021-04-05 RX ORDER — QUETIAPINE FUMARATE 100 MG/1
100 TABLET, FILM COATED ORAL 2 TIMES DAILY
Status: DISCONTINUED | OUTPATIENT
Start: 2021-04-05 | End: 2021-04-22 | Stop reason: HOSPADM

## 2021-04-05 RX ORDER — NICOTINE 21 MG/24HR
1 PATCH, TRANSDERMAL 24 HOURS TRANSDERMAL DAILY
Status: DISCONTINUED | OUTPATIENT
Start: 2021-04-06 | End: 2021-04-22 | Stop reason: HOSPADM

## 2021-04-05 RX ORDER — LORAZEPAM 1 MG/1
1 TABLET ORAL ONCE
Status: COMPLETED | OUTPATIENT
Start: 2021-04-05 | End: 2021-04-05

## 2021-04-05 RX ORDER — GABAPENTIN 400 MG/1
400 CAPSULE ORAL 3 TIMES DAILY
Status: ON HOLD | COMMUNITY
End: 2021-04-21

## 2021-04-05 RX ORDER — OLANZAPINE 10 MG/1
10 TABLET ORAL
Status: ON HOLD | COMMUNITY
End: 2021-04-21

## 2021-04-05 RX ORDER — OLANZAPINE 10 MG/1
10 TABLET ORAL 3 TIMES DAILY PRN
Status: DISCONTINUED | OUTPATIENT
Start: 2021-04-05 | End: 2021-04-22 | Stop reason: HOSPADM

## 2021-04-05 RX ORDER — POLYETHYLENE GLYCOL 3350 17 G
2-4 POWDER IN PACKET (EA) ORAL
Status: DISCONTINUED | OUTPATIENT
Start: 2021-04-05 | End: 2021-04-14 | Stop reason: CLARIF

## 2021-04-05 RX ORDER — ZIPRASIDONE HYDROCHLORIDE 60 MG/1
60 CAPSULE ORAL 2 TIMES DAILY WITH MEALS
Status: ON HOLD | COMMUNITY
End: 2021-04-21

## 2021-04-05 RX ORDER — OLANZAPINE 15 MG/1
15 TABLET ORAL AT BEDTIME
Status: ON HOLD | COMMUNITY
End: 2021-04-21

## 2021-04-05 RX ADMIN — OLANZAPINE 15 MG: 15 TABLET, FILM COATED ORAL at 22:10

## 2021-04-05 RX ADMIN — LORAZEPAM 1 MG: 1 TABLET ORAL at 12:12

## 2021-04-05 RX ADMIN — NICOTINE POLACRILEX 4 MG: 2 LOZENGE ORAL at 22:11

## 2021-04-05 RX ADMIN — QUETIAPINE FUMARATE 100 MG: 100 TABLET ORAL at 22:10

## 2021-04-05 RX ADMIN — NICOTINE POLACRILEX 4 MG: 4 GUM, CHEWING BUCCAL at 20:17

## 2021-04-05 RX ADMIN — GABAPENTIN 400 MG: 400 CAPSULE ORAL at 22:10

## 2021-04-05 RX ADMIN — CEPHALEXIN 500 MG: 500 CAPSULE ORAL at 20:16

## 2021-04-05 RX ADMIN — TRAZODONE HYDROCHLORIDE 50 MG: 50 TABLET ORAL at 22:15

## 2021-04-05 RX ADMIN — LORAZEPAM 0.5 MG: 0.5 TABLET ORAL at 20:17

## 2021-04-05 ASSESSMENT — MIFFLIN-ST. JEOR: SCORE: 1684.15

## 2021-04-05 ASSESSMENT — ENCOUNTER SYMPTOMS: ALTERED MENTAL STATUS: 1

## 2021-04-05 NOTE — ED NOTES
Patient's Dad Lai Case ([phone]) called and wanted an update on patient. Lai informed staff that patient lives alone in subsidized housing with no guardian at this time. Patient had been missing since last Thursday. Patient was calling the house several times and not making any sense. Family thinks patient was doing drugs that deteriorated mental health status very rapidly. Patient does not want to speak to dad or have dad updated on status.

## 2021-04-05 NOTE — ED PROVIDER NOTES
[facility] EMERGENCY DEPARTMENT ([facility])    4/05/21      History     Chief Complaint   Patient presents with     Altered Mental Status     Patient found in lobby of a nursing home; unaware if patient is currently living there. Patient could not answer questions.      Suicidal     The history is provided by medical records. The history is limited by the condition of the patient (Altered).   Altered Mental Status    Tyler Case is a 45 year old male with a past medical history significant for schizoaffective disorder, bipolar I disorder, vitamin D deficiency, methamphetamine use disorder, cannabis use disorder, cocaine abuse, and chronic pain disorder who presents to the Emergency Department for evaluation of altered mental status. Per EMS, patient was noted to have been found in a lobby of a nursing home alone. Upon EMS arrival, patient was largely unable to answer questions and unable to form coherent sentences. Upon ED arrival, history is severely limited due to the patient not being able to answer questions or form responses to such questions.     The patient states he is homeless.  He says he has a  and and ACT team.  He admits to not taking his drugs for at least 2 weeks.  He says he tried to overdose 2 weeks ago.  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  He says he has an 9 yo duaghetr with heart issues who has been seen all over the [place].  He has a cadi waiver.  Hx of commitment 5 times since 2014.  The DEC  spoke to ACT team who says they have been looking for the patient and were relieved he was in the ER  They support admission.         I have reviewed the Medications, Allergies, Past Medical and Surgical History, and Social History in the [employer] system.  PAST MEDICAL HISTORY:   Past Medical History:   Diagnosis Date     Bipolar 1 disorder (H) 8/5/2013     Uses marijuana 10/17/2013       PAST SURGICAL HISTORY:   Past Surgical  History:   Procedure Laterality Date     leg fractures  2002       Past medical history, past surgical history, medications, and allergies were reviewed with the patient. Additional pertinent items: patient unable to participate    FAMILY HISTORY:   Family History   Problem Relation Age of Onset     Diabetes Maternal Grandmother      Alcohol/Drug Maternal Grandmother      Alzheimer Disease Maternal Grandmother      Arthritis Maternal Grandmother      Depression Maternal Grandmother      Eye Disorder Maternal Grandmother      Gastrointestinal Disease Maternal Grandmother      Neurologic Disorder Maternal Grandmother      Osteoporosis Maternal Grandmother      Psychotic Disorder Maternal Grandmother      Hypertension Paternal Grandfather      Alcohol/Drug Paternal Grandfather      Eye Disorder Paternal Grandfather      Gastrointestinal Disease Paternal Grandfather      Neurologic Disorder Paternal Grandfather      Psychotic Disorder Paternal Grandfather      Cerebrovascular Disease Paternal Grandmother      Alcohol/Drug Paternal Grandmother      Eye Disorder Paternal Grandmother      Gastrointestinal Disease Paternal Grandmother      Neurologic Disorder Paternal Grandmother      Lipids Paternal Grandmother      Osteoporosis Paternal Grandmother      Psychotic Disorder Paternal Grandmother      Alcohol/Drug Mother      Depression Mother      Eye Disorder Mother      Gastrointestinal Disease Mother      Neurologic Disorder Mother      Psychotic Disorder Mother      Alcohol/Drug Father      Depression Father      Eye Disorder Father      Gastrointestinal Disease Father      Neurologic Disorder Father      Psychotic Disorder Father      Alcohol/Drug Maternal Grandfather      Arthritis Maternal Grandfather      Depression Maternal Grandfather      Eye Disorder Maternal Grandfather      Gastrointestinal Disease Maternal Grandfather      Neurologic Disorder Maternal Grandfather      Lipids Maternal Grandfather      Psychotic  Disorder Maternal Grandfather      Alcohol/Drug Sister      Alcohol/Drug Brother      Eye Disorder Sister      Eye Disorder Brother      Gastrointestinal Disease Sister      Gastrointestinal Disease Brother      Neurologic Disorder Sister      Neurologic Disorder Brother      Psychotic Disorder Sister      Psychotic Disorder Brother        SOCIAL HISTORY:   Social History     Tobacco Use     Smoking status: Current Every Day Smoker     Packs/day: 0.00     Types: Cigarettes, Pipe     Smokeless tobacco: Current User     Types: Snuff, Chew   Substance Use Topics     Alcohol use: No     Social history was reviewed with the patient. Additional pertinent items: patient unable to participate      Patient's Medications   New Prescriptions    No medications on file   Previous Medications    GABAPENTIN (NEURONTIN) 400 MG CAPSULE    Take 400 mg by mouth 3 times daily    OLANZAPINE (ZYPREXA) 10 MG TABLET    Take 10 mg by mouth nightly as needed    OLANZAPINE (ZYPREXA) 15 MG TABLET    Take 15 mg by mouth At Bedtime    QUETIAPINE (SEROQUEL) 100 MG TABLET    Take 100 mg by mouth 2 times daily    QUETIAPINE (SEROQUEL) 200 MG TABLET    Take 200 mg by mouth 2 times daily as needed    ZIPRASIDONE (GEODON) 60 MG CAPSULE    Take 60 mg by mouth 2 times daily (with meals)   Modified Medications    No medications on file   Discontinued Medications    EMOLLIENT (VANICREAM) EXTERNAL CREAM    Apply topically every 6 hours as needed for other (dry skin)    GABAPENTIN (NEURONTIN) 300 MG CAPSULE    Take 1 capsule (300 mg) by mouth 3 times daily as needed (anxiety)    HYDROXYZINE (ATARAX) 50 MG TABLET    Take 1 tablet (50 mg) by mouth every 4 hours as needed for anxiety    MIRTAZAPINE (REMERON) 15 MG TABLET    Take 1 tablet (15 mg) by mouth nightly as needed (sleep)    MULTIVITAMIN, THERAPEUTIC (THERA-VIT) TABS TABLET    Take 1 tablet by mouth daily    OLANZAPINE ZYDIS (ZYPREXA) 15 MG ODT    Take 2 tablets (30 mg) by mouth At Bedtime     "ZIPRASIDONE (GEODON) 40 MG CAPSULE    Take 1 capsule (40 mg) by mouth 2 times daily (with meals)          Allergies   Allergen Reactions     Benztropine      Other reaction(s): Seizures  Patient reported  Patient reported       Abilify Discmelt      Suicidal thoughts       Aripiprazole Other (See Comments)     Other reaction(s): Unknown  Suicidal thoughts  Suicidal thoughts       Benzyl Alcohol      Other reaction(s): Unknown     Compazine Other (See Comments)     Seizure      Haloperidol Other (See Comments)     Other reaction(s): Dystonia  Patient had EPS with haldol  Patient had EPS with haldol  Patient had EPS with haldol       Phenothiazines Other (See Comments)     Other reaction(s): Dystonia, Muscle Aches/Weakness, Muscle Cramps, Seizures  Seizures  Patient report  Seizures          Review of Systems   Unable to perform ROS: Mental status change       Physical Exam   BP: 128/72  Pulse: 83  Temp: (brandon)  Resp: 14  SpO2: 95 %      Physical Exam  Vitals signs and nursing note reviewed.   Constitutional:       Appearance: He is normal weight.      Comments: Sitting in chair. Hitting the back of his head on the wall. Blinks frequently.  Makes thumbs up signs.  Repeats a few words when asked questions such as \"eta, eta\"     HENT:      Head: Normocephalic.   Eyes:      General: No scleral icterus.     Extraocular Movements: Extraocular movements intact.   Neck:      Musculoskeletal: Normal range of motion and neck supple.   Cardiovascular:      Rate and Rhythm: Normal rate and regular rhythm.   Pulmonary:      Effort: Pulmonary effort is normal.      Breath sounds: Normal breath sounds.   Abdominal:      Palpations: Abdomen is soft.   Musculoskeletal: Normal range of motion.   Skin:     General: Skin is warm and dry.   Neurological:      Mental Status: He is alert.      Cranial Nerves: No cranial nerve deficit or facial asymmetry.   Psychiatric:         Attention and Perception: He is inattentive.         Mood and " Affect: Affect is inappropriate.         Speech: Speech is tangential (disorganized).         Behavior: Behavior is cooperative.         Thought Content: Thought content is delusional.         Judgment: Judgment is impulsive and inappropriate.         ED Course   11:02 AM  The patient was seen and examined by Kenna Kelly MD in Room [address]    Foreign Body Removal    Date/Time: 4/5/2021 7:57 PM  Performed by: Kenna Kelly MD  Authorized by: Kenna Kelly MD       LOCATION     Location:  Foot    Foot location:  R sole (1 foreign body also left heel)    Depth:  Subcutaneous    Tendon involvement:  None      PRE-PROCEDURE DETAILS     Imaging:  X-ray    Neurovascular status: intact    ANESTHESIA (see MAR for exact dosages)     Anesthesia method:  None  PROCEDURE TYPE     Procedure complexity:  Simple      PROCEDURE DETAILS     Dissection of underlying tissues: no      Bloodless field: yes      Removal mechanism:  Forceps    Foreign bodies recovered:  3    Description:  Shards of glass    Intact foreign body removal: no    POST-PROCEDURE DETAILS     Neurovascular status: intact      Confirmation:  No additional foreign bodies on visualization    Skin closure:  None    Dressing:  Antibiotic ointment and non-adherent dressing  PROCEDURE Describe Procedure: Soaked feet in warm soapy water afterwards, bacitracin and gauze.                Results for orders placed or performed during the hospital encounter of 04/05/21 (from the past 24 hour(s))   Drug abuse screen 6 urine (chem dep)   Result Value Ref Range    Amphetamine Qual Urine Negative NEG^Negative    Barbiturates Qual Urine Negative NEG^Negative    Benzodiazepine Qual Urine Negative NEG^Negative    Cannabinoids Qual Urine Positive (A) NEG^Negative    Cocaine Qual Urine Negative NEG^Negative    Ethanol Qual Urine Negative NEG^Negative    Opiates Qualitative Urine Negative NEG^Negative     Medications    LORazepam (ATIVAN) tablet 1 mg (1 mg Oral Not Given 4/5/21 1103)   LORazepam (ATIVAN) tablet 1 mg (1 mg Oral Given 4/5/21 1212)             Assessments & Plan (with Medical Decision Making)   Per epic review the patient has hx of bipolar 1 disorder, thc dependence, benzodiazepine disorder, cocaine abuse, denisha, psychosis who presents to the ED via ems.  He can sit calmly but cannot make full sentences and blinks his eyes a lot and makes odd comments such as repeating words.  He can get riled up at times. There is no facial asymmetry, or weakness noted.  I asked if he had used meth and It seems he said yes though is a round about way.  He was given ativan 1 mg po but spit it out.  Will observe and see if this clears, since it appears that he is under the influence of some substance.  Utox ordered.  He spit out the first ativan but was offered a second pill and took the ativan which allowed him stop twitching and he became more conversational and could be interviewed by DEC . The DEC  spoke to his ACT team who supports admission and didn't know where he was at.  He has stopped taking his meds. He is disorganized and delusional here.  He has 5 prior commitments and likely will need to be committed again.  He was placed on a hold for admission to inpatient mental health. utox is positive for thc.     Told by nursing patient said he walked on glass.  Feet show chronic cracking but also ? Piece of glass in wound.  xrays of both feet done and show 3 foreign bodies which were all removed without difficulty.  Given kelfex for right great toe wound which seemed to be having a more inflammatory response. I would treat with keflex for 5 days .    I have reviewed the nursing notes.    I have reviewed the findings, diagnosis, plan and need for follow up with the patient.    New Prescriptions    No medications on file       Final diagnoses:   Psychosis, unspecified psychosis type (H)   Bipolar affective disorder,  remission status unspecified (H)   Marijuana abuse   I, Robbie De La Cruz, am serving as a trained medical scribe to document services personally performed by Kenna Kelly MD, based on the provider's statements to me.      IKenna MD, was physically present and have reviewed and verified the accuracy of this note documented by Robbie De La Cruz.     4/5/2021   [facility] EMERGENCY DEPARTMENT     Kenna Kelly MD  04/05/21 1720       Kenna Kelly MD  04/05/21 2000

## 2021-04-05 NOTE — SAFE
Tyler RAMIREZ Manpreet  April 5, 2021    Patient has a history of Bipolar Disorder with Psychosis, and Polysubstance abuse, was found disoriented and uncommunicative in the lobby of a nursing home this morning, after ACT team visited his home this morning, found it abandoned and trashed. All damage had occurred in the past week, and police deemed it uninhabitable this morning. Patient has not been told this. ACT  is April Cal [phone]. She says patient has made significant progress in past several months, before relapsing on Methamphetamines 1-2 weeks ago, and stopping his medication. Patient currently very delusional, but cooperative and seeking admission. On 72-hour hold.      Current Suicidal Ideation/Self-Injurious Concerns/Methods: None - N/A    Inappropriate Sexual Behavior: No    Aggression/Homicidal Ideation: None - N/A      For additional details see full DEC assessment.       Valentin Ferreira

## 2021-04-05 NOTE — ED TRIAGE NOTES
"Patient found in nursing home lobby ([address] [facility]) alone. When asked patient was unable to answered questions. Patient would repeat \"all of them\" to each questions. Patient is unable to communicate. Patient gets grabby, and makes incoherent statements. Patient is not violent, voluntary. Ambulance is unaware of who called 911. [id number]  "

## 2021-04-05 NOTE — PHARMACY-ADMISSION MEDICATION HISTORY
Admission Medication History Completed by Pharmacy    See [facility] Admission Navigator for allergy information, preferred outpatient pharmacy, prior to admission medications and immunization status.     Medication History Sources:     [facility] [phone])    [facility] (phone [phone])    Dispense report    Changes made to PTA medication list (reason):    Added: Seroquel    Deleted: Vanicream, hydroxyzine, mirtazapine, multivitamin    Changed: Gabapentin -> 400 mg PO TID, Olanzapine -> 10 mg PO at bedtime PRN and 15 mg PO at bedtime, Quetiapine -> 100 mg PO BID and 200 mg PO BID PRN, ziprasidone -> 60 mg PO BID    Additional Information:    Patient not interviewed as part of this medication history. Please discuss any OTC/non-prescription medication use and last doses with the patient that may not be reflected in the list below.    Spoke with pharmacist at [facility] and the following medications orders were received on 04/02/21 and are in process of being prepared to send out to the patient.  o Quetiapine 200 mg PO BID PRN  o Quetiapine 100 mg PO BID  o Ziprasidone 60 mg BID (with meals)  o Gabapentin 400 mg TID  o Olanzapine 15 mg at bedtime  o Olanzapine 10 mg at bedtime PRN    ACT team medication list was received via fax. The medication list was last reviewed by staff on 02/25/21. Medications listed were:   o Quetiapine 200 mg PO BID PRN  o Ziprasidone 60 mg BID (with meals)  o Gabapentin 400 mg TID  o Olanzapine 15 mg at bedtime      Prior to Admission medications    Medication Sig Last Dose Taking? Auth Provider   gabapentin (NEURONTIN) 400 MG capsule Take 400 mg by mouth 3 times daily  Yes Unknown, Entered By History   OLANZapine (ZYPREXA) 10 MG tablet Take 10 mg by mouth nightly as needed  Yes Unknown, Entered By History   OLANZapine (ZYPREXA) 15 MG tablet Take 15 mg by mouth At Bedtime  Yes Unknown, Entered By History    QUEtiapine (SEROQUEL) 100 MG tablet Take 100 mg by mouth 2 times daily  Yes Unknown, Entered By History   QUEtiapine (SEROQUEL) 200 MG tablet Take 200 mg by mouth 2 times daily as needed  Yes Unknown, Entered By History   ziprasidone (GEODON) 60 MG capsule Take 60 mg by mouth 2 times daily (with meals)  Yes Unknown, Entered By History       Date completed: 04/05/21    Medication history completed by: Jo Ann Hung, PharmD

## 2021-04-05 NOTE — TELEPHONE ENCOUNTER
S:  2:45 PM  Call from DEC  at  ED   requesting IP MH placement for a 44 YO M    B:  Hx of Bipolar affective disorder, mixed, severe w/ psychosis, PTSD, ADHD, anxiety and polysub dependence.  Patient was picked up by police in the lobby of a nursing home,  disorganized and unresponsive-after a few hours in the ED patient now verbal, and delusional.  Patient reports he has not taken his meds for 2-3 weeks-he is followed  by [facility] team.  On  March 5 he was in [facility] ED after he overdosed on prescribed meds-was discharged.  Believes he has an 8 year old daughter with serious medical issues he flies around the [place] 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  Last IP MH approx 1 year ago.  Hx of being under commitment-5 times within the last 7 years.         Patient denies any acute medical problems(poor historian)    COVID-19-not collected yet  Utox-POS for cannabinoids    A:  [facility] HH    R:  Patient cleared and ready for behavioral bed placement: Yes

## 2021-04-05 NOTE — ED NOTES
Pt appears confuse, nurse is unable to assess pt, Nurse called the contact information in the demografic. [phone] (rm Bettencourt) pt's mother. Nurse left voicemail to call back the ED.

## 2021-04-06 PROBLEM — F15.20 METHAMPHETAMINE USE DISORDER, MODERATE (H): Status: ACTIVE | Noted: 2018-12-14

## 2021-04-06 PROBLEM — Z91.199 NONCOMPLIANCE WITH TREATMENT: Status: ACTIVE | Noted: 2017-05-16

## 2021-04-06 PROBLEM — F14.11 HISTORY OF COCAINE ABUSE (H): Status: ACTIVE | Noted: 2018-12-14

## 2021-04-06 PROBLEM — F17.200 TOBACCO USE DISORDER: Status: ACTIVE | Noted: 2018-12-14

## 2021-04-06 PROBLEM — R45.851 SUICIDE IDEATION: Status: ACTIVE | Noted: 2020-04-20

## 2021-04-06 LAB
ALBUMIN SERPL-MCNC: 3 G/DL (ref 3.4–5)
ALP SERPL-CCNC: 89 U/L (ref 40–150)
ALT SERPL W P-5'-P-CCNC: 40 U/L (ref 0–70)
ANION GAP SERPL CALCULATED.3IONS-SCNC: 13 MMOL/L (ref 3–14)
AST SERPL W P-5'-P-CCNC: 44 U/L (ref 0–45)
BASOPHILS # BLD AUTO: 0 10E9/L (ref 0–0.2)
BASOPHILS NFR BLD AUTO: 0.4 %
BILIRUB SERPL-MCNC: 0.2 MG/DL (ref 0.2–1.3)
BUN SERPL-MCNC: 9 MG/DL (ref 7–30)
CALCIUM SERPL-MCNC: 8.5 MG/DL (ref 8.5–10.1)
CHLORIDE SERPL-SCNC: 103 MMOL/L (ref 94–109)
CHOLEST SERPL-MCNC: 134 MG/DL
CO2 SERPL-SCNC: 20 MMOL/L (ref 20–32)
CREAT SERPL-MCNC: 0.8 MG/DL (ref 0.66–1.25)
DIFFERENTIAL METHOD BLD: NORMAL
EOSINOPHIL # BLD AUTO: 0.2 10E9/L (ref 0–0.7)
EOSINOPHIL NFR BLD AUTO: 2.2 %
ERYTHROCYTE [DISTWIDTH] IN BLOOD BY AUTOMATED COUNT: 13.4 % (ref 10–15)
GFR SERPL CREATININE-BSD FRML MDRD: >90 ML/MIN/{1.73_M2}
GLUCOSE SERPL-MCNC: 217 MG/DL (ref 70–99)
HCT VFR BLD AUTO: 40.1 % (ref 40–53)
HDLC SERPL-MCNC: 39 MG/DL
HGB BLD-MCNC: 13.4 G/DL (ref 13.3–17.7)
IMM GRANULOCYTES # BLD: 0 10E9/L (ref 0–0.4)
IMM GRANULOCYTES NFR BLD: 0.4 %
LDLC SERPL CALC-MCNC: 49 MG/DL
LYMPHOCYTES # BLD AUTO: 2.9 10E9/L (ref 0.8–5.3)
LYMPHOCYTES NFR BLD AUTO: 38.2 %
MCH RBC QN AUTO: 28.8 PG (ref 26.5–33)
MCHC RBC AUTO-ENTMCNC: 33.4 G/DL (ref 31.5–36.5)
MCV RBC AUTO: 86 FL (ref 78–100)
MONOCYTES # BLD AUTO: 0.7 10E9/L (ref 0–1.3)
MONOCYTES NFR BLD AUTO: 9.6 %
NEUTROPHILS # BLD AUTO: 3.7 10E9/L (ref 1.6–8.3)
NEUTROPHILS NFR BLD AUTO: 49.2 %
NONHDLC SERPL-MCNC: 95 MG/DL
NRBC # BLD AUTO: 0 10*3/UL
NRBC BLD AUTO-RTO: 0 /100
PLATELET # BLD AUTO: 229 10E9/L (ref 150–450)
POTASSIUM SERPL-SCNC: 3.9 MMOL/L (ref 3.4–5.3)
PROT SERPL-MCNC: 6.3 G/DL (ref 6.8–8.8)
RBC # BLD AUTO: 4.65 10E12/L (ref 4.4–5.9)
SODIUM SERPL-SCNC: 136 MMOL/L (ref 133–144)
T4 FREE SERPL-MCNC: 0.99 NG/DL (ref 0.76–1.46)
TRIGL SERPL-MCNC: 228 MG/DL
TSH SERPL DL<=0.005 MIU/L-ACNC: 7.48 MU/L (ref 0.4–4)
WBC # BLD AUTO: 7.6 10E9/L (ref 4–11)

## 2021-04-06 PROCEDURE — 99223 1ST HOSP IP/OBS HIGH 75: CPT | Mod: AI | Performed by: PSYCHIATRY & NEUROLOGY

## 2021-04-06 PROCEDURE — 80061 LIPID PANEL: CPT | Performed by: PSYCHIATRY & NEUROLOGY

## 2021-04-06 PROCEDURE — 84443 ASSAY THYROID STIM HORMONE: CPT | Performed by: PSYCHIATRY & NEUROLOGY

## 2021-04-06 PROCEDURE — 80053 COMPREHEN METABOLIC PANEL: CPT | Performed by: PSYCHIATRY & NEUROLOGY

## 2021-04-06 PROCEDURE — 85025 COMPLETE CBC W/AUTO DIFF WBC: CPT | Performed by: PSYCHIATRY & NEUROLOGY

## 2021-04-06 PROCEDURE — 84439 ASSAY OF FREE THYROXINE: CPT | Performed by: PSYCHIATRY & NEUROLOGY

## 2021-04-06 PROCEDURE — 36415 COLL VENOUS BLD VENIPUNCTURE: CPT | Performed by: PSYCHIATRY & NEUROLOGY

## 2021-04-06 PROCEDURE — 124N000002 HC R&B MH UMMC

## 2021-04-06 PROCEDURE — 250N000013 HC RX MED GY IP 250 OP 250 PS 637: Performed by: PSYCHIATRY & NEUROLOGY

## 2021-04-06 RX ORDER — CEPHALEXIN 500 MG/1
500 CAPSULE ORAL EVERY 6 HOURS SCHEDULED
Status: COMPLETED | OUTPATIENT
Start: 2021-04-06 | End: 2021-04-11

## 2021-04-06 RX ORDER — ZIPRASIDONE HYDROCHLORIDE 40 MG/1
40 CAPSULE ORAL 2 TIMES DAILY WITH MEALS
Status: DISCONTINUED | OUTPATIENT
Start: 2021-04-06 | End: 2021-04-16

## 2021-04-06 RX ADMIN — NICOTINE POLACRILEX 2 MG: 2 LOZENGE ORAL at 07:08

## 2021-04-06 RX ADMIN — ALUMINUM HYDROXIDE, MAGNESIUM HYDROXIDE, AND SIMETHICONE 30 ML: 200; 200; 20 SUSPENSION ORAL at 16:52

## 2021-04-06 RX ADMIN — ZIPRASIDONE HCL 60 MG: 20 CAPSULE ORAL at 08:32

## 2021-04-06 RX ADMIN — ACETAMINOPHEN 650 MG: 325 TABLET, FILM COATED ORAL at 07:08

## 2021-04-06 RX ADMIN — QUETIAPINE FUMARATE 100 MG: 100 TABLET ORAL at 20:47

## 2021-04-06 RX ADMIN — QUETIAPINE FUMARATE 100 MG: 100 TABLET ORAL at 08:32

## 2021-04-06 RX ADMIN — CEPHALEXIN 500 MG: 500 CAPSULE ORAL at 19:34

## 2021-04-06 RX ADMIN — NICOTINE POLACRILEX 4 MG: 2 LOZENGE ORAL at 14:53

## 2021-04-06 RX ADMIN — ZIPRASIDONE HYDROCHLORIDE 40 MG: 40 CAPSULE ORAL at 18:12

## 2021-04-06 RX ADMIN — NICOTINE 1 PATCH: 21 PATCH, EXTENDED RELEASE TRANSDERMAL at 08:47

## 2021-04-06 RX ADMIN — NICOTINE POLACRILEX 4 MG: 2 LOZENGE ORAL at 16:29

## 2021-04-06 RX ADMIN — OLANZAPINE 10 MG: 10 TABLET, FILM COATED ORAL at 11:08

## 2021-04-06 RX ADMIN — ALUMINUM HYDROXIDE, MAGNESIUM HYDROXIDE, AND SIMETHICONE 30 ML: 200; 200; 20 SUSPENSION ORAL at 07:08

## 2021-04-06 RX ADMIN — QUETIAPINE FUMARATE 200 MG: 100 TABLET ORAL at 13:50

## 2021-04-06 RX ADMIN — NICOTINE POLACRILEX 4 MG: 2 LOZENGE ORAL at 18:12

## 2021-04-06 RX ADMIN — HYDROXYZINE HYDROCHLORIDE 25 MG: 25 TABLET, FILM COATED ORAL at 14:54

## 2021-04-06 RX ADMIN — OLANZAPINE 10 MG: 10 TABLET, FILM COATED ORAL at 17:03

## 2021-04-06 RX ADMIN — OLANZAPINE 15 MG: 15 TABLET, FILM COATED ORAL at 21:18

## 2021-04-06 RX ADMIN — CEPHALEXIN 500 MG: 500 CAPSULE ORAL at 21:19

## 2021-04-06 RX ADMIN — GABAPENTIN 400 MG: 400 CAPSULE ORAL at 08:32

## 2021-04-06 RX ADMIN — GABAPENTIN 400 MG: 400 CAPSULE ORAL at 20:47

## 2021-04-06 RX ADMIN — GABAPENTIN 400 MG: 400 CAPSULE ORAL at 13:51

## 2021-04-06 RX ADMIN — HYDROXYZINE HYDROCHLORIDE 25 MG: 25 TABLET, FILM COATED ORAL at 08:37

## 2021-04-06 ASSESSMENT — ACTIVITIES OF DAILY LIVING (ADL)
LAUNDRY: WITH SUPERVISION
DIFFICULTY_EATING/SWALLOWING: NO
HYGIENE/GROOMING: INDEPENDENT
DIFFICULTY_COMMUNICATING: NO
TOILETING_ISSUES: NO
DRESS: INDEPENDENT
ORAL_HYGIENE: INDEPENDENT
LAUNDRY: WITH SUPERVISION
DRESS: INDEPENDENT
ORAL_HYGIENE: PROMPTS
DRESSING/BATHING_DIFFICULTY: NO
HYGIENE/GROOMING: PROMPTS

## 2021-04-06 ASSESSMENT — MIFFLIN-ST. JEOR: SCORE: 1690.96

## 2021-04-06 NOTE — PROGRESS NOTES
Pt arrived on station 12 with staff and . He was shown his room and given unit tour/guidelines. He is disorganized, restless, and intrusive. On SIO staffing at time of transfer due to severe intrusiveness.

## 2021-04-06 NOTE — PROGRESS NOTES
"   04/06/21 1500   Psycho Education   Type of Intervention structured groups   Response other (see comment)   Hours 0.5   Writer attempted to do a psychoeducation group today.  It was Tyler and one other patient in the group.  He dominated the conversation anytime writer tried to ask a question or speak to another patient.  If writer asked the other patient a question Tyler would loudly interrupt with an inappropriate answer.  We were discussing sobriety and the pt just yelled \"fuck it\" \"sobriety fuck it.\"  Writer attempted to redirect him, but he is very ill right now an unable to take direction. Pt then started making sexual comments and talking about \"shoving things up people's *#*\".  This writer asked him to stop, he kept loudly talking about inappropriate topics so this writer ended the group and asked him to go to his room.   "
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
## 2021-04-06 NOTE — PLAN OF CARE
"Pt has many requests.  Pt did a warm antibacterial soap foot soak due to glass in foot.  Pt was disorganized and starting to dump soap brought to him for a shower in basin.   Pt has flight of ideas switching from needing an emergency foot soak to he is going to get a hotel room for us at the [facility] on [address] in one conversation.  Pt agreed to take a shower but came out after a short amount of time with clean scrubs on but not wet and hair remained dry, no signs of showering noted.  Pt came up to nurses station many many times with multiple requests.  Pt states \"I need milk of magnesia,\" my stomach is gurgling and I have indigestion.\"  Writer suggests maalox when patients states he is pooping like crazy with writers follow up questions.  Pt states after taking maalox \"now I need milk of magnesia,\"  Writer asks why if he is having so many bowel movements and pt states, \"it is the key mineral for my  family.\" Writer educates pt on MOM and purpose and asks pt to step away from glass and pt pounds on the glass x 1 and states, \"god damn it,\" but was redirected and went back to [facility].  Pt flooded the bathroom with filling the garbage can with water and stirring it around like it was a pot of soup.  Writer called on-call provider and request SIO due to intrusiveness and disorganization.  Writer brought in pt's meds into room and pt took meds and dumped his cup of water onto his bed. Pt has a full range affect and is very disorganized in thought, very intrusive.  Pt denies any SI/SIB or HI.   "
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
## 2021-04-06 NOTE — ED PROVIDER NOTES
ED Course as of Apr 05 2011 Mon Apr 05, 2021 2011 MADELINE f/ Dr. Kelly, pt is being admitted for bipolar, required FB removal. Being admitted for further care.        MD Pilar Streeter Craig, MD  04/05/21 0512

## 2021-04-06 NOTE — PROGRESS NOTES
"Patient arrived  4/5/2021 to station 30.     EPIC Admitting  DX: Marijuana abuse [F12.10]  Foreign body (FB) in soft tissue [M79.5]  Psychosis, unspecified psychosis type (H) [F29]  Bipolar affective disorder, remission status unspecified (H) [F31.9]    Vital signs reviewed related to admission.  BP (!) 144/89   Pulse 91   Temp 99.1  F (37.3  C) (Oral)   Resp 16   Ht 1.676 m (5' 6\")   Wt 85.1 kg (187 lb 11.2 oz)   SpO2 96%   BMI 30.30 kg/m  .    Patient arrived on the unit and was cooperative with the clothing search. Patient was been upbeat and began introducing himself to patients and staff. Needed some redirection to keep proper space between himself and another patient since they both were not wearing masks. Denies auditory and visual hallucinations. Denies anxiety and depression. Denies suicidal ideation and self injurious thoughts. Patient was med compliant. Requested prn trazodone. Will attempt to complete the interview tomorrow with patient.     Patient is on a 72 hour hold.         "

## 2021-04-06 NOTE — ED NOTES
ED to Behavioral Floor Handoff    SITUATION  Tyler Case is a 45 year old male who speaks English and lives in a home alone The patient arrived in the ED by ambulance from found outside a NH with a complaint of Altered Mental Status (Patient found in lobby of a nursing home; unaware if patient is currently living there. Patient could not answer questions. ) and Suicidal  .The patient's current symptoms started/worsened 1 week(s) ago and during this time the symptoms have increased.   In the ED, pt was diagnosed with   Final diagnoses:   Psychosis, unspecified psychosis type (H)   Bipolar affective disorder, remission status unspecified (H)   Marijuana abuse   Foreign body (FB) in soft tissue - 3 pieces of glass bottom right foot        Initial vitals were: BP: 128/72  Pulse: 83  Temp: (brandon)  Resp: 14  SpO2: 95 %   --------  Is the patient diabetic? No   If yes, last blood glucose? --     If yes, was this treated in the ED? --  --------  Is the patient inebriated (ETOH) No or Impaired on other substances? Yes  MSSA done? N/A  Last MSSA score: --    Were withdrawal symptoms treated? N/A  Does the patient have a seizure history? No. If yes, date of most recent seizure--  --------  Is the patient patient experiencing suicidal ideation? denies current or recent suicidal ideation   States when at home by self  Homicidal ideation? denies current or recent homicidal ideation or behaviors.    Self-injurious behavior/urges? denies current or recent self injurious behavior or ideation.  ------  Was pt aggressive in the ED No  Was a code called No  Is the pt now cooperative? Yes  -------  Meds given in ED:   Medications   LORazepam (ATIVAN) tablet 1 mg (1 mg Oral Not Given 4/5/21 1103)   LORazepam (ATIVAN) tablet 1 mg (1 mg Oral Given 4/5/21 1212)   cephALEXin (KEFLEX) capsule 500 mg (500 mg Oral Given 4/5/21 2016)   LORazepam (ATIVAN) tablet 0.5 mg (0.5 mg Oral Given 4/5/21 2017)   nicotine polacrilex (NICORETTE) gum 4 mg  "(4 mg Buccal Given 4/5/21 2017)      Family present during ED course? No  Family currently present? No    BACKGROUND  Does the patient have a cognitive impairment or developmental disability? No  Allergies:   Allergies   Allergen Reactions     Benztropine      Other reaction(s): Seizures  Patient reported  Patient reported       Abilify Discmelt      Suicidal thoughts       Aripiprazole Other (See Comments)     Other reaction(s): Unknown  Suicidal thoughts  Suicidal thoughts       Benzyl Alcohol      Other reaction(s): Unknown     Compazine Other (See Comments)     Seizure      Haloperidol Other (See Comments)     Other reaction(s): Dystonia  Patient had EPS with haldol  Patient had EPS with haldol  Patient had EPS with haldol       Phenothiazines Other (See Comments)     Other reaction(s): Dystonia, Muscle Aches/Weakness, Muscle Cramps, Seizures  Seizures  Patient report  Seizures     .   Social demographics are   Social History     Socioeconomic History     Marital status: Single     Spouse name: Not on file     Number of children: Not on file     Years of education: Not on file     Highest education level: Not on file   Occupational History     Not on file   Social Needs     Financial resource strain: Not on file     Food insecurity     Worry: Not on file     Inability: Not on file     Transportation needs     Medical: Not on file     Non-medical: Not on file   Tobacco Use     Smoking status: Current Every Day Smoker     Packs/day: 0.00     Types: Cigarettes, Pipe     Smokeless tobacco: Current User     Types: Snuff, Chew   Substance and Sexual Activity     Alcohol use: No     Drug use: Yes     Types: Marijuana, Cocaine     Comment: \"any and all drugs I can get\"      Sexual activity: Yes     Partners: Female   Lifestyle     Physical activity     Days per week: Not on file     Minutes per session: Not on file     Stress: Not on file   Relationships     Social connections     Talks on phone: Not on file     Gets " together: Not on file     Attends Church service: Not on file     Active member of club or organization: Not on file     Attends meetings of clubs or organizations: Not on file     Relationship status: Not on file     Intimate partner violence     Fear of current or ex partner: Not on file     Emotionally abused: Not on file     Physically abused: Not on file     Forced sexual activity: Not on file   Other Topics Concern     Not on file   Social History Narrative    Natural health practicioner        ASSESSMENT  Labs results   Labs Ordered and Resulted from Time of ED Arrival Up to the Time of Departure from the ED   DRUG ABUSE SCREEN 6 CHEM DEP URINE ([facility]) - Abnormal; Notable for the following components:       Result Value    Cannabinoids Qual Urine Positive (*)     All other components within normal limits   SARS-COV-2 (COVID-19) VIRUS RT-PCR   DOCUMENT IN LEGAL HOLD NAVIGATOR   DOCUMENT      Imaging Studies:   Recent Results (from the past 24 hour(s))   Foot  XR, G/E 3 views, right    Narrative    XR RIGHT FOOT THREE OR MORE VIEWS   4/5/2021 7:24 PM     HISTORY:  Foreign bodies. Foot pain    FINDINGS: There is a small foreign body within the plantar soft  tissues of the first-second webspace. There is a small foreign body  within the plantar soft tissues of the great toe at the level of the  distal phalanx. There is a small foreign body within the superficial  plantar soft tissues at the level of the posterior calcaneus. No  fracture identified.       Impression    IMPRESSION: There are 3 foreign bodies, as above.    DAPHNEY KEENAN MD   Foot XR, G/E 3 views, left    Narrative    XR LEFT FOOT THREE OR MORE VIEWS   4/5/2021 7:25 PM     HISTORY:  Foot pain. Clinical concern for Foreign bodies    FINDINGS: Mild first MTP osteoarthritis. Raw-screw fixation of the  distal tibia. There is an old healed fracture of the fibular distal  diaphysis. No acute fracture identified      Impression    IMPRESSION: No radiopaque  foreign body identified    DAPHNEY KEENAN MD      Most recent vital signs /72   Pulse 83   Resp 14   SpO2 95%    Abnormal labs/tests/findings requiring intervention:---   Pain control: good  Nausea control: pt had none    RECOMMENDATION  Are any infection precautions needed (MRSA, VRE, etc.)? No If yes, what infection? --  ---  Does the patient have mobility issues? independently. If yes, what device does the pt use? ---  ---  Is patient on 72 hour hold or commitment? Yes If on 72 hour hold, have hold and rights been given to patient? Yes  Are admitting orders written if after 10 p.m. ?N/A  Tasks needing to be completed:---     Roberto Garcia, RN   [phone] [place] [facility]   [phone] [facility]

## 2021-04-06 NOTE — PLAN OF CARE
BEHAVIORAL TEAM DISCUSSION    Participants:   Radha Lipscomb RN, Dr. Velázquez. Stormy Elliott [facility]      Progress:   Pt presented to the [facility] ER on 4/5/21 via EMS after being found with altered mental status in the lobby of a nursing home ([address] [facility]) .  Pt did make some reference to suicidal ideation with thoughts of using a gun but he does not have access to a gun.  Pt recently stopped his medication and relapsed on methamphetamine.  Patient had been missing since last Thursday.Pt is disorganized and delusional.        UDS is positive for cannabis.     Pt is on a 72 hour hold. Pt is currently not on a commitment.       Anticipated length of stay:   7 days    Continued Stay Criteria/Rationale:   The pt is on a 72 hour hold nad needs further evaluation by psychiatry.    Medical/Physical:   Small cuts noted on pt's bilateral hands Small cuts noted on pt's bilateral hands   Foreign body (FB) in soft tissue - 3 pieces of glass bottom right foot  vitamin D deficiency,        Precautions:   Behavioral Orders   Procedures     Assault precautions     Code 1 - Restrict to Unit     Discontinue 1:1 attendant for suicide risk     Order Specific Question:   I have performed an in person assessment of the patient     Answer:   Based on this assessment the patient no longer requires a one on one attendant at this point in time.     Fall precautions     Routine Programming     As clinically indicated     Single Room     Status 15     Every 15 minutes.     Plan:   Multidisciplinary evaluation  Medication Management  Collaborate with ACT Team to develop a plan  Assess whether his insurance will be a barrier.        Rationale for change in precautions or plan: initial review

## 2021-04-06 NOTE — PLAN OF CARE
Due to pt's intrusiveness, disorganization, and grabbing at things behind the desk, the provider started an SIO 1:1. Pt has spilt several cups of water in his bathroom and garbage, flooding his bathroom. Pt then removed his wet socks and bandages and put on new dry socks. He refused to allow this RN to assess the wounds on his feet or re-bandage them. This RN did watch as the pt put on his socks and didn't see any signs of infection. He also dumped coffee on the floor, he ate from ketchup and sugar packets, and he has been intrusive with his peers and cursing at them. He took 2 full prune juice cups and crushed them into a peer's journal. Pt has been delusional and grandiose in his speech, stating he built the hospital and that he invented coffee. Pt could be responding to internal stimuli as he was seen talking to himself with no one else around.     Pt denied SI/SIB/HI. He also denied AVH. Pt's affect was blunted and his mood appeared to be irritable at times.    Pt was tachycardic with a pulse of 132. Pt was encouraged to drink fluids. Later refused to have his pulse checked again.     PRNs:  He received hydroxzyine 25 mg for anxiety at 0837.  He received Zyprexa 10 mg for agitation at 1108.  He received Seroquel 200 mg for agitation at 1350.
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
## 2021-04-06 NOTE — PROGRESS NOTES
04/05/21 6767   Patient Belongings   Did you bring any home meds/supplements to the hospital?  No   Patient Belongings locker;remains with patient   Patient Belongings Put in Hospital Secure Location (Security or Locker, etc.) earrings;plastic bag;ring;shoes   Belongings Search Yes   Clothing Search Yes   Second Staff Jose DOUGLASS   Comment see comments     With patient: earring and ring   In locker: 1 pant, 1 t-shirt, 1 pair of socks, 1 pair of shoes, and 1 sweatshirt   A               Admission:  I am responsible for any personal items that are not sent to the safe or pharmacy.  [facility] is not responsible for loss, theft or damage of any property in my possession.    Signature:  _________________________________ Date: _______  Time: _____                                              Staff Signature:  ____________________________ Date: ________  Time: _____      2nd Staff person, if patient is unable/unwilling to sign:    Signature: ________________________________ Date: ________  Time: _____     Discharge:  [facility] has returned all of my personal belongings:    Signature: _________________________________ Date: ________  Time: _____                                          Staff Signature:  ____________________________ Date: ________  Time: _____

## 2021-04-06 NOTE — PROGRESS NOTES
Pt has an order to be transferred to our high acuity unit, Station 12. Gave report to the RN on Station 12. Waiting for a room change and cleaning on Station 12. Pt transferred to Station 12.

## 2021-04-07 PROCEDURE — 250N000013 HC RX MED GY IP 250 OP 250 PS 637: Performed by: PSYCHIATRY & NEUROLOGY

## 2021-04-07 PROCEDURE — 99233 SBSQ HOSP IP/OBS HIGH 50: CPT | Mod: GC | Performed by: PSYCHIATRY & NEUROLOGY

## 2021-04-07 PROCEDURE — 124N000002 HC R&B MH UMMC

## 2021-04-07 RX ADMIN — ALUMINUM HYDROXIDE, MAGNESIUM HYDROXIDE, AND SIMETHICONE 30 ML: 200; 200; 20 SUSPENSION ORAL at 10:20

## 2021-04-07 RX ADMIN — GABAPENTIN 400 MG: 400 CAPSULE ORAL at 07:17

## 2021-04-07 RX ADMIN — NICOTINE POLACRILEX 4 MG: 2 LOZENGE ORAL at 21:31

## 2021-04-07 RX ADMIN — ZIPRASIDONE HYDROCHLORIDE 40 MG: 40 CAPSULE ORAL at 17:07

## 2021-04-07 RX ADMIN — GABAPENTIN 400 MG: 400 CAPSULE ORAL at 13:01

## 2021-04-07 RX ADMIN — QUETIAPINE FUMARATE 200 MG: 100 TABLET ORAL at 16:04

## 2021-04-07 RX ADMIN — OLANZAPINE 10 MG: 10 TABLET, FILM COATED ORAL at 08:09

## 2021-04-07 RX ADMIN — QUETIAPINE FUMARATE 100 MG: 100 TABLET ORAL at 19:56

## 2021-04-07 RX ADMIN — OLANZAPINE 15 MG: 15 TABLET, FILM COATED ORAL at 21:31

## 2021-04-07 RX ADMIN — QUETIAPINE FUMARATE 100 MG: 100 TABLET ORAL at 07:17

## 2021-04-07 RX ADMIN — CEPHALEXIN 500 MG: 500 CAPSULE ORAL at 11:56

## 2021-04-07 RX ADMIN — NICOTINE POLACRILEX 4 MG: 2 LOZENGE ORAL at 20:17

## 2021-04-07 RX ADMIN — NICOTINE POLACRILEX 4 MG: 2 LOZENGE ORAL at 08:37

## 2021-04-07 RX ADMIN — NICOTINE POLACRILEX 4 MG: 2 LOZENGE ORAL at 07:18

## 2021-04-07 RX ADMIN — NICOTINE POLACRILEX 4 MG: 2 LOZENGE ORAL at 13:01

## 2021-04-07 RX ADMIN — CEPHALEXIN 500 MG: 500 CAPSULE ORAL at 17:07

## 2021-04-07 RX ADMIN — NICOTINE POLACRILEX 4 MG: 2 LOZENGE ORAL at 16:08

## 2021-04-07 RX ADMIN — NICOTINE POLACRILEX 4 MG: 2 LOZENGE ORAL at 17:08

## 2021-04-07 RX ADMIN — CEPHALEXIN 500 MG: 500 CAPSULE ORAL at 06:08

## 2021-04-07 RX ADMIN — GABAPENTIN 400 MG: 400 CAPSULE ORAL at 19:56

## 2021-04-07 RX ADMIN — ZIPRASIDONE HYDROCHLORIDE 40 MG: 40 CAPSULE ORAL at 07:18

## 2021-04-07 RX ADMIN — NICOTINE POLACRILEX 4 MG: 2 LOZENGE ORAL at 18:05

## 2021-04-07 RX ADMIN — NICOTINE POLACRILEX 4 MG: 2 LOZENGE ORAL at 14:03

## 2021-04-07 RX ADMIN — ACETAMINOPHEN 650 MG: 325 TABLET, FILM COATED ORAL at 07:18

## 2021-04-07 RX ADMIN — CEPHALEXIN 500 MG: 500 CAPSULE ORAL at 23:00

## 2021-04-07 RX ADMIN — NICOTINE POLACRILEX 4 MG: 2 LOZENGE ORAL at 19:07

## 2021-04-07 RX ADMIN — NICOTINE POLACRILEX 4 MG: 2 LOZENGE ORAL at 11:11

## 2021-04-07 RX ADMIN — NICOTINE 1 PATCH: 21 PATCH, EXTENDED RELEASE TRANSDERMAL at 07:23

## 2021-04-07 RX ADMIN — HYDROXYZINE HYDROCHLORIDE 25 MG: 25 TABLET, FILM COATED ORAL at 11:58

## 2021-04-07 ASSESSMENT — ACTIVITIES OF DAILY LIVING (ADL)
LAUNDRY: UNABLE TO COMPLETE
HYGIENE/GROOMING: HANDWASHING;INDEPENDENT
ORAL_HYGIENE: INDEPENDENT
DRESS: SCRUBS (BEHAVIORAL HEALTH);INDEPENDENT
DRESS: SCRUBS (BEHAVIORAL HEALTH)
ORAL_HYGIENE: INDEPENDENT
HYGIENE/GROOMING: INDEPENDENT

## 2021-04-07 NOTE — PROGRESS NOTES
"[facility], [place]   Psychiatric Progress Note  Hospital Day: 2        Interim History:   The patient's care was discussed with the treatment team during the daily team meeting and/or staff's chart notes were reviewed. Patient admitted to station 12 yesterday as a transfer from station 30. On admission to station 30, patient was irritable, intrusive, disorganized, and agitated. Cursing at peers. He was placed on a 1:1. He grabbed things behind the desk and spilled several cups of water and coffee on the floor. Flooded his bathroom. Declined assessment of wounds on his feet. Was delusional and grandiose. Seen responding to internal stimuli. Received PRN hydroxyzine, Zypexa 10mg, and Seroquel 200mg. Attended a group where he cursed and made sexual comments and was not redirectable. Had multiple requests and received an antibacterial foot soak. On station 12, patient flooded bathroom by filling garbage can with water and stirring it like soup. Medication adherent.     Upon interview, Tyler initially states that he is going to maintain boundaries. When asked how his mood is, he responds, \"right on.\" He denies suicidal or homicidal ideation stating that he last had suicidal thoughts \"last Tuesday\" and last had homicidal thoughts \"2 trillion years ago.\" He denies any physical symptoms and specifically denies foot pain. When asked how he got glass in his feet, he states that it's an Ancient Chinese ritual that is confidential. He states that he lives in the hospital and came to the hospital for \"confidential\" reasons. When discussed that his ACT team found his apartment to be uninhabitable, he responds, \"which one? I have an apartment in every place in this country.\" He reports that his ACT team is \"the best.\" Tyler describes that he felt unsafe outside of the hospital but declines to elaborate. He states that he was taking all of his psychiatric medication and that he wants more medication. " "He is unable to state what symptoms he would like help with. He requests desoxyn, Xanax, and Adderall. He does not think he is manic as he feels like his normal self. Denies difficulty sleeping. When asked if he has increased energy, he states, \"right on\" but denies feeling tired. He states that he is pregnant and that \"I have a womb and ovaries.\" No other questions or concerns.          Medications:       cephALEXin  500 mg Oral Q6H JASWINDER     gabapentin  400 mg Oral TID     nicotine  1 patch Transdermal Daily     nicotine   Transdermal Q8H     OLANZapine  15 mg Oral At Bedtime     QUEtiapine  100 mg Oral BID     ziprasidone  40 mg Oral BID w/meals          Allergies:     Allergies   Allergen Reactions     Benztropine      Other reaction(s): Seizures  Patient reported  Patient reported       Abilify Discmelt      Suicidal thoughts       Aripiprazole Other (See Comments)     Other reaction(s): Unknown  Suicidal thoughts  Suicidal thoughts       Benzyl Alcohol      Other reaction(s): Unknown     Compazine Other (See Comments)     Seizure      Haloperidol Other (See Comments)     Other reaction(s): Dystonia  Patient had EPS with haldol  Patient had EPS with haldol  Patient had EPS with haldol       Phenothiazines Other (See Comments)     Other reaction(s): Dystonia, Muscle Aches/Weakness, Muscle Cramps, Seizures  Seizures  Patient report  Seizures            Labs:   No results found for this or any previous visit (from the past 24 hour(s)).       Psychiatric Examination:     /83   Pulse 128   Temp 98.3  F (36.8  C) (Oral)   Resp 18   Ht 1.676 m (5' 6\")   Wt 85.8 kg (189 lb 3.2 oz)   SpO2 96%   BMI 30.54 kg/m    Weight is 189 lbs 3.2 oz  Body mass index is 30.54 kg/m .    Weight over time:  Vitals:    04/05/21 2118 04/06/21 0750   Weight: 85.1 kg (187 lb 11.2 oz) 85.8 kg (189 lb 3.2 oz)       Orthostatic Vitals       Most Recent      Sitting Orthostatic /83 04/07 0810    Sitting Orthostatic Pulse (bpm) " "128 04/07 0810    Standing Orthostatic /71 04/06 0750    Standing Orthostatic Pulse (bpm) 132 04/06 0750        Cardiometabolic risk assessment. 04/07/21    Reviewed patient profile for cardiometabolic risk factors    Date taken /Value  REFERENCE RANGE   Abdominal Obesity  (Waist Circumference)   See nursing flowsheet Women ?35 in (88 cm)   Men ?40 in (102 cm)      Triglycerides  Triglycerides   Date Value Ref Range Status   04/06/2021 228 (H) <150 mg/dL Final     Comment:     Borderline high:  150-199 mg/dl  High:             200-499 mg/dl  Very high:       >499 mg/dl         ?150 mg/dL (1.7 mmol/L) or current treatment for elevated triglycerides   HDL cholesterol  HDL Cholesterol   Date Value Ref Range Status   04/06/2021 39 (L) >39 mg/dL Final   ]   Women <50 mg/dL (1.3 mmol/L) in women or current treatment for low HDL cholesterol  Men <40 mg/dL (1 mmol/L) in men or current treatment for low HDL cholesterol     Fasting plasma glucose (FPG) Lab Results   Component Value Date     04/06/2021      FPG ?100 mg/dL (5.6 mmol/L) or treatment for elevated blood glucose   Blood pressure  BP Readings from Last 3 Encounters:   04/07/21 106/83   10/15/20 106/78   04/24/20 138/83    Blood pressure ?130/85 mmHg or treatment for elevated blood pressure   Family History  See family history     Appearance: dressed in hospital scrubs. Long carter marcus, appears older than stated age. Sitting comfortably in chair in lounge with feet in a foot bath.   Attitude:  mostly cooperative  Eye Contact:  fair  Mood:  \"right on\"  Affect:  full range  Speech:  normal rate, volume, and quantity  Language: fluent and intact in English  Psychomotor, Gait, Musculoskeletal:  no evidence of tardive dyskinesia, dystonia, or tics. No psychomotor agitation  Throught Process:  disorganized and illogical  Associations:  somewhat loose  Thought Content:  Denies SI and HI. Appears paranoid stating that many things are confidential. Reports " grandiose delusions  Insight:  limited  Judgement:  limited  Oriented to:  oriented to place and self. Did not ask about time orientation  Attention Span and Concentration:  limited  Recent and Remote Memory:  limited  Fund of Knowledge:  appropriate    Clinical Global Impressions  First:  Considering your total clinical experience with this particular patient population, how severe are the patient's symptoms at this time?: 7 (04/06/21 1428)  Compared to the patient's condition at the START of treatment, this patient's condition is: 4 (04/06/21 1428)  Most recent:  Considering your total clinical experience with this particular patient population, how severe are the patient's symptoms at this time?: 7 (04/06/21 1428)  Compared to the patient's condition at the START of treatment, this patient's condition is: 4 (04/06/21 1428)           Precautions:     Behavioral Orders   Procedures     Assault precautions     Code 1 - Restrict to Unit     Discontinue 1:1 attendant for suicide risk     Order Specific Question:   I have performed an in person assessment of the patient     Answer:   Based on this assessment the patient no longer requires a one on one attendant at this point in time.     Fall precautions     Routine Programming     As clinically indicated     Single Room     Status 15     Every 15 minutes.     Status Individual Observation     Patient SIO status reviewed with team/RN.  Please also refer to RN/team documentation for add'l detail.    -SIO staff to monitor following which have contributed to patient being on SIO:  Pt flooding the bathroom, need 1 foot in door for bathroom/showering.  Pt is very disorganized.      -Possible interventions SIO staff could use to support patient's treatment progress:  Provide medications as ordered, communicate with the care team.   -When following observed, team will review discontinuation of SIO:  q shift.     Order Specific Question:   CONTINUOUS 24 hours / day     Answer:    5 feet     Order Specific Question:   Indications for SIO     Answer:   Severe intrusiveness          Diagnoses:     Bipolar affective disorder, severe, current mood episode manic with psychosis  Suicidal ideation   Homicidal ideation  Polysubstance abuse  Cannabis use disorder, unspecified  Cocaine use disorder, severe, remission status unclear  Methamphetamine use disorder, severe, remission status unclear   History of benzodiazepine use disorder per chart  Nicotine dependence   PTSD by history  ADHD by history  Unspecified anxiety by history   S/p foreign body removal from R foot on antibiotics  Elevated blood glucose         Assessment & Plan:     Assessment:  This patient is a 45 year old male with history of bipolar disorder, polysubstance use, several MICD commitments, currently followed by ACT team who presented to ED with altered mental status in context of being found lying on ground of nursing home altered due to suspected medication non-adherence and ongoing substance use. He has history of several hospitalizations, most recently 2021 at [facility] following a suicide attempt via overdose, he was discharged the next day. He had been under MICD commitment in the last year but commitment has . He is followed by [facility] ACT team, he had been stable in apartment with [facility] services up until past few weeks. It is suspected he has not been adherent to medications and has relapsed on substances. His substances of choice tend to be cocaine, methamphetamine and cannabis. Utox in ED was positive for cannabinoids. His presentation was consistent with manic episode with psychosis. He also endorsed suicidal ideation as well as homicidal ideation with thoughts to murder his parents.     Hospital summary:  Patient was initially admitted to station 30 with Dr. Velázquez on a 72 hour hold. He was placed on assault and fall precautions. He became agitated and intrusive on station 30, so was placed on a  1:1 and transferred to station 12 under the care of Dr. Varner.  Given concern for imminent safety of self given psychosis and denisha symptoms along with reported SI and HI, petition for MICD commitment and Frausto with olanzapine, quetiapine, ziprasidone, and risperidone was filed. His ACT team was contacted and was in agreement with admission and petition for commitment and Frausto. He was restarted on his PTA medications though at lower doses due to non-adherence. He endorsed homicidal ideation towards his parents and a duty to warn was completed as his parents were notified of this.     Medical Hospital Summary:  In the ED, patient required foreign body removal of glass from his right foot and was started of Keflex for 5 days.     Changes today:  none    Target psychiatric symptoms and interventions:  # Bipolar Disorder:  -resume PTA quetiapine 100mg BID for mood stabilization and psychosis   -resume PTA ziprasidone at reduced dose given non-adherence of 40mg BID with meals for psychosis   -resume PTA olanzapine 15mg at bedtime for psychosis   -resume PTA gabapentin 400mg TID for anxiety and substance use and pain  Risks, benefits, and alternatives discussed at length with patient.     Acute Medical Problems and Treatments:  1) s/p foreign body removal in ED  -pt has been reluctant to let RN assess wounds  -continue to encourage pt to cooperate with cares  -consider WOC consult if needed  -was given one dose of Keflex in ED, this was not ordered to be continued but per ED note should be on Keflex x5 days, ordered Keflex 500mg QID x5 days     2) Elevated TSH, T4 WNL; likely sick thyroid syndrome from substance use and psychiatric decompensation, will plan to repeat in a few days and monitor for any hypothyroid symptoms     3) Elevated BG on admission   -consider recheck if pt appears hyperglycemic     4) Nicotine dependence  -offer PRN nicotine replacement on unit and upon discharge  - patient on  cessation    Behavioral/Psychological/Social:  - Encourage unit programming    Safety:  - Continue precautions as noted above  - Status 15 minute checks  - Patient SIO status reviewed with team/RN.  Please also refer to RN/team documentation for add'l detail. Continue SIO as patient has been intrusive and agitated.     Legal Status: 72 hour hold, filed for commitment and Frausto    Disposition Plan   Reason for ongoing admission: poses an imminent risk to self, poses an imminent risk to others and is unable to care for self due to severe psychosis or denisha  Discharge location: likely IRTS or MICD treatment  Discharge Medications: not ordered  Follow-up Appointments: not scheduled    Patient seen and discussed with attending, Dr. Varner.    Luz Santiago MD  Psychiatry PGY-4

## 2021-04-07 NOTE — PROGRESS NOTES
"SPIRITUAL HEALTH SERVICES  SPIRITUAL ASSESSMENT Progress Note  [facility] ([facility] 12NB     REFERRAL SOURCE: Consult placed for pt asking for emotional support.    I checked in with pt RN and met with Tyler briefly in his room. Tyler requested \"rosetta sweetgrass and cedar\" and understands that while we are not able to provide smudging on the unit, we can arrange for a rosetta tea or spray as an alternate. Tyler requested the spray.  Shared [employer] Spirit prayer with pt at his request.    I have arranged with FRANCIS Laurent to provide rosetta spray blend on 4/8 between 8:30 - 10:00 am. Ritual to be facilitated in the conference room on the unit.    Sherron Estrella MDiv  Associate   Pager [phone]    "

## 2021-04-07 NOTE — PLAN OF CARE
Current Diagnosis/Procedure:  Bipolar affective disorder, severe, current mood episode manic with psychosis  Suicidal ideation   Homicidal ideation  Polysubstance abuse  Cannabis use disorder, unspecified  Cocaine use disorder, severe, remission status unclear  Methamphetamine use disorder, severe, remission status unclear   History of benzodiazepine use disorder per chart  Nicotine dependence   PTSD by history  ADHD by history  Unspecified anxiety by history   S/p foreign body removal from R foot on antibiotics  Elevated blood glucose    Work Completed:    Pt's care discussed with treatment team in daily team meeting and chart notes were reviewed.  Meds continue to be adjusted by MD's    Pt was transferred late in the day yesterday to Station 12 from Station 30.  He is highly intrusive with flight of ideas currently.  He is not under commitment at this time and they did file a petition for commitment on Station 30.  Waiting to hear if this is supported and when court dates are.     Writer returned call to pt's COLTON Terry - phone: [phone].  Updated her.     Update:  Mary Bhatia @ [phone] from [facility] called and said that they are supporting both the MI and CD portions of the commitment.  We will receive court hold and court dates upon signature from the .     Discharge plan or goal:  Unknown at this time.  Pt's [facility]  informed writer that the pt's apartment is unhabitable. He will not be able to return there.       Barriers to discharge:  Continued need for stabilization of severity of illness and medication management.

## 2021-04-07 NOTE — PLAN OF CARE
BEHAVIORAL TEAM DISCUSSION    Participants: Dr. Jennifer Varner, Luz Santiago MD (Resident), Harry RN, Alvin Eastman RN, [facility] Bettie Benson LMFT, [facility]   Progress: pt continues to exhibit flight of ideas, psychosis and intrusive behaviors.   Anticipated length of stay: 1-2 weeks  Continued Stay Criteria/Rationale: stabilization of mh symptoms, petition for commitment initiated.   Medical/Physical:   Small cuts noted on pt's bilateral hands Small cuts noted on pt's bilateral hands   Foreign body (FB) in soft tissue - 3 pieces of glass bottom right foot  vitamin D deficiency,   Precautions:   Behavioral Orders   Procedures    Assault precautions    Code 1 - Restrict to Unit    Discontinue 1:1 attendant for suicide risk     Order Specific Question:   I have performed an in person assessment of the patient     Answer:   Based on this assessment the patient no longer requires a one on one attendant at this point in time.    Fall precautions    Routine Programming     As clinically indicated    Single Room    Status 15     Every 15 minutes.    Status Individual Observation     Patient SIO status reviewed with team/RN.  Please also refer to RN/team documentation for add'l detail.    -SIO staff to monitor following which have contributed to patient being on SIO:  Pt flooding the bathroom, continuous observation even in the bathroom and shower due to flooding.  Pt is very disorganized.      -Possible interventions SIO staff could use to support patient's treatment progress:  Provide medications as ordered, communicate with the care team.   -When following observed, team will review discontinuation of SIO:  q shift.     Order Specific Question:   CONTINUOUS 24 hours / day     Answer:   5 feet     Order Specific Question:   Indications for SIO     Answer:   Severe intrusiveness     Plan: petition for commitment initiated, work with treatment team to make appropriate referrals for OP care.   Rationale for change in precautions or  plan: no change

## 2021-04-07 NOTE — PLAN OF CARE
Problem: Sleep Disturbance  Goal: Adequate Sleep/Rest  Outcome: Improving   Night Shift Summary (4/6/21 into 04/07/21)    Pt in bed sleeping at start of shift, breathing quiet and unlabored. Pt appeared to have slept for 6.25hrs so far.    Pt continues on fall and assault precautions, with no related events occurring this shift. He is on SIO 1:1 5ft distance restriction for intrusiveness and disorganized behavior . No prn meds given.Pt took his scheduled Abx without problem    Will continue to monitor and assess. The 15 mins safety checks cont.

## 2021-04-07 NOTE — PROGRESS NOTES
1. What PRN did patient receive? seroquel 200mg for agitation    2. What was the patient doing that led to the PRN medication? Pt was loud and pounding on med room window, agitated    3. Did they require R/S? no    4. Side effects to PRN medication? Relief of agitation    5. After 1 Hour, patient appeared:   Pt was not agitated.      Foot soak  Pt given foot soak.  Pt also wanted tylenol PRN for foot pain. When RN gave him tablets, he poured them into foot soak. Pt did not take the tylenol PRN.

## 2021-04-07 NOTE — PLAN OF CARE
"Patient continues to be demanding, disorganized, intrusive, and grandiose.  At start of shift, patient became agitated and was yelling in lounge area.  PRN olanzapine 10 mg PO was admistered at 0809 which appeared to be effective in relieving agitation.  Patient continued to make requests for PRN medication to help \"calm down\" shortly after this, although he appeared lethargic so no additional PRN medication was given at this time.  Patient later endorsed anxiety rated at 7/10 and PRN hydroxyzine 25 mg was administered at 1158. Patinet is demanding throughout the day, making frequent requests.     Patient endorsed depression rated at 5/10.  He endorses passive SI.  Patient explained that he has a suicidal plan if he leaves the hospital but would not tell writer what the plan was.  Patient also endorses homicidal thoughts toward homosexual people but denies any plan to act on these thoughts.  Patient contracts for safety. 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  Patient requested to see a jordan.  Consult for jordan was placed and jordan is meeting with patient today.  Patient endorsed pain in his feet that he initially rated at 7/10.  He did receive acetaminophen with morning medications and foot soak was provided which patient said was helpful.     Patient remains on SIO for intrusive behaviors.  Patient continues to need redirection.  He needs prompts to return lunch tray to proper cart.  Throughout this shift he has spilt coffee on floor, urinated in paper bag in bathroom, and spread toilet paper out on the floor.  He is cooperative when staff asked him to help clean up.  Will continue to monitor. Anastasiia Martin RN   "

## 2021-04-08 PROCEDURE — 250N000013 HC RX MED GY IP 250 OP 250 PS 637: Performed by: PSYCHIATRY & NEUROLOGY

## 2021-04-08 PROCEDURE — 124N000002 HC R&B MH UMMC

## 2021-04-08 PROCEDURE — 99233 SBSQ HOSP IP/OBS HIGH 50: CPT | Performed by: PSYCHIATRY & NEUROLOGY

## 2021-04-08 RX ORDER — LORAZEPAM 1 MG/1
1-2 TABLET ORAL 3 TIMES DAILY PRN
Status: DISCONTINUED | OUTPATIENT
Start: 2021-04-08 | End: 2021-04-22 | Stop reason: HOSPADM

## 2021-04-08 RX ADMIN — NICOTINE POLACRILEX 4 MG: 2 LOZENGE ORAL at 12:09

## 2021-04-08 RX ADMIN — QUETIAPINE FUMARATE 100 MG: 100 TABLET ORAL at 20:29

## 2021-04-08 RX ADMIN — NICOTINE POLACRILEX 4 MG: 2 LOZENGE ORAL at 08:28

## 2021-04-08 RX ADMIN — OLANZAPINE 15 MG: 10 TABLET, FILM COATED ORAL at 14:08

## 2021-04-08 RX ADMIN — QUETIAPINE FUMARATE 200 MG: 100 TABLET ORAL at 10:39

## 2021-04-08 RX ADMIN — HYDROXYZINE HYDROCHLORIDE 25 MG: 25 TABLET, FILM COATED ORAL at 09:10

## 2021-04-08 RX ADMIN — QUETIAPINE FUMARATE 100 MG: 100 TABLET ORAL at 08:03

## 2021-04-08 RX ADMIN — OLANZAPINE 15 MG: 10 TABLET, FILM COATED ORAL at 20:29

## 2021-04-08 RX ADMIN — OLANZAPINE 10 MG: 10 TABLET, FILM COATED ORAL at 09:09

## 2021-04-08 RX ADMIN — GABAPENTIN 400 MG: 400 CAPSULE ORAL at 20:29

## 2021-04-08 RX ADMIN — CEPHALEXIN 500 MG: 500 CAPSULE ORAL at 20:29

## 2021-04-08 RX ADMIN — GABAPENTIN 400 MG: 400 CAPSULE ORAL at 08:03

## 2021-04-08 RX ADMIN — NICOTINE POLACRILEX 4 MG: 2 LOZENGE ORAL at 18:30

## 2021-04-08 RX ADMIN — LORAZEPAM 2 MG: 1 TABLET ORAL at 14:08

## 2021-04-08 RX ADMIN — NICOTINE 1 PATCH: 21 PATCH, EXTENDED RELEASE TRANSDERMAL at 08:10

## 2021-04-08 RX ADMIN — NICOTINE POLACRILEX 4 MG: 2 LOZENGE ORAL at 15:24

## 2021-04-08 RX ADMIN — CEPHALEXIN 500 MG: 500 CAPSULE ORAL at 14:08

## 2021-04-08 RX ADMIN — CEPHALEXIN 500 MG: 500 CAPSULE ORAL at 08:03

## 2021-04-08 RX ADMIN — ALUMINUM HYDROXIDE, MAGNESIUM HYDROXIDE, AND SIMETHICONE 30 ML: 200; 200; 20 SUSPENSION ORAL at 15:57

## 2021-04-08 RX ADMIN — GABAPENTIN 400 MG: 400 CAPSULE ORAL at 14:08

## 2021-04-08 RX ADMIN — ZIPRASIDONE HYDROCHLORIDE 40 MG: 40 CAPSULE ORAL at 08:03

## 2021-04-08 RX ADMIN — OLANZAPINE 10 MG: 10 TABLET, FILM COATED ORAL at 00:02

## 2021-04-08 RX ADMIN — ALUMINUM HYDROXIDE, MAGNESIUM HYDROXIDE, AND SIMETHICONE 30 ML: 200; 200; 20 SUSPENSION ORAL at 00:03

## 2021-04-08 RX ADMIN — NICOTINE POLACRILEX 4 MG: 2 LOZENGE ORAL at 20:34

## 2021-04-08 RX ADMIN — ZIPRASIDONE HYDROCHLORIDE 40 MG: 40 CAPSULE ORAL at 18:23

## 2021-04-08 RX ADMIN — NICOTINE POLACRILEX 4 MG: 2 LOZENGE ORAL at 10:39

## 2021-04-08 ASSESSMENT — ACTIVITIES OF DAILY LIVING (ADL)
ORAL_HYGIENE: INDEPENDENT
LAUNDRY: UNABLE TO COMPLETE
HYGIENE/GROOMING: HANDWASHING;INDEPENDENT
DRESS: SCRUBS (BEHAVIORAL HEALTH)
HYGIENE/GROOMING: INDEPENDENT
ORAL_HYGIENE: INDEPENDENT
DRESS: SCRUBS (BEHAVIORAL HEALTH)

## 2021-04-08 NOTE — PROGRESS NOTES
"Patient frequently makes requests from staff and is often requiring redirection. He requested for Maalox at midnight, he was given this and was also provided with Zyprexa 5mg. After giving him the medication with water he stated \"Get me more water\", he was informed he could get some from the machine. Statements are \"Nurse! Nurse! Nurse I need wound care\" and held up his thumb with a small intact cut on it. As soon as staff assists him he will then shortly after make another request. Patient has been put on top of the hour requests. Patient was taking cups of ice and dumping them into the sink. He started to take the ice water from the sink, was putting it into cups, and then was putting these into the fridge. Staff stopped him, he questioned why and was informed that it's for infection prevention. Will continue to redirect patient as needed.    At 0240 patient was provided a urinal. After using it he continued to pour the urine in a cup with water and then poured it back in the urinal. He continued this for a few minutes. Staff attempted to redirect patient and he stated \"I'm scared of big black guys trying to redirect me!\".  "
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
## 2021-04-08 NOTE — PLAN OF CARE
Problem: Sleep Disturbance  Goal: Adequate Sleep/Rest  Outcome: Declining     Problem: Behavior Regulation Impairment (Psychotic Signs/Symptoms)  Goal: Improved Behavioral Control (Psychotic Signs/Symptoms)  Outcome: No Change     Pt was awake most of the night, disorganized, and difficult to follow directions. PRN Zyprexa 10 mg PO at 0002H but the medication was ineffective and Maalox for indigestion at 0003H.    Pt slept for 2.75 hours.

## 2021-04-08 NOTE — PLAN OF CARE
"Patient continues on SIO to manage his hyperactive, impulsive, and severely intrusive behavior.  He remains very manic appearing.  He requires very frequent redirection to maintain appropriate social boundaries and to follow infection control practices on the unit.  In the last 24 hours, he has been engaging in bizarre behaviors, including but not limited to: mixing urine and other fluids together in a drinking cup; urinating in the paper bag that lines the waste basked in the bathroom; spitting coffee onto the floor of lounge #2.  He presents as elated and grandiose with poor insight into his illness.  He makes very frequent requests/ demands of unit staff.  His affect and eye contact are notable for increased intensity.  His speech is rambling, pressured, and tangential.  He has c/o breakthrough symptoms of agitation and has been receptive to taking PRN medications.  He has already received a dose of Zyprexa 10 mg, Hydroxyzine 25 mg, and Seroquel 200 mg this shift to target his persistent agitation; however, minimal relief can be noted at this point.  Tyler slept poorly last night with only 2.5 hours of sleep noted on the NOC shift of 4/8/21.  Will continue to monitor closely.    Addendum 14:51  Patient was informed of his change in legal status to \"court hold\" in [facility].  He accepted this news without becoming aggressive.  He did report a spike in anxiety and agitation, asked for a PRN medication to help manage his persistent symptoms.  He was given a dose of PRN Ativan 2 mg at 14:08, along with his scheduled doses of Zyprexa 15 mg and Gabapentin 400 mg.  Minimal relief can be noted at this point.  He remains hyperactive, making repeated requests, and needing frequent redirection for intrusive and disruptive behaviors.  His Keflex was not given on the NOC shift, so his scheduled 06:00 dose was given late at 08:00; as such, the 12:00 dose was modified to be given 6 hours later, at 14:00.    "

## 2021-04-08 NOTE — PROGRESS NOTES
04/08/21 0921   Visit Information   Visit Made By Staff    Type of Visit Follow-up   Visited Patient   Interventions   Ritual/Sacramental Encounter  Smudging   Advanced Assessments/Interventions   Presenting Concerns/Issues Spiritual/Rastafari/emotional support   SPIRITUAL HEALTH SERVICES  SPIRITUAL ASSESSMENT Progress Note  [facility] ([facility] [place]     REFERRAL SOURCE: follow up visit.    Facilitated  ritual for Tyler in pt room, 1:1 present. Use of rosetta/cedar/sweetgrass spray in place of smudging.    Plan: Spiritual Health remains available at patient's request for the duration of admission.    Sherron Estrella MDiv  Associate   Pager [phone]

## 2021-04-08 NOTE — PROGRESS NOTES
"It's recommended that pt not have patient pens, only pencils due to him firmly pressing a patient pen into his left forearm onto his tattoo. Pt stated that he needed to give his tattoo more ink. Pt also had black marks on the back of his left hand as well, and it was reddened. When asked, pt states that he was using pen to \"give myself a tattoo\". Staff redirected pt a few times to stop, and when pt didn't stop behavior, staff took pen away and replaced it with a pt pencil instead.  "

## 2021-04-09 PROCEDURE — 124N000002 HC R&B MH UMMC

## 2021-04-09 PROCEDURE — G0177 OPPS/PHP; TRAIN & EDUC SERV: HCPCS

## 2021-04-09 PROCEDURE — 250N000013 HC RX MED GY IP 250 OP 250 PS 637: Performed by: PSYCHIATRY & NEUROLOGY

## 2021-04-09 PROCEDURE — 99233 SBSQ HOSP IP/OBS HIGH 50: CPT | Performed by: PSYCHIATRY & NEUROLOGY

## 2021-04-09 RX ORDER — CALCIUM CARBONATE 500 MG/1
1000 TABLET, CHEWABLE ORAL 2 TIMES DAILY PRN
Status: DISCONTINUED | OUTPATIENT
Start: 2021-04-09 | End: 2021-04-22 | Stop reason: HOSPADM

## 2021-04-09 RX ADMIN — NICOTINE POLACRILEX 4 MG: 2 LOZENGE ORAL at 07:06

## 2021-04-09 RX ADMIN — NICOTINE POLACRILEX 4 MG: 2 LOZENGE ORAL at 12:27

## 2021-04-09 RX ADMIN — NICOTINE POLACRILEX 4 MG: 2 LOZENGE ORAL at 17:32

## 2021-04-09 RX ADMIN — OLANZAPINE 15 MG: 10 TABLET, FILM COATED ORAL at 19:19

## 2021-04-09 RX ADMIN — CEPHALEXIN 500 MG: 500 CAPSULE ORAL at 12:18

## 2021-04-09 RX ADMIN — QUETIAPINE FUMARATE 100 MG: 100 TABLET ORAL at 19:19

## 2021-04-09 RX ADMIN — CEPHALEXIN 500 MG: 500 CAPSULE ORAL at 02:20

## 2021-04-09 RX ADMIN — QUETIAPINE FUMARATE 100 MG: 100 TABLET ORAL at 08:03

## 2021-04-09 RX ADMIN — NICOTINE 1 PATCH: 21 PATCH, EXTENDED RELEASE TRANSDERMAL at 08:10

## 2021-04-09 RX ADMIN — LORAZEPAM 2 MG: 1 TABLET ORAL at 13:05

## 2021-04-09 RX ADMIN — CEPHALEXIN 500 MG: 500 CAPSULE ORAL at 08:05

## 2021-04-09 RX ADMIN — GABAPENTIN 400 MG: 400 CAPSULE ORAL at 19:18

## 2021-04-09 RX ADMIN — GABAPENTIN 400 MG: 400 CAPSULE ORAL at 08:03

## 2021-04-09 RX ADMIN — OLANZAPINE 15 MG: 10 TABLET, FILM COATED ORAL at 08:03

## 2021-04-09 RX ADMIN — CEPHALEXIN 500 MG: 500 CAPSULE ORAL at 19:18

## 2021-04-09 RX ADMIN — GABAPENTIN 400 MG: 400 CAPSULE ORAL at 13:05

## 2021-04-09 RX ADMIN — ZIPRASIDONE HYDROCHLORIDE 40 MG: 40 CAPSULE ORAL at 08:03

## 2021-04-09 RX ADMIN — NICOTINE POLACRILEX 4 MG: 2 LOZENGE ORAL at 08:33

## 2021-04-09 RX ADMIN — ZIPRASIDONE HYDROCHLORIDE 40 MG: 40 CAPSULE ORAL at 17:32

## 2021-04-09 ASSESSMENT — ACTIVITIES OF DAILY LIVING (ADL)
ORAL_HYGIENE: INDEPENDENT
DRESS: SCRUBS (BEHAVIORAL HEALTH)
LAUNDRY: UNABLE TO COMPLETE
HYGIENE/GROOMING: HANDWASHING;INDEPENDENT;PROMPTS
LAUNDRY: UNABLE TO COMPLETE
HYGIENE/GROOMING: INDEPENDENT;PROMPTS
DRESS: SCRUBS (BEHAVIORAL HEALTH)
ORAL_HYGIENE: INDEPENDENT

## 2021-04-09 NOTE — PLAN OF CARE
"Pt continued on SIO for intrusive and disorganized behavior. Pt presents as restless and distractable. Mood elated and grandiose. Pt has not engaged in any overtly disruptive behavior, and appears improving. SIO discontinued by Dr Varner due to improvement in intrusive behaviors. He continues to need redirection at times, though he was overall accepting. Pt has frequent requests of staff, and frequently asking for \"as many drugs as I can get please.\" Denies SI/SIB and HI. Denies all mental health symptoms. Pt has poor insight into situation and mental health symptoms. Tyler c/o agitation this afternoon due to loud peers/increased stimulation, and received prn lorazepam, which he reports was helpful. He has been compliant with scheduled medications and denies any side effects.   "

## 2021-04-09 NOTE — PLAN OF CARE
"  Problem: OT General Care Plan  Goal: OT Goal 1  Description: OT Goal 1  Outcome: No Change     Pt attended 2 of 2 OT sessions.  Grandiose, hyperverbal, easily engages in discussion about conspiracy theories with peer that has similar beliefs, though both were respectful of writer when redirected.  Peer also asked pt his favorite liquor, and when redirected, pt commented they would continue their conversation outside of group, and alluded to hanging out outside of the hospital setting.  Pt was mostly task focused (did not engage much today on treatment related goals, as he just said \"being here\") and did well with this today - even after dropping his beading project 3 times (did not get frustrated).    "

## 2021-04-09 NOTE — PLAN OF CARE
"Writer was overheard stating that \"I can't wait until I can go on [email or web address] and order percs for my whole team\". He then continued to discuss how much he enjoys taking percocet.   "
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
## 2021-04-09 NOTE — PROGRESS NOTES
Pt appeared to sleep comfortably for 6 hours this night shift. No PRNs or snacks given or requested.    Pt up at 0630 and went to the lounge and had some coffee. Psych associate limited pt at 10 sugars and 7 creamers. Pt became angry and loud and went back to his room and slammed his door.

## 2021-04-09 NOTE — PROGRESS NOTES
SPIRITUAL HEALTH SERVICES  SPIRITUAL ASSESSMENT Progress Note  [facility] ([facility]) 12N     REFERRAL SOURCE: Consult placed for patient asking for  ritual support.    I arranged the time with staff; brief visit with Tyler to provide misting spray for smudging alternative. Tyler also asked for a copy of the [employer] Prayer which I will return to provide.    Tyler is aware that on-call [facility] staff may not be available to provide smudging over the weekend; I plan to check in with Tyler next Monday for further support.    Sherron Estrella MDiv  Associate   Pager [phone]

## 2021-04-09 NOTE — PLAN OF CARE
Current Diagnosis/Procedure:  Bipolar affective disorder, severe, current mood episode manic with psychosis  Polysubstance abuse  Cannabis use disorder, unspecified  Cocaine use disorder, severe, remission status unclear  Methamphetamine use disorder, severe, remission status unclear   History of benzodiazepine use disorder per chart  Nicotine dependence   PTSD by history  ADHD by history  Unspecified anxiety by history      Work Completed:    -Pt's care discussed with treatment team in daily team meeting and chart notes were reviewed. Meds continue to be adjusted by MD's     -Court hold and upcoming hearing dates were served to the patient today.  Preliminary Exam:   @ 9am  Final Hearin/16 @ TBD     Writer emailed ITV team the above court dates/time    Pt grandmarye and tangential - he offered writer $100,000 for my shirt today because he liked the colors.     Discharge plan or goal:  Unknown at this time.  Pt's CADI  informed writer that the pt's apartment is uninhabitable. He will not be able to return there.        Barriers to discharge:  Continued need for stabilization of severity of illness and medication management.

## 2021-04-09 NOTE — PROGRESS NOTES
"[facility], [place]   Psychiatric Progress Note  Hospital Day: 4        Interim History:   The patient's care was discussed with the treatment team during the daily team meeting and/or staff's chart notes were reviewed. Patient continues to be demanding, disorganized, intrusive, and grandiose. Ongoing evidence of significant delusional thought content. Demonstrating safe behaviors overnight with no evidence of severe agitation. Stated that he feels \"great,\" and \"clear headed.\" Patient denies SI/SIB, AH/VH, anxiety, depression, or thoughts of harming others. Medication adherent. Patient appears disheveled and untidy not completing ADL's. Pt reported sedation last evening, which was also observed by RN staff.  Did receive prn Ativan 2 mg at 1400 yesterday. Slept 6 hours overnight.     Upon interview, Tyler said that he is feeling \"so much better now that you changed the meds!\" He said that he feels his short-term and long-term memory is returning. He stated that he firmly believes that he has \"CPTSD. Chronic post-traumatic stress disorder.\" He tells me that he was raised in a \"satanic, Jew cult.\" He also shared that when he was a child, providers \"forced psychiatric medications.\" Made comments about Karma and a \"tit for a tat, eye for an eye.\" When asked to elaborate, he said \"It's classified.\" He denied side effects from medications. He denied acute medical concerns with exception of a \"burning when I eat sometimes.\" He noted that Maalox has helped alleviate this in the past. He requested prn Tums. He also asked if he could be started on Vraylar, Vyvanse, and Enbrel. He had no other questions or concerns for this writer.          Medications:       cephALEXin  500 mg Oral Q6H JASWINDER     gabapentin  400 mg Oral TID     nicotine  1 patch Transdermal Daily     nicotine   Transdermal Q8H     OLANZapine  15 mg Oral BID     QUEtiapine  100 mg Oral BID     ziprasidone  40 mg Oral BID w/meals " "         Allergies:     Allergies   Allergen Reactions     Benztropine      Other reaction(s): Seizures  Patient reported  Patient reported       Abilify Discmelt      Suicidal thoughts       Aripiprazole Other (See Comments)     Other reaction(s): Unknown  Suicidal thoughts  Suicidal thoughts       Benzyl Alcohol      Other reaction(s): Unknown     Compazine Other (See Comments)     Seizure      Haloperidol Other (See Comments)     Other reaction(s): Dystonia  Patient had EPS with haldol  Patient had EPS with haldol  Patient had EPS with haldol       Phenothiazines Other (See Comments)     Other reaction(s): Dystonia, Muscle Aches/Weakness, Muscle Cramps, Seizures  Seizures  Patient report  Seizures            Labs:   No results found for this or any previous visit (from the past 24 hour(s)).       Psychiatric Examination:     /75   Pulse 107   Temp 96.2  F (35.7  C) (Tympanic)   Resp 16   Ht 1.676 m (5' 6\")   Wt 85.8 kg (189 lb 3.2 oz)   SpO2 94%   BMI 30.54 kg/m    Weight is 189 lbs 3.2 oz  Body mass index is 30.54 kg/m .    Weight over time:  Vitals:    04/05/21 2118 04/06/21 0750   Weight: 85.1 kg (187 lb 11.2 oz) 85.8 kg (189 lb 3.2 oz)       Orthostatic Vitals       Most Recent      Sitting Orthostatic /83 04/07 0810    Sitting Orthostatic Pulse (bpm) 128 04/07 0810    Standing Orthostatic /71 04/06 0750    Standing Orthostatic Pulse (bpm) 132 04/06 0750        Cardiometabolic risk assessment. 04/07/21    Reviewed patient profile for cardiometabolic risk factors    Date taken /Value  REFERENCE RANGE   Abdominal Obesity  (Waist Circumference)   See nursing flowsheet Women ?35 in (88 cm)   Men ?40 in (102 cm)      Triglycerides  Triglycerides   Date Value Ref Range Status   04/06/2021 228 (H) <150 mg/dL Final     Comment:     Borderline high:  150-199 mg/dl  High:             200-499 mg/dl  Very high:       >499 mg/dl         ?150 mg/dL (1.7 mmol/L) or current treatment for elevated " "triglycerides   HDL cholesterol  HDL Cholesterol   Date Value Ref Range Status   04/06/2021 39 (L) >39 mg/dL Final   ]   Women <50 mg/dL (1.3 mmol/L) in women or current treatment for low HDL cholesterol  Men <40 mg/dL (1 mmol/L) in men or current treatment for low HDL cholesterol     Fasting plasma glucose (FPG) Lab Results   Component Value Date     04/06/2021      FPG ?100 mg/dL (5.6 mmol/L) or treatment for elevated blood glucose   Blood pressure  BP Readings from Last 3 Encounters:   04/09/21 112/75   10/15/20 106/78   04/24/20 138/83    Blood pressure ?130/85 mmHg or treatment for elevated blood pressure   Family History  See family history     Appearance: dressed in hospital scrubs. Long carter marcus, appears older than stated age.   Attitude:  mostly cooperative  Eye Contact:  fair  Mood:  \"so much better!\"  Affect:  full range  Speech:  normal rate, volume, and quantity  Language: fluent and intact in English  Psychomotor, Gait, Musculoskeletal:  no evidence of tardive dyskinesia, dystonia, or tics. No psychomotor agitation  Throught Process:  disorganized and illogical  Associations:  somewhat loose  Thought Content:  Endorses vague HI. Denies SI. Appears paranoid stating that many things are confidential. Reports grandiose delusions  Insight:  limited  Judgement:  limited  Oriented to:  oriented to place and self. Did not ask about time orientation  Attention Span and Concentration:  limited  Recent and Remote Memory:  limited  Fund of Knowledge:  appropriate    Clinical Global Impressions  First:  Considering your total clinical experience with this particular patient population, how severe are the patient's symptoms at this time?: 7 (04/06/21 1428)  Compared to the patient's condition at the START of treatment, this patient's condition is: 4 (04/06/21 1428)  Most recent:  Considering your total clinical experience with this particular patient population, how severe are the patient's symptoms at " this time?: 7 (04/06/21 1428)  Compared to the patient's condition at the START of treatment, this patient's condition is: 4 (04/06/21 1428)           Precautions:     Behavioral Orders   Procedures     Assault precautions     Code 1 - Restrict to Unit     Discontinue 1:1 attendant for suicide risk     Order Specific Question:   I have performed an in person assessment of the patient     Answer:   Based on this assessment the patient no longer requires a one on one attendant at this point in time.     Fall precautions     Routine Programming     As clinically indicated     Single Room     Status 15     Every 15 minutes.     Status Individual Observation     Patient SIO status reviewed with team/RN.  Please also refer to RN/team documentation for add'l detail.    -SIO staff to monitor following which have contributed to patient being on SIO:  Pt flooding the bathroom, continuous observation even in the bathroom and shower due to flooding.  Pt is very disorganized.      -Possible interventions SIO staff could use to support patient's treatment progress:  Provide medications as ordered, communicate with the care team.   -When following observed, team will review discontinuation of SIO:  q shift.     Order Specific Question:   CONTINUOUS 24 hours / day     Answer:   5 feet     Order Specific Question:   Indications for SIO     Answer:   Severe intrusiveness          Diagnoses:     Bipolar affective disorder, severe, current mood episode manic with psychosis  Suicidal ideation   Homicidal ideation  Polysubstance abuse  Cannabis use disorder, unspecified  Cocaine use disorder, severe, remission status unclear  Methamphetamine use disorder, severe, remission status unclear   History of benzodiazepine use disorder per chart  Nicotine dependence   PTSD by history  ADHD by history  Unspecified anxiety by history   S/p foreign body removal from R foot on antibiotics  Elevated blood glucose         Assessment & Plan:      Assessment:  This patient is a 45 year old male with history of bipolar disorder, polysubstance use, several MICD commitments, currently followed by ACT team who presented to ED with altered mental status in context of being found lying on ground of nursing home altered due to suspected medication non-adherence and ongoing substance use. He has history of several hospitalizations, most recently 2021 at [facility] following a suicide attempt via overdose, he was discharged the next day. He had been under MICD commitment in the last year but commitment has . He is followed by [facility] ACT team, he had been stable in apartment with [facility] services up until past few weeks. It is suspected he has not been adherent to medications and has relapsed on substances. His substances of choice tend to be cocaine, methamphetamine and cannabis. Utox in ED was positive for cannabinoids. His presentation was consistent with manic episode with psychosis. He also endorsed suicidal ideation as well as homicidal ideation with thoughts to murder his parents.     Hospital summary:  Patient was initially admitted to station 30 with Dr. Velázquez on a 72 hour hold. He was placed on assault and fall precautions. He became agitated and intrusive on station 30, so was placed on a 1:1 and transferred to station 12 under the care of Dr. Varner.  Given concern for imminent safety of self given psychosis and denisha symptoms along with reported SI and HI, petition for MICD commitment and Frausto with olanzapine, quetiapine, ziprasidone, and risperidone was filed. His ACT team was contacted and was in agreement with admission and petition for commitment and Frausto. He was restarted on his PTA medications though at lower doses due to non-adherence. He endorsed homicidal ideation towards his parents and a duty to warn was completed as his parents were notified of this.     Medical Hospital Summary:  In the ED, patient required foreign  body removal of glass from his right foot and was started of Keflex for 5 days.     Changes today:  Add Tums prn  Discontinue SIO due to overall improvement in agitation and intrusive behaviors    Target psychiatric symptoms and interventions:  # Bipolar Disorder:  -resume PTA quetiapine 100mg BID for mood stabilization and psychosis   -resume PTA ziprasidone at reduced dose given non-adherence of 40mg BID with meals for psychosis   -resume PTA olanzapine 15mg at bedtime for psychosis and added 15 mg daily on 4/8  -resume PTA gabapentin 400mg TID for anxiety and substance use and pain  -Ativan 1-2 mg TID prn for agitation in context of denisha. Plan to discontinue prior to discharge.     Acute Medical Problems and Treatments:  1) s/p foreign body removal in ED  -pt has been reluctant to let RN assess wounds  -continue to encourage pt to cooperate with cares  -consider WOC consult if needed  -was given one dose of Keflex in ED, this was not ordered to be continued but per ED note should be on Keflex x5 days, ordered Keflex 500mg QID x5 days     2) Elevated TSH, T4 WNL; likely sick thyroid syndrome from substance use and psychiatric decompensation, will plan to repeat in a few days and monitor for any hypothyroid symptoms     3) Elevated BG on admission   -consider recheck if pt appears hyperglycemic     4) Nicotine dependence  -offer PRN nicotine replacement on unit and upon discharge  - patient on cessation    Behavioral/Psychological/Social:  - Encourage unit programming    Safety:  - Continue precautions as noted above  - Status 15 minute checks  - Will discontinue SIO    Legal Status: Court hold    Disposition Plan   Reason for ongoing admission: poses an imminent risk to self, poses an imminent risk to others and is unable to care for self due to severe psychosis or denisha  Discharge location: likely IRTS or MICD treatment  Discharge Medications: not ordered  Follow-up Appointments: not scheduled    Maggi  MD [place]  [facility] Psychiat[facility]

## 2021-04-10 PROCEDURE — 124N000002 HC R&B MH UMMC

## 2021-04-10 PROCEDURE — 250N000013 HC RX MED GY IP 250 OP 250 PS 637: Performed by: PSYCHIATRY & NEUROLOGY

## 2021-04-10 RX ADMIN — CEPHALEXIN 500 MG: 500 CAPSULE ORAL at 18:30

## 2021-04-10 RX ADMIN — GABAPENTIN 400 MG: 400 CAPSULE ORAL at 08:25

## 2021-04-10 RX ADMIN — CEPHALEXIN 500 MG: 500 CAPSULE ORAL at 00:31

## 2021-04-10 RX ADMIN — ZIPRASIDONE HYDROCHLORIDE 40 MG: 40 CAPSULE ORAL at 17:52

## 2021-04-10 RX ADMIN — NICOTINE POLACRILEX 4 MG: 2 LOZENGE ORAL at 06:35

## 2021-04-10 RX ADMIN — GABAPENTIN 400 MG: 400 CAPSULE ORAL at 19:09

## 2021-04-10 RX ADMIN — ZIPRASIDONE HYDROCHLORIDE 40 MG: 40 CAPSULE ORAL at 08:25

## 2021-04-10 RX ADMIN — NICOTINE POLACRILEX 4 MG: 2 LOZENGE ORAL at 12:59

## 2021-04-10 RX ADMIN — OLANZAPINE 15 MG: 10 TABLET, FILM COATED ORAL at 08:25

## 2021-04-10 RX ADMIN — NICOTINE POLACRILEX 4 MG: 2 LOZENGE ORAL at 16:39

## 2021-04-10 RX ADMIN — NICOTINE POLACRILEX 4 MG: 2 LOZENGE ORAL at 08:25

## 2021-04-10 RX ADMIN — ALUMINUM HYDROXIDE, MAGNESIUM HYDROXIDE, AND SIMETHICONE 30 ML: 200; 200; 20 SUSPENSION ORAL at 10:36

## 2021-04-10 RX ADMIN — GABAPENTIN 400 MG: 400 CAPSULE ORAL at 13:01

## 2021-04-10 RX ADMIN — QUETIAPINE FUMARATE 100 MG: 100 TABLET ORAL at 19:09

## 2021-04-10 RX ADMIN — NICOTINE POLACRILEX 4 MG: 2 LOZENGE ORAL at 18:33

## 2021-04-10 RX ADMIN — ANTACID TABLETS 1000 MG: 500 TABLET, CHEWABLE ORAL at 18:51

## 2021-04-10 RX ADMIN — CEPHALEXIN 500 MG: 500 CAPSULE ORAL at 06:35

## 2021-04-10 RX ADMIN — TRAZODONE HYDROCHLORIDE 50 MG: 50 TABLET ORAL at 01:23

## 2021-04-10 RX ADMIN — ANTACID TABLETS 1000 MG: 500 TABLET, CHEWABLE ORAL at 08:25

## 2021-04-10 RX ADMIN — CEPHALEXIN 500 MG: 500 CAPSULE ORAL at 12:59

## 2021-04-10 RX ADMIN — CEPHALEXIN 500 MG: 500 CAPSULE ORAL at 23:08

## 2021-04-10 RX ADMIN — QUETIAPINE FUMARATE 100 MG: 100 TABLET ORAL at 08:25

## 2021-04-10 RX ADMIN — HYDROXYZINE HYDROCHLORIDE 25 MG: 25 TABLET, FILM COATED ORAL at 09:22

## 2021-04-10 RX ADMIN — OLANZAPINE 15 MG: 10 TABLET, FILM COATED ORAL at 19:09

## 2021-04-10 RX ADMIN — NICOTINE 1 PATCH: 21 PATCH, EXTENDED RELEASE TRANSDERMAL at 08:25

## 2021-04-10 ASSESSMENT — ACTIVITIES OF DAILY LIVING (ADL)
ORAL_HYGIENE: INDEPENDENT
HYGIENE/GROOMING: HANDWASHING;INDEPENDENT
ORAL_HYGIENE: INDEPENDENT
DRESS: SCRUBS (BEHAVIORAL HEALTH)
DRESS: SCRUBS (BEHAVIORAL HEALTH)
LAUNDRY: UNABLE TO COMPLETE
HYGIENE/GROOMING: INDEPENDENT

## 2021-04-10 NOTE — PLAN OF CARE
"  Problem: Adult Inpatient Plan of Care  Goal: Plan of Care Review  Outcome: Improving  Flowsheets  Taken 4/9/2021 2157  Progress: improving  Taken 4/9/2021 1800  Plan of Care Reviewed With: patient     Problem: Adult Inpatient Plan of Care  Goal: Optimal Comfort and Wellbeing  Outcome: Improving  Intervention: Provide Person-Centered Care  Recent Flowsheet Documentation  Taken 4/9/2021 1800 by Raf Lancaster RN  Trust Relationship/Rapport:   care explained   choices provided   emotional support provided   empathic listening provided   questions answered   questions encouraged   reassurance provided   thoughts/feelings acknowledged     Patient isolative to room majority of the evening sleeping and resting in bed only in the milieu for meals and medications. Patient upon approach flat in affect but social and very polite and expressing his appreciation with staff. Patient reports he continues to feel improved and clearer in thoughts. Patient still demonstrating delusional thought and poor insight into care plan stating his goal to RN writer is \"to stay in the hospital for the next year\". Patient denies SI/SIB, AH/VH, anxiety, depression or thoughts of harming others. Patient independent and accepting of HS medications with no stated or observed side effect. Patient ADL's appear poor, patient not receptive of staff prompting.   "

## 2021-04-10 NOTE — PROVIDER NOTIFICATION
04/10/21 0600   Sleep/Rest/Relaxation   Sleep/Rest/Relaxation awake;difficulty falling asleep   Night Time # Hours 3 hours     Pt had a quiet night with no real behavior or safety issues. But apparently pt had a hard time falling and staying asleep last night despite prn Trazodone.

## 2021-04-10 NOTE — PLAN OF CARE
"Pt is doing slightly better this shift overall. He remains grandiose, delusional and somewhat manic. He notably has slept poorly. He makes frequent delusional statements about owning many things such as businesses around [place]. He makes requests to nursing and other staff about once every 15 minutes. Generally okay with redirection and requests to wait or cluster requests. He took all scheduled medication without issues this shift. He requested PRN ativan for anxiety but was accepting of trying hydroxyzine instead. He reports it is not very helpful and notes he once took a whole bottle (too see what would happen not due to SI) and reports he couldn't feel anything. He utilizes other appropriate coping skills for anxiety such as aromatherapy, distracting activities and talking with peers. His foot looks significantly healed and he reports that it feels much better. He requested and was given tums and maalox this shift for ongoing concerns of acid reflux symptoms. He also requested milk of magnesia but when RN writer explained that it was a laxative he asked if it would give him \"explosive diarrhea\" and then stated, \"I don't feel like going explosive today.\" He denies ongoing concerns of constipation. No other physical health concerns noted this shift and his VS were WDL.   "

## 2021-04-11 PROCEDURE — 250N000013 HC RX MED GY IP 250 OP 250 PS 637: Performed by: PSYCHIATRY & NEUROLOGY

## 2021-04-11 PROCEDURE — 124N000002 HC R&B MH UMMC

## 2021-04-11 PROCEDURE — H2032 ACTIVITY THERAPY, PER 15 MIN: HCPCS

## 2021-04-11 RX ADMIN — GABAPENTIN 400 MG: 400 CAPSULE ORAL at 09:06

## 2021-04-11 RX ADMIN — QUETIAPINE FUMARATE 100 MG: 100 TABLET ORAL at 09:06

## 2021-04-11 RX ADMIN — ANTACID TABLETS 1000 MG: 500 TABLET, CHEWABLE ORAL at 15:56

## 2021-04-11 RX ADMIN — QUETIAPINE FUMARATE 100 MG: 100 TABLET ORAL at 19:08

## 2021-04-11 RX ADMIN — ZIPRASIDONE HYDROCHLORIDE 40 MG: 40 CAPSULE ORAL at 17:40

## 2021-04-11 RX ADMIN — NICOTINE 1 PATCH: 21 PATCH, EXTENDED RELEASE TRANSDERMAL at 09:06

## 2021-04-11 RX ADMIN — LORAZEPAM 2 MG: 1 TABLET ORAL at 02:19

## 2021-04-11 RX ADMIN — CEPHALEXIN 500 MG: 500 CAPSULE ORAL at 13:04

## 2021-04-11 RX ADMIN — ANTACID TABLETS 1000 MG: 500 TABLET, CHEWABLE ORAL at 23:32

## 2021-04-11 RX ADMIN — CEPHALEXIN 500 MG: 500 CAPSULE ORAL at 05:59

## 2021-04-11 RX ADMIN — ZIPRASIDONE HYDROCHLORIDE 40 MG: 40 CAPSULE ORAL at 09:06

## 2021-04-11 RX ADMIN — NICOTINE POLACRILEX 4 MG: 2 LOZENGE ORAL at 15:17

## 2021-04-11 RX ADMIN — ALUMINUM HYDROXIDE, MAGNESIUM HYDROXIDE, AND SIMETHICONE 30 ML: 200; 200; 20 SUSPENSION ORAL at 01:17

## 2021-04-11 RX ADMIN — GABAPENTIN 400 MG: 400 CAPSULE ORAL at 13:04

## 2021-04-11 RX ADMIN — GABAPENTIN 400 MG: 400 CAPSULE ORAL at 19:08

## 2021-04-11 RX ADMIN — NICOTINE POLACRILEX 4 MG: 2 LOZENGE ORAL at 18:37

## 2021-04-11 RX ADMIN — OLANZAPINE 15 MG: 10 TABLET, FILM COATED ORAL at 09:06

## 2021-04-11 RX ADMIN — OLANZAPINE 15 MG: 10 TABLET, FILM COATED ORAL at 19:08

## 2021-04-11 RX ADMIN — TRAZODONE HYDROCHLORIDE 50 MG: 50 TABLET ORAL at 02:19

## 2021-04-11 RX ADMIN — NICOTINE POLACRILEX 2 MG: 2 LOZENGE ORAL at 13:12

## 2021-04-11 ASSESSMENT — ACTIVITIES OF DAILY LIVING (ADL)
HYGIENE/GROOMING: HANDWASHING;INDEPENDENT
ORAL_HYGIENE: INDEPENDENT
DRESS: SCRUBS (BEHAVIORAL HEALTH)
HYGIENE/GROOMING: INDEPENDENT
DRESS: SCRUBS (BEHAVIORAL HEALTH)
LAUNDRY: UNABLE TO COMPLETE
ORAL_HYGIENE: INDEPENDENT

## 2021-04-11 NOTE — PLAN OF CARE
The patient is more sedated and isolative today. He has been sleeping and resting in his room the vast majority of the shift. Reports general malaise and sedation but no specific complaints. He is calm and cooperative with RN writer. Expresses appreciation for the team and his care. He takes all of his medications without any issue. He makes some delusional statements but less significant than previous days. He reports that this is the first time he has significantly slept in 5 years. No physical health concerns expressed or side effects from medications noted. VS were WDL.

## 2021-04-11 NOTE — PLAN OF CARE
Problem: Adult Inpatient Plan of Care  Goal: Plan of Care Review  Outcome: Improving  Flowsheets  Taken 4/10/2021 1745  Plan of Care Reviewed With: patient  Taken 4/9/2021 2157  Progress: improving     Patient in the milieu all evening calm and social with staff and peers. Patient upon approach full range in affect, at times elated with pressured speech. Patient reports continued improvement with mood and thought clarity. Patient denies SI/SIB, AH/VH, anxiety, depression, or thoughts of harming others. Patient was observed throughout the evening making numerous grandiose and delusional comments to peers. Patient independent and accepting of HS medications with no stated or observed side effects. Patient independent with completing foot cares including foot soak and moisturizing. Patient independent with identifying needs and completing ADL's though still appearing disheveled and did not shower this evening.

## 2021-04-11 NOTE — PLAN OF CARE
Problem: Sleep Disturbance  Goal: Adequate Sleep/Rest  Outcome: Improving     1. What PRN did patient receive? Maalox, Trazodone, Lorazepam.    2. What was the patient doing that led to the PRN medication? Patient complained of indigestion, difficulty falling asleep and anxiety of 8/10.    3. Did they require R/S? No.    4. Side effects to PRN medication?None reported or noted.     5. After 1 Hour, patiet appeared: Asleep.     Patient appeared to be asleep for 4 hours during safety checks. Will continue to monitor and update if there are changes.

## 2021-04-11 NOTE — PLAN OF CARE
Problem: Adult Inpatient Plan of Care  Goal: Plan of Care Review  Outcome: Improving  Flowsheets  Taken 4/11/2021 1800  Plan of Care Reviewed With: patient  Taken 4/9/2021 2154  Progress: improving     Patient observed in the milieu all evening calm and social with staff and peers. Patient upon approach full range in affect, calm and cooperative with verbal assessment. Patient continues to report improvement reporting no current symptoms. Patient does appear at times to be displaying manic traits appearing hyperactive with pressured speech and grandiose delusional thought process making comments of how he is the  of many [employer] and part of the [employer] group. Patient independent and accepting of HS medications with no stated or observed side effects. Patient did receive PRN Tums early in the evening for reported heartburn. Patient also after dinner approached RN writer requesting PRN ativan though was unable to identify and current symptoms and appeared calm in demeanor. Patient changed his mind then requested PRN Nicotine with no further symptoms observed or reported. Patient independent with identifying needs and completing ADL's.

## 2021-04-12 LAB
ANION GAP SERPL CALCULATED.3IONS-SCNC: 16 MMOL/L (ref 3–14)
BUN SERPL-MCNC: 12 MG/DL (ref 7–30)
CALCIUM SERPL-MCNC: 7.5 MG/DL (ref 8.5–10.1)
CHLORIDE SERPL-SCNC: 95 MMOL/L (ref 94–109)
CO2 SERPL-SCNC: 16 MMOL/L (ref 20–32)
CREAT SERPL-MCNC: 0.71 MG/DL (ref 0.66–1.25)
GFR SERPL CREATININE-BSD FRML MDRD: >90 ML/MIN/{1.73_M2}
GLUCOSE SERPL-MCNC: 166 MG/DL (ref 70–99)
POTASSIUM SERPL-SCNC: 5.3 MMOL/L (ref 3.4–5.3)
SODIUM SERPL-SCNC: 127 MMOL/L (ref 133–144)
T4 FREE SERPL-MCNC: 0.66 NG/DL (ref 0.76–1.46)
TSH SERPL DL<=0.005 MIU/L-ACNC: 6.36 MU/L (ref 0.4–4)

## 2021-04-12 PROCEDURE — 250N000013 HC RX MED GY IP 250 OP 250 PS 637: Performed by: PSYCHIATRY & NEUROLOGY

## 2021-04-12 PROCEDURE — 250N000013 HC RX MED GY IP 250 OP 250 PS 637: Performed by: STUDENT IN AN ORGANIZED HEALTH CARE EDUCATION/TRAINING PROGRAM

## 2021-04-12 PROCEDURE — 99233 SBSQ HOSP IP/OBS HIGH 50: CPT | Mod: GC | Performed by: PSYCHIATRY & NEUROLOGY

## 2021-04-12 PROCEDURE — 84443 ASSAY THYROID STIM HORMONE: CPT | Performed by: STUDENT IN AN ORGANIZED HEALTH CARE EDUCATION/TRAINING PROGRAM

## 2021-04-12 PROCEDURE — 36415 COLL VENOUS BLD VENIPUNCTURE: CPT | Performed by: STUDENT IN AN ORGANIZED HEALTH CARE EDUCATION/TRAINING PROGRAM

## 2021-04-12 PROCEDURE — 124N000002 HC R&B MH UMMC

## 2021-04-12 PROCEDURE — 84439 ASSAY OF FREE THYROXINE: CPT | Performed by: STUDENT IN AN ORGANIZED HEALTH CARE EDUCATION/TRAINING PROGRAM

## 2021-04-12 PROCEDURE — 80048 BASIC METABOLIC PNL TOTAL CA: CPT | Performed by: STUDENT IN AN ORGANIZED HEALTH CARE EDUCATION/TRAINING PROGRAM

## 2021-04-12 PROCEDURE — G0177 OPPS/PHP; TRAIN & EDUC SERV: HCPCS

## 2021-04-12 RX ORDER — LITHIUM CARBONATE 450 MG
450 TABLET, EXTENDED RELEASE ORAL EVERY 12 HOURS SCHEDULED
Status: DISCONTINUED | OUTPATIENT
Start: 2021-04-12 | End: 2021-04-19

## 2021-04-12 RX ORDER — MULTIVITAMIN,THERAPEUTIC
1 TABLET ORAL DAILY
Status: DISCONTINUED | OUTPATIENT
Start: 2021-04-12 | End: 2021-04-22 | Stop reason: HOSPADM

## 2021-04-12 RX ORDER — CHLORAL HYDRATE 500 MG
1 CAPSULE ORAL DAILY
Status: DISCONTINUED | OUTPATIENT
Start: 2021-04-12 | End: 2021-04-22 | Stop reason: HOSPADM

## 2021-04-12 RX ADMIN — QUETIAPINE FUMARATE 100 MG: 100 TABLET ORAL at 21:26

## 2021-04-12 RX ADMIN — LITHIUM CARBONATE 450 MG: 450 TABLET, EXTENDED RELEASE ORAL at 21:26

## 2021-04-12 RX ADMIN — THERA TABS 1 TABLET: TAB at 14:47

## 2021-04-12 RX ADMIN — NICOTINE POLACRILEX 4 MG: 2 LOZENGE ORAL at 14:15

## 2021-04-12 RX ADMIN — LORAZEPAM 2 MG: 1 TABLET ORAL at 17:28

## 2021-04-12 RX ADMIN — ANTACID TABLETS 1000 MG: 500 TABLET, CHEWABLE ORAL at 14:02

## 2021-04-12 RX ADMIN — GABAPENTIN 400 MG: 400 CAPSULE ORAL at 21:26

## 2021-04-12 RX ADMIN — Medication 1 G: at 14:47

## 2021-04-12 RX ADMIN — ALUMINUM HYDROXIDE, MAGNESIUM HYDROXIDE, AND SIMETHICONE 30 ML: 200; 200; 20 SUSPENSION ORAL at 00:55

## 2021-04-12 RX ADMIN — ZIPRASIDONE HYDROCHLORIDE 40 MG: 40 CAPSULE ORAL at 08:04

## 2021-04-12 RX ADMIN — ZIPRASIDONE HYDROCHLORIDE 40 MG: 40 CAPSULE ORAL at 17:28

## 2021-04-12 RX ADMIN — QUETIAPINE FUMARATE 100 MG: 100 TABLET ORAL at 08:04

## 2021-04-12 RX ADMIN — NICOTINE POLACRILEX 4 MG: 2 LOZENGE ORAL at 17:28

## 2021-04-12 RX ADMIN — NICOTINE POLACRILEX 2 MG: 2 LOZENGE ORAL at 08:18

## 2021-04-12 RX ADMIN — HYDROXYZINE HYDROCHLORIDE 25 MG: 25 TABLET, FILM COATED ORAL at 10:36

## 2021-04-12 RX ADMIN — NICOTINE POLACRILEX 4 MG: 2 LOZENGE ORAL at 10:36

## 2021-04-12 RX ADMIN — OLANZAPINE 15 MG: 10 TABLET, FILM COATED ORAL at 21:26

## 2021-04-12 RX ADMIN — GABAPENTIN 400 MG: 400 CAPSULE ORAL at 13:00

## 2021-04-12 RX ADMIN — OLANZAPINE 15 MG: 10 TABLET, FILM COATED ORAL at 08:04

## 2021-04-12 RX ADMIN — GABAPENTIN 400 MG: 400 CAPSULE ORAL at 08:04

## 2021-04-12 RX ADMIN — NICOTINE POLACRILEX 4 MG: 2 LOZENGE ORAL at 12:51

## 2021-04-12 ASSESSMENT — ACTIVITIES OF DAILY LIVING (ADL)
DRESS: SCRUBS (BEHAVIORAL HEALTH)
DRESS: SCRUBS (BEHAVIORAL HEALTH);INDEPENDENT
ORAL_HYGIENE: INDEPENDENT
HYGIENE/GROOMING: INDEPENDENT
HYGIENE/GROOMING: INDEPENDENT
ORAL_HYGIENE: INDEPENDENT

## 2021-04-12 NOTE — PLAN OF CARE
Music Therapy Group note    Total time in session: 45 minutes    Number of patients in group: 5    Scope of service: holistic    Patient progress: initial encounter this admission     Patient response/reaction to treatment intervention(s): Sha very much resonates with music.  MT would recommend individualized MT sessions if possible in aftercare to address stability needs and functioning through music.  He was very enthusiastic and sensitive to each song.  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      Eloise Edgar, MT-BC  Board-Certified Music Therapist

## 2021-04-12 NOTE — PROGRESS NOTES
SPIRITUAL HEALTH SERVICES  SPIRITUAL ASSESSMENT Progress Note  [facility] [place] [place]     REFERRAL SOURCE: Consult placed for pt asking for spiritual support.    Arranged with unit staff for timing and facilitated rosetta-spray version of smudging for Tyler in pt room.    Plan: I will follow up with Tyler 1-2x weekly as he remains on the unit. Spiritual Health remains available at patient's request for the duration of admission.    Sherron Estrella MDiv  Associate   Pager [phone]

## 2021-04-12 NOTE — PROGRESS NOTES
"[facility], [place]   Psychiatric Progress Note  Hospital Day: 7        Interim History:   The patient's care was discussed with the treatment team during the daily team meeting and/or staff's chart notes were reviewed. On 4/9, patient required some redirection and had many requests for staff. He reported feeling agitated due to loud peers and received PRN lorazepam. Attended groups. Continued to be grandiose and hyperverbal. Was isolative to his room in the evening and reported his thoughts were clearer. Continued to have poor insight. On 4/9, slept 3 hours after PRN Trazodone. On 4/10, continued to be grandiose and delusional but appeared somewhat improved. Was calm and social in the milieu. Slept 4 hours. On 4/11, was more sedated, isolative, and calm and made fewer delusional statements. Reported heartburn. Slept 5.75 hours.      Upon interview, Tyler immediately hands over a list of medications and food he would like including Ensure, eye drops, nasal spray, vitamins, multivitamin, double portions, and fish oil. He states that his mood is \"awesomesauce\" and his weekend was \"awesomesauce.\" He reports that he got \"so much sleep\" because Ativan is the \"right medication.\" He asks about Dr. 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 He reports that his energy is \"awesome.\" He denies feeling unsafe in the hospital, stating, \"this is the safest place on Earth!\" He feels that he needs 24 hour PCA care outside of the hospital though \"because I can't take care of myself.\" He denies any SI or HI. He reports heartburn and nausea and requests a medication for this. He denies any medication side effects or other physical symptoms. He agrees with starting Lithium, stating that it was helpful for him in the past. He prefers to take it twice a day instead of just at night. He asks " "to have his blood drawn but then agrees to only have it drawn if needed. No other concerns.          Medications:       gabapentin  400 mg Oral TID     nicotine  1 patch Transdermal Daily     nicotine   Transdermal Q8H     OLANZapine  15 mg Oral BID     QUEtiapine  100 mg Oral BID     ziprasidone  40 mg Oral BID w/meals          Allergies:     Allergies   Allergen Reactions     Benztropine      Other reaction(s): Seizures  Patient reported  Patient reported       Abilify Discmelt      Suicidal thoughts       Aripiprazole Other (See Comments)     Other reaction(s): Unknown  Suicidal thoughts  Suicidal thoughts       Benzyl Alcohol      Other reaction(s): Unknown     Compazine Other (See Comments)     Seizure      Haloperidol Other (See Comments)     Other reaction(s): Dystonia  Patient had EPS with haldol  Patient had EPS with haldol  Patient had EPS with haldol       Phenothiazines Other (See Comments)     Other reaction(s): Dystonia, Muscle Aches/Weakness, Muscle Cramps, Seizures  Seizures  Patient report  Seizures            Labs:   No results found for this or any previous visit (from the past 24 hour(s)).       Psychiatric Examination:     /88 (BP Location: Left arm)   Pulse 98   Temp 98.2  F (36.8  C) (Oral)   Resp 16   Ht 1.676 m (5' 6\")   Wt 85.8 kg (189 lb 3.2 oz)   SpO2 98%   BMI 30.54 kg/m    Weight is 189 lbs 3.2 oz  Body mass index is 30.54 kg/m .    Weight over time:  Vitals:    04/05/21 2118 04/06/21 0750   Weight: 85.1 kg (187 lb 11.2 oz) 85.8 kg (189 lb 3.2 oz)       Orthostatic Vitals       Most Recent      Sitting Orthostatic /83 04/07 0810    Sitting Orthostatic Pulse (bpm) 128 04/07 0810    Standing Orthostatic /71 04/06 0750    Standing Orthostatic Pulse (bpm) 132 04/06 0750        Cardiometabolic risk assessment. 04/07/21    Reviewed patient profile for cardiometabolic risk factors    Date taken /Value  REFERENCE RANGE   Abdominal Obesity  (Waist Circumference)   See " "nursing flowsheet Women ?35 in (88 cm)   Men ?40 in (102 cm)      Triglycerides  Triglycerides   Date Value Ref Range Status   04/06/2021 228 (H) <150 mg/dL Final     Comment:     Borderline high:  150-199 mg/dl  High:             200-499 mg/dl  Very high:       >499 mg/dl         ?150 mg/dL (1.7 mmol/L) or current treatment for elevated triglycerides   HDL cholesterol  HDL Cholesterol   Date Value Ref Range Status   04/06/2021 39 (L) >39 mg/dL Final   ]   Women <50 mg/dL (1.3 mmol/L) in women or current treatment for low HDL cholesterol  Men <40 mg/dL (1 mmol/L) in men or current treatment for low HDL cholesterol     Fasting plasma glucose (FPG) Lab Results   Component Value Date     04/06/2021      FPG ?100 mg/dL (5.6 mmol/L) or treatment for elevated blood glucose   Blood pressure  BP Readings from Last 3 Encounters:   04/11/21 134/88   10/15/20 106/78   04/24/20 138/83    Blood pressure ?130/85 mmHg or treatment for elevated blood pressure   Family History  See family history     Appearance: dressed in hospital scrubs. Hair and beard tied with ponytails. Wearing 2 dangling earrings.    Attitude:  mostly cooperative  Eye Contact:  fair  Mood:  \"awesomesauce\"  Affect:  full range  Speech:  normal rate, volume, and quantity  Language: fluent and intact in English  Psychomotor, Gait, Musculoskeletal:  no evidence of tardive dyskinesia, dystonia, or tics. No psychomotor agitation  Throught Process:  disorganized and illogical  Associations:  somewhat loose  Thought Content: Denies SI/HI. Reports grandiose delusions  Insight:  limited  Judgement:  fair, adherent to medication and meets with team  Oriented to:  oriented to place and self. Did not ask about time orientation  Attention Span and Concentration:  limited  Recent and Remote Memory:  limited  Fund of Knowledge:  appropriate    Clinical Global Impressions  First:  Considering your total clinical experience with this particular patient population, how " severe are the patient's symptoms at this time?: 7 (04/06/21 1428)  Compared to the patient's condition at the START of treatment, this patient's condition is: 4 (04/06/21 1428)  Most recent:  Considering your total clinical experience with this particular patient population, how severe are the patient's symptoms at this time?: 7 (04/06/21 1428)  Compared to the patient's condition at the START of treatment, this patient's condition is: 4 (04/06/21 1428)           Precautions:     Behavioral Orders   Procedures     Assault precautions     Code 1 - Restrict to Unit     Discontinue 1:1 attendant for suicide risk     Order Specific Question:   I have performed an in person assessment of the patient     Answer:   Based on this assessment the patient no longer requires a one on one attendant at this point in time.     Fall precautions     Routine Programming     As clinically indicated     Single Room     Status 15     Every 15 minutes.          Diagnoses:     Bipolar affective disorder, severe, current mood episode manic with psychosis  Suicidal ideation   Homicidal ideation  Polysubstance abuse  Cannabis use disorder, unspecified  Cocaine use disorder, severe, remission status unclear  Methamphetamine use disorder, severe, remission status unclear   History of benzodiazepine use disorder per chart  Nicotine dependence   PTSD by history  ADHD by history  Unspecified anxiety by history   S/p foreign body removal from R foot on antibiotics  Elevated blood glucose         Assessment & Plan:     Assessment:  This patient is a 45 year old male with history of bipolar disorder, polysubstance use, several MICD commitments, currently followed by ACT team who presented to ED with altered mental status in context of being found lying on ground of nursing home altered due to suspected medication non-adherence and ongoing substance use. He has history of several hospitalizations, most recently March 5 2021 at [facility]  following a suicide attempt via overdose, he was discharged the next day. He had been under MICD commitment in the last year but commitment has . He is followed by [facility] ACT team, he had been stable in apartment with CADI services up until past few weeks. It is suspected he has not been adherent to medications and has relapsed on substances. His substances of choice tend to be cocaine, methamphetamine and cannabis. Utox in ED was positive for cannabinoids. His presentation was consistent with manic episode with psychosis. He also endorsed suicidal ideation as well as homicidal ideation with thoughts to murder his parents.     Hospital summary:  Patient was initially admitted to station 30 with Dr. Velázquez on a 72 hour hold. He was placed on assault and fall precautions. He became agitated and intrusive on station 30, so was placed on a 1:1 and transferred to station 12 under the care of Dr. Varner.  Given concern for imminent safety of self given psychosis and denisha symptoms along with reported SI and HI, petition for MICD commitment and Frausto with olanzapine, quetiapine, ziprasidone, and risperidone was filed. His ACT team was contacted and was in agreement with admission and petition for commitment and Frausto. He was restarted on his PTA medications though at lower doses due to non-adherence. He endorsed homicidal ideation towards his parents and a duty to warn was completed as his parents were notified of this. 1:1 was discontinued on  due to improved intrusive behaviors. Zyprexa was titrated from 15mg at bedtime to 15mg BID. The patient was started on [place] to target denisah.     Medical Hospital Summary:  In the ED, patient required foreign body removal of glass from his right foot and was started of Keflex for 5 days.     Changes today:  -Start Lithium 450mg BID  -Order BMP and TSH given elevated TSH at admission.  -Start fish oil, multivitamin, eye drops, double portions, and Boost with  meals    Target psychiatric symptoms and interventions:  # Bipolar Disorder:  -Lithium 450mg BID  -resume PTA quetiapine 100mg BID for mood stabilization and psychosis   -resume PTA ziprasidone at reduced dose given non-adherence of 40mg BID with meals for psychosis   -resume PTA olanzapine 15mg at bedtime for psychosis and added 15 mg daily on 4/8  -resume PTA gabapentin 400mg TID for anxiety and substance use and pain  -Ativan 1-2 mg TID prn for agitation in context of denisha. Plan to discontinue prior to discharge.     Acute Medical Problems and Treatments:  1) s/p foreign body removal in ED  -pt has been reluctant to let RN assess wounds  -continue to encourage pt to cooperate with cares  -consider WOC consult if needed  -was given one dose of Keflex in ED, this was not ordered to be continued but per ED note should be on Keflex x5 days, ordered Keflex 500mg QID x5 days     2) Elevated TSH, T4 WNL; likely sick thyroid syndrome from substance use and psychiatric decompensation, will plan to repeat in a few days and monitor for any hypothyroid symptoms     3) Elevated BG on admission   -consider recheck if pt appears hyperglycemic     4) Nicotine dependence  -offer PRN nicotine replacement on unit and upon discharge  - patient on cessation    Behavioral/Psychological/Social:  - Encourage unit programming    Safety:  - Continue precautions as noted above  - Status 15 minute checks  - Will discontinue SIO    Legal Status: Court hold    Disposition Plan   Reason for ongoing admission: poses an imminent risk to self, poses an imminent risk to others and is unable to care for self due to severe psychosis or denisha  Discharge location: likely IRTS or MICD treatment  Discharge Medications: not ordered  Follow-up Appointments: not scheduled    Patient seen and discussed with attending, Dr. Varner.    Luz Santiago MD  Psychiatry PGY-4

## 2021-04-12 NOTE — PLAN OF CARE
Current Diagnosis/Procedure:  Bipolar affective disorder, severe, current mood episode manic with psychosis  Polysubstance abuse  Cannabis use disorder, unspecified  Cocaine use disorder, severe, remission status unclear  Methamphetamine use disorder, severe, remission status unclear   History of benzodiazepine use disorder per chart  Nicotine dependence   PTSD by history  ADHD by history  Unspecified anxiety by history      Work Completed:    -Pt's care discussed with treatment team in daily team meeting and chart notes were reviewed. Meds continue to be adjusted by MD's     Patient has preliminary exam tomorrow morning.  We are recommending that he go to the final hearing as he needs full commit w/rodarte. Writer spoke to Britton Abernathy (phone: [phone]) provided update and our recommendations.    He slept 3-4 hours of sleep.  He showered last night which does show progress for him.    Preliminary Exam:   @ 9am  Final Hearin/16 @ TBD    Discharge plan or goal:  Unknown at this time.  Pt's CADI  informed writer that the pt's apartment is uninhabitable. He will not be able to return there.        Barriers to discharge:  Continued need for stabilization of severity of illness and medication management.

## 2021-04-12 NOTE — PLAN OF CARE
"  Problem: OT General Care Plan  Goal: OT Goal 1  Description: OT Goal 1  Outcome: No Change     Pt attended 2 of 3 OT groups.  Continues to present with high energy, needs close monitoring with supplies.  Chose 14 paints to paint a small wood box, and later when writer turned for a moment, grabbed a fistful of beads and dumped inside the partially painted inside of the box.  Easily redirected that he couldn't use the beads for that purpose.  Pt asked to paint his new birkenstock sandals, was told to only use the paints for the projects.  Later, pt had a larger [hospital] pitcher of crystal light lemonade he had been drinking out of with a straw.  Pt had offered and started to pour a peer a \"cup of refreshing lemonade\" out of it, before promptly redirected and informed he couldn't share his beverage.  "

## 2021-04-12 NOTE — PLAN OF CARE
Pt asleep  at start of shift. Breathing quiet and unlabored.     Pt requested and was given Tums at 2332 and Mag-Al 0055  for increasing c/o of heartburn . Pt stated the medication was not effective,      Appears to have slept 5.75 hours.     Pt on  ASSAULT and FALL precautions in addition to single room order. Any related events noted above.     Will continue to monitor and assess.     Problem: Sleep Disturbance  Goal: Adequate Sleep/Rest  Outcome: Declining

## 2021-04-12 NOTE — PLAN OF CARE
The patient has had a successful shift. He appears a little more manic today than yesterday. He is frequently making requests and is makes several delusional statements. He continues to use coping strategies such as essential oils and spiritual health assistance while in the hospital. No agitated or inappropriate behaviors noted this shift. He reports feeling physically better. No concerns or side effects from medications noted this shift. VS were WDL with mild tachycardia.

## 2021-04-13 LAB
LABORATORY COMMENT REPORT: NORMAL
SARS-COV-2 RNA RESP QL NAA+PROBE: NEGATIVE
SPECIMEN SOURCE: NORMAL

## 2021-04-13 PROCEDURE — 250N000013 HC RX MED GY IP 250 OP 250 PS 637: Performed by: PSYCHIATRY & NEUROLOGY

## 2021-04-13 PROCEDURE — 250N000013 HC RX MED GY IP 250 OP 250 PS 637: Performed by: STUDENT IN AN ORGANIZED HEALTH CARE EDUCATION/TRAINING PROGRAM

## 2021-04-13 PROCEDURE — 99233 SBSQ HOSP IP/OBS HIGH 50: CPT | Mod: GC | Performed by: PSYCHIATRY & NEUROLOGY

## 2021-04-13 PROCEDURE — 124N000002 HC R&B MH UMMC

## 2021-04-13 PROCEDURE — 87635 SARS-COV-2 COVID-19 AMP PRB: CPT | Performed by: STUDENT IN AN ORGANIZED HEALTH CARE EDUCATION/TRAINING PROGRAM

## 2021-04-13 RX ORDER — FLUORIDE TOOTHPASTE
15 TOOTHPASTE DENTAL 4 TIMES DAILY PRN
Status: DISCONTINUED | OUTPATIENT
Start: 2021-04-13 | End: 2021-04-22 | Stop reason: HOSPADM

## 2021-04-13 RX ADMIN — NICOTINE POLACRILEX 4 MG: 2 LOZENGE ORAL at 11:56

## 2021-04-13 RX ADMIN — GABAPENTIN 400 MG: 400 CAPSULE ORAL at 21:04

## 2021-04-13 RX ADMIN — LITHIUM CARBONATE 450 MG: 450 TABLET, EXTENDED RELEASE ORAL at 07:43

## 2021-04-13 RX ADMIN — OLANZAPINE 15 MG: 10 TABLET, FILM COATED ORAL at 07:43

## 2021-04-13 RX ADMIN — LITHIUM CARBONATE 450 MG: 450 TABLET, EXTENDED RELEASE ORAL at 21:04

## 2021-04-13 RX ADMIN — NICOTINE POLACRILEX 4 MG: 2 LOZENGE ORAL at 19:40

## 2021-04-13 RX ADMIN — OLANZAPINE 15 MG: 10 TABLET, FILM COATED ORAL at 21:04

## 2021-04-13 RX ADMIN — ANTACID TABLETS 1000 MG: 500 TABLET, CHEWABLE ORAL at 16:37

## 2021-04-13 RX ADMIN — NICOTINE POLACRILEX 4 MG: 2 LOZENGE ORAL at 21:09

## 2021-04-13 RX ADMIN — ANTACID TABLETS 1000 MG: 500 TABLET, CHEWABLE ORAL at 09:41

## 2021-04-13 RX ADMIN — ZIPRASIDONE HYDROCHLORIDE 40 MG: 40 CAPSULE ORAL at 18:18

## 2021-04-13 RX ADMIN — GABAPENTIN 400 MG: 400 CAPSULE ORAL at 13:54

## 2021-04-13 RX ADMIN — NICOTINE POLACRILEX 4 MG: 2 LOZENGE ORAL at 16:12

## 2021-04-13 RX ADMIN — HYDROXYZINE HYDROCHLORIDE 25 MG: 25 TABLET, FILM COATED ORAL at 21:09

## 2021-04-13 RX ADMIN — Medication 30 MG: at 18:17

## 2021-04-13 RX ADMIN — THERA TABS 1 TABLET: TAB at 07:43

## 2021-04-13 RX ADMIN — QUETIAPINE FUMARATE 100 MG: 100 TABLET ORAL at 21:04

## 2021-04-13 RX ADMIN — ZIPRASIDONE HYDROCHLORIDE 40 MG: 40 CAPSULE ORAL at 07:43

## 2021-04-13 RX ADMIN — Medication 1 G: at 07:43

## 2021-04-13 RX ADMIN — NICOTINE 1 PATCH: 21 PATCH, EXTENDED RELEASE TRANSDERMAL at 07:42

## 2021-04-13 RX ADMIN — GABAPENTIN 400 MG: 400 CAPSULE ORAL at 07:43

## 2021-04-13 RX ADMIN — Medication 1 DROP: at 20:24

## 2021-04-13 RX ADMIN — NICOTINE POLACRILEX 4 MG: 2 LOZENGE ORAL at 07:43

## 2021-04-13 RX ADMIN — QUETIAPINE FUMARATE 100 MG: 100 TABLET ORAL at 07:43

## 2021-04-13 ASSESSMENT — MIFFLIN-ST. JEOR: SCORE: 1727.24

## 2021-04-13 ASSESSMENT — ACTIVITIES OF DAILY LIVING (ADL)
ORAL_HYGIENE: INDEPENDENT
ORAL_HYGIENE: INDEPENDENT
HYGIENE/GROOMING: INDEPENDENT
DRESS: SCRUBS (BEHAVIORAL HEALTH)
DRESS: SCRUBS (BEHAVIORAL HEALTH)
HYGIENE/GROOMING: INDEPENDENT
LAUNDRY: UNABLE TO COMPLETE

## 2021-04-13 NOTE — PLAN OF CARE
"  Problem: OT General Care Plan  Goal: OT Goal 1  Description: Provide reality orientation for improved ability to actively participate in self management.     Pt attended 2 of 3 OT sessions.  Continues to present with high energy, often interrupts others during group, eager to share personal information and contact information and needs frequent redirection.  Pt reports he's doing great, court this morning went great, \"my next trial will be on tv\" referencing local trials on tv right now, starting to ramble that he has his own cases against the [place] [employer], and begin to rattle off various abbreviations [employer], [employer], [employer] etc, and states he has a whole legal team working for him.  A peer joins in and states the medications she is given is a form chemical rape, and pt is quick to say he to is \"chemically raped, chemically castrated\", and again is redirected to focus on the topic/worksheet at hand.    Pt remains tangential, for example, per the worksheet topic of '3 things you can do to make your corner of the world a better place' pt responds:  \"grow green grass, more explosive diarrhea, and don't fuck up cool shit\".    Outcome: No Change     "

## 2021-04-13 NOTE — PROGRESS NOTES
"Tyler  attended a Life Skills group this evening that involved sharing reflections according to question prompts. Expansive and elated. Hyperberbal. Geoff all over his arms with markers throughout the session. Discussed a plan to bring 300 people on a \"heal your self ayuhasca journey\" and claimed that he \"owns the hospital\" and possibly \"owns the planet.\"      04/12/21 2000   Occupational Therapy   Type of Intervention structured groups   Response Initiates, socially acceptable   Hours 1       "

## 2021-04-13 NOTE — PLAN OF CARE
Writer scanned and emailed Waiver Form that patient signed to Sushma Lopez. Patient is agreeing to waive his rights to a trial, and accepts that he is placed on a commitment.

## 2021-04-13 NOTE — PROGRESS NOTES
NOC Shift Report     Pt in bed at beginning of shift, breathing quiet and unlabored. Pt slept 4.75 hours.      He got up a few times to use the restroom or to move about in his room.      No PRNs given. Will continue to monitor.

## 2021-04-13 NOTE — PLAN OF CARE
Work Completed: Participated in team meeting and discussed patient's progress  Chart review    At Preliminary Exam today, patient voluntarily agreed to Final hearing  Per email correspondence between between Sushma Lopez and Nayla Snyder ([facility]) pt signed waiver.    This writer was later included in correspondence where it was confirmed that patient's Final court hearing IS taking place on Friday,  4/16 @ 11:15am ([facility] Bettie already sent ITV info).        Met with pt in person and gave him copy of the Waiver form he signed.  No other concerns reported.  He was noted to be in a positive mood, eating lunch and pleasant on approach    Discharge plan or goal: TBD pending stabilization                Barriers to discharge: Medication mgmt, sx stabilization, court proceedings

## 2021-04-13 NOTE — PROGRESS NOTES
CLINICAL NUTRITION SERVICES - BRIEF NOTE     Nutrition Prescription      Recommendations already ordered by Registered Dietitian (RD):  Continue supplements as ordered by MD  Advised RN that alternatives are available, but soy-based Boost is not.    Future/Additional Recommendations:  RD will sign off, remains available     Addendum:  Received call back from RN who reports patient has seen another supplement on unit that is not Boost. He could provide no further detail and would like to receive Ensure instead of Boost at this time. Will adjust orders accordingly.    REASON FOR ASSESSMENT:  Tyler Case is a 45 year old male admitted d/t bipolar d/o with recurrent manic episode, severe with psychotic behavior.  Received MD consult as patient has requested a very specific Boost. Provider has ordered Boost Plus tid with meals and double entree portions as well.  Per RN, patient has a number of very specific requests and has requested a soy Boost. This writer is unaware of any soy-based Boost product, but did report to RN that is suitable for patients with lactose intolerance if that's a concern for patient. Do also have non-dairy options.  Per RN, patient is a questionable historian at this time. She will monitor acceptance of current Boost order and will re-consult RD if necessary.      NEW FINDINGS   Wt Readings from Last 10 Encounters:   04/13/21 89.4 kg (197 lb 3.2 oz)   10/15/20 90.5 kg (199 lb 8 oz)   04/23/20 85.5 kg (188 lb 6.4 oz)   12/14/19 83.5 kg (184 lb)   11/30/17 81.2 kg (179 lb)   10/10/17 79.9 kg (176 lb 1.6 oz)   09/22/15 81.9 kg (180 lb 8 oz)   10/17/13 78.1 kg (172 lb 1.6 oz)   08/16/13 75.2 kg (165 lb 12.8 oz)   08/05/13 74.8 kg (165 lb)     Weight trending up per records    Good intake per RN    INTERVENTIONS  Implementation  Continue supplements as ordered by MD  Advised RN that alternatives are available, but soy-based Boost is not.    Monitoring/Evaluation  RD will sign off at this time,  but remains available if re-consulted.    Letitia Toribio RD, LD  Unit Pager: [phone]  Weekend Pager: [phone]

## 2021-04-13 NOTE — PROGRESS NOTES
"[facility], [place]   Psychiatric Progress Note  Hospital Day: 8        Interim History:   The patient's care was discussed with the treatment team during the daily team meeting and/or staff's chart notes were reviewed. Appeared slightly more manic yesterday than days prior. Made frequent requests and several delusional statements. Attended 2 of 3 OT groups. Continued to be high energy and required redirection for some behaviors. Presented as expansive, elated, hyperverbal, and grandiose. Demonstrated poor insight. Is intrusive at times including encouraging a patient to refuse her medications. Unit programming was ended early as patient was disruptive in group. Received Ativan PRN for agitation. Medication adherent. Slept 4.75 hours.     Upon interview, Tyler states that his mood is \"good.\" He shows a paper where he's written requests to start coenzyme Q10 for \"brain health\" and Vraylar. He states that he has a friend who is on Vraylar for bipolar disorder and has only been hospitalized once. He reports a dry mouth and agrees to start Biotene mouthwash for this. Tyler states that he has some brain fog and has been sleeping more recently. He denies any SI, HI, or other physical symptoms. He asks to be started on Adderall. When discussed that this is contraindicated as it can trigger denisha, he responds that it's ok because he's already found a provider as an outpatient who will prescribe it. He asks for Epsom salts for his feet. He states, \"blessings on both of you. You're good doctors.\" No further questions or concerns.          Medications:       fish oil-omega-3 fatty acids  1 g Oral Daily     gabapentin  400 mg Oral TID     lithium ER  450 mg Oral Q12H JASWINDER     multivitamin, therapeutic  1 tablet Oral Daily     nicotine  1 patch Transdermal Daily     nicotine   Transdermal Q8H     OLANZapine  15 mg Oral BID     QUEtiapine  100 mg Oral BID     ziprasidone  40 mg Oral BID w/meals       " "   Allergies:     Allergies   Allergen Reactions     Benztropine      Other reaction(s): Seizures  Patient reported  Patient reported       Abilify Discmelt      Suicidal thoughts       Aripiprazole Other (See Comments)     Other reaction(s): Unknown  Suicidal thoughts  Suicidal thoughts       Benzyl Alcohol      Other reaction(s): Unknown     Compazine Other (See Comments)     Seizure      Haloperidol Other (See Comments)     Other reaction(s): Dystonia  Patient had EPS with haldol  Patient had EPS with haldol  Patient had EPS with haldol       Phenothiazines Other (See Comments)     Other reaction(s): Dystonia, Muscle Aches/Weakness, Muscle Cramps, Seizures  Seizures  Patient report  Seizures            Labs:     Recent Results (from the past 24 hour(s))   Basic metabolic panel    Collection Time: 04/12/21  1:22 PM   Result Value Ref Range    Sodium 127 (L) 133 - 144 mmol/L    Potassium 5.3 3.4 - 5.3 mmol/L    Chloride 95 94 - 109 mmol/L    Carbon Dioxide 16 (L) 20 - 32 mmol/L    Anion Gap 16 (H) 3 - 14 mmol/L    Glucose 166 (H) 70 - 99 mg/dL    Urea Nitrogen 12 7 - 30 mg/dL    Creatinine 0.71 0.66 - 1.25 mg/dL    GFR Estimate >90 >60 mL/min/[1.73_m2]    GFR Estimate If Black >90 >60 mL/min/[1.73_m2]    Calcium 7.5 (L) 8.5 - 10.1 mg/dL   TSH with free T4 reflex    Collection Time: 04/12/21  1:22 PM   Result Value Ref Range    TSH 6.36 (H) 0.40 - 4.00 mU/L   T4 free    Collection Time: 04/12/21  1:22 PM   Result Value Ref Range    T4 Free 0.66 (L) 0.76 - 1.46 ng/dL          Psychiatric Examination:     /81   Pulse 81   Temp 98.4  F (36.9  C) (Tympanic)   Resp 16   Ht 1.676 m (5' 6\")   Wt 85.8 kg (189 lb 3.2 oz)   SpO2 96%   BMI 30.54 kg/m    Weight is 189 lbs 3.2 oz  Body mass index is 30.54 kg/m .    Weight over time:  Vitals:    04/05/21 2118 04/06/21 0750   Weight: 85.1 kg (187 lb 11.2 oz) 85.8 kg (189 lb 3.2 oz)       Orthostatic Vitals       Most Recent      Sitting Orthostatic /83 04/07 " "0810    Sitting Orthostatic Pulse (bpm) 128 04/07 0810    Standing Orthostatic /71 04/06 0750    Standing Orthostatic Pulse (bpm) 132 04/06 0750        Cardiometabolic risk assessment. 04/07/21    Reviewed patient profile for cardiometabolic risk factors    Date taken /Value  REFERENCE RANGE   Abdominal Obesity  (Waist Circumference)   See nursing flowsheet Women ?35 in (88 cm)   Men ?40 in (102 cm)      Triglycerides  Triglycerides   Date Value Ref Range Status   04/06/2021 228 (H) <150 mg/dL Final     Comment:     Borderline high:  150-199 mg/dl  High:             200-499 mg/dl  Very high:       >499 mg/dl         ?150 mg/dL (1.7 mmol/L) or current treatment for elevated triglycerides   HDL cholesterol  HDL Cholesterol   Date Value Ref Range Status   04/06/2021 39 (L) >39 mg/dL Final   ]   Women <50 mg/dL (1.3 mmol/L) in women or current treatment for low HDL cholesterol  Men <40 mg/dL (1 mmol/L) in men or current treatment for low HDL cholesterol     Fasting plasma glucose (FPG) Lab Results   Component Value Date     04/06/2021      FPG ?100 mg/dL (5.6 mmol/L) or treatment for elevated blood glucose   Blood pressure  BP Readings from Last 3 Encounters:   04/12/21 122/81   10/15/20 106/78   04/24/20 138/83    Blood pressure ?130/85 mmHg or treatment for elevated blood pressure   Family History  See family history     Appearance: dressed in hospital scrubs. Hair and beard tied with ponytails. Wearing 2 dangling earrings.    Attitude:  mostly cooperative  Eye Contact:  fair  Mood:  \"good\"  Affect:  intensity is heightened, bright. though improved from days prior.  Speech:  normal rate, volume, and quantity  Language: fluent and intact in English  Psychomotor, Gait, Musculoskeletal:  no evidence of tardive dyskinesia, dystonia, or tics. No psychomotor agitation  Throught Process:  disorganized and illogical  Associations:  somewhat loose  Thought Content: Denies SI/HI. Reports grandiose " delusions  Insight:  limited  Judgement:  fair, adherent to medication and meets with team  Oriented to:  oriented to place and self. Did not ask about time orientation  Attention Span and Concentration:  limited  Recent and Remote Memory:  limited  Fund of Knowledge:  appropriate    Clinical Global Impressions  First:  Considering your total clinical experience with this particular patient population, how severe are the patient's symptoms at this time?: 7 (04/06/21 1428)  Compared to the patient's condition at the START of treatment, this patient's condition is: 4 (04/06/21 1428)  Most recent:  Considering your total clinical experience with this particular patient population, how severe are the patient's symptoms at this time?: 7 (04/06/21 1428)  Compared to the patient's condition at the START of treatment, this patient's condition is: 4 (04/06/21 1428)           Precautions:     Behavioral Orders   Procedures     Assault precautions     Code 1 - Restrict to Unit     Discontinue 1:1 attendant for suicide risk     Order Specific Question:   I have performed an in person assessment of the patient     Answer:   Based on this assessment the patient no longer requires a one on one attendant at this point in time.     Fall precautions     Routine Programming     As clinically indicated     Single Room     Status 15     Every 15 minutes.          Diagnoses:     Bipolar affective disorder, severe, current mood episode manic with psychosis  Suicidal ideation   Homicidal ideation  Polysubstance abuse  Cannabis use disorder, unspecified  Cocaine use disorder, severe, remission status unclear  Methamphetamine use disorder, severe, remission status unclear   History of benzodiazepine use disorder per chart  Nicotine dependence   PTSD by history  ADHD by history  Unspecified anxiety by history   S/p foreign body removal from R foot on antibiotics  Elevated blood glucose         Assessment & Plan:     Assessment:  This patient  is a 45 year old male with history of bipolar disorder, polysubstance use, several MICD commitments, currently followed by ACT team who presented to ED with altered mental status in context of being found lying on ground of nursing home altered due to suspected medication non-adherence and ongoing substance use. He has history of several hospitalizations, most recently 2021 at [facility] following a suicide attempt via overdose, he was discharged the next day. He had been under MICD commitment in the last year but commitment has . He is followed by [facility] ACT team, he had been stable in apartment with [facility] services up until past few weeks. It is suspected he has not been adherent to medications and has relapsed on substances. His substances of choice tend to be cocaine, methamphetamine and cannabis. Utox in ED was positive for cannabinoids. His presentation was consistent with manic episode with psychosis. He also endorsed suicidal ideation as well as homicidal ideation with thoughts to murder his parents.     Hospital summary:  Patient was initially admitted to station 30 with Dr. Velázquez on a 72 hour hold. He was placed on assault and fall precautions. He became agitated and intrusive on station 30, so was placed on a 1:1 and transferred to station 12 under the care of Dr. Varner.  Given concern for imminent safety of self given psychosis and denisha symptoms along with reported SI and HI, petition for MICD commitment and Frausto with olanzapine, quetiapine, ziprasidone, and risperidone was filed. His ACT team was contacted and was in agreement with admission and petition for commitment and Frausto. He was restarted on his PTA medications though at lower doses due to non-adherence. He endorsed homicidal ideation towards his parents and a duty to warn was completed as his parents were notified of this. 1:1 was discontinued on  due to improved intrusive behaviors. Zyprexa was titrated from 15mg  at bedtime to 15mg BID. The patient was started on Urie to target denisha.     Medical Hospital Summary:  In the ED, patient required foreign body removal of glass from his right foot and was started of Keflex for 5 days.     Changes today:  -Start coenzyme q10 at patient's request    Target psychiatric symptoms and interventions:  # Bipolar Disorder:  -Lithium 450mg BID. Follow-up lithium level, TSH, and BMP on 4/19  -resume PTA quetiapine 100mg BID for mood stabilization and psychosis   -resume PTA ziprasidone at reduced dose given non-adherence of 40mg BID with meals for psychosis   -resume PTA olanzapine 15mg at bedtime for psychosis and added 15 mg daily on 4/8  -resume PTA gabapentin 400mg TID for anxiety and substance use and pain  -Ativan 1-2 mg TID prn for agitation in context of denisha. Plan to discontinue prior to discharge.     Acute Medical Problems and Treatments:  1) s/p foreign body removal in ED  -pt has been reluctant to let RN assess wounds  -continue to encourage pt to cooperate with cares  -consider WOC consult if needed  -was given one dose of Keflex in ED, this was not ordered to be continued but per ED note should be on Keflex x5 days, ordered Keflex 500mg QID x5 days     2) Elevated TSH, T4 WNL; likely sick thyroid syndrome from substance use and psychiatric decompensation, will plan to repeat in a few days and monitor for any hypothyroid symptoms     3) Elevated BG on admission   -consider recheck if pt appears hyperglycemic     4) Nicotine dependence  -offer PRN nicotine replacement on unit and upon discharge  - patient on cessation    Behavioral/Psychological/Social:  - Encourage unit programming    Safety:  - Continue precautions as noted above  - Status 15 minute checks  - Will discontinue SIO    Legal Status: Court hold    Disposition Plan   Reason for ongoing admission: poses an imminent risk to self, poses an imminent risk to others and is unable to care for self due to severe  psychosis or denisha  Discharge location: likely IRTS or MICD treatment  Discharge Medications: not ordered  Follow-up Appointments: not scheduled    Patient seen and discussed with attending, Dr. Varner.    Luz Santiago MD  Psychiatry PGY-4

## 2021-04-13 NOTE — PLAN OF CARE
Patient remains manic appearing, but he does seem to be improving when compared to his presentation at the end of last week.  Tyler presented as elated and grandiose with poor insight into his manic symptoms.  He remains intrusive and does require prompts to maintain appropriate social boundaries.  He was encouraging a female patient in [facility] ([facility] to refuse her medications at one point this evening.  Unit programming had to be terminated early this evening, as Tyler was becoming disruptive in group.  RN writer was able to administer a dose of PRN Ativan 2 mg with the patient's scheduled 18:00 dose of Geodon, and this did appear to offer some relief in that he appeared to have less psychomotor agitation and hyperactive behavior in the aftermath.  Tyler accepted all of his scheduled medications without incident this evening, and no adverse side effects have been reported or observed at this time.  He denies any acute physical concerns.  VSS.

## 2021-04-13 NOTE — PLAN OF CARE
"Pt continues to display manic symptoms; thought process is grandiose and delusional. Mood elated and labile. Reports \"I'm doing great, I slept amazing last night!\" Pt has many requests throughout the day, including getting a specific nutritional supplement drink ordered through dietary. Pt agreed to get Ensure instead of Boost. 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 He was talking about the medications being \"chemical rape\" in group, but then tells RN writer that his medications are helpful. He has been compliant with scheduled medications, with no observed side effects. He was napping in his room this afternoon, otherwise was active in the milieu all day.   "

## 2021-04-14 PROCEDURE — 250N000013 HC RX MED GY IP 250 OP 250 PS 637: Performed by: PSYCHIATRY & NEUROLOGY

## 2021-04-14 PROCEDURE — 250N000013 HC RX MED GY IP 250 OP 250 PS 637: Performed by: STUDENT IN AN ORGANIZED HEALTH CARE EDUCATION/TRAINING PROGRAM

## 2021-04-14 PROCEDURE — 124N000002 HC R&B MH UMMC

## 2021-04-14 PROCEDURE — G0177 OPPS/PHP; TRAIN & EDUC SERV: HCPCS

## 2021-04-14 PROCEDURE — 99233 SBSQ HOSP IP/OBS HIGH 50: CPT | Performed by: PSYCHIATRY & NEUROLOGY

## 2021-04-14 RX ORDER — OMEPRAZOLE 10 MG/1
10 CAPSULE, DELAYED RELEASE ORAL
Status: DISCONTINUED | OUTPATIENT
Start: 2021-04-14 | End: 2021-04-22 | Stop reason: HOSPADM

## 2021-04-14 RX ADMIN — NICOTINE POLACRILEX 4 MG: 2 GUM, CHEWING BUCCAL at 16:05

## 2021-04-14 RX ADMIN — Medication 1 G: at 07:28

## 2021-04-14 RX ADMIN — ANTACID TABLETS 1000 MG: 500 TABLET, CHEWABLE ORAL at 06:45

## 2021-04-14 RX ADMIN — LITHIUM CARBONATE 450 MG: 450 TABLET, EXTENDED RELEASE ORAL at 07:28

## 2021-04-14 RX ADMIN — LITHIUM CARBONATE 450 MG: 450 TABLET, EXTENDED RELEASE ORAL at 20:04

## 2021-04-14 RX ADMIN — ZIPRASIDONE HYDROCHLORIDE 40 MG: 40 CAPSULE ORAL at 17:39

## 2021-04-14 RX ADMIN — OLANZAPINE 15 MG: 10 TABLET, FILM COATED ORAL at 20:04

## 2021-04-14 RX ADMIN — GABAPENTIN 400 MG: 400 CAPSULE ORAL at 20:04

## 2021-04-14 RX ADMIN — GABAPENTIN 400 MG: 400 CAPSULE ORAL at 13:40

## 2021-04-14 RX ADMIN — Medication 1 DROP: at 16:32

## 2021-04-14 RX ADMIN — THERA TABS 1 TABLET: TAB at 07:28

## 2021-04-14 RX ADMIN — NICOTINE POLACRILEX 4 MG: 2 GUM, CHEWING BUCCAL at 18:05

## 2021-04-14 RX ADMIN — ALUMINUM HYDROXIDE, MAGNESIUM HYDROXIDE, AND SIMETHICONE 30 ML: 200; 200; 20 SUSPENSION ORAL at 00:20

## 2021-04-14 RX ADMIN — QUETIAPINE FUMARATE 100 MG: 100 TABLET ORAL at 20:04

## 2021-04-14 RX ADMIN — NICOTINE POLACRILEX 4 MG: 2 GUM, CHEWING BUCCAL at 21:22

## 2021-04-14 RX ADMIN — Medication 15 ML: at 08:33

## 2021-04-14 RX ADMIN — ZIPRASIDONE HYDROCHLORIDE 40 MG: 40 CAPSULE ORAL at 07:27

## 2021-04-14 RX ADMIN — NICOTINE POLACRILEX 4 MG: 2 GUM, CHEWING BUCCAL at 13:58

## 2021-04-14 RX ADMIN — NICOTINE POLACRILEX 4 MG: 2 LOZENGE ORAL at 07:27

## 2021-04-14 RX ADMIN — QUETIAPINE FUMARATE 100 MG: 100 TABLET ORAL at 07:28

## 2021-04-14 RX ADMIN — Medication 15 ML: at 12:54

## 2021-04-14 RX ADMIN — OLANZAPINE 15 MG: 10 TABLET, FILM COATED ORAL at 07:27

## 2021-04-14 RX ADMIN — GABAPENTIN 400 MG: 400 CAPSULE ORAL at 07:28

## 2021-04-14 RX ADMIN — NICOTINE 1 PATCH: 21 PATCH, EXTENDED RELEASE TRANSDERMAL at 07:34

## 2021-04-14 RX ADMIN — OMEPRAZOLE 10 MG: 10 CAPSULE, DELAYED RELEASE ORAL at 12:52

## 2021-04-14 RX ADMIN — Medication 30 MG: at 07:30

## 2021-04-14 ASSESSMENT — ACTIVITIES OF DAILY LIVING (ADL)
ORAL_HYGIENE: INDEPENDENT
LAUNDRY: UNABLE TO COMPLETE
DRESS: SCRUBS (BEHAVIORAL HEALTH)
ORAL_HYGIENE: INDEPENDENT
LAUNDRY: UNABLE TO COMPLETE
DRESS: SCRUBS (BEHAVIORAL HEALTH)
HYGIENE/GROOMING: INDEPENDENT
HYGIENE/GROOMING: HANDWASHING;SHOWER;INDEPENDENT

## 2021-04-14 NOTE — PLAN OF CARE
"  Problem: OT General Care Plan  Goal: OT Goal 1  Description: Provide reality orientation for improved ability to actively participate in self management.     Pt politely declined group today - stated today needed to be a day of \"rest and relaxation - all I'm doing is sleeping and eating\".  Checked in later in the day, stated he was doing well, catching up on sleep, and looking forward to group tomorrow.  Outcome: No Change     "
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
## 2021-04-14 NOTE — PLAN OF CARE
Pt briefly attended the Therapeutic Recreation group this evening. Pt was quiet after sharing his hobbies and interests. Pt left shortly without an explanation.

## 2021-04-14 NOTE — PLAN OF CARE
"Patient presents as elated and grandiose with poor insight into his manic symptoms.  He continues to make delusional statements about being an , playing in \"the best jam band in the history of [place]!\", and owning various companies.  He remains intrusive at times, but overall has required much less redirection when compared to his presentation at admission.  He has been making frequent requests this evening, but he has tolerated limit setting on the frequency of these requests.  Patient denies any self harm or aggressive ideation.  He reports that he is \"doing well\" overall.  He requested a patient room with a bathroom, so he was moved to room N137.  He reports that he is tolerating his medications well and reports that \"this is the most effective medication regimen I have ever been on!\".  His affect and eye contact are notable for increased intensity.  He remains hyperactive.  He denies any acute physical concerns.  VSS with the exception of some mild tachycardia (pulse=111); currently, he is asymptomatic.      "

## 2021-04-14 NOTE — PLAN OF CARE
Current Diagnosis/Procedure:  Bipolar affective disorder, severe, current mood episode manic with psychosis  Polysubstance abuse  Cannabis use disorder, unspecified  Cocaine use disorder, severe, remission status unclear  Methamphetamine use disorder, severe, remission status unclear   History of benzodiazepine use disorder per chart  Nicotine dependence   PTSD by history  ADHD by history  Unspecified anxiety by history      Work Completed:    -Pt's care discussed with treatment team in daily team meeting and chart notes were reviewed. Meds continue to be adjusted by MD's     - Pt has final hearing on  4/16 @ 11:15am.  We are recommending a full commitment with rodarte for him.  He is still quite manic, grandiose, elated etc.  Cty Atty on the case is Oma Mckee - Phone: [phone].    -Writer update UR on pt's status.       Discharge plan or goal:  Pt will likely need IRTS along with his ACT team.  He needs some improvement before referrals can be nava.       Barriers to discharge:  Continued need for stabilization of severity of illness and medication management.
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
## 2021-04-14 NOTE — PROGRESS NOTES
1. What PRN medication did patient receive? 30 mL Maalox at 0020.     2. What were the events that led to the PRN being given? heartburn  and nausea.      3. Did events require restraint or seclusion? No      4. Side effects to PRN medication? None reported or observed.      5. Was it effective? yes.     6. Follow up needed? Recommendations? Pt states he wants to start Prilosec for GERD symptoms.     Oncoming shift to assess pt for continuing symptoms when next awake.

## 2021-04-14 NOTE — PROGRESS NOTES
Patient appears to have slept only a total of 4.25 hrs throughout the night.  Given maalox time one at the beginning of the night for c/o abdominal discomfort.  Quieter, less rapid or pressured speech, and more eye contact with staff. Still remains not sleeping through the night though - has trouble falling asleep and wakes up early, with being apparently unable to fall back to sleep.  Continue to monitor and reassurance as needed.

## 2021-04-14 NOTE — PROGRESS NOTES
"[facility], [place]   Psychiatric Progress Note  Hospital Day: 9        Interim History:   The patient's care was discussed with the treatment team during the daily team meeting and/or staff's chart notes were reviewed. Was pleasant on approach. Signed waiver form for court. Attended 2/3 OT groups. Presented as high energy, tangential, delusional, labile, intrusive, and grandiose. Required frequent redirection for interrupting others and sharing personal information. Had many requests during the day, though less so in the evening. Active in milieu most of the day except for a nap in the afternoon. Presented with poor insight. Slept 4.25 hours. In the evening, had quieter, less rapid speech and improved eye contact.     Upon interview, Tyler states that he is feeling \"awesome!\" Notes that he slept through the night and through morning until close to noon. He said that sleep was \"much needed.\" He said that he was excited to learn from a friend that there are \"magical mushrooms in [place].\" He notes good energy and racing thoughts at times, though improved. He requests that nicotine lozenge be changed to gum. He also inquired about use of Embrel for joint pain, but was advised to discuss this further with his PCP as it is reportedly a chronic medical issue. He said that he will need assistance in scheduling a PCP appointment. He denies SI, HI, AH, VH. He had no additional requests or concerns today. Denies side effects from medications.          Medications:       co-enzyme Q-10  30 mg Oral Daily     fish oil-omega-3 fatty acids  1 g Oral Daily     gabapentin  400 mg Oral TID     lithium ER  450 mg Oral Q12H JASWINDER     multivitamin, therapeutic  1 tablet Oral Daily     nicotine  1 patch Transdermal Daily     nicotine   Transdermal Q8H     OLANZapine  15 mg Oral BID     QUEtiapine  100 mg Oral BID     ziprasidone  40 mg Oral BID w/meals          Allergies:     Allergies   Allergen Reactions     " "Benztropine      Other reaction(s): Seizures  Patient reported  Patient reported       Abilify Discmelt      Suicidal thoughts       Aripiprazole Other (See Comments)     Other reaction(s): Unknown  Suicidal thoughts  Suicidal thoughts       Benzyl Alcohol      Other reaction(s): Unknown     Compazine Other (See Comments)     Seizure      Haloperidol Other (See Comments)     Other reaction(s): Dystonia  Patient had EPS with haldol  Patient had EPS with haldol  Patient had EPS with haldol       Phenothiazines Other (See Comments)     Other reaction(s): Dystonia, Muscle Aches/Weakness, Muscle Cramps, Seizures  Seizures  Patient report  Seizures            Labs:     Recent Results (from the past 24 hour(s))   Asymptomatic SARS-CoV-2 COVID-19 Virus (Coronavirus) by PCR    Collection Time: 04/13/21 10:40 AM    Specimen: Nasopharyngeal   Result Value Ref Range    SARS-CoV-2 Virus Specimen Source Nasopharyngeal     SARS-CoV-2 PCR Result NEGATIVE     SARS-CoV-2 PCR Comment (Note)           Psychiatric Examination:     /84   Pulse 111   Temp 99.9  F (37.7  C) (Oral)   Resp 16   Ht 1.676 m (5' 6\")   Wt 89.4 kg (197 lb 3.2 oz)   SpO2 95%   BMI 31.83 kg/m    Weight is 197 lbs 3.2 oz  Body mass index is 31.83 kg/m .    Weight over time:  Vitals:    04/05/21 2118 04/06/21 0750 04/13/21 0800   Weight: 85.1 kg (187 lb 11.2 oz) 85.8 kg (189 lb 3.2 oz) 89.4 kg (197 lb 3.2 oz)       Orthostatic Vitals       Most Recent      Sitting Orthostatic /83 04/07 0810    Sitting Orthostatic Pulse (bpm) 128 04/07 0810    Standing Orthostatic /71 04/06 0750    Standing Orthostatic Pulse (bpm) 132 04/06 0750        Cardiometabolic risk assessment. 04/07/21    Reviewed patient profile for cardiometabolic risk factors    Date taken /Value  REFERENCE RANGE   Abdominal Obesity  (Waist Circumference)   See nursing flowsheet Women ?35 in (88 cm)   Men ?40 in (102 cm)      Triglycerides  Triglycerides   Date Value Ref Range " "Status   04/06/2021 228 (H) <150 mg/dL Final     Comment:     Borderline high:  150-199 mg/dl  High:             200-499 mg/dl  Very high:       >499 mg/dl         ?150 mg/dL (1.7 mmol/L) or current treatment for elevated triglycerides   HDL cholesterol  HDL Cholesterol   Date Value Ref Range Status   04/06/2021 39 (L) >39 mg/dL Final   ]   Women <50 mg/dL (1.3 mmol/L) in women or current treatment for low HDL cholesterol  Men <40 mg/dL (1 mmol/L) in men or current treatment for low HDL cholesterol     Fasting plasma glucose (FPG) Lab Results   Component Value Date     04/06/2021      FPG ?100 mg/dL (5.6 mmol/L) or treatment for elevated blood glucose   Blood pressure  BP Readings from Last 3 Encounters:   04/13/21 133/84   10/15/20 106/78   04/24/20 138/83    Blood pressure ?130/85 mmHg or treatment for elevated blood pressure   Family History  See family history     Appearance: dressed in hospital scrubs. Hair and beard tied with ponytails. Wearing 2 dangling earrings.    Attitude:  mostly cooperative  Eye Contact:  fair  Mood:  \"awesome!\"  Affect:  intensity is heightened, bright. though improved from days prior.  Speech:  normal rate, volume, and quantity  Language: fluent and intact in English  Psychomotor, Gait, Musculoskeletal:  no evidence of tardive dyskinesia, dystonia, or tics. No psychomotor agitation  Throught Process:  disorganized and illogical  Associations:  somewhat loose  Thought Content: Denies SI/HI. Reports grandiose delusions  Insight:  limited  Judgement:  fair, adherent to medication and meets with team  Oriented to:  oriented to place and self. Did not ask about time orientation  Attention Span and Concentration:  limited  Recent and Remote Memory:  limited  Fund of Knowledge:  appropriate    Clinical Global Impressions  First:  Considering your total clinical experience with this particular patient population, how severe are the patient's symptoms at this time?: 7 (04/06/21 " 1428)  Compared to the patient's condition at the START of treatment, this patient's condition is: 4 (04/06/21 1428)  Most recent:  Considering your total clinical experience with this particular patient population, how severe are the patient's symptoms at this time?: 7 (04/06/21 1428)  Compared to the patient's condition at the START of treatment, this patient's condition is: 4 (04/06/21 1428)           Precautions:     Behavioral Orders   Procedures     Assault precautions     Code 1 - Restrict to Unit     Discontinue 1:1 attendant for suicide risk     Order Specific Question:   I have performed an in person assessment of the patient     Answer:   Based on this assessment the patient no longer requires a one on one attendant at this point in time.     Fall precautions     Routine Programming     As clinically indicated     Single Room     Status 15     Every 15 minutes.          Diagnoses:     Bipolar affective disorder, severe, current mood episode manic with psychosis  Suicidal ideation, resolved  Homicidal ideation, resolved  Polysubstance abuse  Cannabis use disorder, unspecified  Cocaine use disorder, severe, remission status unclear  Methamphetamine use disorder, severe, remission status unclear   History of benzodiazepine use disorder per chart  Nicotine dependence   PTSD by history  ADHD by history  Unspecified anxiety by history   S/p foreign body removal from R foot on antibiotics  Elevated blood glucose         Assessment & Plan:     Assessment:  This patient is a 45 year old male with history of bipolar disorder, polysubstance use, several MICD commitments, currently followed by ACT team who presented to ED with altered mental status in context of being found lying on ground of nursing home altered due to suspected medication non-adherence and ongoing substance use. He has history of several hospitalizations, most recently March 5 2021 at [facility] following a suicide attempt via overdose, he was  discharged the next day. He had been under MICD commitment in the last year but commitment has . He is followed by [facility] ACT team, he had been stable in apartment with [facility] services up until past few weeks. It is suspected he has not been adherent to medications and has relapsed on substances. His substances of choice tend to be cocaine, methamphetamine and cannabis. Utox in ED was positive for cannabinoids. His presentation was consistent with manic episode with psychosis. He also endorsed suicidal ideation as well as homicidal ideation with thoughts to murder his parents.     Hospital summary:  Patient was initially admitted to station 30 with Dr. Velázquez on a 72 hour hold. He was placed on assault and fall precautions. He became agitated and intrusive on station 30, so was placed on a 1:1 and transferred to station 12 under the care of Dr. Varner.  Given concern for imminent safety of self given psychosis and denisha symptoms along with reported SI and HI, petition for MICD commitment and Frautso with olanzapine, quetiapine, ziprasidone, and risperidone was filed. His ACT team was contacted and was in agreement with admission and petition for commitment and Frausto. He was restarted on his PTA medications though at lower doses due to non-adherence. He endorsed homicidal ideation towards his parents and a duty to warn was completed as his parents were notified of this. 1:1 was discontinued on  due to improved intrusive behaviors. Zyprexa was titrated from 15mg at bedtime to 15mg BID. The patient was started on Lithium to target denisha on .    Dr. Varner has cared for patient on multiple occasions in the past. Historically, he has declined neuroleptic medications and mood stabilizers, though is willing to take current medications. He is not amenable with plan to pursue IGNACIO and historically has not followed through with IGNACIO injections in the community.  He is currently homeless and unable to care for  self. He is not at his baseline as he continues to exhibit severe symptoms of denisha with significantly impaired insight and judgment.     Medical Hospital Summary:  In the ED, patient required foreign body removal of glass from his right foot and was started of Keflex for 5 days.     Changes today:  -Start Prilosec 10 mg daily to target symptoms of GERD  -Switch nicotine lozenges to gum per patient request    Target psychiatric symptoms and interventions:  # Bipolar Disorder:  -Lithium 450mg BID. Follow-up lithium level, TSH, and BMP on 4/19  -resume PTA quetiapine 100mg BID for mood stabilization and psychosis   -resume PTA ziprasidone at reduced dose given non-adherence of 40mg BID with meals for psychosis   -resume PTA olanzapine 15mg at bedtime for psychosis and added 15 mg daily on 4/8  -resume PTA gabapentin 400mg TID for anxiety and substance use and pain  -Ativan 1-2 mg TID prn for agitation in context of denisha. Plan to discontinue prior to discharge.     Acute Medical Problems and Treatments:  1) s/p foreign body removal in ED  -pt has been reluctant to let RN assess wounds  -continue to encourage pt to cooperate with cares  -consider WOC consult if needed  -was given one dose of Keflex in ED, this was not ordered to be continued but per ED note should be on Keflex x5 days, ordered Keflex 500mg QID x5 days     2) Elevated TSH, T4 WNL; likely sick thyroid syndrome from substance use and psychiatric decompensation, will plan to repeat in a few days and monitor for any hypothyroid symptoms     3) Elevated BG on admission   -consider recheck if pt appears hyperglycemic     4) Nicotine dependence  -offer PRN nicotine replacement on unit and upon discharge  - patient on cessation    Behavioral/Psychological/Social:  - Encourage unit programming    Safety:  - Continue precautions as noted above  - Status 15 minute checks    Legal Status: Court hold. Final hearing is on 4/16.    Disposition Plan   Reason for  ongoing admission: poses an imminent risk to self, poses an imminent risk to others and is unable to care for self due to severe psychosis or denisha  Discharge location: likely [facility] or MICD treatment  Discharge Medications: not ordered  Follow-up Appointments: not scheduled

## 2021-04-15 PROCEDURE — 250N000013 HC RX MED GY IP 250 OP 250 PS 637: Performed by: PSYCHIATRY & NEUROLOGY

## 2021-04-15 PROCEDURE — 124N000002 HC R&B MH UMMC

## 2021-04-15 PROCEDURE — G0177 OPPS/PHP; TRAIN & EDUC SERV: HCPCS

## 2021-04-15 PROCEDURE — 99233 SBSQ HOSP IP/OBS HIGH 50: CPT | Performed by: PSYCHIATRY & NEUROLOGY

## 2021-04-15 PROCEDURE — 250N000013 HC RX MED GY IP 250 OP 250 PS 637: Performed by: STUDENT IN AN ORGANIZED HEALTH CARE EDUCATION/TRAINING PROGRAM

## 2021-04-15 RX ADMIN — GABAPENTIN 400 MG: 400 CAPSULE ORAL at 21:11

## 2021-04-15 RX ADMIN — OLANZAPINE 15 MG: 10 TABLET, FILM COATED ORAL at 07:38

## 2021-04-15 RX ADMIN — QUETIAPINE FUMARATE 100 MG: 100 TABLET ORAL at 21:11

## 2021-04-15 RX ADMIN — NICOTINE POLACRILEX 4 MG: 2 GUM, CHEWING BUCCAL at 16:50

## 2021-04-15 RX ADMIN — GABAPENTIN 400 MG: 400 CAPSULE ORAL at 14:41

## 2021-04-15 RX ADMIN — NICOTINE POLACRILEX 4 MG: 2 GUM, CHEWING BUCCAL at 20:17

## 2021-04-15 RX ADMIN — Medication 30 MG: at 08:11

## 2021-04-15 RX ADMIN — GABAPENTIN 400 MG: 400 CAPSULE ORAL at 07:37

## 2021-04-15 RX ADMIN — NICOTINE POLACRILEX 4 MG: 2 GUM, CHEWING BUCCAL at 17:57

## 2021-04-15 RX ADMIN — ZIPRASIDONE HYDROCHLORIDE 40 MG: 40 CAPSULE ORAL at 07:37

## 2021-04-15 RX ADMIN — Medication 15 ML: at 14:44

## 2021-04-15 RX ADMIN — LITHIUM CARBONATE 450 MG: 450 TABLET, EXTENDED RELEASE ORAL at 21:11

## 2021-04-15 RX ADMIN — Medication 1 G: at 07:37

## 2021-04-15 RX ADMIN — NICOTINE POLACRILEX 4 MG: 2 GUM, CHEWING BUCCAL at 14:45

## 2021-04-15 RX ADMIN — OMEPRAZOLE 10 MG: 10 CAPSULE, DELAYED RELEASE ORAL at 08:11

## 2021-04-15 RX ADMIN — NICOTINE POLACRILEX 4 MG: 2 GUM, CHEWING BUCCAL at 12:40

## 2021-04-15 RX ADMIN — QUETIAPINE FUMARATE 100 MG: 100 TABLET ORAL at 07:37

## 2021-04-15 RX ADMIN — THERA TABS 1 TABLET: TAB at 07:37

## 2021-04-15 RX ADMIN — OLANZAPINE 15 MG: 10 TABLET, FILM COATED ORAL at 21:11

## 2021-04-15 RX ADMIN — ZIPRASIDONE HYDROCHLORIDE 40 MG: 40 CAPSULE ORAL at 17:57

## 2021-04-15 RX ADMIN — LITHIUM CARBONATE 450 MG: 450 TABLET, EXTENDED RELEASE ORAL at 07:38

## 2021-04-15 RX ADMIN — NICOTINE 1 PATCH: 21 PATCH, EXTENDED RELEASE TRANSDERMAL at 07:38

## 2021-04-15 NOTE — PROGRESS NOTES
"[facility], [place]   Psychiatric Progress Note  Hospital Day: 10        Interim History:   The patient's care was discussed with the treatment team during the daily team meeting and/or staff's chart notes were reviewed. Pt is showing some improvement with manic symptoms. Frequently in milieu, interactive with staff and peers. Affect/mood is elated and grandiose, with less intensity than at admission. He has been coloring on his arms with markers. Pleasant and cooperative overall, no irritability noted. 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 Pt's Father called to get an update. Father relayed to writer that pt's home was condemned, and pt informed Father that landlord \"shouldn't worry because I'll be buying the property.\" As with previous residences, it fell into disrepair and was significantly damaged; family believes this is because Tyler is unable to live independently and maintain basic house upkeep/cleanliness etc.Pt spent a couple hours napping yesterday AM, and reports that his sleep has been improving. He has been compliant with scheduled medications and currently denies any side effects. Reports the medications \"are super helpful!\" Attending and participating in some groups. Patient denying any symptoms of SI/SIB, AH/VH, anxiety, depression, or thoughts of harming others.  Vitals unremarkable with exception of tachycardia. Not attending to ADLs. Slept 6.5 hours during night shift.     Upon interview, Tyler reports that he is \"great!\" Stated that he slept from 9:30 AM to about 7 AM, \"which felt good.\" His energy level is \"good.\" He attributes this to boost he receives with his meals. He is aware that it may result in weight gain, but said that his weight has remained stable for several months. He also mentioned that prior to hospitalization, he was eating a #2 at [employer] up to 3 " "times daily and consumed 8-10 Coca lyndon per day. On admission, patient was quite guarded about events leading to his hospitalization. 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 He stated that he was \"infected with demons,\" and that demons were \"invading my home and throwing glass bottles through my windows.\" He could not find his shoes \"because the demons stole them from me\" and was walking around his apartment barefoot with several broken bottles of glass on the floor. He mentioned that a friend of his went to his apartment shortly after patient was admitted and said \"There was mud and blood everywhere.\" Patient said that he sought help from a neighbor who brought him to a nearby [employer] and then 911 was called. He added that his apartment is now \"inhabitable.\" He apparently was told that his apartment \"could have blown up\" from \"natural gas in my apartment.\" He does not appear to have any appreciable insight into the dangerousness of his behavior. He did say that he needs \"24/7 nursing home level of care,\" but then added that \"I am going to get my crew in there [apartment] and fix it up for me. I will just need a PCA to help me out.\" He claims to also have two \"body guards\" monitoring his apartment at the moment, and \"many many booby traps.\" He does not feel he is being bothered by the demons any longer because \"I won the acuna.\" At this time, he feels his medication regimen is \"perfect,\" and is not interested in changes. He asked me if he would be discharged on 4/20 because \"that's when us pot smokers go to the [facility], smoke a bunch of pot, and protest.\" He was informed that he would remain hospitalized for at least another 1-2 weeks, and accepted this update well. He denies SI, HI, AH, VH. Denies side effects from medications with exception of dry mouth, and noted that prn biotene is effective. He had no additional requests or concerns today.          " "Medications:       co-enzyme Q-10  30 mg Oral Daily     fish oil-omega-3 fatty acids  1 g Oral Daily     gabapentin  400 mg Oral TID     lithium ER  450 mg Oral Q12H JASWINDER     multivitamin, therapeutic  1 tablet Oral Daily     nicotine  1 patch Transdermal Daily     nicotine   Transdermal Q8H     OLANZapine  15 mg Oral BID     omeprazole  10 mg Oral QAM AC     QUEtiapine  100 mg Oral BID     ziprasidone  40 mg Oral BID w/meals          Allergies:     Allergies   Allergen Reactions     Benztropine      Other reaction(s): Seizures  Patient reported  Patient reported       Abilify Discmelt      Suicidal thoughts       Aripiprazole Other (See Comments)     Other reaction(s): Unknown  Suicidal thoughts  Suicidal thoughts       Benzyl Alcohol      Other reaction(s): Unknown     Compazine Other (See Comments)     Seizure      Haloperidol Other (See Comments)     Other reaction(s): Dystonia  Patient had EPS with haldol  Patient had EPS with haldol  Patient had EPS with haldol       Phenothiazines Other (See Comments)     Other reaction(s): Dystonia, Muscle Aches/Weakness, Muscle Cramps, Seizures  Seizures  Patient report  Seizures            Labs:     No results found for this or any previous visit (from the past 24 hour(s)).       Psychiatric Examination:     /88 (BP Location: Left arm)   Pulse 122   Temp 98.4  F (36.9  C) (Oral)   Resp 16   Ht 1.676 m (5' 6\")   Wt 89.4 kg (197 lb 3.2 oz)   SpO2 93%   BMI 31.83 kg/m    Weight is 197 lbs 3.2 oz  Body mass index is 31.83 kg/m .    Weight over time:  Vitals:    04/05/21 2118 04/06/21 0750 04/13/21 0800   Weight: 85.1 kg (187 lb 11.2 oz) 85.8 kg (189 lb 3.2 oz) 89.4 kg (197 lb 3.2 oz)       Orthostatic Vitals       Most Recent      Sitting Orthostatic /83 04/07 0810    Sitting Orthostatic Pulse (bpm) 128 04/07 0810    Standing Orthostatic /71 04/06 0750    Standing Orthostatic Pulse (bpm) 132 04/06 0750        Cardiometabolic risk assessment. " "04/07/21    Reviewed patient profile for cardiometabolic risk factors    Date taken /Value  REFERENCE RANGE   Abdominal Obesity  (Waist Circumference)   See nursing flowsheet Women ?35 in (88 cm)   Men ?40 in (102 cm)      Triglycerides  Triglycerides   Date Value Ref Range Status   04/06/2021 228 (H) <150 mg/dL Final     Comment:     Borderline high:  150-199 mg/dl  High:             200-499 mg/dl  Very high:       >499 mg/dl         ?150 mg/dL (1.7 mmol/L) or current treatment for elevated triglycerides   HDL cholesterol  HDL Cholesterol   Date Value Ref Range Status   04/06/2021 39 (L) >39 mg/dL Final   ]   Women <50 mg/dL (1.3 mmol/L) in women or current treatment for low HDL cholesterol  Men <40 mg/dL (1 mmol/L) in men or current treatment for low HDL cholesterol     Fasting plasma glucose (FPG) Lab Results   Component Value Date     04/06/2021      FPG ?100 mg/dL (5.6 mmol/L) or treatment for elevated blood glucose   Blood pressure  BP Readings from Last 3 Encounters:   04/15/21 128/88   10/15/20 106/78   04/24/20 138/83    Blood pressure ?130/85 mmHg or treatment for elevated blood pressure   Family History  See family history     Appearance: dressed in hospital scrubs. Hair and beard tied with ponytails. Wearing  dangling earring.    Attitude:  mostly cooperative  Eye Contact:  intense  Mood:  \"great!\"  Affect:  intensity is heightened, bright. though improved from days prior.  Speech:  normal rate, volume, and quantity  Language: fluent and intact in English  Psychomotor, Gait, Musculoskeletal:  no evidence of tardive dyskinesia, dystonia, or tics. No psychomotor agitation  Throught Process:  disorganized and illogical  Associations:  somewhat loose  Thought Content: Denies SI/HI. Reports grandiose delusions. Evidence of significant delusional and paranoid thought content  Insight:  Very limited  Judgement:  fair, adherent to medication and meets with team  Oriented to:  oriented to place and self. " Did not ask about time orientation  Attention Span and Concentration:  limited  Recent and Remote Memory:  limited  Fund of Knowledge:  appropriate    Clinical Global Impressions  First:  Considering your total clinical experience with this particular patient population, how severe are the patient's symptoms at this time?: 7 (04/06/21 1428)  Compared to the patient's condition at the START of treatment, this patient's condition is: 4 (04/06/21 1428)  Most recent:  Considering your total clinical experience with this particular patient population, how severe are the patient's symptoms at this time?: 7 (04/06/21 1428)  Compared to the patient's condition at the START of treatment, this patient's condition is: 4 (04/06/21 1428)           Precautions:     Behavioral Orders   Procedures     Assault precautions     Code 1 - Restrict to Unit     Discontinue 1:1 attendant for suicide risk     Order Specific Question:   I have performed an in person assessment of the patient     Answer:   Based on this assessment the patient no longer requires a one on one attendant at this point in time.     Fall precautions     Routine Programming     As clinically indicated     Single Room     Status 15     Every 15 minutes.          Diagnoses:     Bipolar affective disorder, severe, current mood episode manic with psychosis  Suicidal ideation, resolved  Homicidal ideation, resolved  Polysubstance abuse  Cannabis use disorder, unspecified  Cocaine use disorder, severe, remission status unclear  Methamphetamine use disorder, severe, remission status unclear   History of benzodiazepine use disorder per chart  Nicotine dependence   PTSD by history  ADHD by history  Unspecified anxiety by history   S/p foreign body removal from R foot on antibiotics  Elevated blood glucose         Assessment & Plan:     Assessment:  This patient is a 45 year old male with history of bipolar disorder, polysubstance use, several MICD commitments, currently  followed by ACT team who presented to ED with altered mental status in context of being found lying on ground of nursing home altered due to suspected medication non-adherence and ongoing substance use. He has history of several hospitalizations, most recently 2021 at [facility] following a suicide attempt via overdose, he was discharged the next day. He had been under MICD commitment in the last year but commitment has . He is followed by [facility] ACT team, he had been stable in apartment with [facility] services up until past few weeks. It is suspected he has not been adherent to medications and has relapsed on substances. His substances of choice tend to be cocaine, methamphetamine and cannabis. Utox in ED was positive for cannabinoids. His presentation was consistent with manic episode with psychosis. He also endorsed suicidal ideation as well as homicidal ideation with thoughts to murder his parents.     Hospital summary:  Patient was initially admitted to station 30 with Dr. Velázquez on a 72 hour hold. He was placed on assault and fall precautions. He became agitated and intrusive on station 30, so was placed on a 1:1 and transferred to station 12 under the care of Dr. Varner.  Given concern for imminent safety of self given psychosis and denisha symptoms along with reported SI and HI, petition for MICD commitment and Frausto with olanzapine, quetiapine, ziprasidone, and risperidone was filed. His ACT team was contacted and was in agreement with admission and petition for commitment and Frausto. He was restarted on his PTA medications though at lower doses due to non-adherence. He endorsed homicidal ideation towards his parents and a duty to warn was completed as his parents were notified of this. 1:1 was discontinued on  due to improved intrusive behaviors. Zyprexa was titrated from 15mg at bedtime to 15mg BID. The patient was started on Lithium to target denisha on .    Dr. Varner has cared for  patient on multiple occasions in the past. Historically, he has declined neuroleptic medications and mood stabilizers, though is willing to take current medications. He is not amenable with plan to pursue IGNACIO and historically has not followed through with IGNACIO injections in the community.  He is currently homeless and unable to care for self. He is not at his baseline as he continues to exhibit severe symptoms of denisha with significantly impaired insight and judgment.     Medical Hospital Summary:  In the ED, patient required foreign body removal of glass from his right foot and was started of Keflex for 5 days.     Changes today:  -NONE    Target psychiatric symptoms and interventions:  # Bipolar Disorder:  -Lithium 450mg BID. Follow-up lithium level, TSH, and BMP on 4/19 (ordered)  -resume PTA quetiapine 100mg BID for mood stabilization and psychosis   -resume PTA ziprasidone at reduced dose given non-adherence of 40mg BID with meals for psychosis   -resume PTA olanzapine 15mg at bedtime for psychosis and added 15 mg daily on 4/8  -resume PTA gabapentin 400mg TID for anxiety and substance use and pain  -Ativan 1-2 mg TID prn for agitation in context of denisha. Plan to discontinue prior to discharge.   - May consider alternative neuroleptic medication once Frausto is in place.     Acute Medical Problems and Treatments:  1) s/p foreign body removal in ED  -pt has been reluctant to let RN assess wounds  -continue to encourage pt to cooperate with cares  -consider WOC consult if needed  -was given one dose of Keflex in ED, this was not ordered to be continued but per ED note should be on Keflex x5 days, ordered Keflex 500mg QID x5 days     2) Elevated TSH, T4 WNL; likely sick thyroid syndrome from substance use and psychiatric decompensation, will plan to repeat in a few days and monitor for any hypothyroid symptoms     3) Elevated BG on admission   -consider recheck if pt appears hyperglycemic     4) Nicotine  dependence  -offer PRN nicotine replacement on unit and upon discharge  - patient on cessation    Behavioral/Psychological/Social:  - Encourage unit programming    Safety:  - Continue precautions as noted above  - Status 15 minute checks    Legal Status: Court hold. Final hearing is on 4/16.    Disposition Plan   Reason for ongoing admission: poses an imminent risk to self, poses an imminent risk to others and is unable to care for self due to severe psychosis or denisha  Discharge location: likely IRTS or MICD treatment  Discharge Medications: not ordered  Follow-up Appointments: not scheduled     Maggi Varner MD  [facility]

## 2021-04-15 NOTE — PLAN OF CARE
Problem: Adult Inpatient Plan of Care  Goal: Plan of Care Review  Outcome: No Change  Flowsheets  Taken 4/14/2021 2007  Progress: improving  Taken 4/14/2021 1946  Plan of Care Reviewed With: patient     Patient in the milieu all evening interactive and social with staff and peers. Patient upon approach full range in affect, often elated, very approachable and cooperative with verbal assessment. Patient stating acceptance with current care plan and medications. Patient denying any symptoms of SI/SIB, AH/VH, anxiety, depression, or thoughts of harming others. Patient still observed by staff to be making grandiose and delusional comments about being in famous bands and  of many [employer]. Patient independent and accepting of HS medications with no stated or observed side effects. Patients pulse observed to be elevated this evening at 117 bpm, and upon chart review appears to be on trend with past charting with no other associated symptoms observed. Patient ADL's appear unkempt and not receptive to staff prompting stating he will shower tomorrow.

## 2021-04-15 NOTE — PLAN OF CARE
Pt asleep  at start of shift. Breathing quiet and unlabored.     Appears to have slept 6.5 hours.     Pt on  ASSAULT and FALL  precautions in addition to single room order. Any related events noted above.     Will continue to monitor and assess.     Problem: Sleep Disturbance  Goal: Adequate Sleep/Rest  Outcome: No Change

## 2021-04-15 NOTE — PLAN OF CARE
"Pt showered in AM and remained calm in his room resting most of the morning. Pt denies all mental health concerns when asked. 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  Pt attended OT group and was happy to be dancing.  Pt is very social on unit and attending group.  Pt continues to make delusional states such as, \"I am one with [place],\" when talking about spirituality and psychedelics.  No other concerns.    "

## 2021-04-15 NOTE — PLAN OF CARE
"  Problem: OT General Care Plan  Goal: OT Goal 1  Description: Provide reality orientation for improved ability to actively participate in self management.     Pt attended 2 of 2 OT sessions.  During check-in/topic group, pt answered questions briefly and was eager to move on to activity portion of group.  Continues to have loose boundaries with female peer - appeared to be in a friendship way, not a relationship, but talking about how they need to go to [place] together.  Often talking about his \"[place]\" and \"his group\", asking how much she would charge him to make the duct tape roses she was making in group for \"my [place]\" and wanting to conduct business deals.  Responds well to redirection.  Comments he is tired today after his day of rest yesterday.  States he'll need 24 hour PCA care after discharge.    Outcome: Improving     "

## 2021-04-15 NOTE — PLAN OF CARE
Current Diagnosis/Procedure:  Bipolar affective disorder, severe, current mood episode manic with psychosis  Polysubstance abuse  Cannabis use disorder, unspecified  Cocaine use disorder, severe, remission status unclear  Methamphetamine use disorder, severe, remission status unclear   History of benzodiazepine use disorder per chart  Nicotine dependence   PTSD by history  ADHD by history  Unspecified anxiety by history      Work Completed:    -Pt's care discussed with treatment team in daily team meeting and chart notes were reviewed. Meds continue to be adjusted by MD's     -Pt showered last night, is taking his Lithium.  Seems to be sleeping a little bit better.      - Pt has final hearing on  4/16 @ 11:15am.  Writer spoke to Britton Nash on the case is [place] - Phone: [phone]. Recommending full commitment with rodarte for this patient.     Discharge plan or goal:  Pt will likely need IRTS along with his ACT team.  He needs some improvement before referrals can be nava.       Barriers to discharge:  Continued need for stabilization of severity of illness and medication management.

## 2021-04-16 PROCEDURE — 124N000002 HC R&B MH UMMC

## 2021-04-16 PROCEDURE — 93010 ELECTROCARDIOGRAM REPORT: CPT | Performed by: INTERNAL MEDICINE

## 2021-04-16 PROCEDURE — 250N000013 HC RX MED GY IP 250 OP 250 PS 637: Performed by: PSYCHIATRY & NEUROLOGY

## 2021-04-16 PROCEDURE — 93005 ELECTROCARDIOGRAM TRACING: CPT

## 2021-04-16 PROCEDURE — 99233 SBSQ HOSP IP/OBS HIGH 50: CPT | Performed by: PSYCHIATRY & NEUROLOGY

## 2021-04-16 PROCEDURE — 250N000013 HC RX MED GY IP 250 OP 250 PS 637: Performed by: STUDENT IN AN ORGANIZED HEALTH CARE EDUCATION/TRAINING PROGRAM

## 2021-04-16 RX ORDER — ZIPRASIDONE HYDROCHLORIDE 60 MG/1
60 CAPSULE ORAL 2 TIMES DAILY WITH MEALS
Status: DISCONTINUED | OUTPATIENT
Start: 2021-04-16 | End: 2021-04-22 | Stop reason: HOSPADM

## 2021-04-16 RX ADMIN — GABAPENTIN 400 MG: 400 CAPSULE ORAL at 07:59

## 2021-04-16 RX ADMIN — ZIPRASIDONE HCL 60 MG: 60 CAPSULE ORAL at 18:20

## 2021-04-16 RX ADMIN — THERA TABS 1 TABLET: TAB at 07:59

## 2021-04-16 RX ADMIN — NICOTINE POLACRILEX 2 MG: 2 GUM, CHEWING BUCCAL at 08:05

## 2021-04-16 RX ADMIN — QUETIAPINE FUMARATE 100 MG: 100 TABLET ORAL at 07:59

## 2021-04-16 RX ADMIN — Medication 30 MG: at 07:59

## 2021-04-16 RX ADMIN — NICOTINE POLACRILEX 4 MG: 2 GUM, CHEWING BUCCAL at 22:12

## 2021-04-16 RX ADMIN — ZIPRASIDONE HYDROCHLORIDE 40 MG: 40 CAPSULE ORAL at 07:59

## 2021-04-16 RX ADMIN — Medication 1 G: at 07:59

## 2021-04-16 RX ADMIN — OMEPRAZOLE 10 MG: 10 CAPSULE, DELAYED RELEASE ORAL at 07:59

## 2021-04-16 RX ADMIN — LORAZEPAM 2 MG: 1 TABLET ORAL at 22:48

## 2021-04-16 RX ADMIN — QUETIAPINE FUMARATE 100 MG: 100 TABLET ORAL at 22:10

## 2021-04-16 RX ADMIN — HYDROXYZINE HYDROCHLORIDE 25 MG: 25 TABLET, FILM COATED ORAL at 22:33

## 2021-04-16 RX ADMIN — NICOTINE POLACRILEX 4 MG: 2 GUM, CHEWING BUCCAL at 11:58

## 2021-04-16 RX ADMIN — NICOTINE POLACRILEX 4 MG: 2 GUM, CHEWING BUCCAL at 21:05

## 2021-04-16 RX ADMIN — NICOTINE POLACRILEX 4 MG: 2 GUM, CHEWING BUCCAL at 16:28

## 2021-04-16 RX ADMIN — NICOTINE 1 PATCH: 21 PATCH, EXTENDED RELEASE TRANSDERMAL at 07:59

## 2021-04-16 RX ADMIN — LITHIUM CARBONATE 450 MG: 450 TABLET, EXTENDED RELEASE ORAL at 22:10

## 2021-04-16 RX ADMIN — OLANZAPINE 15 MG: 10 TABLET, FILM COATED ORAL at 22:10

## 2021-04-16 RX ADMIN — OLANZAPINE 15 MG: 10 TABLET, FILM COATED ORAL at 07:59

## 2021-04-16 RX ADMIN — Medication 15 ML: at 22:34

## 2021-04-16 RX ADMIN — LITHIUM CARBONATE 450 MG: 450 TABLET, EXTENDED RELEASE ORAL at 07:59

## 2021-04-16 RX ADMIN — GABAPENTIN 400 MG: 400 CAPSULE ORAL at 13:37

## 2021-04-16 RX ADMIN — GABAPENTIN 400 MG: 400 CAPSULE ORAL at 22:10

## 2021-04-16 RX ADMIN — NICOTINE POLACRILEX 4 MG: 2 GUM, CHEWING BUCCAL at 18:20

## 2021-04-16 ASSESSMENT — ACTIVITIES OF DAILY LIVING (ADL)
LAUNDRY: UNABLE TO COMPLETE
ORAL_HYGIENE: INDEPENDENT
LAUNDRY: UNABLE TO COMPLETE
ORAL_HYGIENE: INDEPENDENT
HYGIENE/GROOMING: INDEPENDENT
DRESS: SCRUBS (BEHAVIORAL HEALTH)
HYGIENE/GROOMING: INDEPENDENT
DRESS: SCRUBS (BEHAVIORAL HEALTH)

## 2021-04-16 NOTE — PLAN OF CARE
Pt asleep at start of shift. Breathing quiet and unlabored.     Appears to have slept 6 hours.       Pt on  ASSAULT and  FALL precautions in addition to single room order. Any related events noted above.     Will continue to monitor and assess.     Problem: Sleep Disturbance  Goal: Adequate Sleep/Rest  Outcome: No Change

## 2021-04-16 NOTE — PLAN OF CARE
Current Diagnosis/Procedure:  Bipolar affective disorder, severe, current mood episode manic with psychosis  Polysubstance abuse  Cannabis use disorder, unspecified  Cocaine use disorder, severe, remission status unclear  Methamphetamine use disorder, severe, remission status unclear   History of benzodiazepine use disorder per chart  Nicotine dependence   PTSD by history  ADHD by history  Unspecified anxiety by history      Work Completed:    -Pt's care discussed with treatment team in daily team meeting and chart notes were reviewed. Meds continue to be adjusted by MD's, they will likely start him on Risperdal.      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       - Pt had his final hearing this morning.  Writer called [employer] Atty's office to see if we could get the final court order prior to the weekend so we can get him going on treatment with medications.   They are waiting for the  to sign the order.   Checked again at 2:40pm to see if rodarte was signed, but it was still under advisement.  She reported they are open until 4:30pm and it could still get signed by  today and be faxed over.       Discharge plan or goal:  Pt will likely need IRTS along with his ACT team.  He needs some improvement before referrals can be nava.       Barriers to discharge:  Continued need for stabilization of severity of illness and medication management

## 2021-04-16 NOTE — PLAN OF CARE
Pt had uneventful shift. Pt visible in lounge throughout entire shift. Pt enjoyed watching movies and reading. Pt calm, pleasant, and cooperative with staff. Pt took medications without issue. Pt did not require any PRNs this shift. Pt attended some of group however reported that it was not within his interests so left group early. Pt did not appear to make any delusional or paranoid statements this shift however grandiose at times.     No stated or observed signs of SI/SIB/HI/AH/VH. Pt denies any acute medical concerns. Appetite appears WDL. Will continue to monitor and support.

## 2021-04-16 NOTE — PROGRESS NOTES
"[facility], [place]   Psychiatric Progress Note  Hospital Day: 11        Interim History:   The patient's care was discussed with the treatment team during the daily team meeting and/or staff's chart notes were reviewed. Patient continues to exhibit signs and symptoms of psychosis and denisha, though is overall calmer and more cooperative. He is accepting of medications without issue. He denies SI/HI. He denies acute medical concerns and side effects. Appetite is good. Slept 6 hours on night shift.     Upon interview, Tyler reported that he is \"really good! I slept a lot.\" He said that his court hearing this morning went well. He now states that he has \"several several friends that would let me crash at their place and provide 24/7 cares.\" I expressed my concerns about that and proposed that he reside at an IRTS upon discharge. He said \"I'd be down for an IRTS.\" He would prefer to go to either [facility] or [facility]. I did discuss my recommendation to stop both Seroquel and Geodon and start risperidone given noted benefit from it in the past. He said \"Fuck risperidone. Fuck no. That medication chemically castrated me. I had horrible horrible erectile dysfunction. You have no idea what it is like to be a man and not have a penis.\" He said that he has a similar side effect from Haldol, and is not in agreement to take either of these medications. He did agree to further optimize Geodon. Discussed R/B/A with him. He agreed to complete an EKG. Discussed risks of further prolonging QTc. He expressed understanding. He feels that it has been an effective medication for him and was previously taking a dose of 60 mg BID. He denies SI, HI, AH, VH. Denies side effects from medications with exception of dry mouth, and noted that prn biotene is effective. He had no additional requests or concerns today.          Medications:       co-enzyme Q-10  30 mg Oral Daily     fish oil-omega-3 fatty " "acids  1 g Oral Daily     gabapentin  400 mg Oral TID     lithium ER  450 mg Oral Q12H JASWINDER     multivitamin, therapeutic  1 tablet Oral Daily     nicotine  1 patch Transdermal Daily     nicotine   Transdermal Q8H     OLANZapine  15 mg Oral BID     omeprazole  10 mg Oral QAM AC     QUEtiapine  100 mg Oral BID     ziprasidone  40 mg Oral BID w/meals          Allergies:     Allergies   Allergen Reactions     Benztropine      Other reaction(s): Seizures  Patient reported  Patient reported       Abilify Discmelt      Suicidal thoughts       Aripiprazole Other (See Comments)     Other reaction(s): Unknown  Suicidal thoughts  Suicidal thoughts       Benzyl Alcohol      Other reaction(s): Unknown     Compazine Other (See Comments)     Seizure      Haloperidol Other (See Comments)     Other reaction(s): Dystonia  Patient had EPS with haldol  Patient had EPS with haldol  Patient had EPS with haldol       Phenothiazines Other (See Comments)     Other reaction(s): Dystonia, Muscle Aches/Weakness, Muscle Cramps, Seizures  Seizures  Patient report  Seizures            Labs:     No results found for this or any previous visit (from the past 24 hour(s)).       Psychiatric Examination:     BP (!) 144/87   Pulse 101   Temp 98.5  F (36.9  C) (Tympanic)   Resp 17   Ht 1.676 m (5' 6\")   Wt 89.4 kg (197 lb 3.2 oz)   SpO2 96%   BMI 31.83 kg/m    Weight is 197 lbs 3.2 oz  Body mass index is 31.83 kg/m .    Weight over time:  Vitals:    04/05/21 2118 04/06/21 0750 04/13/21 0800   Weight: 85.1 kg (187 lb 11.2 oz) 85.8 kg (189 lb 3.2 oz) 89.4 kg (197 lb 3.2 oz)       Orthostatic Vitals       Most Recent      Sitting Orthostatic /83 04/07 0810    Sitting Orthostatic Pulse (bpm) 128 04/07 0810    Standing Orthostatic /71 04/06 0750    Standing Orthostatic Pulse (bpm) 132 04/06 0750        Cardiometabolic risk assessment. 04/07/21    Reviewed patient profile for cardiometabolic risk factors    Date taken /Value  REFERENCE " "RANGE   Abdominal Obesity  (Waist Circumference)   See nursing flowsheet Women ?35 in (88 cm)   Men ?40 in (102 cm)      Triglycerides  Triglycerides   Date Value Ref Range Status   04/06/2021 228 (H) <150 mg/dL Final     Comment:     Borderline high:  150-199 mg/dl  High:             200-499 mg/dl  Very high:       >499 mg/dl         ?150 mg/dL (1.7 mmol/L) or current treatment for elevated triglycerides   HDL cholesterol  HDL Cholesterol   Date Value Ref Range Status   04/06/2021 39 (L) >39 mg/dL Final   ]   Women <50 mg/dL (1.3 mmol/L) in women or current treatment for low HDL cholesterol  Men <40 mg/dL (1 mmol/L) in men or current treatment for low HDL cholesterol     Fasting plasma glucose (FPG) Lab Results   Component Value Date     04/06/2021      FPG ?100 mg/dL (5.6 mmol/L) or treatment for elevated blood glucose   Blood pressure  BP Readings from Last 3 Encounters:   04/15/21 (!) 144/87   10/15/20 106/78   04/24/20 138/83    Blood pressure ?130/85 mmHg or treatment for elevated blood pressure   Family History  See family history     Appearance: dressed in hospital scrubs. Hair and beard tied with ponytails. Wearing  dangling earring.    Attitude:  mostly cooperative  Eye Contact:  intense  Mood:  \"good\"  Affect:  intensity is heightened, bright. though improved from days prior.  Speech:  normal rate, volume, and quantity  Language: fluent and intact in English  Psychomotor, Gait, Musculoskeletal:  no evidence of tardive dyskinesia, dystonia, or tics. No psychomotor agitation  Throught Process:  disorganized and illogical  Associations:  somewhat loose  Thought Content: Denies SI/HI. No overt evidence of psychotic thought today.   Insight:  Very limited  Judgement:  fair, adherent to medication and meets with team  Oriented to:  oriented to person, place, time.   Attention Span and Concentration:  limited, though improved  Recent and Remote Memory:  limited  Fund of Knowledge:  appropriate    Clinical " Global Impressions  First:  Considering your total clinical experience with this particular patient population, how severe are the patient's symptoms at this time?: 7 (04/06/21 1428)  Compared to the patient's condition at the START of treatment, this patient's condition is: 4 (04/06/21 1428)  Most recent:  Considering your total clinical experience with this particular patient population, how severe are the patient's symptoms at this time?: 7 (04/06/21 1428)  Compared to the patient's condition at the START of treatment, this patient's condition is: 4 (04/06/21 1428)           Precautions:     Behavioral Orders   Procedures     Assault precautions     Code 1 - Restrict to Unit     Discontinue 1:1 attendant for suicide risk     Order Specific Question:   I have performed an in person assessment of the patient     Answer:   Based on this assessment the patient no longer requires a one on one attendant at this point in time.     Fall precautions     Routine Programming     As clinically indicated     Single Room     Status 15     Every 15 minutes.          Diagnoses:     Bipolar affective disorder, severe, current mood episode manic with psychosis  Suicidal ideation, resolved  Homicidal ideation, resolved  Polysubstance abuse  Cannabis use disorder, unspecified  Cocaine use disorder, severe, remission status unclear  Methamphetamine use disorder, severe, remission status unclear   History of benzodiazepine use disorder per chart  Nicotine dependence   PTSD by history  ADHD by history  Unspecified anxiety by history   S/p foreign body removal from R foot on antibiotics  Elevated blood glucose  Abnormal QTc at 453         Assessment & Plan:     Assessment:  This patient is a 45 year old male with history of bipolar disorder, polysubstance use, several MICD commitments, currently followed by ACT team who presented to ED with altered mental status in context of being found lying on ground of nursing home altered due to  suspected medication non-adherence and ongoing substance use. He has history of several hospitalizations, most recently 2021 at [facility] following a suicide attempt via overdose, he was discharged the next day. He had been under MICD commitment in the last year but commitment has . He is followed by [facility] ACT team, he had been stable in apartment with [facility] services up until past few weeks. It is suspected he has not been adherent to medications and has relapsed on substances. His substances of choice tend to be cocaine, methamphetamine and cannabis. Utox in ED was positive for cannabinoids. His presentation was consistent with manic episode with psychosis. He also endorsed suicidal ideation as well as homicidal ideation with thoughts to murder his parents.     Hospital summary:  Patient was initially admitted to station 30 with Dr. Velázquez on a 72 hour hold. He was placed on assault and fall precautions. He became agitated and intrusive on station 30, so was placed on a 1:1 and transferred to station 12 under the care of Dr. Varner.  Given concern for imminent safety of self given psychosis and denisha symptoms along with reported SI and HI, petition for MICD commitment and Frausto with olanzapine, quetiapine, ziprasidone, and risperidone was filed. His ACT team was contacted and was in agreement with admission and petition for commitment and Frausto. He was restarted on his PTA medications though at lower doses due to non-adherence. He endorsed homicidal ideation towards his parents and a duty to warn was completed as his parents were notified of this. 1:1 was discontinued on  due to improved intrusive behaviors. Zyprexa was titrated from 15mg at bedtime to 15mg BID. The patient was started on Lithium to target denisha on .    Dr. Varner has cared for patient on multiple occasions in the past. Historically, he has declined neuroleptic medications and mood stabilizers, though is willing to  take current medications. He is not amenable with plan to pursue IGNACIO and historically has not followed through with IGNACIO injections in the community.  He is currently homeless and unable to care for self. He is not at his baseline as he continues to exhibit severe symptoms of denisha with significantly impaired insight and judgment. Plan is to refer to IRTS once stable.     Medical Hospital Summary:  In the ED, patient required foreign body removal of glass from his right foot and was started of Keflex for 5 days.     Changes today:  -EKG: QTc midly prolonged at 453  - Increase Geodon to 60 mg BID to target ongoing symptoms of denisha and psychosis    Target psychiatric symptoms and interventions:  # Bipolar Disorder:  -Lithium 450mg BID. Follow-up lithium level, TSH, and BMP on 4/19 (ordered)  -resume PTA quetiapine 100mg BID for mood stabilization and psychosis   -resume ziprasidone 60 mg BID with meals for psychosis   -resume PTA olanzapine 15mg at bedtime for psychosis and added 15 mg daily on 4/8  -resume PTA gabapentin 400mg TID for anxiety and substance use and pain  -Ativan 1-2 mg TID prn for agitation in context of denisha. Plan to discontinue prior to discharge.     Acute Medical Problems and Treatments:  1) s/p foreign body removal in ED  -pt has been reluctant to let RN assess wounds  -continue to encourage pt to cooperate with cares  -consider WOC consult if needed  -was given one dose of Keflex in ED, this was not ordered to be continued but per ED note should be on Keflex x5 days, ordered Keflex 500mg QID x5 days     2) Elevated TSH, T4 WNL; likely sick thyroid syndrome from substance use and psychiatric decompensation, will plan to repeat in a few days and monitor for any hypothyroid symptoms     3) Elevated BG on admission   -consider recheck if pt appears hyperglycemic     4) Nicotine dependence  -offer PRN nicotine replacement on unit and upon discharge  - patient on cessation    5) Prolonged QTc  at 453 on 4/16/21  - Repeat EKG on Friday, 4/23.    Behavioral/Psychological/Social:  - Encourage unit programming    Safety:  - Continue precautions as noted above  - Status 15 minute checks    Legal Status: Court hold. Final hearing on 4/16.    Disposition Plan   Reason for ongoing admission: poses an imminent risk to self, poses an imminent risk to others and is unable to care for self due to severe psychosis or denisha  Discharge location: likely IRTS or MICD treatment  Discharge Medications: not ordered  Follow-up Appointments: not scheduled     Maggi Varner MD  [facility]

## 2021-04-16 NOTE — PROGRESS NOTES
SPIRITUAL HEALTH SERVICES  SPIRITUAL ASSESSMENT Progress Note  [facility] ([facility] 12NB       REFERRAL SOURCE:  initiated follow up.    I arranged with staff for time to follow up with Tyler for ongoing support of smudging practice. Tyler welcomed the ritual; provided rosetta-spray version of smudging in patient room.    I plan to check in with Tyler 1 - 2x weekly. Spiritual Health remains available at patient's request for the duration of admission.     Sherron Estrella MDiv  Associate   Pager [phone]

## 2021-04-16 NOTE — PLAN OF CARE
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 When asked if pt was seeing or hearing them today, pt denied, stating \"no, I'm offline right now\".   Sleep: pt denies any sleep-related concerns  PRN Med: Nicotine gum  Medication AE: none, pt believes his medications are helping him  Physical Complaints/Issues: pt denies any physical concerns  I & O: eating and drinking well  ADLs: independent  Vitals:  stable and WNL  COVID 19 Assessment:  tested negative on 4/13/21  Milieu Participation: visible, participates in groups, interactions are pleasant and polite  Behavior: overall calm and controlled, social at times. Conversations with pt reveal delusional thought content. Pt is cooperative with care.    No other concerns at this time. Nursing will continue to monitor and assess.           "

## 2021-04-17 PROCEDURE — 250N000013 HC RX MED GY IP 250 OP 250 PS 637: Performed by: PSYCHIATRY & NEUROLOGY

## 2021-04-17 PROCEDURE — 124N000002 HC R&B MH UMMC

## 2021-04-17 PROCEDURE — 250N000013 HC RX MED GY IP 250 OP 250 PS 637: Performed by: STUDENT IN AN ORGANIZED HEALTH CARE EDUCATION/TRAINING PROGRAM

## 2021-04-17 RX ADMIN — GABAPENTIN 400 MG: 400 CAPSULE ORAL at 08:51

## 2021-04-17 RX ADMIN — ZIPRASIDONE HCL 60 MG: 60 CAPSULE ORAL at 08:51

## 2021-04-17 RX ADMIN — NICOTINE 1 PATCH: 21 PATCH, EXTENDED RELEASE TRANSDERMAL at 08:51

## 2021-04-17 RX ADMIN — GABAPENTIN 400 MG: 400 CAPSULE ORAL at 22:32

## 2021-04-17 RX ADMIN — GABAPENTIN 400 MG: 400 CAPSULE ORAL at 14:57

## 2021-04-17 RX ADMIN — LITHIUM CARBONATE 450 MG: 450 TABLET, EXTENDED RELEASE ORAL at 22:31

## 2021-04-17 RX ADMIN — OLANZAPINE 15 MG: 10 TABLET, FILM COATED ORAL at 08:51

## 2021-04-17 RX ADMIN — QUETIAPINE FUMARATE 100 MG: 100 TABLET ORAL at 08:51

## 2021-04-17 RX ADMIN — OLANZAPINE 15 MG: 10 TABLET, FILM COATED ORAL at 22:32

## 2021-04-17 RX ADMIN — OMEPRAZOLE 10 MG: 10 CAPSULE, DELAYED RELEASE ORAL at 08:50

## 2021-04-17 RX ADMIN — ZIPRASIDONE HCL 60 MG: 60 CAPSULE ORAL at 18:18

## 2021-04-17 RX ADMIN — THERA TABS 1 TABLET: TAB at 08:51

## 2021-04-17 RX ADMIN — LITHIUM CARBONATE 450 MG: 450 TABLET, EXTENDED RELEASE ORAL at 08:51

## 2021-04-17 RX ADMIN — QUETIAPINE FUMARATE 100 MG: 100 TABLET ORAL at 22:31

## 2021-04-17 RX ADMIN — NICOTINE POLACRILEX 4 MG: 2 GUM, CHEWING BUCCAL at 20:34

## 2021-04-17 RX ADMIN — Medication 1 G: at 08:51

## 2021-04-17 RX ADMIN — Medication 30 MG: at 08:51

## 2021-04-17 ASSESSMENT — ACTIVITIES OF DAILY LIVING (ADL)
HYGIENE/GROOMING: INDEPENDENT
ORAL_HYGIENE: INDEPENDENT
DRESS: SCRUBS (BEHAVIORAL HEALTH)

## 2021-04-17 NOTE — PROGRESS NOTES
NOC Shift Report     Pt in bed at beginning of shift, breathing quiet and unlabored. Pt slept through shift. Pt slept 6.75 hours.      No pt complaints or concerns at this time.      No PRNs given. Will continue to monitor.    Precautions: Fall and assault

## 2021-04-17 NOTE — PLAN OF CARE
Pt has been active in the milieu all evening. He spent time watching movies with peers and talking on the phone with family. Participated in group. Affect full range. Thought content is delusional and grandiose. 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 Speech tangential, but he is overall less hyperverbal. Denies SI/SIB and HI. Reports that he is very satisfied with his medication regimen and denies any side effects.

## 2021-04-17 NOTE — PLAN OF CARE
Pt denies all mental health concerns. Pt denies anxiety, depression, hearing voices, thoughts of hurting himself or others.  Pt is med compliant. Pt has been isolative to room all day listening to Restorationist music.  Pt came out of room for meals only and was pleasant and cooperative.

## 2021-04-17 NOTE — PROGRESS NOTES
"   04/16/21 2300   Groups   Details   (Psychotherapy)   Number of patients attending the group:  2  Group Length:  1 Hours    Group Therapy Type:     Summary of Group / Topics Discussed:      The  Psychotherapy group goal is to promote insight to positive choice and change. Group processing is within a supportive and safe environment. Patients will process emotions using verbal group and expressive psychotherapy interventions including visual art/writing interventions.    Group interventions support patients by: communication/social skills and supports and hope/optimism    Modalities to reach these goals include: DBT (Dialectical Behavioral Therapy)    Subjective -patient report of mood today- excellent    Objective/ Intervention- Goal of group and Therapeutic modality utilized- DBT house and drawing    Group Response- engaged/ some distraction and restlessness    Patient Response-engaged, but shorter attention span, did work quickly    Pt was pleasant. He completed the DBT house directive and then did some mandala drawing He is somewhat grandiose but he is future goal oriented. He talks about future plans. To go to an IRTS and then to remodel his home that was condemned. He talked about family supports. He is very pleased with the care he is getting on the unit. 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 He redirected easily and respectfully. He was also kind to a lower functioning pt and made her feel welcome in group.    Richie Bethea, EMILY, ATR-BC          "

## 2021-04-18 PROCEDURE — 250N000013 HC RX MED GY IP 250 OP 250 PS 637: Performed by: PSYCHIATRY & NEUROLOGY

## 2021-04-18 PROCEDURE — 250N000013 HC RX MED GY IP 250 OP 250 PS 637: Performed by: STUDENT IN AN ORGANIZED HEALTH CARE EDUCATION/TRAINING PROGRAM

## 2021-04-18 PROCEDURE — 124N000002 HC R&B MH UMMC

## 2021-04-18 RX ADMIN — THERA TABS 1 TABLET: TAB at 08:05

## 2021-04-18 RX ADMIN — OMEPRAZOLE 10 MG: 10 CAPSULE, DELAYED RELEASE ORAL at 08:04

## 2021-04-18 RX ADMIN — GABAPENTIN 400 MG: 400 CAPSULE ORAL at 22:05

## 2021-04-18 RX ADMIN — OLANZAPINE 15 MG: 10 TABLET, FILM COATED ORAL at 22:05

## 2021-04-18 RX ADMIN — QUETIAPINE FUMARATE 100 MG: 100 TABLET ORAL at 08:04

## 2021-04-18 RX ADMIN — NICOTINE POLACRILEX 4 MG: 2 GUM, CHEWING BUCCAL at 08:54

## 2021-04-18 RX ADMIN — Medication 1 G: at 08:04

## 2021-04-18 RX ADMIN — ZIPRASIDONE HCL 60 MG: 60 CAPSULE ORAL at 08:04

## 2021-04-18 RX ADMIN — NICOTINE POLACRILEX 2 MG: 2 GUM, CHEWING BUCCAL at 20:13

## 2021-04-18 RX ADMIN — GABAPENTIN 400 MG: 400 CAPSULE ORAL at 08:04

## 2021-04-18 RX ADMIN — NICOTINE POLACRILEX 4 MG: 2 GUM, CHEWING BUCCAL at 10:53

## 2021-04-18 RX ADMIN — GABAPENTIN 400 MG: 400 CAPSULE ORAL at 13:22

## 2021-04-18 RX ADMIN — ZIPRASIDONE HCL 60 MG: 60 CAPSULE ORAL at 17:55

## 2021-04-18 RX ADMIN — Medication 30 MG: at 08:04

## 2021-04-18 RX ADMIN — LITHIUM CARBONATE 450 MG: 450 TABLET, EXTENDED RELEASE ORAL at 08:04

## 2021-04-18 RX ADMIN — NICOTINE POLACRILEX 4 MG: 2 GUM, CHEWING BUCCAL at 13:22

## 2021-04-18 RX ADMIN — NICOTINE 1 PATCH: 21 PATCH, EXTENDED RELEASE TRANSDERMAL at 08:05

## 2021-04-18 RX ADMIN — LITHIUM CARBONATE 450 MG: 450 TABLET, EXTENDED RELEASE ORAL at 22:05

## 2021-04-18 RX ADMIN — HYDROXYZINE HYDROCHLORIDE 25 MG: 25 TABLET, FILM COATED ORAL at 11:47

## 2021-04-18 RX ADMIN — QUETIAPINE FUMARATE 100 MG: 100 TABLET ORAL at 22:05

## 2021-04-18 RX ADMIN — OLANZAPINE 15 MG: 10 TABLET, FILM COATED ORAL at 08:03

## 2021-04-18 RX ADMIN — NICOTINE POLACRILEX 2 MG: 2 GUM, CHEWING BUCCAL at 18:17

## 2021-04-18 ASSESSMENT — ACTIVITIES OF DAILY LIVING (ADL)
HYGIENE/GROOMING: INDEPENDENT
DRESS: SCRUBS (BEHAVIORAL HEALTH)
HYGIENE/GROOMING: INDEPENDENT
DRESS: SCRUBS (BEHAVIORAL HEALTH)
ORAL_HYGIENE: INDEPENDENT
ORAL_HYGIENE: INDEPENDENT
LAUNDRY: UNABLE TO COMPLETE
LAUNDRY: UNABLE TO COMPLETE

## 2021-04-18 ASSESSMENT — MIFFLIN-ST. JEOR: SCORE: 1727.24

## 2021-04-18 NOTE — PLAN OF CARE
"Pt has been active in the milieu all evening, and appropriately social. He spent time playing cards and watching movies with peers. Affect full range. He is overall less grandiose and elated. Thought content delusional. He has been calm, pleasant, and cooperative all evening. Compliant with scheduled medications. Reports that his current medications are \"very helpful,\" and denies any side effects.   "

## 2021-04-18 NOTE — PLAN OF CARE
"Pt spent much of the day watching movies with peers and staff. He said he enjoyed himself. He made a phone call to his mother and did a 3-way call with her and his brother for his brother's birthday and sang \"happy birthday\" to him. He said his brother was his \"best bud.\" Pt's affect was full range and mood ranged between calm to slightly anxious. At 1147 the pt asked for and received a prn of hydroxyzine 25 mg for anxiety. He later said he felt relief. Pt denied SI/SIB/AH. Pt denied all MH sxs. Pt ate all meals. Pt was medication compliant. No side effects were reported or observed.   "

## 2021-04-19 LAB
ANION GAP SERPL CALCULATED.3IONS-SCNC: 9 MMOL/L (ref 3–14)
BUN SERPL-MCNC: 14 MG/DL (ref 7–30)
CALCIUM SERPL-MCNC: 8.4 MG/DL (ref 8.5–10.1)
CHLORIDE SERPL-SCNC: 106 MMOL/L (ref 94–109)
CO2 SERPL-SCNC: 23 MMOL/L (ref 20–32)
CREAT SERPL-MCNC: 0.83 MG/DL (ref 0.66–1.25)
GFR SERPL CREATININE-BSD FRML MDRD: >90 ML/MIN/{1.73_M2}
GLUCOSE SERPL-MCNC: 165 MG/DL (ref 70–99)
INTERPRETATION ECG - MUSE: NORMAL
LITHIUM SERPL-SCNC: 0.43 MMOL/L (ref 0.6–1.2)
POTASSIUM SERPL-SCNC: 4 MMOL/L (ref 3.4–5.3)
SODIUM SERPL-SCNC: 138 MMOL/L (ref 133–144)
T3 SERPL-MCNC: 123 NG/DL (ref 60–181)
T4 FREE SERPL-MCNC: 0.54 NG/DL (ref 0.76–1.46)
TSH SERPL DL<=0.005 MIU/L-ACNC: 6.21 MU/L (ref 0.4–4)

## 2021-04-19 PROCEDURE — G0177 OPPS/PHP; TRAIN & EDUC SERV: HCPCS

## 2021-04-19 PROCEDURE — 250N000013 HC RX MED GY IP 250 OP 250 PS 637: Performed by: PSYCHIATRY & NEUROLOGY

## 2021-04-19 PROCEDURE — 124N000002 HC R&B MH UMMC

## 2021-04-19 PROCEDURE — 84439 ASSAY OF FREE THYROXINE: CPT | Performed by: STUDENT IN AN ORGANIZED HEALTH CARE EDUCATION/TRAINING PROGRAM

## 2021-04-19 PROCEDURE — 99232 SBSQ HOSP IP/OBS MODERATE 35: CPT | Performed by: PHYSICIAN ASSISTANT

## 2021-04-19 PROCEDURE — 36415 COLL VENOUS BLD VENIPUNCTURE: CPT | Performed by: STUDENT IN AN ORGANIZED HEALTH CARE EDUCATION/TRAINING PROGRAM

## 2021-04-19 PROCEDURE — 84443 ASSAY THYROID STIM HORMONE: CPT | Performed by: STUDENT IN AN ORGANIZED HEALTH CARE EDUCATION/TRAINING PROGRAM

## 2021-04-19 PROCEDURE — 250N000013 HC RX MED GY IP 250 OP 250 PS 637: Performed by: STUDENT IN AN ORGANIZED HEALTH CARE EDUCATION/TRAINING PROGRAM

## 2021-04-19 PROCEDURE — 80178 ASSAY OF LITHIUM: CPT | Performed by: STUDENT IN AN ORGANIZED HEALTH CARE EDUCATION/TRAINING PROGRAM

## 2021-04-19 PROCEDURE — 86376 MICROSOMAL ANTIBODY EACH: CPT | Performed by: STUDENT IN AN ORGANIZED HEALTH CARE EDUCATION/TRAINING PROGRAM

## 2021-04-19 PROCEDURE — 84480 ASSAY TRIIODOTHYRONINE (T3): CPT | Performed by: STUDENT IN AN ORGANIZED HEALTH CARE EDUCATION/TRAINING PROGRAM

## 2021-04-19 PROCEDURE — 99207 PR CONSULT E&M CHANGED TO SUBSEQUENT LEVEL: CPT | Performed by: PHYSICIAN ASSISTANT

## 2021-04-19 PROCEDURE — 80048 BASIC METABOLIC PNL TOTAL CA: CPT | Performed by: STUDENT IN AN ORGANIZED HEALTH CARE EDUCATION/TRAINING PROGRAM

## 2021-04-19 PROCEDURE — 99233 SBSQ HOSP IP/OBS HIGH 50: CPT | Mod: GC | Performed by: PSYCHIATRY & NEUROLOGY

## 2021-04-19 RX ORDER — LEVOTHYROXINE SODIUM 50 UG/1
100 TABLET ORAL
Status: DISCONTINUED | OUTPATIENT
Start: 2021-04-20 | End: 2021-04-22 | Stop reason: HOSPADM

## 2021-04-19 RX ORDER — LITHIUM CARBONATE 300 MG/1
600 TABLET, FILM COATED, EXTENDED RELEASE ORAL EVERY 12 HOURS SCHEDULED
Status: DISCONTINUED | OUTPATIENT
Start: 2021-04-19 | End: 2021-04-22 | Stop reason: HOSPADM

## 2021-04-19 RX ADMIN — OLANZAPINE 15 MG: 10 TABLET, FILM COATED ORAL at 08:23

## 2021-04-19 RX ADMIN — QUETIAPINE FUMARATE 100 MG: 100 TABLET ORAL at 21:44

## 2021-04-19 RX ADMIN — OLANZAPINE 15 MG: 10 TABLET, FILM COATED ORAL at 21:45

## 2021-04-19 RX ADMIN — NICOTINE POLACRILEX 4 MG: 2 GUM, CHEWING BUCCAL at 10:48

## 2021-04-19 RX ADMIN — GABAPENTIN 400 MG: 400 CAPSULE ORAL at 14:45

## 2021-04-19 RX ADMIN — Medication 30 MG: at 08:24

## 2021-04-19 RX ADMIN — OMEPRAZOLE 10 MG: 10 CAPSULE, DELAYED RELEASE ORAL at 08:24

## 2021-04-19 RX ADMIN — NICOTINE POLACRILEX 4 MG: 2 GUM, CHEWING BUCCAL at 18:05

## 2021-04-19 RX ADMIN — NICOTINE POLACRILEX 4 MG: 2 GUM, CHEWING BUCCAL at 14:56

## 2021-04-19 RX ADMIN — GABAPENTIN 400 MG: 400 CAPSULE ORAL at 21:44

## 2021-04-19 RX ADMIN — NICOTINE POLACRILEX 4 MG: 2 GUM, CHEWING BUCCAL at 08:24

## 2021-04-19 RX ADMIN — QUETIAPINE FUMARATE 100 MG: 100 TABLET ORAL at 08:24

## 2021-04-19 RX ADMIN — ZIPRASIDONE HCL 60 MG: 60 CAPSULE ORAL at 17:54

## 2021-04-19 RX ADMIN — NICOTINE POLACRILEX 4 MG: 2 GUM, CHEWING BUCCAL at 20:17

## 2021-04-19 RX ADMIN — Medication 1 G: at 08:23

## 2021-04-19 RX ADMIN — NICOTINE 1 PATCH: 21 PATCH, EXTENDED RELEASE TRANSDERMAL at 08:24

## 2021-04-19 RX ADMIN — ZIPRASIDONE HCL 60 MG: 60 CAPSULE ORAL at 08:24

## 2021-04-19 RX ADMIN — GABAPENTIN 400 MG: 400 CAPSULE ORAL at 08:24

## 2021-04-19 RX ADMIN — NICOTINE POLACRILEX 2 MG: 2 GUM, CHEWING BUCCAL at 17:03

## 2021-04-19 RX ADMIN — THERA TABS 1 TABLET: TAB at 08:23

## 2021-04-19 RX ADMIN — NICOTINE POLACRILEX 4 MG: 2 GUM, CHEWING BUCCAL at 12:27

## 2021-04-19 RX ADMIN — LITHIUM CARBONATE 600 MG: 300 TABLET, FILM COATED, EXTENDED RELEASE ORAL at 21:44

## 2021-04-19 RX ADMIN — LITHIUM CARBONATE 450 MG: 450 TABLET, EXTENDED RELEASE ORAL at 08:24

## 2021-04-19 ASSESSMENT — ACTIVITIES OF DAILY LIVING (ADL)
ORAL_HYGIENE: INDEPENDENT
HYGIENE/GROOMING: INDEPENDENT
DRESS: SCRUBS (BEHAVIORAL HEALTH)
HYGIENE/GROOMING: INDEPENDENT
LAUNDRY: UNABLE TO COMPLETE
DRESS: SCRUBS (BEHAVIORAL HEALTH)
ORAL_HYGIENE: INDEPENDENT

## 2021-04-19 NOTE — PLAN OF CARE
Pt asleep at start of shift. Breathing quiet and unlabored.     Appears to have slept 7 hours.       Pt on  ASSAULT and FALL precautions in addition to single room order. Any related events noted above.     Will continue to monitor and assess.   Problem: Sleep Disturbance  Goal: Adequate Sleep/Rest  Outcome: Improving

## 2021-04-19 NOTE — PROGRESS NOTES
"Patient discussed using heroin after discharging hospital with other patient in [facility]. Following this both patients engaged in SIB head slapping/hitting. When staff addressed this action patient stated he was just \"messing around\".  "

## 2021-04-19 NOTE — PLAN OF CARE
Current Diagnosis/Procedure:  Bipolar affective disorder, severe, current mood episode manic with psychosis  Polysubstance abuse  Cannabis use disorder, unspecified  Cocaine use disorder, severe, remission status unclear  Methamphetamine use disorder, severe, remission status unclear   History of benzodiazepine use disorder per chart  Nicotine dependence   PTSD by history  ADHD by history  Unspecified anxiety by history      Work Completed:    - Pt's care discussed with treatment team in daily team meeting and chart notes were reviewed. Meds continue to be adjusted by MD's, they will likely start him on Risperdal.      -  Writer spoke to pt's COLTON Ingram- provided update.    - Writer planned to make IRTS referrals today, but he needs a couple days where his notes reflect improvement.  He had an episode of SIB behavior with a peer this weekend.     -Prepped Provisional Discharge agreement      Discharge plan or goal:  IRTS likely.  His first choice is [facility] or [facility].       Barriers to discharge:  Continued need for stabilization of severity of illness and medication management

## 2021-04-19 NOTE — PLAN OF CARE
Pt attended 1/2 offered occupational therapy groups today.  Pt independently chose a task and worked on it for about 20 min.  Pt was easily distracted and would get up and walk around for a break several times during the hour.  Pt was appropriately social with peers and staff.

## 2021-04-19 NOTE — CONSULTS
Internal Medicine Initial Visit    Tyler Case MRN# [id number]   Age: 45 year old YOB: 1975   Date of Admission: 4/5/2021    ADMIT DATE: 4/5/2021  DATE OF CONSULT: 4/19/2021    PCP: No Ref-Primary, Physician    REQUESTING SERVICE: Psychiatry  REASON FOR CONSULT: hypothyroid    CHIEF COMPLAINT: alcohol use    HPI: Tyler Case is a 45 year old male with a past medical history of BPD w/ psychosis, polysubstance misuse, subclinical hypothyroidism who was admitted to [facility] on 04/06 for decompensated psychiatric disease.    Medicine was asked to consult 04/19 for thyroid lab abnormalities.    Patient notes he is really worried about his thyroid. 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 Denies ever being on synthroid in the past. He denies constipation, weight gain, dry skin, fatigue, depression, edema. He notes he is worried about his abnormal level, did discuss his TSH abnormity back through 2017. He denies any rashes or lesions or acute concerns.     ROS:   10 point ROS asked and otherwise negative, with exceptions as noted above in HPI.     PMH:  Past Medical History:   Diagnosis Date     Bipolar 1 disorder (H) 8/5/2013     Uses marijuana 10/17/2013       PSH:  Past Surgical History:   Procedure Laterality Date     leg fractures  2002       MEDICATIONS  No current facility-administered medications on file prior to encounter.   gabapentin (NEURONTIN) 400 MG capsule, Take 400 mg by mouth 3 times daily  OLANZapine (ZYPREXA) 10 MG tablet, Take 10 mg by mouth nightly as needed  OLANZapine (ZYPREXA) 15 MG tablet, Take 15 mg by mouth At Bedtime  QUEtiapine (SEROQUEL) 100 MG tablet, Take 100 mg by mouth 2 times daily  QUEtiapine (SEROQUEL) 200 MG tablet, Take 200 mg by mouth 2 times daily as needed  ziprasidone (GEODON) 60 MG capsule, Take 60 mg by mouth 2 times daily (with meals)        ALLERGIES:     Allergies   Allergen  Reactions     Benztropine      Other reaction(s): Seizures  Patient reported  Patient reported       Abilify Discmelt      Suicidal thoughts       Aripiprazole Other (See Comments)     Other reaction(s): Unknown  Suicidal thoughts  Suicidal thoughts       Benzyl Alcohol      Other reaction(s): Unknown     Compazine Other (See Comments)     Seizure      Haloperidol Other (See Comments)     Other reaction(s): Dystonia  Patient had EPS with haldol  Patient had EPS with haldol  Patient had EPS with haldol       Phenothiazines Other (See Comments)     Other reaction(s): Dystonia, Muscle Aches/Weakness, Muscle Cramps, Seizures  Seizures  Patient report  Seizures         FAMILY HISTORY:  Family History   Problem Relation Age of Onset     Diabetes Maternal Grandmother      Alcohol/Drug Maternal Grandmother      Alzheimer Disease Maternal Grandmother      Arthritis Maternal Grandmother      Depression Maternal Grandmother      Eye Disorder Maternal Grandmother      Gastrointestinal Disease Maternal Grandmother      Neurologic Disorder Maternal Grandmother      Osteoporosis Maternal Grandmother      Psychotic Disorder Maternal Grandmother      Hypertension Paternal Grandfather      Alcohol/Drug Paternal Grandfather      Eye Disorder Paternal Grandfather      Gastrointestinal Disease Paternal Grandfather      Neurologic Disorder Paternal Grandfather      Psychotic Disorder Paternal Grandfather      Cerebrovascular Disease Paternal Grandmother      Alcohol/Drug Paternal Grandmother      Eye Disorder Paternal Grandmother      Gastrointestinal Disease Paternal Grandmother      Neurologic Disorder Paternal Grandmother      Lipids Paternal Grandmother      Osteoporosis Paternal Grandmother      Psychotic Disorder Paternal Grandmother      Alcohol/Drug Mother      Depression Mother      Eye Disorder Mother      Gastrointestinal Disease Mother      Neurologic Disorder Mother      Psychotic Disorder Mother      Alcohol/Drug Father   "    Depression Father      Eye Disorder Father      Gastrointestinal Disease Father      Neurologic Disorder Father      Psychotic Disorder Father      Alcohol/Drug Maternal Grandfather      Arthritis Maternal Grandfather      Depression Maternal Grandfather      Eye Disorder Maternal Grandfather      Gastrointestinal Disease Maternal Grandfather      Neurologic Disorder Maternal Grandfather      Lipids Maternal Grandfather      Psychotic Disorder Maternal Grandfather      Alcohol/Drug Sister      Alcohol/Drug Brother      Eye Disorder Sister      Eye Disorder Brother      Gastrointestinal Disease Sister      Gastrointestinal Disease Brother      Neurologic Disorder Sister      Neurologic Disorder Brother      Psychotic Disorder Sister      Psychotic Disorder Brother        SOCIAL HISTORY:  Social History     Socioeconomic History     Marital status: Single     Spouse name: Not on file     Number of children: Not on file     Years of education: Not on file     Highest education level: Not on file   Occupational History     Not on file   Social Needs     Financial resource strain: Not on file     Food insecurity     Worry: Not on file     Inability: Not on file     Transportation needs     Medical: Not on file     Non-medical: Not on file   Tobacco Use     Smoking status: Current Every Day Smoker     Packs/day: 0.00     Types: Cigarettes, Pipe     Smokeless tobacco: Current User     Types: Snuff, Chew   Substance and Sexual Activity     Alcohol use: No     Drug use: Yes     Types: Marijuana, Cocaine     Comment: \"any and all drugs I can get\"      Sexual activity: Yes     Partners: Female   Lifestyle     Physical activity     Days per week: Not on file     Minutes per session: Not on file     Stress: Not on file   Relationships     Social connections     Talks on phone: Not on file     Gets together: Not on file     Attends Hinduism service: Not on file     Active member of club or organization: Not on file     " "Attends meetings of clubs or organizations: Not on file     Relationship status: Not on file     Intimate partner violence     Fear of current or ex partner: Not on file     Emotionally abused: Not on file     Physically abused: Not on file     Forced sexual activity: Not on file   Other Topics Concern     Not on file   Social History Narrative    Natural health practicioner       PHYSICAL EXAM:  Blood pressure (!) 144/85, pulse 114, temperature 98  F (36.7  C), temperature source Tympanic, resp. rate 18, height 1.676 m (5' 6\"), weight 89.4 kg (197 lb 3.2 oz), SpO2 95 %.    GENERAL: Alert and orientated x 3. Appears in no acute distress.   HEENT: Anicteric sclera. Mucous membranes moist and without lesions.   CV: RRR, S1S2, no murmurs appreciated.  RESPIRATORY: Respirations regular, even, and unlabored. Lungs CTAB with no wheezing, rales, rhonchi.   EXTREMITIES: No peripheral edema.  NEUROLOGIC: No focal deficits.   MUSCULOSKELETAL: No joint swelling or tenderness.   SKIN: No jaundice. No rashes or lesions on exposed skin.     LABS:  CMP  Recent Labs   Lab 04/19/21  0759 04/12/21  1322    127*   POTASSIUM 4.0 5.3   CHLORIDE 106 95   CO2 23 16*   ANIONGAP 9 16*   * 166*   BUN 14 12   CR 0.83 0.71   GFRESTIMATED >90 >90   GFRESTBLACK >90 >90   HODAN 8.4* 7.5*     CBCNo lab results found in last 7 days.  INRNo lab results found in last 7 days.    IMAGING: None to review from this admission    ASSESSMENT & RECOMMENDATIONS:  #Hypothyroidism: TSH of 6.21, elevated on every check since 2017 but free T4 remained normal until 04/12- now low on check today at 0.54 (0.66 on 04/12 and normal at 0.99 on 04/06). New meds include lithium initiated 04/11. Denies being on meds for thyroid issues in the past. Denies fatigue, depression, constipation, hair loss, edema, heat/cold intolerance, constipation and no documented bradycardia. No dry skin on exam. Not on any biotin.   -Discussed case with Endocrine, given " persistently low free T4 on meds. Weight based dose: Initial dose is ~142 mcg of synthroid. Given short duration of time known to be hypothyroid, can start at 2/3-3/4 of dose, will initiate 100 mcg today  -Add on free T3, Add on TPO to evaluate if hashimoto's thyroiditis potentially present prior to lithium initiation  -Needs repeat TSH in 1 month, discussed with psychiatry team and patient who understands and is working on setting up PCP on discharge  #BPD, psychosis: Management per chem dep.    I appreciate the opportunity to participate in the care of this patient. Medicine will follow T3 and TPO peripherally. Please notify on call JEET if any intercurrent medical issues arise.     Jennifer Rubio PA-C

## 2021-04-19 NOTE — PLAN OF CARE
"Pt participated in groups and was active in the milieu all evening. He did not engage in any negative behaviors with peers. No agitation or aggressive behavior observed. Affect full range. He was calm and cooperative. Overall less grandiose and elated. Pt is making fewer delusional statements. Reports that his mood is \"great,\" but that he was feeling more tired this evening. Denies SI/SIB and HI. Verbalized that his scheduled medications are \"so helpful, I feel great!\" Denies any medication side effects.   "

## 2021-04-19 NOTE — PLAN OF CARE
Patient presents as elated and grandiose.  He is calm, pleasant, and cooperative on approach.  He is improving with regard to his manic symptoms.  He has been less reliant on verbal redirection from unit staff to maintain appropriate social boundaries; however, earlier this shift, he did need prompts to keep conversation topics appropriate and to refrain from exchanging contact information with peers.  His insight remains limited.  Tyler has not exhibited any aggressive behavior or signs of agitation this shift.  He accepted all of his scheduled medications without incident.  He denies any SE's or acute physical concerns at this time.  His blood pressure and pulse were elevated this AM, but Tyler is asymptomatic at this time.

## 2021-04-19 NOTE — PLAN OF CARE
BEHAVIORAL TEAM DISCUSSION    Participants: Dr. Jennifer Varner, Luz Santiago MD (Resident), Jacques Ivey RN, FLORIAN- Bettie Benson LMFT, [facility]   Progress: pt's denisha has slightly improved, but still has delusional and grandiose thought process.  Anticipated length of stay: 1-2 weeks  Continued Stay Criteria/Rationale: pt continues to need further stabilization of mental health symptoms, medication adjustments   Medical/Physical: none noted  Precautions:   Behavioral Orders   Procedures    Assault precautions    Code 1 - Restrict to Unit    Discontinue 1:1 attendant for suicide risk     Order Specific Question:   I have performed an in person assessment of the patient     Answer:   Based on this assessment the patient no longer requires a one on one attendant at this point in time.    Fall precautions    Routine Programming     As clinically indicated    Single Room    Status 15     Every 15 minutes.     Plan: plan is for pt to discharge to [facility] likely.   Rationale for change in precautions or plan: no change at this time.

## 2021-04-19 NOTE — PROGRESS NOTES
"[facility], [place]   Psychiatric Progress Note  Hospital Day: 14        Interim History:   The patient's care was discussed with the treatment team during the daily team meeting and/or staff's chart notes were reviewed. Over the weekend, continued to make delusional and grandiose comments including that he is a shaman and  of the hospital. Slept well. Medication adherent. On 4/16, he was in the milieu all evening, watching movies and talking on the phone with family. He was tangential though less hyperverbal. Redirected in group about selling marijuana. On 4/17, was isolative to room listening to Yarsanism music. Was later appropriate in the milieu in the evening. Continued to be delusional but was less grandiose. Spent 4/18 watching movies with peers and staff. Was calm to slightly anxious. Requested and received a PRN hydroxyzine for anxiety. Discussed using heroin with a peer after discharge. Then both patients engaged in SIB of head slapping/head. When redirected, said he was \"messing around.\"    Upon interview, Tyler states that his mood is \"great.\" He reports that outside of the hospital, he is a  for a home healthcare team. He denies SI or HI. He states that he has a dry mouth but denies any other medication side effects or physical symptoms. He specifically denies constipation, dry skin, or fatigue. This writer told Tyler his lithium was 0.42 (it was actually 0.43). He responds that 42 is his freddy number because it is his  shoe size, it's like 4/20 for marijuana, and it's the number from [employer]. He is initially hesitant to increase Lithium but then states, \"I trust you, we can increase it.\" He expresses interest in going to [facility]. He agrees to speak to medicine about his thyroid levels. No other concerns or questions.          Medications:       co-enzyme Q-10  30 mg Oral Daily     fish oil-omega-3 fatty acids  1 g " Oral Daily     gabapentin  400 mg Oral TID     lithium ER  450 mg Oral Q12H JASWINDER     multivitamin, therapeutic  1 tablet Oral Daily     nicotine  1 patch Transdermal Daily     nicotine   Transdermal Q8H     OLANZapine  15 mg Oral BID     omeprazole  10 mg Oral QAM AC     QUEtiapine  100 mg Oral BID     ziprasidone  60 mg Oral BID w/meals          Allergies:     Allergies   Allergen Reactions     Benztropine      Other reaction(s): Seizures  Patient reported  Patient reported       Abilify Discmelt      Suicidal thoughts       Aripiprazole Other (See Comments)     Other reaction(s): Unknown  Suicidal thoughts  Suicidal thoughts       Benzyl Alcohol      Other reaction(s): Unknown     Compazine Other (See Comments)     Seizure      Haloperidol Other (See Comments)     Other reaction(s): Dystonia  Patient had EPS with haldol  Patient had EPS with haldol  Patient had EPS with haldol       Phenothiazines Other (See Comments)     Other reaction(s): Dystonia, Muscle Aches/Weakness, Muscle Cramps, Seizures  Seizures  Patient report  Seizures            Labs:     Recent Results (from the past 24 hour(s))   Lithium level    Collection Time: 04/19/21  7:59 AM   Result Value Ref Range    Lithium Level 0.43 (L) 0.60 - 1.20 mmol/L   TSH with free T4 reflex    Collection Time: 04/19/21  7:59 AM   Result Value Ref Range    TSH 6.21 (H) 0.40 - 4.00 mU/L   Basic metabolic panel    Collection Time: 04/19/21  7:59 AM   Result Value Ref Range    Sodium 138 133 - 144 mmol/L    Potassium 4.0 3.4 - 5.3 mmol/L    Chloride 106 94 - 109 mmol/L    Carbon Dioxide 23 20 - 32 mmol/L    Anion Gap 9 3 - 14 mmol/L    Glucose 165 (H) 70 - 99 mg/dL    Urea Nitrogen 14 7 - 30 mg/dL    Creatinine 0.83 0.66 - 1.25 mg/dL    GFR Estimate >90 >60 mL/min/[1.73_m2]    GFR Estimate If Black >90 >60 mL/min/[1.73_m2]    Calcium 8.4 (L) 8.5 - 10.1 mg/dL          Psychiatric Examination:     BP (!) 144/85   Pulse 114   Temp 98  F (36.7  C) (Tympanic)   Resp 18   " Ht 1.676 m (5' 6\")   Wt 89.4 kg (197 lb 3.2 oz)   SpO2 95%   BMI 31.83 kg/m    Weight is 197 lbs 3.2 oz  Body mass index is 31.83 kg/m .    Weight over time:  Vitals:    04/05/21 2118 04/06/21 0750 04/13/21 0800 04/18/21 0839   Weight: 85.1 kg (187 lb 11.2 oz) 85.8 kg (189 lb 3.2 oz) 89.4 kg (197 lb 3.2 oz) 89.4 kg (197 lb 3.2 oz)       Orthostatic Vitals       Most Recent      Sitting Orthostatic /83 04/07 0810    Sitting Orthostatic Pulse (bpm) 128 04/07 0810    Standing Orthostatic /71 04/06 0750    Standing Orthostatic Pulse (bpm) 132 04/06 0750        Cardiometabolic risk assessment. 04/07/21    Reviewed patient profile for cardiometabolic risk factors    Date taken /Value  REFERENCE RANGE   Abdominal Obesity  (Waist Circumference)   See nursing flowsheet Women ?35 in (88 cm)   Men ?40 in (102 cm)      Triglycerides  Triglycerides   Date Value Ref Range Status   04/06/2021 228 (H) <150 mg/dL Final     Comment:     Borderline high:  150-199 mg/dl  High:             200-499 mg/dl  Very high:       >499 mg/dl         ?150 mg/dL (1.7 mmol/L) or current treatment for elevated triglycerides   HDL cholesterol  HDL Cholesterol   Date Value Ref Range Status   04/06/2021 39 (L) >39 mg/dL Final   ]   Women <50 mg/dL (1.3 mmol/L) in women or current treatment for low HDL cholesterol  Men <40 mg/dL (1 mmol/L) in men or current treatment for low HDL cholesterol     Fasting plasma glucose (FPG) Lab Results   Component Value Date     04/06/2021      FPG ?100 mg/dL (5.6 mmol/L) or treatment for elevated blood glucose   Blood pressure  BP Readings from Last 3 Encounters:   04/19/21 (!) 144/85   10/15/20 106/78   04/24/20 138/83    Blood pressure ?130/85 mmHg or treatment for elevated blood pressure   Family History  See family history     Appearance: dressed in hospital scrubs. Hair and beard tied with ponytails. Wearing  dangling earrings.    Attitude:  mostly cooperative  Eye Contact:  good  Mood:  " "\"great\"  Affect:  intensity is heightened, bright. though improved from days prior.  Speech:  normal rate, volume, and quantity  Language: fluent and intact in English  Psychomotor, Gait, Musculoskeletal:  no evidence of tardive dyskinesia, dystonia, or tics. No psychomotor agitation  Throught Process:  linear, more organized than days prior.   Associations:  somewhat loose  Thought Content: Denies SI/HI. No overt evidence of psychotic thought today.   Insight: limited though imprving.   Judgement:  fair, adherent to medication and meets with team  Oriented to:  oriented to person, place, time.   Attention Span and Concentration:  fair  Recent and Remote Memory:  limited  Fund of Knowledge:  appropriate    Clinical Global Impressions  First:  Considering your total clinical experience with this particular patient population, how severe are the patient's symptoms at this time?: 7 (04/06/21 1428)  Compared to the patient's condition at the START of treatment, this patient's condition is: 4 (04/06/21 1428)  Most recent:  Considering your total clinical experience with this particular patient population, how severe are the patient's symptoms at this time?: 7 (04/06/21 1428)  Compared to the patient's condition at the START of treatment, this patient's condition is: 4 (04/06/21 1428)           Precautions:     Behavioral Orders   Procedures     Assault precautions     Code 1 - Restrict to Unit     Discontinue 1:1 attendant for suicide risk     Order Specific Question:   I have performed an in person assessment of the patient     Answer:   Based on this assessment the patient no longer requires a one on one attendant at this point in time.     Fall precautions     Routine Programming     As clinically indicated     Single Room     Status 15     Every 15 minutes.          Diagnoses:     Bipolar affective disorder, severe, current mood episode manic with psychosis  Suicidal ideation, resolved  Homicidal ideation, " resolved  Polysubstance abuse  Cannabis use disorder, unspecified  Cocaine use disorder, severe, remission status unclear  Methamphetamine use disorder, severe, remission status unclear   History of benzodiazepine use disorder per chart  Nicotine dependence   PTSD by history  ADHD by history  Unspecified anxiety by history   S/p foreign body removal from R foot on antibiotics  Elevated blood glucose  Abnormal QTc at 453         Assessment & Plan:     Assessment:  This patient is a 45 year old male with history of bipolar disorder, polysubstance use, several MICD commitments, currently followed by ACT team who presented to ED with altered mental status in context of being found lying on ground of nursing home altered due to suspected medication non-adherence and ongoing substance use. He has history of several hospitalizations, most recently 2021 at [facility] following a suicide attempt via overdose, he was discharged the next day. He had been under MICD commitment in the last year but commitment has . He is followed by [facility] ACT team, he had been stable in apartment with [facility] services up until past few weeks. It is suspected he has not been adherent to medications and has relapsed on substances. His substances of choice tend to be cocaine, methamphetamine and cannabis. Utox in ED was positive for cannabinoids. His presentation was consistent with manic episode with psychosis. He also endorsed suicidal ideation as well as homicidal ideation with thoughts to murder his parents.     Hospital summary:  Patient was initially admitted to station 30 with Dr. Velázquez on a 72 hour hold. He was placed on assault and fall precautions. He became agitated and intrusive on station 30, so was placed on a 1:1 and transferred to station 12 under the care of Dr. Varner.  Given concern for imminent safety of self given psychosis and denisha symptoms along with reported SI and HI, petition for MICD commitment and  Frausto with olanzapine, quetiapine, ziprasidone, and risperidone was filed. His ACT team was contacted and was in agreement with admission and petition for commitment and Frausto. He was restarted on his PTA medications though at lower doses due to non-adherence. He endorsed homicidal ideation towards his parents and a duty to warn was completed as his parents were notified of this. 1:1 was discontinued on 4/9 due to improved intrusive behaviors. Zyprexa was titrated from 15mg at bedtime to 15mg BID. The patient was started on Lithium to target denisha on 4/12. Lithium level on 450mg BID was 0.43, so Lithium was further titrated. The patient has been hospitalized on station 12 multiple times in the past and has historically declined neuroleptic medications and mood stabilizers. He was not amenable to initiating IGNACIO and historically has not followed through with IGNACIO injections in the community. He did agree to titration of Geodon to target ongoing denisha and psychosis on 4/16. EKG was mildly prolonged at 453 with plan repeat EKG.     Medical Hospital Summary:  In the ED, patient required foreign body removal of glass from his right foot and was started of Keflex for 5 days. TSH was persistently elevated with latest level of 6.21 with two low T4 levels with his latest level of 0.54. Medicine was consulted for hypothyroidism management.     Changes today:  -Medicine consult for hypothyroidism management.   - Increase Lithium to 600mg BID to target ongoing symptoms of denisha and psychosis    Target psychiatric symptoms and interventions:  # Bipolar Disorder:  -Lithium 600mg BID. Follow-up lithium level, TSH, and BMP on 4/19 (ordered)  -resume PTA quetiapine 100mg BID for mood stabilization and psychosis   -ziprasidone 60 mg BID with meals for psychosis   -resume PTA olanzapine 15mg at bedtime for psychosis and added 15 mg daily on 4/8  -resume PTA gabapentin 400mg TID for anxiety and substance use and pain  -Ativan 1-2 mg  TID prn for agitation in context of denisha. Plan to discontinue prior to discharge.     Acute Medical Problems and Treatments:  1) s/p foreign body removal in ED  -pt has been reluctant to let RN assess wounds  -continue to encourage pt to cooperate with cares  -consider WOC consult if needed  -was given one dose of Keflex in ED, this was not ordered to be continued but per ED note should be on Keflex x5 days, ordered Keflex 500mg QID x5 days     2) Hypothyroidism:  Persistently elevated TSH and low T4. No symptoms of hypothyroidism per patient.   -Consult medicine      3) Elevated BG on admission   -consider recheck if pt appears hyperglycemic     4) Nicotine dependence  -offer PRN nicotine replacement on unit and upon discharge  - patient on cessation    5) Prolonged QTc at 453 on 4/16/21  - Repeat EKG on Friday, 4/23.    Behavioral/Psychological/Social:  - Encourage unit programming    Safety:  - Continue precautions as noted above  - Status 15 minute checks    Legal Status: Court hold. Final hearing on 4/16.    Disposition Plan   Reason for ongoing admission: poses an imminent risk to self, poses an imminent risk to others and is unable to care for self due to severe psychosis or denisha  Discharge location: likely [facility] or MICD treatment  Discharge Medications: not ordered  Follow-up Appointments: not scheduled     Patient seen and discussed with Dr. Varner.    Luz Santiago MD  Psychiatry PGY-4

## 2021-04-20 LAB
LABORATORY COMMENT REPORT: NORMAL
SARS-COV-2 RNA RESP QL NAA+PROBE: NEGATIVE
SPECIMEN SOURCE: NORMAL
THYROPEROXIDASE AB SERPL-ACNC: <10 IU/ML

## 2021-04-20 PROCEDURE — 87635 SARS-COV-2 COVID-19 AMP PRB: CPT | Performed by: STUDENT IN AN ORGANIZED HEALTH CARE EDUCATION/TRAINING PROGRAM

## 2021-04-20 PROCEDURE — 124N000002 HC R&B MH UMMC

## 2021-04-20 PROCEDURE — 250N000013 HC RX MED GY IP 250 OP 250 PS 637: Performed by: PSYCHIATRY & NEUROLOGY

## 2021-04-20 PROCEDURE — 250N000013 HC RX MED GY IP 250 OP 250 PS 637: Performed by: STUDENT IN AN ORGANIZED HEALTH CARE EDUCATION/TRAINING PROGRAM

## 2021-04-20 PROCEDURE — 250N000013 HC RX MED GY IP 250 OP 250 PS 637: Performed by: PHYSICIAN ASSISTANT

## 2021-04-20 PROCEDURE — 99232 SBSQ HOSP IP/OBS MODERATE 35: CPT | Mod: GC | Performed by: PSYCHIATRY & NEUROLOGY

## 2021-04-20 RX ADMIN — QUETIAPINE FUMARATE 100 MG: 100 TABLET ORAL at 08:12

## 2021-04-20 RX ADMIN — LEVOTHYROXINE SODIUM 100 MCG: 50 TABLET ORAL at 08:12

## 2021-04-20 RX ADMIN — ALUMINUM HYDROXIDE, MAGNESIUM HYDROXIDE, AND SIMETHICONE 30 ML: 200; 200; 20 SUSPENSION ORAL at 21:59

## 2021-04-20 RX ADMIN — QUETIAPINE FUMARATE 100 MG: 100 TABLET ORAL at 21:32

## 2021-04-20 RX ADMIN — OLANZAPINE 15 MG: 10 TABLET, FILM COATED ORAL at 08:12

## 2021-04-20 RX ADMIN — LITHIUM CARBONATE 600 MG: 300 TABLET, FILM COATED, EXTENDED RELEASE ORAL at 08:11

## 2021-04-20 RX ADMIN — LORAZEPAM 2 MG: 1 TABLET ORAL at 01:27

## 2021-04-20 RX ADMIN — NICOTINE POLACRILEX 4 MG: 2 GUM, CHEWING BUCCAL at 08:12

## 2021-04-20 RX ADMIN — ZIPRASIDONE HCL 60 MG: 60 CAPSULE ORAL at 08:12

## 2021-04-20 RX ADMIN — GABAPENTIN 400 MG: 400 CAPSULE ORAL at 08:12

## 2021-04-20 RX ADMIN — OLANZAPINE 15 MG: 10 TABLET, FILM COATED ORAL at 21:32

## 2021-04-20 RX ADMIN — ZIPRASIDONE HCL 60 MG: 60 CAPSULE ORAL at 18:12

## 2021-04-20 RX ADMIN — Medication 30 MG: at 08:12

## 2021-04-20 RX ADMIN — GABAPENTIN 400 MG: 400 CAPSULE ORAL at 14:22

## 2021-04-20 RX ADMIN — LITHIUM CARBONATE 600 MG: 300 TABLET, FILM COATED, EXTENDED RELEASE ORAL at 21:31

## 2021-04-20 RX ADMIN — OMEPRAZOLE 10 MG: 10 CAPSULE, DELAYED RELEASE ORAL at 08:11

## 2021-04-20 RX ADMIN — THERA TABS 1 TABLET: TAB at 08:11

## 2021-04-20 RX ADMIN — GABAPENTIN 400 MG: 400 CAPSULE ORAL at 21:32

## 2021-04-20 RX ADMIN — NICOTINE POLACRILEX 4 MG: 2 GUM, CHEWING BUCCAL at 16:16

## 2021-04-20 RX ADMIN — Medication 1 G: at 08:12

## 2021-04-20 RX ADMIN — NICOTINE 1 PATCH: 21 PATCH, EXTENDED RELEASE TRANSDERMAL at 08:11

## 2021-04-20 ASSESSMENT — ACTIVITIES OF DAILY LIVING (ADL)
LAUNDRY: UNABLE TO COMPLETE
DRESS: SCRUBS (BEHAVIORAL HEALTH)
HYGIENE/GROOMING: INDEPENDENT
LAUNDRY: UNABLE TO COMPLETE
HYGIENE/GROOMING: INDEPENDENT
ORAL_HYGIENE: INDEPENDENT
DRESS: SCRUBS (BEHAVIORAL HEALTH)
ORAL_HYGIENE: INDEPENDENT

## 2021-04-20 NOTE — PLAN OF CARE
Problem: Behavior Regulation Impairment (Psychotic Signs/Symptoms)  Goal: Improved Behavioral Control (Psychotic Signs/Symptoms)  Outcome: Improving    Patient was visible in the lounge and was calm and polite with staff and peers.  He was more withdrawn and did not make many delusional, or grandiose statements.  Contracted for safety.  He cooperated when he was asked to not watch local news on the TV in the lounge.  Good frustration tolerance with intrusive peers.     Patient was compliant with his HS medications.  Essential oils and Lavendar patch given per patient request.  He ate supper.  Disheveled and neglected ADLs.

## 2021-04-20 NOTE — PROGRESS NOTES
"[facility], [place]   Psychiatric Progress Note  Hospital Day: 15        Interim History:   The patient's care was discussed with the treatment team during the daily team meeting and/or staff's chart notes were reviewed. Presented as elated and grandiose but was calm, pleasant, and cooperative on approach. Required less redirection though needed some redirection to keep conversation topics appropriate and refrain from exchanging contact information with peers. Continued limited insight. Medication adherent. Attended part of group but remained easily distracted and would walk around several times during the hour. Was in the milieu in the evening where he had good frustration tolerance with intrusive peers and did not make delusional statements. Continued to be disheveled. Slept well. Requested and received Ativan 2mg PRN overnight for anxiety.     Upon interview, Tyler states that his mood is more \"mellow\" today. He is unsure why this is. He reports some increased fatigue but notes that he slept well last night. He states that his only physical symptom is a sore on the side of his tongue, similar to prior canker sores he has had. He denies any SI, HI, medication side effects, or other physical symptoms. He reports that he doesn't feel depressed or manic and feels ready for discharge. He is hoping to discharge to [facility]. No other questions or concerns.          Medications:       co-enzyme Q-10  30 mg Oral Daily     fish oil-omega-3 fatty acids  1 g Oral Daily     gabapentin  400 mg Oral TID     levothyroxine  100 mcg Oral QAM AC     lithium ER  600 mg Oral Q12H JASWINDER     multivitamin, therapeutic  1 tablet Oral Daily     nicotine  1 patch Transdermal Daily     nicotine   Transdermal Q8H     OLANZapine  15 mg Oral BID     omeprazole  10 mg Oral QAM AC     QUEtiapine  100 mg Oral BID     ziprasidone  60 mg Oral BID w/meals          Allergies:     Allergies   Allergen Reactions     " "Benztropine      Other reaction(s): Seizures  Patient reported  Patient reported       Abilify Discmelt      Suicidal thoughts       Aripiprazole Other (See Comments)     Other reaction(s): Unknown  Suicidal thoughts  Suicidal thoughts       Benzyl Alcohol      Other reaction(s): Unknown     Compazine Other (See Comments)     Seizure      Haloperidol Other (See Comments)     Other reaction(s): Dystonia  Patient had EPS with haldol  Patient had EPS with haldol  Patient had EPS with haldol       Phenothiazines Other (See Comments)     Other reaction(s): Dystonia, Muscle Aches/Weakness, Muscle Cramps, Seizures  Seizures  Patient report  Seizures            Labs:     No results found for this or any previous visit (from the past 24 hour(s)).       Psychiatric Examination:     BP (!) 132/92   Pulse 104   Temp 98.1  F (36.7  C)   Resp 18   Ht 1.676 m (5' 6\")   Wt 89.4 kg (197 lb 3.2 oz)   SpO2 97%   BMI 31.83 kg/m    Weight is 197 lbs 3.2 oz  Body mass index is 31.83 kg/m .    Weight over time:  Vitals:    04/05/21 2118 04/06/21 0750 04/13/21 0800 04/18/21 0839   Weight: 85.1 kg (187 lb 11.2 oz) 85.8 kg (189 lb 3.2 oz) 89.4 kg (197 lb 3.2 oz) 89.4 kg (197 lb 3.2 oz)       Orthostatic Vitals       Most Recent      Sitting Orthostatic /83 04/07 0810    Sitting Orthostatic Pulse (bpm) 128 04/07 0810    Standing Orthostatic /71 04/06 0750    Standing Orthostatic Pulse (bpm) 132 04/06 0750        Cardiometabolic risk assessment. 04/07/21    Reviewed patient profile for cardiometabolic risk factors    Date taken /Value  REFERENCE RANGE   Abdominal Obesity  (Waist Circumference)   See nursing flowsheet Women ?35 in (88 cm)   Men ?40 in (102 cm)      Triglycerides  Triglycerides   Date Value Ref Range Status   04/06/2021 228 (H) <150 mg/dL Final     Comment:     Borderline high:  150-199 mg/dl  High:             200-499 mg/dl  Very high:       >499 mg/dl         ?150 mg/dL (1.7 mmol/L) or current treatment for " "elevated triglycerides   HDL cholesterol  HDL Cholesterol   Date Value Ref Range Status   04/06/2021 39 (L) >39 mg/dL Final   ]   Women <50 mg/dL (1.3 mmol/L) in women or current treatment for low HDL cholesterol  Men <40 mg/dL (1 mmol/L) in men or current treatment for low HDL cholesterol     Fasting plasma glucose (FPG) Lab Results   Component Value Date     04/06/2021      FPG ?100 mg/dL (5.6 mmol/L) or treatment for elevated blood glucose   Blood pressure  BP Readings from Last 3 Encounters:   04/19/21 (!) 132/92   10/15/20 106/78   04/24/20 138/83    Blood pressure ?130/85 mmHg or treatment for elevated blood pressure   Family History  See family history     Appearance: dressed in hospital scrubs. Hair and beard tied with ponytails. Wearing  dangling earrings. Disheveled.  Attitude:  cooperative  Eye Contact:  good  Mood:  \"mellow\"  Affect:  appropriate and in normal range and mood congruent  Speech:  normal rate, volume, and quantity  Language: fluent and intact in English  Psychomotor, Gait, Musculoskeletal:  no evidence of tardive dyskinesia, dystonia, or tics. No psychomotor agitation  Throught Process:  linear and goal oriented   Associations:  no loose associations  Thought Content: Denies SI/HI. No overt evidence of psychotic thought.  Insight: limited to fair  Judgement:  fair, adherent to medication and meets with team  Oriented to:  oriented to person, place, time.   Attention Span and Concentration:  fair  Recent and Remote Memory:  limited  Fund of Knowledge:  appropriate    Clinical Global Impressions  First:  Considering your total clinical experience with this particular patient population, how severe are the patient's symptoms at this time?: 7 (04/06/21 1428)  Compared to the patient's condition at the START of treatment, this patient's condition is: 4 (04/06/21 1428)  Most recent:  Considering your total clinical experience with this particular patient population, how severe are the " patient's symptoms at this time?: 7 (21)  Compared to the patient's condition at the START of treatment, this patient's condition is: 4 (21)           Precautions:     Behavioral Orders   Procedures     Assault precautions     Code 1 - Restrict to Unit     Discontinue 1:1 attendant for suicide risk     Order Specific Question:   I have performed an in person assessment of the patient     Answer:   Based on this assessment the patient no longer requires a one on one attendant at this point in time.     Fall precautions     Routine Programming     As clinically indicated     Single Room     Status 15     Every 15 minutes.          Diagnoses:     Bipolar affective disorder, severe, current mood episode manic with psychosis  Suicidal ideation, resolved  Homicidal ideation, resolved  Polysubstance abuse  Cannabis use disorder, unspecified  Cocaine use disorder, severe, remission status unclear  Methamphetamine use disorder, severe, remission status unclear   History of benzodiazepine use disorder per chart  Nicotine dependence   PTSD by history  ADHD by history  Unspecified anxiety by history   S/p foreign body removal from R foot on antibiotics  Elevated blood glucose  Abnormal QTc at 453         Assessment & Plan:     Assessment:  This patient is a 45 year old male with history of bipolar disorder, polysubstance use, several MICD commitments, currently followed by ACT team who presented to ED with altered mental status in context of being found lying on ground of nursing home altered due to suspected medication non-adherence and ongoing substance use. He has history of several hospitalizations, most recently 2021 at [facility] following a suicide attempt via overdose, he was discharged the next day. He had been under MICD commitment in the last year but commitment has . He is followed by [facility] ACT team, he had been stable in apartment with [facility] services up until past few  weeks. It is suspected he has not been adherent to medications and has relapsed on substances. His substances of choice tend to be cocaine, methamphetamine and cannabis. Utox in ED was positive for cannabinoids. His presentation was consistent with manic episode with psychosis. He also endorsed suicidal ideation as well as homicidal ideation with thoughts to murder his parents.     Hospital summary:  Patient was initially admitted to station 30 with Dr. Velázquez on a 72 hour hold. He was placed on assault and fall precautions. He became agitated and intrusive on station 30, so was placed on a 1:1 and transferred to station 12 under the care of Dr. Varner.  Given concern for imminent safety of self given psychosis and denisha symptoms along with reported SI and HI, petition for MICD commitment and Frausto with olanzapine, quetiapine, ziprasidone, and risperidone was filed. His ACT team was contacted and was in agreement with admission and petition for commitment and Frausto. He was restarted on his PTA medications though at lower doses due to non-adherence. He endorsed homicidal ideation towards his parents and a duty to warn was completed as his parents were notified of this. 1:1 was discontinued on 4/9 due to improved intrusive behaviors. Zyprexa was titrated from 15mg at bedtime to 15mg BID. The patient was started on Lithium to target denisha on 4/12. Lithium level on 450mg BID was 0.43, so Lithium was further titrated. The patient has been hospitalized on station 12 multiple times in the past and has historically declined neuroleptic medications and mood stabilizers. He was not amenable to initiating IGNACIO and historically has not followed through with IGNACIO injections in the community. He did agree to titration of Geodon to target ongoing denisha and psychosis on 4/16. EKG was mildly prolonged at 453 with plan repeat EKG.     Medical Hospital Summary:  In the ED, patient required foreign body removal of glass from his  right foot and was started of Keflex for 5 days. TSH was persistently elevated with latest level of 6.21 with two low T4 levels with his latest level of 0.54. Per medicine consult, patient was started on levothyroxine 100mcg daily for hypothyroidism. T3 was normal. Patient requires repeat TSH 1 month after starting levothyroxine (mid-May).    Changes today:  Start oragel for canker sore pain.    Target psychiatric symptoms and interventions:  # Bipolar Disorder:  -Lithium 600mg BID. Follow-up lithium level 4/25  -resume PTA quetiapine 100mg BID for mood stabilization and psychosis   -ziprasidone 60 mg BID with meals for psychosis   -resume PTA olanzapine 15mg at bedtime for psychosis and added 15 mg daily on 4/8  -resume PTA gabapentin 400mg TID for anxiety and substance use and pain  -Ativan 1-2 mg TID prn for agitation in context of denisha. Plan to discontinue prior to discharge.     Acute Medical Problems and Treatments:  1) s/p foreign body removal in ED  -pt has been reluctant to let RN assess wounds  -continue to encourage pt to cooperate with cares  -consider WOC consult if needed  -was given one dose of Keflex in ED, this was not ordered to be continued but per ED note should be on Keflex x5 days, ordered Keflex 500mg QID x5 days     2) Hypothyroidism:  Persistently elevated TSH and low T4. Normal T3. No symptoms of hypothyroidism per patient. Medicine consulted  -Started levothyroxine 100mcg daily  -TPO pending  -Nees repeat TSH in 1 month with PCP     3) Elevated BG on admission   -consider recheck if pt appears hyperglycemic     4) Nicotine dependence  -offer PRN nicotine replacement on unit and upon discharge  - patient on cessation    5) Prolonged QTc at 453 on 4/16/21  - Repeat EKG on Friday, 4/23.    Behavioral/Psychological/Social:  - Encourage unit programming    Safety:  - Continue precautions as noted above  - Status 15 minute checks    Legal Status: Committed and Frausto (Zyprexa, Seroquel,  Geodon, Risperdal)    Disposition Plan   Reason for ongoing admission: poses an imminent risk to self, poses an imminent risk to others and is unable to care for self due to severe psychosis or denisha  Discharge location: [facility] facility  Discharge Medications: not ordered  Follow-up Appointments: not scheduled     Patient seen and discussed with Dr. Varner.    Luz Santiago MD  Psychiatry PGY-4

## 2021-04-20 NOTE — PROGRESS NOTES
"SPIRITUAL HEALTH SERVICES  SPIRITUAL ASSESSMENT Progress Note  [facility] ([facility]) 12NB     REFERRAL SOURCE: Follow up visit for ongoing support.  Accompianed by  Nannette Pelaez on this visit; met in pt room.    Arranged with pt RN to provide rosetta-spray version of smudging for Tyler. Tyler shared a number of \"thoughts and prayers to hold\" in this ritual.    Plan: visit with Tyler 1 - 2x weekly.    Sherron Estrella MDiv  Associate   Pager [phone]    "

## 2021-04-20 NOTE — PLAN OF CARE
Current Diagnosis/Procedure:  Bipolar affective disorder, severe, current mood episode manic with psychosis  Polysubstance abuse  Cannabis use disorder, unspecified  Cocaine use disorder, severe, remission status unclear  Methamphetamine use disorder, severe, remission status unclear   History of benzodiazepine use disorder per chart  Nicotine dependence   PTSD by history  ADHD by history  Unspecified anxiety by history      Work Completed:    - Pt's care discussed with treatment team in daily team meeting and chart notes were reviewed.     - Writer made referrals to [facility], [facility] and [facility] for [facility] referrals.     Update at 3:20pm - pt having phone interview with Mansi from [facility] at the moment.  They are considering him and they have upcoming openings soon.      Update: 3:30pm.  Pt has been accepted at [facility].  They will take him on Thursday.  They are sending over the paperwork for Dr. Gardenia milner.  Writer will talk to pt's ACT team tomorrow to figure out medication etc.     Discharge plan or goal:  Pt will discharge to [facility] on Thursday       Barriers to discharge:  Continued need for stabilization of severity of illness and medication management

## 2021-04-20 NOTE — PLAN OF CARE
Problem: Sleep Disturbance  Goal: Adequate Sleep/Rest  Outcome: Improving  Patient appeared to be sleeping most of the night. He was briefly up and requested Ativan for anxiety. He was given Ativan 2 mg PRN at 0127 and he slept for a total of about 7 hours.

## 2021-04-20 NOTE — PLAN OF CARE
"Pt was visible in the milieu. He watched TV and ate meals in the lounge. He also spent time in his room watching TV by himself. Pt presented as pleasant and cooperative. Social with staff and select peers. Affect was full range and mood was calm. Pt reported his mood was \"good.\" Pt had no concerns or complaints. Pt denied SI/SIB/AVH. Pt was not heard making delusional or grandiose statements. Pt had no outbursts or behavioral issues this shift. Pt denied pain and acute physical concerns. Pt was medication compliant and no side effects were reported or observed.    Pt c/o acid reflux after laying down after breakfast. Pt was reminded to take his Prilosec 30 minutes before eating and to stay sitting up after meals for 60 minutes. Pt was receptive and was also given a GERD handout on treatment and lifestyle changes.     Pt had a visit with a  during the afternoon. Pt agreed to a COVID test and the results came back negative.   "

## 2021-04-21 LAB — T3FREE SERPL-MCNC: 2.6 PG/ML (ref 2.3–4.2)

## 2021-04-21 PROCEDURE — 36415 COLL VENOUS BLD VENIPUNCTURE: CPT | Performed by: PHYSICIAN ASSISTANT

## 2021-04-21 PROCEDURE — 250N000013 HC RX MED GY IP 250 OP 250 PS 637: Performed by: PSYCHIATRY & NEUROLOGY

## 2021-04-21 PROCEDURE — 84481 FREE ASSAY (FT-3): CPT | Performed by: PHYSICIAN ASSISTANT

## 2021-04-21 PROCEDURE — 250N000013 HC RX MED GY IP 250 OP 250 PS 637: Performed by: STUDENT IN AN ORGANIZED HEALTH CARE EDUCATION/TRAINING PROGRAM

## 2021-04-21 PROCEDURE — 124N000002 HC R&B MH UMMC

## 2021-04-21 PROCEDURE — 93005 ELECTROCARDIOGRAM TRACING: CPT

## 2021-04-21 PROCEDURE — 250N000013 HC RX MED GY IP 250 OP 250 PS 637: Performed by: PHYSICIAN ASSISTANT

## 2021-04-21 PROCEDURE — 99233 SBSQ HOSP IP/OBS HIGH 50: CPT | Mod: GC | Performed by: PSYCHIATRY & NEUROLOGY

## 2021-04-21 PROCEDURE — 93010 ELECTROCARDIOGRAM REPORT: CPT | Performed by: INTERNAL MEDICINE

## 2021-04-21 RX ORDER — OLANZAPINE 15 MG/1
15 TABLET ORAL 2 TIMES DAILY
Qty: 60 TABLET | Refills: 0 | Status: SHIPPED | OUTPATIENT
Start: 2021-04-21

## 2021-04-21 RX ORDER — LITHIUM CARBONATE 300 MG/1
600 TABLET, FILM COATED, EXTENDED RELEASE ORAL EVERY 12 HOURS
Qty: 120 TABLET | Refills: 0 | Status: SHIPPED | OUTPATIENT
Start: 2021-04-21

## 2021-04-21 RX ORDER — GABAPENTIN 400 MG/1
400 CAPSULE ORAL 3 TIMES DAILY
Qty: 90 CAPSULE | Refills: 0 | Status: SHIPPED | OUTPATIENT
Start: 2021-04-21

## 2021-04-21 RX ORDER — AMOXICILLIN 250 MG
1 CAPSULE ORAL 2 TIMES DAILY PRN
Qty: 60 TABLET | Refills: 0 | Status: SHIPPED | OUTPATIENT
Start: 2021-04-21

## 2021-04-21 RX ORDER — LORAZEPAM 1 MG/1
1 TABLET ORAL DAILY PRN
Qty: 14 TABLET | Refills: 0 | Status: SHIPPED | OUTPATIENT
Start: 2021-04-21

## 2021-04-21 RX ORDER — MULTIVITAMIN,THERAPEUTIC
1 TABLET ORAL DAILY
Qty: 30 TABLET | Refills: 0 | Status: SHIPPED | OUTPATIENT
Start: 2021-04-22 | End: 2021-05-18

## 2021-04-21 RX ORDER — BUTALB/ACETAMINOPHEN/CAFFEINE 50-325-40
30 TABLET ORAL DAILY
Qty: 30 CAPSULE | Refills: 0 | Status: SHIPPED | OUTPATIENT
Start: 2021-04-22

## 2021-04-21 RX ORDER — FLUORIDE TOOTHPASTE
15 TOOTHPASTE DENTAL 4 TIMES DAILY PRN
Qty: 473 ML | Refills: 0 | Status: SHIPPED | OUTPATIENT
Start: 2021-04-21

## 2021-04-21 RX ORDER — QUETIAPINE FUMARATE 100 MG/1
100 TABLET, FILM COATED ORAL 2 TIMES DAILY
Qty: 60 TABLET | Refills: 0 | Status: SHIPPED | OUTPATIENT
Start: 2021-04-21 | End: 2021-05-18

## 2021-04-21 RX ORDER — CALCIUM CARBONATE 500 MG/1
2 TABLET, CHEWABLE ORAL 2 TIMES DAILY PRN
Qty: 60 TABLET | Refills: 0 | Status: SHIPPED | OUTPATIENT
Start: 2021-04-21

## 2021-04-21 RX ORDER — LEVOTHYROXINE SODIUM 100 UG/1
100 TABLET ORAL
Qty: 30 TABLET | Refills: 0 | Status: SHIPPED | OUTPATIENT
Start: 2021-04-22 | End: 2021-05-18

## 2021-04-21 RX ORDER — ZIPRASIDONE HYDROCHLORIDE 60 MG/1
60 CAPSULE ORAL 2 TIMES DAILY WITH MEALS
Qty: 60 CAPSULE | Refills: 0 | Status: SHIPPED | OUTPATIENT
Start: 2021-04-21

## 2021-04-21 RX ORDER — CHLORAL HYDRATE 500 MG
1 CAPSULE ORAL DAILY
Qty: 30 CAPSULE | Refills: 0 | Status: SHIPPED | OUTPATIENT
Start: 2021-04-22

## 2021-04-21 RX ORDER — HYDROXYZINE HYDROCHLORIDE 25 MG/1
25 TABLET, FILM COATED ORAL 2 TIMES DAILY PRN
Qty: 60 TABLET | Refills: 0 | Status: SHIPPED | OUTPATIENT
Start: 2021-04-21

## 2021-04-21 RX ORDER — TRAZODONE HYDROCHLORIDE 50 MG/1
50 TABLET, FILM COATED ORAL
Qty: 30 TABLET | Refills: 0 | Status: SHIPPED | OUTPATIENT
Start: 2021-04-21

## 2021-04-21 RX ADMIN — GABAPENTIN 400 MG: 400 CAPSULE ORAL at 14:13

## 2021-04-21 RX ADMIN — GABAPENTIN 400 MG: 400 CAPSULE ORAL at 21:23

## 2021-04-21 RX ADMIN — QUETIAPINE FUMARATE 100 MG: 100 TABLET ORAL at 21:23

## 2021-04-21 RX ADMIN — Medication 1 G: at 08:06

## 2021-04-21 RX ADMIN — ZIPRASIDONE HCL 60 MG: 60 CAPSULE ORAL at 08:08

## 2021-04-21 RX ADMIN — LORAZEPAM 2 MG: 1 TABLET ORAL at 22:03

## 2021-04-21 RX ADMIN — THERA TABS 1 TABLET: TAB at 08:05

## 2021-04-21 RX ADMIN — GABAPENTIN 400 MG: 400 CAPSULE ORAL at 08:05

## 2021-04-21 RX ADMIN — OLANZAPINE 15 MG: 10 TABLET, FILM COATED ORAL at 21:23

## 2021-04-21 RX ADMIN — NICOTINE POLACRILEX 4 MG: 2 GUM, CHEWING BUCCAL at 16:02

## 2021-04-21 RX ADMIN — LITHIUM CARBONATE 600 MG: 300 TABLET, FILM COATED, EXTENDED RELEASE ORAL at 21:23

## 2021-04-21 RX ADMIN — ZIPRASIDONE HCL 60 MG: 60 CAPSULE ORAL at 17:41

## 2021-04-21 RX ADMIN — LEVOTHYROXINE SODIUM 100 MCG: 50 TABLET ORAL at 08:06

## 2021-04-21 RX ADMIN — OMEPRAZOLE 10 MG: 10 CAPSULE, DELAYED RELEASE ORAL at 08:06

## 2021-04-21 RX ADMIN — OLANZAPINE 15 MG: 10 TABLET, FILM COATED ORAL at 08:06

## 2021-04-21 RX ADMIN — NICOTINE 1 PATCH: 21 PATCH, EXTENDED RELEASE TRANSDERMAL at 08:08

## 2021-04-21 RX ADMIN — LITHIUM CARBONATE 600 MG: 300 TABLET, FILM COATED, EXTENDED RELEASE ORAL at 08:06

## 2021-04-21 RX ADMIN — QUETIAPINE FUMARATE 100 MG: 100 TABLET ORAL at 08:06

## 2021-04-21 RX ADMIN — Medication 30 MG: at 08:06

## 2021-04-21 ASSESSMENT — ACTIVITIES OF DAILY LIVING (ADL)
LAUNDRY: UNABLE TO COMPLETE
DRESS: SCRUBS (BEHAVIORAL HEALTH)
ORAL_HYGIENE: INDEPENDENT
LAUNDRY: UNABLE TO COMPLETE
HYGIENE/GROOMING: HANDWASHING;SHOWER;INDEPENDENT
DRESS: SCRUBS (BEHAVIORAL HEALTH)
ORAL_HYGIENE: INDEPENDENT
HYGIENE/GROOMING: INDEPENDENT

## 2021-04-21 NOTE — PROGRESS NOTES
"[facility], [place]   Psychiatric Progress Note  Hospital Day: 16        Interim History:   The patient's care was discussed with the treatment team during the daily team meeting and/or staff's chart notes were reviewed. Patient was visible in the milieu, watching TV. Presented as pleasant, calm, and cooperative. Social with staff and peers. Not heard making any delusional or grandiose statements. Medication adherent. Denied all mental health symptoms. Requested and received Maalox PRN for acid reflux. Slept well.      Upon interview, Tyler states that his mood is \"good and mellow.\" He reports sleeping well and that his energy is good. He denies any SI or HI. He denies any grandiose delusions, specifically denying that he is  of the hospital as he had previously discussed. He reports acid reflux which he attributes to eating shortly before bed. He denies any other physical symptoms or medication side effects. He asks to be prescribed Ativan PRN at discharge because he has taken it a few times while hospitalized to help with flashbacks that interfere with his sleep. Tyler describes that long-term he would like to discontinue his medications, but he understands that he should only do that with close supervision with a psychiatrist. He describes that abruptly stopping his medication prior to hospitalization caused him to become manic. He agrees with getting another EKG and Lithium level in the hospital as well as getting an EKG, lithium level, TSH, and PCP appointment following discharge. He thanks this writer and notes that he is excited to discharge to [facility]. No other questions or concerns.          Medications:       co-enzyme Q-10  30 mg Oral Daily     fish oil-omega-3 fatty acids  1 g Oral Daily     gabapentin  400 mg Oral TID     levothyroxine  100 mcg Oral QAM AC     lithium ER  600 mg Oral Q12H JASWINDER     multivitamin, therapeutic  1 tablet Oral Daily     nicotine  1 " "patch Transdermal Daily     nicotine   Transdermal Q8H     OLANZapine  15 mg Oral BID     omeprazole  10 mg Oral QAM AC     QUEtiapine  100 mg Oral BID     ziprasidone  60 mg Oral BID w/meals          Allergies:     Allergies   Allergen Reactions     Benztropine      Other reaction(s): Seizures  Patient reported  Patient reported       Abilify Discmelt      Suicidal thoughts       Aripiprazole Other (See Comments)     Other reaction(s): Unknown  Suicidal thoughts  Suicidal thoughts       Benzyl Alcohol      Other reaction(s): Unknown     Compazine Other (See Comments)     Seizure      Haloperidol Other (See Comments)     Other reaction(s): Dystonia  Patient had EPS with haldol  Patient had EPS with haldol  Patient had EPS with haldol       Phenothiazines Other (See Comments)     Other reaction(s): Dystonia, Muscle Aches/Weakness, Muscle Cramps, Seizures  Seizures  Patient report  Seizures            Labs:     Recent Results (from the past 24 hour(s))   Asymptomatic SARS-CoV-2 COVID-19 Virus (Coronavirus) by PCR    Collection Time: 04/20/21  1:00 PM    Specimen: Nasopharyngeal   Result Value Ref Range    SARS-CoV-2 Virus Specimen Source Nasal     SARS-CoV-2 PCR Result NEGATIVE     SARS-CoV-2 PCR Comment (Note)           Psychiatric Examination:     /70   Pulse 103   Temp 98.2  F (36.8  C) (Oral)   Resp 16   Ht 1.676 m (5' 6\")   Wt 89.4 kg (197 lb 3.2 oz)   SpO2 100%   BMI 31.83 kg/m    Weight is 197 lbs 3.2 oz  Body mass index is 31.83 kg/m .    Weight over time:  Vitals:    04/05/21 2118 04/06/21 0750 04/13/21 0800 04/18/21 0839   Weight: 85.1 kg (187 lb 11.2 oz) 85.8 kg (189 lb 3.2 oz) 89.4 kg (197 lb 3.2 oz) 89.4 kg (197 lb 3.2 oz)       Orthostatic Vitals       Most Recent      Sitting Orthostatic /83 04/07 0810    Sitting Orthostatic Pulse (bpm) 128 04/07 0810    Standing Orthostatic /71 04/06 0750    Standing Orthostatic Pulse (bpm) 132 04/06 0750        Cardiometabolic risk assessment. " "04/07/21    Reviewed patient profile for cardiometabolic risk factors    Date taken /Value  REFERENCE RANGE   Abdominal Obesity  (Waist Circumference)   See nursing flowsheet Women ?35 in (88 cm)   Men ?40 in (102 cm)      Triglycerides  Triglycerides   Date Value Ref Range Status   04/06/2021 228 (H) <150 mg/dL Final     Comment:     Borderline high:  150-199 mg/dl  High:             200-499 mg/dl  Very high:       >499 mg/dl         ?150 mg/dL (1.7 mmol/L) or current treatment for elevated triglycerides   HDL cholesterol  HDL Cholesterol   Date Value Ref Range Status   04/06/2021 39 (L) >39 mg/dL Final   ]   Women <50 mg/dL (1.3 mmol/L) in women or current treatment for low HDL cholesterol  Men <40 mg/dL (1 mmol/L) in men or current treatment for low HDL cholesterol     Fasting plasma glucose (FPG) Lab Results   Component Value Date     04/06/2021      FPG ?100 mg/dL (5.6 mmol/L) or treatment for elevated blood glucose   Blood pressure  BP Readings from Last 3 Encounters:   04/21/21 117/70   10/15/20 106/78   04/24/20 138/83    Blood pressure ?130/85 mmHg or treatment for elevated blood pressure   Family History  See family history   Appearance: dressed in hospital scrubs. Hair and beard tied with ponytails. Wearing  dangling earring. Slightly disheveled  Attitude:  cooperative  Eye Contact:  good  Mood:  \"mellow, good\"  Affect:  appropriate and in normal range and mood congruent  Speech:  normal rate, volume, and quantity  Language: fluent and intact in English  Psychomotor, Gait, Musculoskeletal:  no evidence of tardive dyskinesia, dystonia, or tics. No psychomotor agitation  Throught Process:  logical, linear and goal oriented   Associations:  no loose associations  Thought Content: Denies SI/HI. No overt evidence of psychotic thought.  Insight: fair  Judgement:  fair, adherent to medication and meets with team  Oriented to:  oriented to person, place, time.   Attention Span and Concentration:  " fair  Recent and Remote Memory:  limited  Fund of Knowledge:  appropriate    Clinical Global Impressions  First:  Considering your total clinical experience with this particular patient population, how severe are the patient's symptoms at this time?: 7 (04/06/21 1428)  Compared to the patient's condition at the START of treatment, this patient's condition is: 4 (04/06/21 1428)  Most recent:  Considering your total clinical experience with this particular patient population, how severe are the patient's symptoms at this time?: 7 (04/06/21 1428)  Compared to the patient's condition at the START of treatment, this patient's condition is: 4 (04/06/21 1428)           Precautions:     Behavioral Orders   Procedures     Assault precautions     Code 1 - Restrict to Unit     Discontinue 1:1 attendant for suicide risk     Order Specific Question:   I have performed an in person assessment of the patient     Answer:   Based on this assessment the patient no longer requires a one on one attendant at this point in time.     Fall precautions     Routine Programming     As clinically indicated     Single Room     Status 15     Every 15 minutes.          Diagnoses:     Bipolar affective disorder, severe, current mood episode manic with psychosis  Suicidal ideation, resolved  Homicidal ideation, resolved  Polysubstance abuse  Cannabis use disorder, unspecified  Cocaine use disorder, severe, remission status unclear  Methamphetamine use disorder, severe, remission status unclear   History of benzodiazepine use disorder per chart  Nicotine dependence   PTSD by history  ADHD by history  Unspecified anxiety by history   S/p foreign body removal from R foot on antibiotics  Elevated blood glucose  Abnormal QTc at 453         Assessment & Plan:     Assessment:  This patient is a 45 year old male with history of bipolar disorder, polysubstance use, several MICD commitments, currently followed by ACT team who presented to ED with altered  mental status in context of being found lying on ground of nursing home altered due to suspected medication non-adherence and ongoing substance use. He has history of several hospitalizations, most recently 2021 at [facility] following a suicide attempt via overdose, he was discharged the next day. He had been under MICD commitment in the last year but commitment has . He is followed by [facility] ACT team, he had been stable in apartment with [facility] services up until past few weeks. It is suspected he has not been adherent to medications and has relapsed on substances. His substances of choice tend to be cocaine, methamphetamine and cannabis. Utox in ED was positive for cannabinoids. His presentation was consistent with manic episode with psychosis. He also endorsed suicidal ideation as well as homicidal ideation with thoughts to murder his parents.     Hospital summary:  Patient was initially admitted to station 30 with Dr. Velázquez on a 72 hour hold. He was placed on assault and fall precautions. He became agitated and intrusive on station 30, so was placed on a 1:1 and transferred to station 12 under the care of Dr. Varner.  Given concern for imminent safety of self given psychosis and denisha symptoms along with reported SI and HI, petition for MICD commitment and Frausto with olanzapine, quetiapine, ziprasidone, and risperidone was filed. His ACT team was contacted and was in agreement with admission and petition for commitment and Frausto. He was restarted on his PTA medications though at lower doses due to non-adherence. He endorsed homicidal ideation towards his parents and a duty to warn was completed as his parents were notified of this. 1:1 was discontinued on  due to improved intrusive behaviors. Zyprexa was titrated from 15mg at bedtime to 15mg BID. The patient was started on Lithium to target denisha on . Lithium level on 450mg BID was 0.43, so Lithium was further titrated. The patient  has been hospitalized on station 12 multiple times in the past and has historically declined neuroleptic medications and mood stabilizers. He was not amenable to initiating IGNACIO and historically has not followed through with IGNACIO injections in the community. He did agree to titration of Geodon to target ongoing denisha and psychosis on 4/16. EKG was mildly prolonged at 453 with plan repeat EKG. Patient will require a repeat lithium level on 4/26.     Medical Hospital Summary:  In the ED, patient required foreign body removal of glass from his right foot and was started of Keflex for 5 days. TSH was persistently elevated with latest level of 6.21 with two low T4 levels with his latest level of 0.54. Per medicine consult, patient was started on levothyroxine 100mcg daily for hypothyroidism. T3 was normal. Patient requires repeat TSH 1 month after starting levothyroxine (mid-May). Patient will require repeat EKG after discharge.     Changes today:  Repeat EKG today to assess for QTc prolongation given increase in Geodon.  Repeat Lithium level tomorrow prior to discharge.     Target psychiatric symptoms and interventions:  # Bipolar Disorder:  -Lithium 600mg BID. Follow-up lithium level 4/25  -resume PTA quetiapine 100mg BID for mood stabilization and psychosis   -ziprasidone 60 mg BID with meals for psychosis   -resume PTA olanzapine 15mg at bedtime for psychosis and added 15 mg daily on 4/8  -resume PTA gabapentin 400mg TID for anxiety and substance use and pain  -Ativan 1-2 mg TID prn for agitation in context of denisha. Plan to discontinue prior to discharge.     Acute Medical Problems and Treatments:  1) s/p foreign body removal in ED  -pt has been reluctant to let RN assess wounds  -continue to encourage pt to cooperate with cares  -consider WOC consult if needed  -was given one dose of Keflex in ED, this was not ordered to be continued but per ED note should be on Keflex x5 days, ordered Keflex 500mg QID x5 days     2)  Hypothyroidism:  Persistently elevated TSH and low T4. Normal T3. No symptoms of hypothyroidism per patient. Medicine consulted  -Started levothyroxine 100mcg daily  -TPO pending  -Nees repeat TSH in 1 month with PCP     3) Elevated BG on admission   -consider recheck if pt appears hyperglycemic     4) Nicotine dependence  -offer PRN nicotine replacement on unit and upon discharge  - patient on cessation    5) Prolonged QTc at 453 on 4/16/21  - Repeat EKG today    Behavioral/Psychological/Social:  - Encourage unit programming    Safety:  - Continue precautions as noted above  - Status 15 minute checks    Legal Status: Committed and Frausto (Zyprexa, Seroquel, Geodon, Risperdal)    Disposition Plan   Reason for ongoing admission: pending safe discharge plan  Discharge location: [facility] 4/22   Discharge Medications: ordered.  Follow-up Appointments: scheduled     Patient seen and discussed with Dr. Varner.    Luz Santiago MD  Psychiatry PGY-4

## 2021-04-21 NOTE — PROGRESS NOTES
Medicine following Free T3 and TPO AB.    Note free t3 normal, TPO AB negative.    Needs repeat TSH in 1 month, discussed with psychiatry team and patient who understands and is working on setting up PCP on discharge.    Medicine will sign off. Please notify on call JEET if any intercurrent medical issues arise.     Jennifer Rubio PA-C

## 2021-04-21 NOTE — PLAN OF CARE
Problem: Behavior Regulation Impairment (Psychotic Signs/Symptoms)  Goal: Improved Behavioral Control (Psychotic Signs/Symptoms)  Outcome: Improving  Flowsheets (Taken 4/16/2021 1317 by Radha Wasserman RN)  Mutually Determined Action Steps (Improved Behavioral Control):   verbalizes personal treatment goal   verbalizes gratifying activity   identifies future-oriented goal  Patient spent most of the shift watching television in his room, occasionally coming out to make requests. He was pleasant and cooperative. Patient denied all mental health symptoms and was medication compliant. He ate meals and snacks and denied experiencing medication side effects. He complained of acid reflux; requested and was given Maalox PRN. He is hopeful and verbalized that he is excited and looking forward to his upcoming discharge to an ITRS facility. No behavioral or safety concerns during shift.

## 2021-04-21 NOTE — PLAN OF CARE
Pt has been active in the milieu throughout the day and appropriately social with peers and staff. Affect full range. No delusional statements observed. He denies SI/SIB and HI. Reports he is happy about plan for discharge to [facility] tomorrow. Pleasant, cooperative, and calm on approach. No agitation observed. He has been compliant with scheduled medications and denies all side effects. Denies any acid reflux symptoms this morning, which he had c/o last evening.

## 2021-04-21 NOTE — PLAN OF CARE
Current Diagnosis/Procedure:  Bipolar affective disorder, severe, current mood episode manic with psychosis  Polysubstance abuse  Cannabis use disorder, unspecified  Cocaine use disorder, severe, remission status unclear  Methamphetamine use disorder, severe, remission status unclear   History of benzodiazepine use disorder per chart  Nicotine dependence   PTSD by history  ADHD by history  Unspecified anxiety by history      Work Completed:    - Pt's care discussed with treatment team in daily team meeting and chart notes were reviewed.      - Pt discharging to [facility] tomorrow.    - Writer spoke to Na BLANCO from pt's ACT team.  They will need all of his medications filled and sent with him to [facility].     Writer scheduled PCP appointment for pt.   - Primary Care Follow-up  Appointment:  Monday, May 3rd @ 9:20am  w/ Dr. Hola Starr (IN PERSON appointment to get EKG to monitor your QTC levels)   markedup [facility] location  [address] [place] [address], [address]  [place] [address]  Phone: [phone]     -Psychiatric Follow-up Care   [facility] psychiatrist as doesn't cooperate with ACT Team psychiatry,   Appointment: Wednesday, May 5th @ 3pm w/ Emily Lunsford PA-C, - (this is a phone appointment and the doctor will call you)   P: [phone]  F: [phone]  [facility] TO FAX [facility]     Discharge plan or goal:  Pt will discharge to [facility] tomorrow       Barriers to discharge:  Pt will discharge tomorrow

## 2021-04-21 NOTE — PROGRESS NOTES
Pt attended 10 minutes of group, no charge. Pt itroduced himself and stated he was excited about discharging to [facility] tomorrow. Pt shared his experience in previous IRTS and educated the other pt in group around IRTS guidelines and what he can expect. Pt declined to engage in further group discussion.

## 2021-04-22 VITALS
RESPIRATION RATE: 16 BRPM | TEMPERATURE: 97.7 F | OXYGEN SATURATION: 100 % | DIASTOLIC BLOOD PRESSURE: 86 MMHG | HEART RATE: 108 BPM | HEIGHT: 66 IN | WEIGHT: 197.2 LBS | SYSTOLIC BLOOD PRESSURE: 127 MMHG | BODY MASS INDEX: 31.69 KG/M2

## 2021-04-22 LAB
HBA1C MFR BLD: 5.6 % (ref 0–5.6)
LITHIUM SERPL-SCNC: 0.42 MMOL/L (ref 0.6–1.2)

## 2021-04-22 PROCEDURE — 250N000013 HC RX MED GY IP 250 OP 250 PS 637: Performed by: PSYCHIATRY & NEUROLOGY

## 2021-04-22 PROCEDURE — 83036 HEMOGLOBIN GLYCOSYLATED A1C: CPT | Performed by: STUDENT IN AN ORGANIZED HEALTH CARE EDUCATION/TRAINING PROGRAM

## 2021-04-22 PROCEDURE — 99239 HOSP IP/OBS DSCHRG MGMT >30: CPT | Performed by: PSYCHIATRY & NEUROLOGY

## 2021-04-22 PROCEDURE — 36415 COLL VENOUS BLD VENIPUNCTURE: CPT | Performed by: STUDENT IN AN ORGANIZED HEALTH CARE EDUCATION/TRAINING PROGRAM

## 2021-04-22 PROCEDURE — 250N000013 HC RX MED GY IP 250 OP 250 PS 637: Performed by: STUDENT IN AN ORGANIZED HEALTH CARE EDUCATION/TRAINING PROGRAM

## 2021-04-22 PROCEDURE — 250N000013 HC RX MED GY IP 250 OP 250 PS 637: Performed by: PHYSICIAN ASSISTANT

## 2021-04-22 PROCEDURE — 80178 ASSAY OF LITHIUM: CPT | Performed by: STUDENT IN AN ORGANIZED HEALTH CARE EDUCATION/TRAINING PROGRAM

## 2021-04-22 RX ADMIN — Medication 1 G: at 08:31

## 2021-04-22 RX ADMIN — LEVOTHYROXINE SODIUM 100 MCG: 50 TABLET ORAL at 08:32

## 2021-04-22 RX ADMIN — QUETIAPINE FUMARATE 100 MG: 100 TABLET ORAL at 08:32

## 2021-04-22 RX ADMIN — NICOTINE 1 PATCH: 21 PATCH, EXTENDED RELEASE TRANSDERMAL at 09:17

## 2021-04-22 RX ADMIN — THERA TABS 1 TABLET: TAB at 08:31

## 2021-04-22 RX ADMIN — ZIPRASIDONE HCL 60 MG: 60 CAPSULE ORAL at 08:32

## 2021-04-22 RX ADMIN — OMEPRAZOLE 10 MG: 10 CAPSULE, DELAYED RELEASE ORAL at 08:32

## 2021-04-22 RX ADMIN — LITHIUM CARBONATE 600 MG: 300 TABLET, FILM COATED, EXTENDED RELEASE ORAL at 08:31

## 2021-04-22 RX ADMIN — Medication 30 MG: at 08:31

## 2021-04-22 RX ADMIN — GABAPENTIN 400 MG: 400 CAPSULE ORAL at 08:32

## 2021-04-22 RX ADMIN — OLANZAPINE 15 MG: 10 TABLET, FILM COATED ORAL at 08:32

## 2021-04-22 ASSESSMENT — ACTIVITIES OF DAILY LIVING (ADL)
HYGIENE/GROOMING: INDEPENDENT
DRESS: SCRUBS (BEHAVIORAL HEALTH)
ORAL_HYGIENE: INDEPENDENT
LAUNDRY: UNABLE TO COMPLETE

## 2021-04-22 NOTE — DISCHARGE SUMMARY
"Psychiatric Discharge Summary    Tyler Case MRN# [id number]   Age: 45 year old YOB: 1975     Date of Admission:  4/5/2021  Date of Discharge:  4/22/2021 11:20 AM  Admitting Physician:  Milagro Velázquez, DO  Discharge Physician:  Tarun Moulton MD          Event Leading to Hospitalization:   Prior to interviewing patient writer received page while walking to unit that patient was becoming increasingly intrusive, needing constant redirection and was minimally responsive to redirection, he also was spilling coffee and water in various places around the unit and flooding his bathroom. Writer approached patient on unit standing by nursing desk, he agreed to meet in his room, he reported he was waiting for writer because he wants prescriptions. Attempted to assess which symptoms he was having to target with prescriptions but he was not able to give specifics only stating \"all of them\" when asked with prompts with specific symptoms he would reply \"oh yeah\" to every symptom listed. He was disorganized and a poor historian along with having grandiose and delusional statements. When asked about previous hospitalizations he stated \"I built the hospital\" and claimed he invented many things and in regards to substance use stated he is \"the best cannabis farmer on planet [place]\". He stated he has been using \"all the substances\" with exception of alcohol as \"alcohol is dangerous for children\" and he only drinks \"a forth a thimble of whiskey when I need it\" when asked how often he needs it \"every 2 million years\". He reported adherence to his medications but could not recall which medications he took, attempted to review medications with patient and he stated he wanted \"all of them\" restarted. He also reported that \"all medications come from methamphetamine\". He endorsed having suicidal ideation, not able to give plan. He also reported having thoughts to harm his parents, he stated he is \"a homicidal " "maniac\" and \"has wanted to murder [his] parents since they became whatever it is they are\". Tried to have patient further explain what he meant by this but only responded with that he is a \"hyperambitious polar bear and they are fucking with every single space on planet [place]\" and went on to another topic. He later states \"I don't have any mental health issues\" and denied hallucinations stating \"everybody else has them though\". He reported he is feeling nauseated with flatulence and loose stools. Denies other physical health concerns, reports he is not having pain in his foot and when asked how he ended up with glass in his foot he replied \"that is how you walk on water\". He kept repeatedly asking for a mask despite being given one by staff and was not able to keep mask on. Interview was terminated at patients request and remainder of history provided by chart review.      Per chart review last hospitalization was at [facility] 3/5-3/6 following suicide attempt via overdose on medications in context of marijuana use and worsening auditory hallucinations that were command in nature. It was determined that he cleared and denied further safety concerns so was discharge. Prior to this he was hospitalized at [facility] in April 2020 and it was noted that he had intermittent hallucinations at baseline and would have \"episodes\" of these hallucinations. He often would report physical concerns of sweating or genital swelling. He was medication adherent and had following changes to medications: gabapentin was scheduled for anxiety, Geodon increased to target episodes of AVH. He was discharged to [facility].      He continues to be followed by [facility] ACT team, however did not wish to see ACT psychiatrist so has provider at [facility]. Also has [facility] CM and waiver. Team working to coordinate with ACT team.        See Admission note for additional details.          Diagnoses:     Bipolar affective disorder, severe, current mood " episode manic with psychosis  Suicidal ideation, resolved  Homicidal ideation, resolved  Polysubstance abuse  Cannabis use disorder, unspecified  Cocaine use disorder, severe, remission status unclear  Methamphetamine use disorder, severe, remission status unclear   History of benzodiazepine use disorder per chart  Nicotine dependence   PTSD by history  ADHD by history  Unspecified anxiety by history   S/p foreign body removal from R foot on antibiotics  Elevated blood glucose  Abnormal QTc at 453         Labs:        Lab Results   Component Value Date     04/19/2021    Lab Results   Component Value Date    CHLORIDE 106 04/19/2021    Lab Results   Component Value Date    BUN 14 04/19/2021      Lab Results   Component Value Date    POTASSIUM 4.0 04/19/2021    Lab Results   Component Value Date    CO2 23 04/19/2021    Lab Results   Component Value Date    CR 0.83 04/19/2021          Lab Results   Component Value Date    WBC 7.6 04/06/2021    HGB 13.4 04/06/2021    HCT 40.1 04/06/2021    MCV 86 04/06/2021     04/06/2021     Lab Results   Component Value Date    AST 44 04/06/2021    ALT 40 04/06/2021    GGT 25 08/16/2013    ALKPHOS 89 04/06/2021    BILITOTAL 0.2 04/06/2021     Lab Results   Component Value Date    TSH 6.21 (H) 04/19/2021            Consults:   Consultation during this admission received from internal medicine:    Medical Hospital Summary:  In the ED, patient required foreign body removal of glass from his right foot and was started of Keflex for 5 days. TSH was persistently elevated with latest level of 6.21 with two low T4 levels with his latest level of 0.54. Per medicine consult, patient was started on levothyroxine 100mcg daily for hypothyroidism. T3 was normal. Patient requires repeat TSH 1 month after starting levothyroxine (mid-May). Patient will require repeat EKG after discharge.          Hospital Course:   Tyler Case was admitted to Station 30 with attending Milagro  DO Melania and transferred to Station 12 with attending Jennifer Varner MD and Tarun Moulton MD on a 72 hour mental health hold. The patient was placed under status 15 (15 minute checks) to ensure patient safety.   CBC, BMP and utox obtained.    All outpatient medications were continued    Tyler Case did participate in groups and was visible in the milieu.     Hospital summary:  Patient was initially admitted to station 30 with Dr. Velázquez on a 72 hour hold. He was placed on assault and fall precautions. He became agitated and intrusive on station 30, so was placed on a 1:1 and transferred to station 12 under the care of Dr. Varner.  Given concern for imminent safety of self given psychosis and denisha symptoms along with reported SI and HI, petition for MICD commitment and Frausto with olanzapine, quetiapine, ziprasidone, and risperidone was filed. His ACT team was contacted and was in agreement with admission and petition for commitment and Frausto. He was restarted on his PTA medications though at lower doses due to non-adherence. He endorsed homicidal ideation towards his parents and a duty to warn was completed as his parents were notified of this. 1:1 was discontinued on 4/9 due to improved intrusive behaviors. Zyprexa was titrated from 15mg at bedtime to 15mg BID. The patient was started on Lithium to target denisha on 4/12. Lithium level on 450mg BID was 0.43, so Lithium was further titrated. The patient has been hospitalized on station 12 multiple times in the past and has historically declined neuroleptic medications and mood stabilizers. He was not amenable to initiating IGNACIO and historically has not followed through with IGNACIO injections in the community. He did agree to titration of Geodon to target ongoing denisha and psychosis on 4/16. EKG was mildly prolonged at 453 with plan repeat EKG. Patient will require a repeat lithium level on 4/26.     The patient's symptoms of psychosis and denisha  improved.     Tyler Case was released to group home. At the time of discharge Tyler Case was determined to not be a danger to himself or others. At the current time of discharge, the patient does not meet criteria for involuntary hospitalization. On the day of discharge, the patient reports that they do not have suicidal or homicidal ideation and would never hurt themselves or others. Steps taken to minimize risk include: assessing patient s behavior and thought process daily during hospital stay, discharging patient with adequate plan for follow up for mental and physical health and discussing safety plan of returning to the hospital should the patient ever have thoughts of harming themselves or others. Therefore, based on all available evidence including the factors cited above, the patient does not appear to be at imminent risk for self-harm, and is appropriate for outpatient level of care.           Discharge Medications:     Current Discharge Medication List      START taking these medications    Details   artificial saliva (BIOTENE DRY MOUTHWASH) LIQD liquid Swish and spit 15 mLs in mouth 4 times daily as needed for dry mouth  Qty: 473 mL, Refills: 0    Associated Diagnoses: Dry mouth      benzocaine (ORAJEL MAXIMUM STRENGTH) 20 % GEL gel Take by mouth 4 times daily as needed for moderate pain  Qty: 7 g, Refills: 0    Associated Diagnoses: Mouth sore      calcium carbonate (TUMS) 500 MG chewable tablet Take 2 tablets (1,000 mg) by mouth 2 times daily as needed for heartburn  Qty: 60 tablet, Refills: 0    Associated Diagnoses: Gastroesophageal reflux disease without esophagitis      co-enzyme Q-10 30 MG CAPS Take 30 mg by mouth daily  Qty: 30 capsule, Refills: 0    Associated Diagnoses: Routine general medical examination at a health care facility      fish oil-omega-3 fatty acids 1000 MG capsule Take 1 capsule (1 g) by mouth daily  Qty: 30 capsule, Refills: 0    Associated Diagnoses: Routine  general medical examination at a health care facility      hydrOXYzine (ATARAX) 25 MG tablet Take 1 tablet (25 mg) by mouth 2 times daily as needed for anxiety  Qty: 60 tablet, Refills: 0    Associated Diagnoses: Bipolar I, recurrent manic episode, severe with psychotic behavior (H)      levothyroxine (SYNTHROID/LEVOTHROID) 100 MCG tablet Take 1 tablet (100 mcg) by mouth every morning (before breakfast)  Qty: 30 tablet, Refills: 0    Associated Diagnoses: Hypothyroidism, unspecified type      lithium ER (LITHOBID) 300 MG CR tablet Take 2 tablets (600 mg) by mouth every 12 hours  Qty: 120 tablet, Refills: 0    Associated Diagnoses: Bipolar I, recurrent manic episode, severe with psychotic behavior (H)      LORazepam (ATIVAN) 1 MG tablet Take 1 tablet (1 mg) by mouth daily as needed (severe anxiety and sleep disturbances related to severe anxiety)  Qty: 14 tablet, Refills: 0    Associated Diagnoses: Bipolar I, recurrent manic episode, severe with psychotic behavior (H)      multivitamin, therapeutic (THERA-VIT) TABS tablet Take 1 tablet by mouth daily  Qty: 30 tablet, Refills: 0    Associated Diagnoses: Routine general medical examination at a health care facility      naphazoline-pheniramine (NAPHCON A) SOLN ophthalmic solution Place 1 drop into both eyes 4 times daily as needed for irritation  Qty: 15 mL, Refills: 0    Associated Diagnoses: Routine general medical examination at a health care facility      nicotine (NICORETTE) 2 MG gum Place 1-2 each (2-4 mg) inside cheek every hour as needed for smoking cessation  Qty: 240 each, Refills: 0    Associated Diagnoses: Tobacco use disorder      omeprazole (PRILOSEC) 20 MG DR capsule Take 1 capsule (20 mg) by mouth every morning (before breakfast)  Qty: 30 capsule, Refills: 0    Associated Diagnoses: Gastroesophageal reflux disease without esophagitis      senna-docusate (SENOKOT-S/PERICOLACE) 8.6-50 MG tablet Take 1 tablet by mouth 2 times daily as needed for  constipation  Qty: 60 tablet, Refills: 0    Associated Diagnoses: Drug-induced constipation      traZODone (DESYREL) 50 MG tablet Take 1 tablet (50 mg) by mouth nightly as needed for sleep (may repeat after 60 minutes)  Qty: 30 tablet, Refills: 0    Associated Diagnoses: Bipolar I, recurrent manic episode, severe with psychotic behavior (H)         CONTINUE these medications which have CHANGED    Details   gabapentin (NEURONTIN) 400 MG capsule Take 1 capsule (400 mg) by mouth 3 times daily  Qty: 90 capsule, Refills: 0    Associated Diagnoses: Bipolar I, recurrent manic episode, severe with psychotic behavior (H)      OLANZapine (ZYPREXA) 15 MG tablet Take 1 tablet (15 mg) by mouth 2 times daily  Qty: 60 tablet, Refills: 0    Associated Diagnoses: Bipolar I, recurrent manic episode, severe with psychotic behavior (H)      QUEtiapine (SEROQUEL) 100 MG tablet Take 1 tablet (100 mg) by mouth 2 times daily  Qty: 60 tablet, Refills: 0    Associated Diagnoses: Bipolar I, recurrent manic episode, severe with psychotic behavior (H)      ziprasidone (GEODON) 60 MG capsule Take 1 capsule (60 mg) by mouth 2 times daily (with meals)  Qty: 60 capsule, Refills: 0    Associated Diagnoses: Bipolar I, recurrent manic episode, severe with psychotic behavior (H)                  Psychiatric Examination:   Appearance:  awake, alert and adequately groomed  Attitude:  cooperative  Eye Contact:  good  Mood:  good  Affect:  mood congruent  Speech:  clear, coherent  Psychomotor Behavior:  no evidence of tardive dyskinesia, dystonia, or tics  Thought Process:  goal oriented  Associations:  no loose associations  Thought Content:  no evidence of suicidal ideation or homicidal ideation and no evidence of psychotic thought  Insight:  fair  Judgment:  intact  Oriented to:  time, person, and place  Attention Span and Concentration:  intact  Recent and Remote Memory:  fair  Language: Able to read and write  Fund of Knowledge: appropriate  Muscle  Strength and Tone: normal  Gait and Station: Normal         Discharge Plan:   Continue medications as above.     Health Care Follow-up:   - [facility] ACT Team  Main # is [phone]  ACT  is Megan Mccall [phone].  RN delivers meds weekly -  Na Patterson RN @ [phone]  CM: Jett Hernandez xt 110, or cell @ [phone]  FAX: [phone]  [facility] TO FAX AVS     - Lithium Level Check - You need to have your Lithium level checked on Monday, April 26th - 12 hours after your last dose!!!  Walk-in clinic: The doctor has put an order in for these labs and you can walk-in to complete the lab work.   [facility]  Outreach Patient Laboratory  [facility], First Floor  [address] [place] [address]  Phone: [phone]     - CAD[facility] YOU - Mara Juan Jose  Phone: [phone]  Fax: [phone]     -Psychiatric Follow-up Care   [facility] psychiatrist as doesn't cooperate with ACT Team psychiatry,   Appointment: Wednesday, May 5th @ 3pm w/ Emily Lunsford PA-C, - (this is a phone appointment and the doctor will call you)   P: [phone]  F: [phone]  [facility] TO FAX AVS      - Primary Care Follow-up  Appointment:  Monday, May 3rd @ 9:20am  w/ Dr. Hola Starr (IN PERSON appointment to get EKG to monitor your QTC levels)   [facility] location  [address] [place] [address], [address]  [place] [address]  Phone: [phone]    Attestation:    Tarun Moulton MD    The patient was seen and evaluated by me. I spent more than 30 minutes on discharge day activities. Tarun Moulton MD

## 2021-04-22 NOTE — PLAN OF CARE
Pt asleep at start of shift. Breathing quiet and unlabored.     Appears to have slept 5.25 hours.      Pt on  ASSAULT and FALL  precautions in addition to single room order. Any related events noted above.     Will continue to monitor and assess.   Problem: Sleep Disturbance  Goal: Adequate Sleep/Rest  Outcome: No Change

## 2021-04-22 NOTE — PLAN OF CARE
Pt discharged via cab to [facility]. Pt agrees with all discharge instructions and medication instructions. Verbalizes understanding that he has to complete [facility] lab on Monday 4/22/21 at the walk-in clinic. Denies SI/SIB and HI and reports feeling safe to discharge.   Discharge medications and all belongings returned to pt at discharge.

## 2021-04-22 NOTE — DISCHARGE INSTRUCTIONS
Behavioral Discharge Planning and Instructions    Summary: You were admitted on 4/5/2021  due to Schizoaffective disorder, Disorganized Thinking/Behaviors, Delusional Thoughts and Manic Symptomology.  You were treated by Dr. Jennifer Varner and discharged on 4/22/2021 from Station 12 to [facility]      You were dually committed to the [facility] and the Commissioner of Human Services on 4/16/2021.  You are being discharged on a Provisional Discharge Agreement which shall remain in effect for the duration of the Commitment.  Your Commitment expires on 10/15/2021.  You were also court ordered to take the medications the doctor ordered for you.     Main Diagnosis:   Bipolar affective disorder, severe, current mood episode manic with psychosis  Suicidal ideation, resolved  Homicidal ideation, resolved  Polysubstance abuse  Cannabis use disorder, unspecified  Cocaine use disorder, severe, remission status unclear  Methamphetamine use disorder, severe, remission status unclear   History of benzodiazepine use disorder per chart  Nicotine dependence   PTSD by history  ADHD by history  Unspecified anxiety by history   S/p foreign body removal from R foot on antibiotics  Elevated blood glucose  Abnormal QTc at 453    Health Care Follow-up:   - [facility] ACT Team  Main # is [phone]  ACT  is Megan Mullinson [phone].  RN delivers meds weekly -  Na Patterson RN @ [phone]  CM: Jett Hernandez xt 110, or cell @ [phone]  FAX: [phone]  [facility] TO FAX AVS    - Lithium Level Check - You need to have your Lithium level checked on Monday, April 26th - 12 hours after your last dose!!!  Walk-in clinic: The doctor has put an order in for these labs and you can walk-in to complete the lab work.   [facility]  Outreach Patient Laboratory  [facility], First Floor  [address] [place] [address]  Phone: [phone] -  COLTON ORTA Laci Ingram  Phone: [phone]  Fax: [phone]    -Psychiatric Follow-up Care   [facility] psychiatrist as doesn't cooperate with ACT Team psychiatry,   Appointment: Wednesday, May 5th @ 3pm w/ Emily Lunsford PA-C, - (this is a phone appointment and the doctor will call you)   P: [phone]  F: [phone]  [facility] TO FAX AVS      - Primary Care Follow-up  Appointment:  Monday, May 3rd @ 9:20am  w/ Dr. Hola Starr (IN PERSON appointment to get EKG to monitor your QTC levels)   [facility] location  [address] [place] [address], [address]  [place] [address]  Phone: [phone]    Attend all scheduled appointments with your outpatient providers. Call at least 24 hours in advance if you need to reschedule an appointment to ensure continued access to your outpatient providers.     Major Treatments, Procedures and Findings:  You were provided with: a psychiatric assessment, assessed for medical stability, medication evaluation and/or management, group therapy and milieu management    Symptoms to Report: feeling more aggressive, increased confusion, losing more sleep, mood getting worse or thoughts of suicide    Early warning signs can include: increased depression or anxiety sleep disturbances increased thoughts or behaviors of suicide or self-harm  increased unusual thinking, such as paranoia or hearing voices    Safety and Wellness:  Take all medicines as directed.  Make no changes unless your doctor suggests them.      Follow treatment recommendations.  Refrain from alcohol and non-prescribed drugs.  If there is a concern for safety, call 911.    Resources:   Crisis Intervention: 833.633.7072 or 613-246-6269 (TTY: 818.709.1174).  Call anytime for help.  National [place] on Mental Illness (www.[place].luis.org): 134.398.5508 or 282-298-9158.  [place] Association for Children's Mental Health (www.macmh.org): 754.208.6054.  Alcoholics Anonymous  "(www.alcoholics-anonymous.org): Check your phone book for your local chapter.  Suicide Awareness Voices of Education (SAVE) (www.save.org): 345-842-OWZP (5039)  National Suicide Prevention Line (www.mentalhealthmn.org): 023-686-EDXE (9812)  Mental Health Consumer/Survivor Network of [place] (www.mhcsn.net): 913.181.4601 or 547-494-6665  Mental Health Association of [place] (www.mentalhealth.org): 844.451.1050 or 298-888-4261  Self- Management and Recovery Training., SMART-- Toll free: 974.411.3028  www.[employer]  [facility] Crisis (COPE) Response - Adult 724 284-6352  [facility] Crisis Response - Adult 802 838-2899  [facility] Rapid Response 430-860-9577  Text 4 Life: txt \"LIFE\" to 46660 for immediate support and crisis intervention  Crisis text line: Text \"[place]\" to 876182. Free, confidential, 24/7.  Crisis Intervention: 534.858.9787 or 844-424-9441. Call anytime for help.   [facility] Crisis Team - Child: [phone]  [facility] Mental [facility] Crisis Response Team - Child: [phone]    General Medication Instructions:   See your medication sheet(s) for instructions.   Take all medicines as directed.  Make no changes unless your doctor suggests them.   Go to all your doctor visits.  Be sure to have all your required lab tests. This way, your medicines can be refilled on time.  Do not use any drugs not prescribed by your doctor.  Avoid alcohol.    Advance Directives:   Scanned document on file with [employer]? No scanned doc  Is document scanned? Pt states no documents  Honoring Choices Your Rights Handout:    Was more information offered?      The Treatment team has appreciated the opportunity to work with you. If you have any questions or concerns about your recent admission, you can contact the unit which can receive your call 24 hours a day, 7 days a week. They will be able to get in touch with a Provider if needed. The unit number is [phone].  "

## 2021-04-22 NOTE — PLAN OF CARE
Problem: Adult Inpatient Plan of Care  Goal: Plan of Care Review  Outcome: Improving  Flowsheets  Taken 4/21/2021 1917  Progress: improving  Taken 4/21/2021 1743  Plan of Care Reviewed With: patient     Problem: Adult Inpatient Plan of Care  Goal: Readiness for Transition of Care  Outcome: Improving  Flowsheets (Taken 4/21/2021 1917)  Concerns to be Addressed:    compliance issue    medication  Intervention: Mutually Develop Transition Plan  Recent Flowsheet Documentation  Taken 4/21/2021 1917 by Raf Lancaster RN  Concerns to be Addressed:    compliance issue    medication     Patient visible in the milieu throughout the evening calm and social with staff and peers watching television. Patient states overall feeling improved and ready for discharge. Patient reports plan to use supports including IRTS staff and his act team to assist him with medications compliance after discharge. Patient had no observed manic symptoms and denied SI/SIB, AH/VH, anxiety, depression, or thoughts of harming others. Patient independent and accepting of HS medications with no stated or observed side effects. Patient independent with identifying needs and completing ADL's.    Patient late in the evening approached RN writer reporting severe anxiety, and appearing extremely tense. Patient administered and accepting of PRN Ativan and also receptive of essential oils to aid calming. Patient reported anxiety came on as he was lying in bed trying to go to sleep identifying that he possibly watched too much news today possibly triggering the symptom.

## 2021-04-22 NOTE — PLAN OF CARE
Current Diagnosis/Procedure:  Bipolar affective disorder, severe, current mood episode manic with psychosis  Polysubstance abuse  Cannabis use disorder, unspecified  Cocaine use disorder, severe, remission status unclear  Methamphetamine use disorder, severe, remission status unclear   History of benzodiazepine use disorder per chart  Nicotine dependence   PTSD by history  ADHD by history  Unspecified anxiety by history      Work Completed:    - Pt's care discussed with treatment team in daily team meeting and chart notes were reviewed.      - Pt discharging @ 11am for [facility] ANTHON[facility] via cab. He will bring 30 day supply of medications with him.    The following aftercare has been set up for this patient.    - Writer completed Provisional Discharge agreement with the patient and sent to his  April Cal for signature.  After getting it back writer faxed the PD and Change of Status to [facility] court and sent a copy to scanning.     -Medicare Rights form completed.    Health Care Follow-up:   - [facility] ACT Team  Main # is [phone]  ACT  is April Cal [phone].  RN delivers meds weekly -  Na Patterson RN @ [phone]  CM: Jett Hernandez xt 110, or cell @ [phone]  FAX: [phone]  [facility] TO FAX AVS     - Lithium Level Check - You need to have your Lithium level checked on Monday, April 26th - 12 hours after your last dose!!!  Walk-in clinic: The doctor has put an order in for these labs and you can walk-in to complete the lab work.   [facility]  Outreach Patient Laboratory  [facility], First Floor  [address] [place] [id number]  Phone: [phone]     - COLTON Ingram  Phone: [phone]  Fax: [phone]     -Psychiatric Follow-up Care   [facility] psychiatrist as doesn't cooperate with ACT Team psychiatry,   Appointment: Wednesday, May 5th @ 3pm w/ Emily Lunsford  MANOJ, - (this is a phone appointment and the doctor will call you)   P: [phone]  F: [phone]  HUC TO FAX AVS      - Primary Care Follow-up  Appointment:  Monday, May 3rd @ 9:20am  w/ Dr. Hola Starr (IN PERSON appointment to get EKG to monitor your QTC levels)   [facility] location  [address] [place] [address], [address]  [place] [address]  Phone: [phone]        Discharge plan or goal:  Pt will discharge to [facility]       Barriers to discharge:  None.

## 2021-04-23 LAB — INTERPRETATION ECG - MUSE: NORMAL

## 2021-04-27 DIAGNOSIS — F31.2 BIPOLAR I, RECURRENT MANIC EPISODE, SEVERE WITH PSYCHOTIC BEHAVIOR (H): ICD-10-CM

## 2021-04-27 LAB — LITHIUM SERPL-SCNC: 0.58 MMOL/L (ref 0.6–1.2)

## 2021-04-27 PROCEDURE — 80178 ASSAY OF LITHIUM: CPT | Performed by: PSYCHIATRY & NEUROLOGY

## 2021-04-27 PROCEDURE — 36415 COLL VENOUS BLD VENIPUNCTURE: CPT | Performed by: PSYCHIATRY & NEUROLOGY

## 2021-05-18 ENCOUNTER — MEDICAL CORRESPONDENCE (OUTPATIENT)
Dept: HEALTH INFORMATION MANAGEMENT | Facility: CLINIC | Age: 46
End: 2021-05-18

## 2021-05-18 ENCOUNTER — OFFICE VISIT (OUTPATIENT)
Dept: FAMILY MEDICINE | Facility: CLINIC | Age: 46
End: 2021-05-18
Payer: COMMERCIAL

## 2021-05-18 VITALS
WEIGHT: 236.2 LBS | SYSTOLIC BLOOD PRESSURE: 126 MMHG | TEMPERATURE: 98.6 F | BODY MASS INDEX: 38.12 KG/M2 | OXYGEN SATURATION: 96 % | HEART RATE: 101 BPM | DIASTOLIC BLOOD PRESSURE: 86 MMHG

## 2021-05-18 DIAGNOSIS — F31.9 BIPOLAR AFFECTIVE DISORDER, REMISSION STATUS UNSPECIFIED (H): ICD-10-CM

## 2021-05-18 DIAGNOSIS — E03.9 HYPOTHYROIDISM, UNSPECIFIED TYPE: ICD-10-CM

## 2021-05-18 DIAGNOSIS — Z79.899 HIGH RISK MEDICATION USE: ICD-10-CM

## 2021-05-18 DIAGNOSIS — F31.2 BIPOLAR I, RECURRENT MANIC EPISODE, SEVERE WITH PSYCHOTIC BEHAVIOR (H): ICD-10-CM

## 2021-05-18 DIAGNOSIS — F31.9 BIPOLAR AFFECTIVE DISORDER, REMISSION STATUS UNSPECIFIED (H): Primary | ICD-10-CM

## 2021-05-18 DIAGNOSIS — K21.9 GASTROESOPHAGEAL REFLUX DISEASE WITHOUT ESOPHAGITIS: ICD-10-CM

## 2021-05-18 LAB
ANION GAP SERPL CALCULATED.3IONS-SCNC: 6 MMOL/L (ref 3–14)
BUN SERPL-MCNC: 8 MG/DL (ref 7–30)
CALCIUM SERPL-MCNC: 9 MG/DL (ref 8.5–10.1)
CHLORIDE SERPL-SCNC: 105 MMOL/L (ref 94–109)
CO2 SERPL-SCNC: 27 MMOL/L (ref 20–32)
CREAT SERPL-MCNC: 0.97 MG/DL (ref 0.66–1.25)
GFR SERPL CREATININE-BSD FRML MDRD: >90 ML/MIN/{1.73_M2}
GLUCOSE SERPL-MCNC: 79 MG/DL (ref 70–99)
LITHIUM SERPL-SCNC: 0.93 MMOL/L (ref 0.6–1.2)
POTASSIUM SERPL-SCNC: 4 MMOL/L (ref 3.4–5.3)
SODIUM SERPL-SCNC: 138 MMOL/L (ref 133–144)
T4 FREE SERPL-MCNC: 1 NG/DL (ref 0.76–1.46)
TSH SERPL DL<=0.005 MIU/L-ACNC: 0.41 MU/L (ref 0.4–4)

## 2021-05-18 PROCEDURE — 99214 OFFICE O/P EST MOD 30 MIN: CPT | Performed by: FAMILY MEDICINE

## 2021-05-18 PROCEDURE — 36415 COLL VENOUS BLD VENIPUNCTURE: CPT | Performed by: FAMILY MEDICINE

## 2021-05-18 PROCEDURE — 84439 ASSAY OF FREE THYROXINE: CPT | Performed by: FAMILY MEDICINE

## 2021-05-18 PROCEDURE — 80178 ASSAY OF LITHIUM: CPT | Performed by: FAMILY MEDICINE

## 2021-05-18 PROCEDURE — 93000 ELECTROCARDIOGRAM COMPLETE: CPT | Performed by: FAMILY MEDICINE

## 2021-05-18 PROCEDURE — 80048 BASIC METABOLIC PNL TOTAL CA: CPT | Performed by: FAMILY MEDICINE

## 2021-05-18 PROCEDURE — 84443 ASSAY THYROID STIM HORMONE: CPT | Performed by: FAMILY MEDICINE

## 2021-05-18 RX ORDER — LEVOTHYROXINE SODIUM 100 UG/1
100 TABLET ORAL
Qty: 30 TABLET | Refills: 11 | Status: SHIPPED | OUTPATIENT
Start: 2021-05-18

## 2021-05-18 RX ORDER — MULTIVITAMIN,THERAPEUTIC
1 TABLET ORAL DAILY
Qty: 30 TABLET | Refills: 11 | Status: SHIPPED | OUTPATIENT
Start: 2021-05-18

## 2021-05-18 RX ORDER — QUETIAPINE FUMARATE 200 MG/1
200 TABLET, FILM COATED ORAL 2 TIMES DAILY
COMMUNITY
Start: 2021-05-18

## 2021-05-18 NOTE — PROGRESS NOTES
Assessment & Plan     Bipolar affective disorder, remission status unspecified (H)  Patient reports that he is feeling better at this time.  He follows with psychiatry.  There is some evidence of disordered thought content and delusional beliefs in today's visit but they do not appear to be overwhelming to him or dangerous.  - Lithium level; Future    Hypothyroidism, unspecified type  Patient has some degree of hypothyroidism currently on replacement.  I refilled his medication because the group home indicated that was due and check thyroid function today.  - levothyroxine (SYNTHROID/LEVOTHROID) 100 MCG tablet; Take 1 tablet (100 mcg) by mouth every morning (before breakfast)  - TSH; Future  - T4, free; Future    Gastroesophageal reflux disease without esophagitis  Refilled omeprazole as that appeared to be due.  No alarm symptoms.  - omeprazole (PRILOSEC) 20 MG DR capsule; Take 1 capsule (20 mg) by mouth every morning (before breakfast)    Bipolar I, recurrent manic episode, severe with psychotic behavior (H)  The dosing of quetiapine was incorrect when reconciling between our electronic record and the medication list from the group home so was updated to reflect what he is currently taking at the group home.  - QUEtiapine (SEROQUEL) 200 MG tablet; Take 1 tablet (200 mg) by mouth 2 times daily    High risk medication use  Laboratory studies and EKG were checked today.  EKG was reviewed with the patient and normal.  Sinus tachycardia present in the hospital had resolved.  There is no significant QT prolongation.  - EKG 12-lead complete w/read - Clinics  - Lithium level; Future  - Basic metabolic panel  (Ca, Cl, CO2, Creat, Gluc, K, Na, BUN); Future                 Return in about 3 months (around 8/18/2021) for Routine Visit.    Hola Starr MD  [facility]    Rossi Scott is a 45 year old who presents for the following health issues     HPI       Hospital Follow-up  Visit:    Hospital/Nursing Home/IP Rehab Facility: [facility]  Date of Admission: 4/5/2021  Date of Discharge: 4/22/2021  Reason(s) for Admission: Bipolar I, recurrent manic episode, severe with psychotic behavior (H)      Was your hospitalization related to COVID-19? No   Problems taking medications regularly:  None  Medication changes since discharge: None  Problems adhering to non-medication therapy:  None    Summary of hospitalization:  [facility] discharge summary reviewed  Diagnostic Tests/Treatments reviewed.  Follow up needed: none  Other Healthcare Providers Involved in Patient s Care:         Psychiatric care  Update since discharge: stable.       Post Discharge Medication Reconciliation: discharge medications reconciled and changed, per note/orders.  Plan of care communicated with patient              Patient is here to establish care and for follow-up of a recent hospital stay.  I did ask some relatively simple questions today and this elicited some unique thought content around his believing that he has come down from a Galactic star ship and that all Native Americans are from another part of the [place].  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  He expressed some concerns about being overly sedated on his medications as he has been in the past.  Currently he is in the group home for at least 3 months and I believe there may be some type of order to compel him to take his medications at this time.  He does report seeing psychiatry and his medication list was updated in our record today.  Overall he endorses feeling okay.  He reports that he is getting along well with the other residents of the group home and he denies any substance use except for smoking CBD flowers.    Review of Systems   Constitutional, HEENT, cardiovascular, pulmonary, gi and gu systems are negative, except as otherwise noted.      Objective    /86    Pulse 101   Temp 98.6  F (37  C) (Temporal)   Wt 107.1 kg (236 lb 3.2 oz)   SpO2 96%   BMI 38.12 kg/m    Body mass index is 38.12 kg/m .  Physical Exam   GENERAL: healthy, alert and no distress  EYES: Eyes grossly normal to inspection, PERRL and conjunctivae and sclerae normal  NECK: no adenopathy, no asymmetry, masses, or scars and thyroid normal to palpation  RESP: lungs clear to auscultation - no rales, rhonchi or wheezes  CV: regular rate and rhythm, normal S1 S2, no S3 or S4, no murmur, click or rub, no peripheral edema and peripheral pulses strong  MS: no gross musculoskeletal defects noted, no edema  NEURO: Normal strength and tone, mentation intact and speech normal  PSYCH: mentation appears normal, affect normal/bright and appears to have some delusional thought content at this time.    EKG - Reviewed and interpreted by me appears normal, NSR, normal axis, normal intervals, no acute ST/T changes c/w ischemia, no LVH by voltage criteria, unchanged from previous tracings
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
## 2021-06-08 NOTE — TELEPHONE ENCOUNTER
Felipe RN's,     [facility] sent in Diclofenac (Voltaren) 1% gel--patient has not received--please check to see if fax was received-  Please call [facility], [place], at [phone]      Thanks! Sharla Hall RN

## 2021-06-08 NOTE — TELEPHONE ENCOUNTER
Dr Starr    Call to pharm, they were not able to take my call at this time    see med cued, ok with pt having med?    Almaz Millan RN   [facility]

## 2021-06-08 NOTE — TELEPHONE ENCOUNTER
This is not a medication the patient and I have discussed and I am not aware of any particular indications for it.    It was also been requested by pharmacy then is different than the pharmacy he is currently using so I was a bit suspicious that the request may be fraudulent.    No need to act on it at this time.    Hola Starr MD

## 2021-06-11 NOTE — TELEPHONE ENCOUNTER
Reason for Call:  Medication or medication refill:    Do you use a [facility]?  Name of the pharmacy and phone number for the current request:    [id number] - [place], [place] - [address]    Name of the medication requested: DICLOFENAC 1% GEL QTY 300GM     Other request: Could not find this med on Pt medication list to queue it. Per fax instructions listed apply 2.5GM (3 inches) 3-4 times a day as needed for pain    Can we leave a detailed message on this number? Not Applicable    Phone number patient can be reached at: NA none listed       Call taken on 6/11/2021 at 3:05 PM by Aminah Welsh

## 2021-06-14 NOTE — TELEPHONE ENCOUNTER
I am not able to find an indication for this medication and we have been [place]es [place] pharmacy.  No fill was sent to the pharmacy.    Hola Starr MD

## 2021-06-15 ENCOUNTER — TELEPHONE (OUTPATIENT)
Dept: FAMILY MEDICINE | Facility: CLINIC | Age: 46
End: 2021-06-15

## 2021-06-15 NOTE — TELEPHONE ENCOUNTER
Called pt regarding this request as we do not have any record of pt taking this medication - pt reports that he has never taken citalopram. Closing this encounter.    Jenifer Quinn RN  [facility]

## 2021-06-15 NOTE — TELEPHONE ENCOUNTER
Reason for Call:  Medication or medication refill:    Do you use a [facility]?  Name of the pharmacy and phone number for the current request:  [facility] [id number] [place] [address] (aka [id number])    Name of the medication requested: CITALOPRAM HBR 10MG TABLET    Other request: NA    Can we leave a detailed message on this number? Not Applicable    Phone number patient can be reached at: Other phone number:  [phone]      Call taken on 6/15/2021 at 4:50 PM by Aminah Welsh

## 2023-05-06 NOTE — PLAN OF CARE
He will need to come in   Did we give any courtesy refills ?  If so, then no further refills   If no, then we can give him another month supply      Problem: Patient Care Overview  Goal: Team Discussion  Team Plan:   BEHAVIORAL TEAM DISCUSSION     Participants: FLORIAN Mckeon, Abdelrahman Gill MD, Jacques Ivey RN  Progress: Minimal, Pt has been off his medicine and is manic and delusional.  Continued Stay Criteria/Rationale: Pt needs stabilization and could pose a threat to the community if discharged  Medical/Physical: Monitored by Internal medicine if needed.  Precautions:   Behavioral Orders   Procedures     Assault precautions     Code 1 - Restrict to Unit     Elopement precautions     Routine Programming       As clinically indicated     Status 15       Every 15 minutes.     Withdrawal precautions     Plan: Continue hospitalization, stabilize medications, stabilize moods and behaviors.  Rationale for change in precautions or plan: no changes.